# Patient Record
Sex: MALE | Race: WHITE | NOT HISPANIC OR LATINO | Employment: OTHER | URBAN - METROPOLITAN AREA
[De-identification: names, ages, dates, MRNs, and addresses within clinical notes are randomized per-mention and may not be internally consistent; named-entity substitution may affect disease eponyms.]

---

## 2017-12-21 ENCOUNTER — OFFICE VISIT (OUTPATIENT)
Dept: CARDIOLOGY | Facility: CLINIC | Age: 67
End: 2017-12-21
Payer: MEDICARE

## 2017-12-21 VITALS
DIASTOLIC BLOOD PRESSURE: 60 MMHG | HEART RATE: 105 BPM | SYSTOLIC BLOOD PRESSURE: 100 MMHG | WEIGHT: 243 LBS | BODY MASS INDEX: 35.99 KG/M2 | HEIGHT: 69 IN

## 2017-12-21 DIAGNOSIS — R06.02 SHORTNESS OF BREATH: ICD-10-CM

## 2017-12-21 DIAGNOSIS — E78.5 HYPERLIPIDEMIA, UNSPECIFIED HYPERLIPIDEMIA TYPE: ICD-10-CM

## 2017-12-21 DIAGNOSIS — I67.9 CVD (CEREBROVASCULAR DISEASE): ICD-10-CM

## 2017-12-21 DIAGNOSIS — E11.9 TYPE 2 DIABETES MELLITUS WITHOUT COMPLICATION, WITHOUT LONG-TERM CURRENT USE OF INSULIN: ICD-10-CM

## 2017-12-21 DIAGNOSIS — I10 ESSENTIAL HYPERTENSION: Primary | ICD-10-CM

## 2017-12-21 DIAGNOSIS — I63.00 CEREBROVASCULAR ACCIDENT (CVA) DUE TO THROMBOSIS OF PRECEREBRAL ARTERY: ICD-10-CM

## 2017-12-21 PROCEDURE — 99499 UNLISTED E&M SERVICE: CPT | Mod: S$GLB,,, | Performed by: INTERNAL MEDICINE

## 2017-12-21 PROCEDURE — 99999 PR PBB SHADOW E&M-NEW PATIENT-LVL III: CPT | Mod: PBBFAC,,, | Performed by: INTERNAL MEDICINE

## 2017-12-21 PROCEDURE — 93000 ELECTROCARDIOGRAM COMPLETE: CPT | Mod: S$GLB,,, | Performed by: INTERNAL MEDICINE

## 2017-12-21 PROCEDURE — 99213 OFFICE O/P EST LOW 20 MIN: CPT | Mod: S$GLB,,, | Performed by: INTERNAL MEDICINE

## 2017-12-21 RX ORDER — OMEGA-3 FATTY ACIDS 1000 MG
CAPSULE ORAL
COMMUNITY

## 2017-12-21 NOTE — PROGRESS NOTES
Patient, Christiano Frost (MRN #[id number]), presented with a recorded BMI of 35.88 kg/m^2 and a documented comorbidity(s):  - Diabetes Mellitus Type 2  - Hypertension  - Hyperlipidemia  to which the severe obesity is a contributing factor. This is consistent with the definition of severe obesity (BMI 35.0-35.9) with comorbidity (ICD-10 E66.01, Z68.35). The patient's severe obesity was monitored, evaluated, addressed and/or treated. This addendum to the medical record is made on 12/21/2017.

## 2017-12-21 NOTE — PROGRESS NOTES
Subjective:   Patient ID:  Christiano Frost is a 67 y.o. male     Chief Complaint   Patient presents with    Follow-up       HPI: Pt is scheduled for elective back fusion possibly in February or March at [facility] by Dr Edenilson Santiago.  Pt's main problem is chronic low back pain. Pt requests cardiac clearance.    Review of Systems   Cardiovascular: Positive for chest pain (Pt had hours of right lateral chest pain worse in certain positions which has since abated.) and dyspnea on exertion (Chronic shortness of breath after walking a block---pt attributes the shortness of breath to obesity). Negative for claudication, irregular heartbeat, leg swelling, near-syncope, orthopnea, palpitations and syncope.         Last stress test was several years ago.   Objective:   Physical Exam   Constitutional: He is oriented to person, place, and time. He appears well-developed and well-nourished. No distress.   HENT:   Head: Normocephalic.   Eyes: No scleral icterus.   Neck: No JVD present.   Cardiovascular: Normal rate, regular rhythm and normal heart sounds.  Exam reveals no gallop and no friction rub.    No murmur heard.  Pulmonary/Chest: Effort normal and breath sounds normal. No stridor.   Musculoskeletal: He exhibits no edema.   Neurological: He is alert and oriented to person, place, and time.   Skin: Skin is warm and dry. He is not diaphoretic.   Psychiatric: He has a normal mood and affect. His behavior is normal. Judgment and thought content normal.   Vitals reviewed.  Pt is overwight    ECG: sinus tachycardia at 105 bpm, otherwise WNL  Assessment:     1. Essential hypertension    2. CVD (cerebrovascular disease)    3. Cerebrovascular accident (CVA) due to thrombosis of precerebral artery    4. Hyperlipidemia, unspecified hyperlipidemia type    5. Type 2 diabetes mellitus without complication, without long-term current use of insulin    6. Shortness of breath        Plan:   Pt is clinically stable for back surgery  I  explained to pt that there is a risk of stroke if the ASA is held  Will get pre-op Lexiscan Cardiolite stress test  Same meds  Wt reducing diet  Walk more  F/u with Dr Sanjana sanchez orders labs  RTC 6 months

## 2018-01-02 ENCOUNTER — HOSPITAL ENCOUNTER (OUTPATIENT)
Dept: RADIOLOGY | Facility: HOSPITAL | Age: 68
Discharge: HOME | End: 2018-01-02
Attending: INTERNAL MEDICINE
Payer: MEDICARE

## 2018-01-02 ENCOUNTER — HOSPITAL ENCOUNTER (OUTPATIENT)
Dept: CARDIOLOGY | Facility: HOSPITAL | Age: 68
Discharge: HOME | End: 2018-01-02
Attending: INTERNAL MEDICINE
Payer: MEDICARE

## 2018-01-02 ENCOUNTER — TELEPHONE (OUTPATIENT)
Dept: CARDIOLOGY | Facility: CLINIC | Age: 68
End: 2018-01-02

## 2018-01-02 DIAGNOSIS — R06.02 SHORTNESS OF BREATH: ICD-10-CM

## 2018-01-02 LAB — DIASTOLIC DYSFUNCTION: NO

## 2018-01-02 PROCEDURE — 78452 HT MUSCLE IMAGE SPECT MULT: CPT | Mod: 26,,, | Performed by: RADIOLOGY

## 2018-01-02 PROCEDURE — 93018 CV STRESS TEST I&R ONLY: CPT | Mod: ,,, | Performed by: INTERNAL MEDICINE

## 2018-01-02 PROCEDURE — A9502 TC99M TETROFOSMIN: HCPCS

## 2018-01-02 PROCEDURE — 93016 CV STRESS TEST SUPVJ ONLY: CPT | Mod: ,,, | Performed by: INTERNAL MEDICINE

## 2018-07-03 ENCOUNTER — OFFICE VISIT (OUTPATIENT)
Dept: CARDIOLOGY | Facility: CLINIC | Age: 68
End: 2018-07-03
Payer: MEDICARE

## 2018-07-03 VITALS
SYSTOLIC BLOOD PRESSURE: 117 MMHG | WEIGHT: 232.69 LBS | DIASTOLIC BLOOD PRESSURE: 80 MMHG | BODY MASS INDEX: 34.47 KG/M2 | HEIGHT: 69 IN | HEART RATE: 86 BPM

## 2018-07-03 DIAGNOSIS — E78.5 HYPERLIPIDEMIA, UNSPECIFIED HYPERLIPIDEMIA TYPE: ICD-10-CM

## 2018-07-03 DIAGNOSIS — E11.9 TYPE 2 DIABETES MELLITUS WITHOUT COMPLICATION, WITHOUT LONG-TERM CURRENT USE OF INSULIN: ICD-10-CM

## 2018-07-03 DIAGNOSIS — I10 ESSENTIAL HYPERTENSION: Primary | ICD-10-CM

## 2018-07-03 PROCEDURE — 99213 OFFICE O/P EST LOW 20 MIN: CPT | Mod: S$GLB,,, | Performed by: INTERNAL MEDICINE

## 2018-07-03 PROCEDURE — 93000 ELECTROCARDIOGRAM COMPLETE: CPT | Mod: S$GLB,,, | Performed by: INTERNAL MEDICINE

## 2018-07-03 PROCEDURE — 3079F DIAST BP 80-89 MM HG: CPT | Mod: CPTII,S$GLB,, | Performed by: INTERNAL MEDICINE

## 2018-07-03 PROCEDURE — 99999 PR PBB SHADOW E&M-EST. PATIENT-LVL II: CPT | Mod: PBBFAC,,, | Performed by: INTERNAL MEDICINE

## 2018-07-03 PROCEDURE — 3074F SYST BP LT 130 MM HG: CPT | Mod: CPTII,S$GLB,, | Performed by: INTERNAL MEDICINE

## 2019-01-08 ENCOUNTER — OFFICE VISIT (OUTPATIENT)
Dept: CARDIOLOGY | Facility: CLINIC | Age: 69
End: 2019-01-08
Payer: MEDICARE

## 2019-01-08 VITALS
DIASTOLIC BLOOD PRESSURE: 75 MMHG | WEIGHT: 240.88 LBS | SYSTOLIC BLOOD PRESSURE: 114 MMHG | BODY MASS INDEX: 35.68 KG/M2 | HEIGHT: 69 IN | HEART RATE: 100 BPM

## 2019-01-08 DIAGNOSIS — I67.9 CVD (CEREBROVASCULAR DISEASE): ICD-10-CM

## 2019-01-08 DIAGNOSIS — I63.00 CEREBROVASCULAR ACCIDENT (CVA) DUE TO THROMBOSIS OF PRECEREBRAL ARTERY: Primary | ICD-10-CM

## 2019-01-08 DIAGNOSIS — E11.9 TYPE 2 DIABETES MELLITUS WITHOUT COMPLICATION, WITHOUT LONG-TERM CURRENT USE OF INSULIN: ICD-10-CM

## 2019-01-08 DIAGNOSIS — E78.00 PURE HYPERCHOLESTEROLEMIA: ICD-10-CM

## 2019-01-08 DIAGNOSIS — I10 ESSENTIAL HYPERTENSION: ICD-10-CM

## 2019-01-08 PROCEDURE — 1101F PR PT FALLS ASSESS DOC 0-1 FALLS W/OUT INJ PAST YR: ICD-10-PCS | Mod: CPTII,S$GLB,, | Performed by: INTERNAL MEDICINE

## 2019-01-08 PROCEDURE — 99999 PR PBB SHADOW E&M-EST. PATIENT-LVL II: CPT | Mod: PBBFAC,,, | Performed by: INTERNAL MEDICINE

## 2019-01-08 PROCEDURE — 3074F PR MOST RECENT SYSTOLIC BLOOD PRESSURE < 130 MM HG: ICD-10-PCS | Mod: CPTII,S$GLB,, | Performed by: INTERNAL MEDICINE

## 2019-01-08 PROCEDURE — 99999 PR PBB SHADOW E&M-EST. PATIENT-LVL II: ICD-10-PCS | Mod: PBBFAC,,, | Performed by: INTERNAL MEDICINE

## 2019-01-08 PROCEDURE — 99499 RISK ADDL DX/OHS AUDIT: ICD-10-PCS | Mod: S$GLB,,, | Performed by: INTERNAL MEDICINE

## 2019-01-08 PROCEDURE — 93000 EKG 12-LEAD: ICD-10-PCS | Mod: S$GLB,,, | Performed by: INTERNAL MEDICINE

## 2019-01-08 PROCEDURE — 3078F DIAST BP <80 MM HG: CPT | Mod: CPTII,S$GLB,, | Performed by: INTERNAL MEDICINE

## 2019-01-08 PROCEDURE — 99213 PR OFFICE/OUTPT VISIT, EST, LEVL III, 20-29 MIN: ICD-10-PCS | Mod: 25,S$GLB,, | Performed by: INTERNAL MEDICINE

## 2019-01-08 PROCEDURE — 99499 UNLISTED E&M SERVICE: CPT | Mod: S$GLB,,, | Performed by: INTERNAL MEDICINE

## 2019-01-08 PROCEDURE — 3078F PR MOST RECENT DIASTOLIC BLOOD PRESSURE < 80 MM HG: ICD-10-PCS | Mod: CPTII,S$GLB,, | Performed by: INTERNAL MEDICINE

## 2019-01-08 PROCEDURE — 93000 ELECTROCARDIOGRAM COMPLETE: CPT | Mod: S$GLB,,, | Performed by: INTERNAL MEDICINE

## 2019-01-08 PROCEDURE — 3074F SYST BP LT 130 MM HG: CPT | Mod: CPTII,S$GLB,, | Performed by: INTERNAL MEDICINE

## 2019-01-08 PROCEDURE — 1101F PT FALLS ASSESS-DOCD LE1/YR: CPT | Mod: CPTII,S$GLB,, | Performed by: INTERNAL MEDICINE

## 2019-01-08 PROCEDURE — 99213 OFFICE O/P EST LOW 20 MIN: CPT | Mod: 25,S$GLB,, | Performed by: INTERNAL MEDICINE

## 2019-01-08 NOTE — PROGRESS NOTES
Subjective:      Patient ID: Christiano Frost is a 68 y.o. male.    Chief Complaint: Follow-up (Hypertension - -BP up after working out.)    HPI:  Getting over a cold. Pt has a cough with scanty clear sputum.  Seeing urologist today.    Review of Systems   Cardiovascular: Positive for dyspnea on exertion (chronic, with significant exertion). Negative for chest pain, claudication, irregular heartbeat, leg swelling, near-syncope, orthopnea, palpitations and syncope.      Has nocturia q 3 hours.    Past Medical History:   Diagnosis Date    BPH (benign prostatic hyperplasia)     Depression     Diabetes mellitus     GERD (gastroesophageal reflux disease)     Hyperlipidemia     Hypertension     Obesity     TIA (transient ischemic attack) 0322/2016        Past Surgical History:   Procedure Laterality Date    SPINE SURGERY  2044 2005 2008       No family history on file.    Social History     Socioeconomic History    Marital status:      Spouse name: None    Number of children: None    Years of education: None    Highest education level: None   Social Needs    Financial resource strain: None    Food insecurity - worry: None    Food insecurity - inability: None    Transportation needs - medical: None    Transportation needs - non-medical: None   Occupational History    None   Tobacco Use    Smoking status: Never Smoker    Smokeless tobacco: Never Used   Substance and Sexual Activity    Alcohol use: None    Drug use: None    Sexual activity: None   Other Topics Concern    None   Social History Narrative    None       Current Outpatient Medications on File Prior to Visit   Medication Sig Dispense Refill    aspirin (ECOTRIN) 81 MG EC tablet Take 81 mg by mouth once daily.      atorvastatin (LIPITOR) 10 MG tablet Take 10 mg by mouth once daily.       dutasteride (AVODART) 0.5 mg capsule Take 0.5 mg by mouth once daily.      enalapril (VASOTEC) 5 MG tablet TK 1 T PO  QD  0    escitalopram  "oxalate (LEXAPRO) 10 MG tablet Take 10 mg by mouth once daily.       metformin (GLUCOPHAGE) 500 MG tablet 500 mg daily with breakfast.       omega-3 fatty acids (FISH OIL CONCENTRATE) 1,000 mg Cap Take by mouth.      tamsulosin (FLOMAX) 0.4 mg Cp24 0.4 mg once daily.        No current facility-administered medications on file prior to visit.        Review of patient's allergies indicates:  No Known Allergies  Objective:     Vitals:    01/08/19 1111   BP: 114/75   BP Location: Left arm   Patient Position: Sitting   BP Method: Large (Automatic)   Pulse: 100   Weight: 109.3 kg (240 lb 14.4 oz)   Height: 5' 9" (1.753 m)        Physical Exam   Constitutional: He is oriented to person, place, and time. He appears well-developed and well-nourished. No distress.   Eyes: No scleral icterus.   Neck: No JVD present. Carotid bruit is not present.   Cardiovascular: Regular rhythm and normal heart sounds. Exam reveals no gallop and no friction rub.   No murmur heard.  Pulmonary/Chest: Effort normal and breath sounds normal. No respiratory distress.   Musculoskeletal: He exhibits no edema.   Neurological: He is alert and oriented to person, place, and time.   Skin: Skin is warm and dry. He is not diaphoretic.   Psychiatric: He has a normal mood and affect. His behavior is normal. Judgment and thought content normal.   Vitals reviewed.     Wt up 8 lbs    ECG--NSR, WNL  Assessment:     1. Cerebrovascular accident (CVA) due to thrombosis of precerebral artery    2. CVD (cerebrovascular disease)    3. Essential hypertension    4. Pure hypercholesterolemia    5. Type 2 diabetes mellitus without complication, without long-term current use of insulin      Plan:   Christiano was seen today for follow-up.    Diagnoses and all orders for this visit:    Cerebrovascular accident (CVA) due to thrombosis of precerebral artery    CVD (cerebrovascular disease)    Essential hypertension  -     IN OFFICE EKG 12-LEAD (to [place])    Pure " hypercholesterolemia    Type 2 diabetes mellitus without complication, without long-term current use of insulin     f/u with urologist--Dr Webster    RTC 6 months    F/u with Dr Allan    Discussed diet    Follow-up in about 6 months (around 7/8/2019).

## 2019-01-08 NOTE — PROGRESS NOTES
Patient, Christiano Frost (MRN #[id number]), presented with a recorded BMI of 35.57 kg/m^2 and a documented comorbidity(s):  - Diabetes Mellitus Type 2  - Hypertension  - Hyperlipidemia  to which the severe obesity is a contributing factor. This is consistent with the definition of severe obesity (BMI 35.0-39.9) with comorbidity (ICD-10 E66.01, Z68.35). The patient's severe obesity was monitored, evaluated, addressed and/or treated. This addendum to the medical record is made on 01/08/2019.

## 2019-07-09 ENCOUNTER — OFFICE VISIT (OUTPATIENT)
Dept: CARDIOLOGY | Facility: CLINIC | Age: 69
End: 2019-07-09
Payer: MEDICARE

## 2019-07-09 VITALS
BODY MASS INDEX: 36.17 KG/M2 | HEIGHT: 69 IN | WEIGHT: 244.19 LBS | SYSTOLIC BLOOD PRESSURE: 134 MMHG | DIASTOLIC BLOOD PRESSURE: 85 MMHG | HEART RATE: 97 BPM | OXYGEN SATURATION: 90 %

## 2019-07-09 DIAGNOSIS — I67.9 CVD (CEREBROVASCULAR DISEASE): ICD-10-CM

## 2019-07-09 DIAGNOSIS — I63.00 CEREBROVASCULAR ACCIDENT (CVA) DUE TO THROMBOSIS OF PRECEREBRAL ARTERY: ICD-10-CM

## 2019-07-09 DIAGNOSIS — I10 ESSENTIAL HYPERTENSION: Primary | ICD-10-CM

## 2019-07-09 DIAGNOSIS — E78.00 PURE HYPERCHOLESTEROLEMIA: ICD-10-CM

## 2019-07-09 DIAGNOSIS — E11.9 TYPE 2 DIABETES MELLITUS WITHOUT COMPLICATION, WITHOUT LONG-TERM CURRENT USE OF INSULIN: ICD-10-CM

## 2019-07-09 PROCEDURE — 3079F DIAST BP 80-89 MM HG: CPT | Mod: CPTII,S$GLB,, | Performed by: INTERNAL MEDICINE

## 2019-07-09 PROCEDURE — 3075F SYST BP GE 130 - 139MM HG: CPT | Mod: CPTII,S$GLB,, | Performed by: INTERNAL MEDICINE

## 2019-07-09 PROCEDURE — 1101F PR PT FALLS ASSESS DOC 0-1 FALLS W/OUT INJ PAST YR: ICD-10-PCS | Mod: CPTII,S$GLB,, | Performed by: INTERNAL MEDICINE

## 2019-07-09 PROCEDURE — 93000 ELECTROCARDIOGRAM COMPLETE: CPT | Mod: S$GLB,,, | Performed by: INTERNAL MEDICINE

## 2019-07-09 PROCEDURE — 1101F PT FALLS ASSESS-DOCD LE1/YR: CPT | Mod: CPTII,S$GLB,, | Performed by: INTERNAL MEDICINE

## 2019-07-09 PROCEDURE — 3079F PR MOST RECENT DIASTOLIC BLOOD PRESSURE 80-89 MM HG: ICD-10-PCS | Mod: CPTII,S$GLB,, | Performed by: INTERNAL MEDICINE

## 2019-07-09 PROCEDURE — 99999 PR PBB SHADOW E&M-EST. PATIENT-LVL III: ICD-10-PCS | Mod: PBBFAC,,, | Performed by: INTERNAL MEDICINE

## 2019-07-09 PROCEDURE — 99214 OFFICE O/P EST MOD 30 MIN: CPT | Mod: S$GLB,,, | Performed by: INTERNAL MEDICINE

## 2019-07-09 PROCEDURE — 99999 PR PBB SHADOW E&M-EST. PATIENT-LVL III: CPT | Mod: PBBFAC,,, | Performed by: INTERNAL MEDICINE

## 2019-07-09 PROCEDURE — 3075F PR MOST RECENT SYSTOLIC BLOOD PRESS GE 130-139MM HG: ICD-10-PCS | Mod: CPTII,S$GLB,, | Performed by: INTERNAL MEDICINE

## 2019-07-09 PROCEDURE — 93000 EKG 12-LEAD: ICD-10-PCS | Mod: S$GLB,,, | Performed by: INTERNAL MEDICINE

## 2019-07-09 PROCEDURE — 99214 PR OFFICE/OUTPT VISIT, EST, LEVL IV, 30-39 MIN: ICD-10-PCS | Mod: S$GLB,,, | Performed by: INTERNAL MEDICINE

## 2019-07-09 RX ORDER — DUREZOL 0.5 MG/ML
EMULSION OPHTHALMIC
Refills: 5 | COMMUNITY
Start: 2019-05-10 | End: 2022-05-03

## 2019-07-09 RX ORDER — BROMFENAC 0.76 MG/ML
SOLUTION/ DROPS OPHTHALMIC
Refills: 2 | COMMUNITY
Start: 2019-05-30 | End: 2022-05-03

## 2019-07-09 RX ORDER — ESOMEPRAZOLE MAGNESIUM 40 MG/1
CAPSULE, DELAYED RELEASE ORAL
COMMUNITY
Start: 2019-03-08

## 2019-07-09 NOTE — PROGRESS NOTES
Subjective:      Patient ID: Christiano Frost is a 68 y.o. male.    Chief Complaint: Follow-up (Hypertension)    HPI:  Feels OK.    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    Back feels better since surgery with Dr Santiago    Walks a little    Rides stationary bike.    Lifts weights.    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    Review of Systems   Cardiovascular: Negative for chest pain, claudication, dyspnea on exertion, irregular heartbeat, leg swelling, near-syncope, orthopnea, palpitations and syncope.      Travelling to [facility]  Past Medical History:   Diagnosis Date    BPH (benign prostatic hyperplasia)     Depression     Diabetes mellitus     GERD (gastroesophageal reflux disease)     Hyperlipidemia     Hypertension     Obesity     TIA (transient ischemic attack) 0322/2016        Past Surgical History:   Procedure Laterality Date    SPINE SURGERY  2044 2005 2008       No family history on file.    Social History     Socioeconomic History    Marital status:      Spouse name: Not on file    Number of children: Not on file    Years of education: Not on file    Highest education level: Not on file   Occupational History    Not on file   Social Needs    Financial resource strain: Not on file    Food insecurity:     Worry: Not on file     Inability: Not on file    Transportation needs:     Medical: Not on file     Non-medical: Not on file   Tobacco Use    Smoking status: Never Smoker    Smokeless tobacco: Never Used   Substance and Sexual Activity    Alcohol use: Not on file    Drug use: Not on file    Sexual activity: Not on file   Lifestyle    Physical activity:     Days per week: Not on file     Minutes per session: Not on file    Stress: Not on file   Relationships    Social connections:     Talks on phone: Not on file     Gets together: Not on file     Attends Christianity service: Not on file     Active member of club or organization: Not on file  "    Attends meetings of clubs or organizations: Not on file     Relationship status: Not on file   Other Topics Concern    Not on file   Social History Narrative    Not on file       Current Outpatient Medications on File Prior to Visit   Medication Sig Dispense Refill    aspirin (ECOTRIN) 81 MG EC tablet Take 81 mg by mouth once daily.      atorvastatin (LIPITOR) 10 MG tablet Take 10 mg by mouth once daily.       BROMSITE 0.075 % Drop INSTILL 1 DROP INTO RIGHT EYE ONCE A DAY  2    DUREZOL 0.05 % Drop ophthalmic solution INSTILL 1 DROP INTO AFFECTED EYE ONCE A DAY AS DIRECTED  5    dutasteride (AVODART) 0.5 mg capsule Take 0.5 mg by mouth once daily.      enalapril (VASOTEC) 5 MG tablet TK 1 T PO  QD  0    escitalopram oxalate (LEXAPRO) 10 MG tablet Take 10 mg by mouth once daily.       esomeprazole (NEXIUM) 40 MG capsule Take by mouth.      metformin (GLUCOPHAGE) 500 MG tablet 500 mg daily with breakfast.       omega-3 fatty acids (FISH OIL CONCENTRATE) 1,000 mg Cap Take by mouth.      tamsulosin (FLOMAX) 0.4 mg Cp24 0.4 mg once daily.        No current facility-administered medications on file prior to visit.        Review of patient's allergies indicates:  No Known Allergies  Objective:     Vitals:    07/09/19 1135   BP: 134/85   BP Location: Left arm   Patient Position: Sitting   BP Method: Large (Automatic)   Pulse: 97   SpO2: (!) 90%   Weight: 110.7 kg (244 lb 2.6 oz)   Height: 5' 9" (1.753 m)        Physical Exam   Constitutional: He is oriented to person, place, and time. He appears well-developed and well-nourished. No distress.   Eyes: No scleral icterus.   Neck: No JVD present. Carotid bruit is not present.   Cardiovascular: Regular rhythm and normal heart sounds. Exam reveals no gallop and no friction rub.   No murmur heard.  Pulmonary/Chest: Effort normal and breath sounds normal. No respiratory distress.   Musculoskeletal: He exhibits no edema.   Neurological: He is alert and oriented to " person, place, and time.   Skin: Skin is warm and dry. He is not diaphoretic.   Psychiatric: He has a normal mood and affect. His behavior is normal. Judgment and thought content normal.   Vitals reviewed.     Wt up 3 lbs    ECG: NSR, first degree AV block.    Note Cardiolite stress test in 2017 was normal    Assessment:     1. Essential hypertension    2. Pure hypercholesterolemia    3. Cerebrovascular accident (CVA) due to thrombosis of precerebral artery    4. CVD (cerebrovascular disease)    5. Type 2 diabetes mellitus without complication, without long-term current use of insulin      Plan:   Christiano was seen today for follow-up.    Diagnoses and all orders for this visit:    Essential hypertension    Pure hypercholesterolemia    Cerebrovascular accident (CVA) due to thrombosis of precerebral artery    CVD (cerebrovascular disease)    Type 2 diabetes mellitus without complication, without long-term current use of insulin    Other orders  -     IN OFFICE EKG 12-LEAD (to [place])     Pt has appt with Dr Allan in August with lab    Same meds    If dizziness recurs would consider decreasing the enalapril to 2.5 mg daily since the dizziness may be due to orthostatic hypotension    Follow up in about 6 months (around 1/9/2020).

## 2020-01-21 ENCOUNTER — OFFICE VISIT (OUTPATIENT)
Dept: CARDIOLOGY | Facility: CLINIC | Age: 70
End: 2020-01-21
Payer: MEDICARE

## 2020-01-21 VITALS
WEIGHT: 241.88 LBS | HEART RATE: 104 BPM | SYSTOLIC BLOOD PRESSURE: 132 MMHG | HEIGHT: 69 IN | DIASTOLIC BLOOD PRESSURE: 86 MMHG | BODY MASS INDEX: 35.82 KG/M2 | OXYGEN SATURATION: 93 %

## 2020-01-21 DIAGNOSIS — I69.398: ICD-10-CM

## 2020-01-21 DIAGNOSIS — F07.0: ICD-10-CM

## 2020-01-21 DIAGNOSIS — I67.9 CVD (CEREBROVASCULAR DISEASE): ICD-10-CM

## 2020-01-21 DIAGNOSIS — E78.00 PURE HYPERCHOLESTEROLEMIA: ICD-10-CM

## 2020-01-21 DIAGNOSIS — R06.02 SHORTNESS OF BREATH: ICD-10-CM

## 2020-01-21 DIAGNOSIS — I10 ESSENTIAL HYPERTENSION: Primary | ICD-10-CM

## 2020-01-21 DIAGNOSIS — E11.9 TYPE 2 DIABETES MELLITUS WITHOUT COMPLICATION, WITHOUT LONG-TERM CURRENT USE OF INSULIN: ICD-10-CM

## 2020-01-21 PROCEDURE — 3079F PR MOST RECENT DIASTOLIC BLOOD PRESSURE 80-89 MM HG: ICD-10-PCS | Mod: CPTII,S$GLB,, | Performed by: INTERNAL MEDICINE

## 2020-01-21 PROCEDURE — 3075F SYST BP GE 130 - 139MM HG: CPT | Mod: CPTII,S$GLB,, | Performed by: INTERNAL MEDICINE

## 2020-01-21 PROCEDURE — 93000 ELECTROCARDIOGRAM COMPLETE: CPT | Mod: S$GLB,,, | Performed by: INTERNAL MEDICINE

## 2020-01-21 PROCEDURE — 1159F MED LIST DOCD IN RCRD: CPT | Mod: S$GLB,,, | Performed by: INTERNAL MEDICINE

## 2020-01-21 PROCEDURE — 99213 PR OFFICE/OUTPT VISIT, EST, LEVL III, 20-29 MIN: ICD-10-PCS | Mod: 25,S$GLB,, | Performed by: INTERNAL MEDICINE

## 2020-01-21 PROCEDURE — 1101F PT FALLS ASSESS-DOCD LE1/YR: CPT | Mod: CPTII,S$GLB,, | Performed by: INTERNAL MEDICINE

## 2020-01-21 PROCEDURE — 1101F PR PT FALLS ASSESS DOC 0-1 FALLS W/OUT INJ PAST YR: ICD-10-PCS | Mod: CPTII,S$GLB,, | Performed by: INTERNAL MEDICINE

## 2020-01-21 PROCEDURE — 99999 PR PBB SHADOW E&M-EST. PATIENT-LVL III: ICD-10-PCS | Mod: PBBFAC,,, | Performed by: INTERNAL MEDICINE

## 2020-01-21 PROCEDURE — 1159F PR MEDICATION LIST DOCUMENTED IN MEDICAL RECORD: ICD-10-PCS | Mod: S$GLB,,, | Performed by: INTERNAL MEDICINE

## 2020-01-21 PROCEDURE — 93000 EKG 12-LEAD: ICD-10-PCS | Mod: S$GLB,,, | Performed by: INTERNAL MEDICINE

## 2020-01-21 PROCEDURE — 99999 PR PBB SHADOW E&M-EST. PATIENT-LVL III: CPT | Mod: PBBFAC,,, | Performed by: INTERNAL MEDICINE

## 2020-01-21 PROCEDURE — 99213 OFFICE O/P EST LOW 20 MIN: CPT | Mod: 25,S$GLB,, | Performed by: INTERNAL MEDICINE

## 2020-01-21 PROCEDURE — 3079F DIAST BP 80-89 MM HG: CPT | Mod: CPTII,S$GLB,, | Performed by: INTERNAL MEDICINE

## 2020-01-21 PROCEDURE — 3075F PR MOST RECENT SYSTOLIC BLOOD PRESS GE 130-139MM HG: ICD-10-PCS | Mod: CPTII,S$GLB,, | Performed by: INTERNAL MEDICINE

## 2020-01-21 NOTE — PROGRESS NOTES
Subjective:      Patient ID: Christiano Frost is a 69 y.o. male.    Chief Complaint: Follow-up (Hypetension)    HPI:  Sees an orthopedic for right knee pain.    Rides a stationary bike.    Pt c/o mild shortness of breath when taking the trash out    Review of Systems   Cardiovascular: Positive for dyspnea on exertion. Negative for chest pain, claudication, irregular heartbeat, leg swelling, near-syncope, orthopnea, palpitations and syncope.      Wife has told pt that he has had a personality change since his TIA/stroke    Past Medical History:   Diagnosis Date    BPH (benign prostatic hyperplasia)     Depression     Diabetes mellitus     GERD (gastroesophageal reflux disease)     Hyperlipidemia     Hypertension     Obesity     TIA (transient ischemic attack) 0322/2016        Past Surgical History:   Procedure Laterality Date    SPINE SURGERY  2044 2005 2008       No family history on file.    Social History     Socioeconomic History    Marital status:      Spouse name: Not on file    Number of children: Not on file    Years of education: Not on file    Highest education level: Not on file   Occupational History    Not on file   Social Needs    Financial resource strain: Not on file    Food insecurity:     Worry: Not on file     Inability: Not on file    Transportation needs:     Medical: Not on file     Non-medical: Not on file   Tobacco Use    Smoking status: Never Smoker    Smokeless tobacco: Never Used   Substance and Sexual Activity    Alcohol use: Not on file    Drug use: Not on file    Sexual activity: Not on file   Lifestyle    Physical activity:     Days per week: Not on file     Minutes per session: Not on file    Stress: Not on file   Relationships    Social connections:     Talks on phone: Not on file     Gets together: Not on file     Attends Cheondoism service: Not on file     Active member of club or organization: Not on file     Attends meetings of clubs or  "organizations: Not on file     Relationship status: Not on file   Other Topics Concern    Not on file   Social History Narrative    Not on file       Current Outpatient Medications on File Prior to Visit   Medication Sig Dispense Refill    aspirin (ECOTRIN) 81 MG EC tablet Take 81 mg by mouth once daily.      atorvastatin (LIPITOR) 10 MG tablet Take 10 mg by mouth once daily.       BROMSITE 0.075 % Drop INSTILL 1 DROP INTO RIGHT EYE ONCE A DAY  2    DUREZOL 0.05 % Drop ophthalmic solution INSTILL 1 DROP INTO AFFECTED EYE ONCE A DAY AS DIRECTED  5    dutasteride (AVODART) 0.5 mg capsule Take 0.5 mg by mouth once daily.      enalapril (VASOTEC) 5 MG tablet TK 1 T PO  QD  0    esomeprazole (NEXIUM) 40 MG capsule Take by mouth.      metformin (GLUCOPHAGE) 500 MG tablet 500 mg daily with breakfast.       omega-3 fatty acids (FISH OIL CONCENTRATE) 1,000 mg Cap Take by mouth.      tamsulosin (FLOMAX) 0.4 mg Cp24 0.4 mg once daily.       escitalopram oxalate (LEXAPRO) 10 MG tablet Take 10 mg by mouth once daily.        No current facility-administered medications on file prior to visit.        Review of patient's allergies indicates:  No Known Allergies  Objective:     Vitals:    01/21/20 1120   BP: 132/86   BP Location: Left arm   Patient Position: Sitting   BP Method: Large (Automatic)   Pulse: 104   SpO2: (!) 93%   Weight: 109.7 kg (241 lb 13.5 oz)   Height: 5' 9" (1.753 m)        Physical Exam   Constitutional: He is oriented to person, place, and time. He appears well-developed and well-nourished. No distress.   Eyes: No scleral icterus.   Neck: No JVD present. Carotid bruit is not present.   Cardiovascular: Regular rhythm and normal heart sounds. Exam reveals no gallop and no friction rub.   No murmur heard.  Pulmonary/Chest: Effort normal and breath sounds normal. No respiratory distress.   Musculoskeletal: He exhibits no edema.   Neurological: He is alert and oriented to person, place, and time.   Skin: " Skin is warm and dry. He is not diaphoretic.   Psychiatric: He has a normal mood and affect. His behavior is normal. Judgment and thought content normal.   Vitals reviewed.     ECG: NSR, WNL    Wt down 3 lbs  Assessment:     1. Essential hypertension    2. Pure hypercholesterolemia    3. Type 2 diabetes mellitus without complication, without long-term current use of insulin    4. Shortness of breath    5. CVD (cerebrovascular disease)    6. 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

## 2020-01-21 NOTE — PROGRESS NOTES
Patient, Christiano Frost (MRN #[id number]), presented with a recorded BMI of 35.71 kg/m^2 and a documented comorbidity(s):  - Diabetes Mellitus Type 2  - Hypertension  - Hyperlipidemia  to which the severe obesity is a contributing factor. This is consistent with the definition of severe obesity (BMI 35.0-39.9) with comorbidity (ICD-10 E66.01, Z68.35). The patient's severe obesity was monitored, evaluated, addressed and/or treated. This addendum to the medical record is made on 01/21/2020.

## 2020-07-13 ENCOUNTER — TELEPHONE (OUTPATIENT)
Dept: CARDIOLOGY | Facility: CLINIC | Age: 70
End: 2020-07-13

## 2020-07-13 NOTE — TELEPHONE ENCOUNTER
Reached out to patient rescheduled her appointment for August with Dr. Meyers.      ----- Message from Jazmin Marshall sent at 7/13/2020  1:23 PM CDT -----  Contact: pt  Pt returning a call to the clinic       Please contact pt: [phone] (cell) or [phone] (home)

## 2020-07-13 NOTE — TELEPHONE ENCOUNTER
August appointment with Dr Meyers needs to be rescheduled.  Reached out to patient.  No answer, left voice mail message.

## 2020-10-27 ENCOUNTER — OFFICE VISIT (OUTPATIENT)
Dept: CARDIOLOGY | Facility: CLINIC | Age: 70
End: 2020-10-27
Payer: MEDICARE

## 2020-10-27 VITALS
OXYGEN SATURATION: 94 % | SYSTOLIC BLOOD PRESSURE: 136 MMHG | WEIGHT: 241.75 LBS | HEIGHT: 69 IN | HEART RATE: 93 BPM | DIASTOLIC BLOOD PRESSURE: 79 MMHG | BODY MASS INDEX: 35.81 KG/M2

## 2020-10-27 DIAGNOSIS — E11.9 TYPE 2 DIABETES MELLITUS WITHOUT COMPLICATION, WITHOUT LONG-TERM CURRENT USE OF INSULIN: ICD-10-CM

## 2020-10-27 DIAGNOSIS — F07.0: ICD-10-CM

## 2020-10-27 DIAGNOSIS — I10 ESSENTIAL HYPERTENSION: Primary | ICD-10-CM

## 2020-10-27 DIAGNOSIS — R06.02 SHORTNESS OF BREATH: ICD-10-CM

## 2020-10-27 DIAGNOSIS — I67.9 CVD (CEREBROVASCULAR DISEASE): ICD-10-CM

## 2020-10-27 DIAGNOSIS — E78.5 HYPERLIPIDEMIA, UNSPECIFIED HYPERLIPIDEMIA TYPE: ICD-10-CM

## 2020-10-27 DIAGNOSIS — I69.398: ICD-10-CM

## 2020-10-27 PROCEDURE — 3075F SYST BP GE 130 - 139MM HG: CPT | Mod: CPTII,S$GLB,, | Performed by: INTERNAL MEDICINE

## 2020-10-27 PROCEDURE — 1159F MED LIST DOCD IN RCRD: CPT | Mod: S$GLB,,, | Performed by: INTERNAL MEDICINE

## 2020-10-27 PROCEDURE — 3075F PR MOST RECENT SYSTOLIC BLOOD PRESS GE 130-139MM HG: ICD-10-PCS | Mod: CPTII,S$GLB,, | Performed by: INTERNAL MEDICINE

## 2020-10-27 PROCEDURE — 3008F BODY MASS INDEX DOCD: CPT | Mod: CPTII,S$GLB,, | Performed by: INTERNAL MEDICINE

## 2020-10-27 PROCEDURE — 1101F PR PT FALLS ASSESS DOC 0-1 FALLS W/OUT INJ PAST YR: ICD-10-PCS | Mod: CPTII,S$GLB,, | Performed by: INTERNAL MEDICINE

## 2020-10-27 PROCEDURE — 3008F PR BODY MASS INDEX (BMI) DOCUMENTED: ICD-10-PCS | Mod: CPTII,S$GLB,, | Performed by: INTERNAL MEDICINE

## 2020-10-27 PROCEDURE — 93000 EKG 12-LEAD: ICD-10-PCS | Mod: S$GLB,,, | Performed by: INTERNAL MEDICINE

## 2020-10-27 PROCEDURE — 1126F PR PAIN SEVERITY QUANTIFIED, NO PAIN PRESENT: ICD-10-PCS | Mod: S$GLB,,, | Performed by: INTERNAL MEDICINE

## 2020-10-27 PROCEDURE — 99214 PR OFFICE/OUTPT VISIT, EST, LEVL IV, 30-39 MIN: ICD-10-PCS | Mod: 25,S$GLB,, | Performed by: INTERNAL MEDICINE

## 2020-10-27 PROCEDURE — 1159F PR MEDICATION LIST DOCUMENTED IN MEDICAL RECORD: ICD-10-PCS | Mod: S$GLB,,, | Performed by: INTERNAL MEDICINE

## 2020-10-27 PROCEDURE — 93000 ELECTROCARDIOGRAM COMPLETE: CPT | Mod: S$GLB,,, | Performed by: INTERNAL MEDICINE

## 2020-10-27 PROCEDURE — 99999 PR PBB SHADOW E&M-EST. PATIENT-LVL IV: ICD-10-PCS | Mod: PBBFAC,,, | Performed by: INTERNAL MEDICINE

## 2020-10-27 PROCEDURE — 3078F PR MOST RECENT DIASTOLIC BLOOD PRESSURE < 80 MM HG: ICD-10-PCS | Mod: CPTII,S$GLB,, | Performed by: INTERNAL MEDICINE

## 2020-10-27 PROCEDURE — 1101F PT FALLS ASSESS-DOCD LE1/YR: CPT | Mod: CPTII,S$GLB,, | Performed by: INTERNAL MEDICINE

## 2020-10-27 PROCEDURE — 99214 OFFICE O/P EST MOD 30 MIN: CPT | Mod: 25,S$GLB,, | Performed by: INTERNAL MEDICINE

## 2020-10-27 PROCEDURE — 99999 PR PBB SHADOW E&M-EST. PATIENT-LVL IV: CPT | Mod: PBBFAC,,, | Performed by: INTERNAL MEDICINE

## 2020-10-27 PROCEDURE — 1126F AMNT PAIN NOTED NONE PRSNT: CPT | Mod: S$GLB,,, | Performed by: INTERNAL MEDICINE

## 2020-10-27 PROCEDURE — 3078F DIAST BP <80 MM HG: CPT | Mod: CPTII,S$GLB,, | Performed by: INTERNAL MEDICINE

## 2020-10-27 RX ORDER — IBUPROFEN 400 MG/1
TABLET ORAL
Status: ON HOLD | COMMUNITY
Start: 2020-10-15 | End: 2022-08-03 | Stop reason: HOSPADM

## 2020-10-27 RX ORDER — HYDROCODONE BITARTRATE AND ACETAMINOPHEN 10; 325 MG/1; MG/1
TABLET ORAL
Status: ON HOLD | COMMUNITY
Start: 2020-10-08 | End: 2022-08-03 | Stop reason: HOSPADM

## 2020-10-27 NOTE — PROGRESS NOTES
Subjective:      Patient ID: Christiano Frost is a 69 y.o. male.    Chief Complaint: Follow-up (Hypertension)    HPI:  Rides bike.    Pt has been limited by back pain.    Pt got constipated on pain meds.    Right knee hurt for a week.    The chronic shortness of breath with exertion has improved with riding bike.    Pt sees Dr Santiago for chronic low back pain.    Review of Systems   Cardiovascular: Positive for dyspnea on exertion (improved overall) and leg swelling (  mild chronic, intermittent). Negative for chest pain, claudication, irregular heartbeat, near-syncope, orthopnea, palpitations and syncope.        Past Medical History:   Diagnosis Date    BPH (benign prostatic hyperplasia)     Depression     Diabetes mellitus     GERD (gastroesophageal reflux disease)     Hyperlipidemia     Hypertension     Obesity     TIA (transient ischemic attack) 0322/2016        Past Surgical History:   Procedure Laterality Date    SPINE SURGERY  2044 2005 2008       History reviewed. No pertinent family history.    Social History     Socioeconomic History    Marital status:      Spouse name: Not on file    Number of children: Not on file    Years of education: Not on file    Highest education level: Not on file   Occupational History    Not on file   Social Needs    Financial resource strain: Not on file    Food insecurity     Worry: Not on file     Inability: Not on file    Transportation needs     Medical: Not on file     Non-medical: Not on file   Tobacco Use    Smoking status: Never Smoker    Smokeless tobacco: Never Used   Substance and Sexual Activity    Alcohol use: Not on file    Drug use: Not on file    Sexual activity: Not on file   Lifestyle    Physical activity     Days per week: Not on file     Minutes per session: Not on file    Stress: Not on file   Relationships    Social connections     Talks on phone: Not on file     Gets together: Not on file     Attends [employer] service: Not  "on file     Active member of club or organization: Not on file     Attends meetings of clubs or organizations: Not on file     Relationship status: Not on file   Other Topics Concern    Not on file   Social History Narrative    Not on file       Current Outpatient Medications on File Prior to Visit   Medication Sig Dispense Refill    aspirin (ECOTRIN) 81 MG EC tablet Take 81 mg by mouth once daily.      atorvastatin (LIPITOR) 10 MG tablet Take 10 mg by mouth once daily.       BROMSITE 0.075 % Drop INSTILL 1 DROP INTO RIGHT EYE ONCE A DAY  2    DUREZOL 0.05 % Drop ophthalmic solution INSTILL 1 DROP INTO AFFECTED EYE ONCE A DAY AS DIRECTED  5    dutasteride (AVODART) 0.5 mg capsule Take 0.5 mg by mouth once daily.      enalapril (VASOTEC) 5 MG tablet TK 1 T PO  QD  0    escitalopram oxalate (LEXAPRO) 10 MG tablet Take 10 mg by mouth once daily.       esomeprazole (NEXIUM) 40 MG capsule Take by mouth.      HYDROcodone-acetaminophen (NORCO)  mg per tablet TK 1 T PO TID FOR 7 DAYS      ibuprofen (ADVIL,MOTRIN) 400 MG tablet       metformin (GLUCOPHAGE) 500 MG tablet 500 mg daily with breakfast.       omega-3 fatty acids (FISH OIL CONCENTRATE) 1,000 mg Cap Take by mouth.      tamsulosin (FLOMAX) 0.4 mg Cp24 0.4 mg once daily.        No current facility-administered medications on file prior to visit.        Review of patient's allergies indicates:  No Known Allergies  Objective:     Vitals:    10/27/20 1504 10/27/20 1526   BP: (!) 151/84 136/79   BP Location: Left arm Left arm   Patient Position: Sitting Sitting   BP Method: Large (Automatic)    Pulse: 93    SpO2: (!) 94%    Weight: 109.6 kg (241 lb 11.8 oz)    Height: 5' 9" (1.753 m)         Physical Exam   Constitutional: He is oriented to person, place, and time. He appears well-developed and well-nourished. No distress.   Eyes: No scleral icterus.   Neck: No JVD present. Carotid bruit is not present.   Cardiovascular: Regular rhythm and normal " heart sounds. Exam reveals no gallop and no friction rub.   No murmur heard.  Pulmonary/Chest: Effort normal and breath sounds normal. No respiratory distress.   Musculoskeletal:         General: Edema (trace pitting edema bilaterally) present.   Neurological: He is alert and oriented to person, place, and time.   Skin: Skin is warm and dry. He is not diaphoretic.   Psychiatric: He has a normal mood and affect. His behavior is normal. Judgment and thought content normal.   Vitals reviewed.     ECG: NSR, WNL      Assessment:     1. Essential hypertension    2. BMI 35.0-35.9,adult    3. Hyperlipidemia, unspecified hyperlipidemia type    4. Type 2 diabetes mellitus without complication, without long-term current use of insulin    5. Shortness of breath    6. 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 CVD (cerebrovascular disease)      Plan:   Christiano was seen today for follow-up.    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

## 2021-05-10 ENCOUNTER — TELEPHONE (OUTPATIENT)
Dept: CARDIOLOGY | Facility: CLINIC | Age: 71
End: 2021-05-10

## 2021-10-05 ENCOUNTER — OFFICE VISIT (OUTPATIENT)
Dept: CARDIOLOGY | Facility: CLINIC | Age: 71
End: 2021-10-05
Payer: MEDICARE

## 2021-10-05 VITALS
BODY MASS INDEX: 35.23 KG/M2 | HEART RATE: 90 BPM | HEIGHT: 69 IN | DIASTOLIC BLOOD PRESSURE: 70 MMHG | WEIGHT: 237.88 LBS | SYSTOLIC BLOOD PRESSURE: 118 MMHG

## 2021-10-05 DIAGNOSIS — R06.02 SHORTNESS OF BREATH: ICD-10-CM

## 2021-10-05 DIAGNOSIS — I10 ESSENTIAL HYPERTENSION: Primary | ICD-10-CM

## 2021-10-05 DIAGNOSIS — I67.9 CVD (CEREBROVASCULAR DISEASE): ICD-10-CM

## 2021-10-05 DIAGNOSIS — F07.0: ICD-10-CM

## 2021-10-05 DIAGNOSIS — E11.9 TYPE 2 DIABETES MELLITUS WITHOUT COMPLICATION, WITHOUT LONG-TERM CURRENT USE OF INSULIN: ICD-10-CM

## 2021-10-05 DIAGNOSIS — I69.398: ICD-10-CM

## 2021-10-05 DIAGNOSIS — E78.5 HYPERLIPIDEMIA, UNSPECIFIED HYPERLIPIDEMIA TYPE: ICD-10-CM

## 2021-10-05 PROCEDURE — 99999 PR PBB SHADOW E&M-EST. PATIENT-LVL III: CPT | Mod: PBBFAC,,, | Performed by: INTERNAL MEDICINE

## 2021-10-05 PROCEDURE — 99999 PR PBB SHADOW E&M-EST. PATIENT-LVL III: ICD-10-PCS | Mod: PBBFAC,,, | Performed by: INTERNAL MEDICINE

## 2021-10-05 PROCEDURE — 3078F PR MOST RECENT DIASTOLIC BLOOD PRESSURE < 80 MM HG: ICD-10-PCS | Mod: CPTII,S$GLB,, | Performed by: INTERNAL MEDICINE

## 2021-10-05 PROCEDURE — 3078F DIAST BP <80 MM HG: CPT | Mod: CPTII,S$GLB,, | Performed by: INTERNAL MEDICINE

## 2021-10-05 PROCEDURE — 1159F MED LIST DOCD IN RCRD: CPT | Mod: CPTII,S$GLB,, | Performed by: INTERNAL MEDICINE

## 2021-10-05 PROCEDURE — 3008F PR BODY MASS INDEX (BMI) DOCUMENTED: ICD-10-PCS | Mod: CPTII,S$GLB,, | Performed by: INTERNAL MEDICINE

## 2021-10-05 PROCEDURE — 4010F ACE/ARB THERAPY RXD/TAKEN: CPT | Mod: CPTII,S$GLB,, | Performed by: INTERNAL MEDICINE

## 2021-10-05 PROCEDURE — 99213 PR OFFICE/OUTPT VISIT, EST, LEVL III, 20-29 MIN: ICD-10-PCS | Mod: 25,S$GLB,, | Performed by: INTERNAL MEDICINE

## 2021-10-05 PROCEDURE — 93000 EKG 12-LEAD: ICD-10-PCS | Mod: S$GLB,,, | Performed by: INTERNAL MEDICINE

## 2021-10-05 PROCEDURE — 1101F PT FALLS ASSESS-DOCD LE1/YR: CPT | Mod: CPTII,S$GLB,, | Performed by: INTERNAL MEDICINE

## 2021-10-05 PROCEDURE — 3008F BODY MASS INDEX DOCD: CPT | Mod: CPTII,S$GLB,, | Performed by: INTERNAL MEDICINE

## 2021-10-05 PROCEDURE — 99213 OFFICE O/P EST LOW 20 MIN: CPT | Mod: 25,S$GLB,, | Performed by: INTERNAL MEDICINE

## 2021-10-05 PROCEDURE — 1126F AMNT PAIN NOTED NONE PRSNT: CPT | Mod: CPTII,S$GLB,, | Performed by: INTERNAL MEDICINE

## 2021-10-05 PROCEDURE — 1101F PR PT FALLS ASSESS DOC 0-1 FALLS W/OUT INJ PAST YR: ICD-10-PCS | Mod: CPTII,S$GLB,, | Performed by: INTERNAL MEDICINE

## 2021-10-05 PROCEDURE — 1160F PR REVIEW ALL MEDS BY PRESCRIBER/CLIN PHARMACIST DOCUMENTED: ICD-10-PCS | Mod: CPTII,S$GLB,, | Performed by: INTERNAL MEDICINE

## 2021-10-05 PROCEDURE — 4010F PR ACE/ARB THEARPY RXD/TAKEN: ICD-10-PCS | Mod: CPTII,S$GLB,, | Performed by: INTERNAL MEDICINE

## 2021-10-05 PROCEDURE — 3288F FALL RISK ASSESSMENT DOCD: CPT | Mod: CPTII,S$GLB,, | Performed by: INTERNAL MEDICINE

## 2021-10-05 PROCEDURE — 1126F PR PAIN SEVERITY QUANTIFIED, NO PAIN PRESENT: ICD-10-PCS | Mod: CPTII,S$GLB,, | Performed by: INTERNAL MEDICINE

## 2021-10-05 PROCEDURE — 93000 ELECTROCARDIOGRAM COMPLETE: CPT | Mod: S$GLB,,, | Performed by: INTERNAL MEDICINE

## 2021-10-05 PROCEDURE — 1160F RVW MEDS BY RX/DR IN RCRD: CPT | Mod: CPTII,S$GLB,, | Performed by: INTERNAL MEDICINE

## 2021-10-05 PROCEDURE — 1159F PR MEDICATION LIST DOCUMENTED IN MEDICAL RECORD: ICD-10-PCS | Mod: CPTII,S$GLB,, | Performed by: INTERNAL MEDICINE

## 2021-10-05 PROCEDURE — 3074F PR MOST RECENT SYSTOLIC BLOOD PRESSURE < 130 MM HG: ICD-10-PCS | Mod: CPTII,S$GLB,, | Performed by: INTERNAL MEDICINE

## 2021-10-05 PROCEDURE — 3288F PR FALLS RISK ASSESSMENT DOCUMENTED: ICD-10-PCS | Mod: CPTII,S$GLB,, | Performed by: INTERNAL MEDICINE

## 2021-10-05 PROCEDURE — 3074F SYST BP LT 130 MM HG: CPT | Mod: CPTII,S$GLB,, | Performed by: INTERNAL MEDICINE

## 2022-05-03 ENCOUNTER — OFFICE VISIT (OUTPATIENT)
Dept: CARDIOLOGY | Facility: CLINIC | Age: 72
End: 2022-05-03
Payer: MEDICARE

## 2022-05-03 VITALS
HEART RATE: 94 BPM | DIASTOLIC BLOOD PRESSURE: 72 MMHG | SYSTOLIC BLOOD PRESSURE: 114 MMHG | HEIGHT: 69 IN | WEIGHT: 236.88 LBS | OXYGEN SATURATION: 94 % | BODY MASS INDEX: 35.09 KG/M2

## 2022-05-03 DIAGNOSIS — I10 ESSENTIAL HYPERTENSION: Primary | ICD-10-CM

## 2022-05-03 DIAGNOSIS — I69.398: ICD-10-CM

## 2022-05-03 DIAGNOSIS — F07.0: ICD-10-CM

## 2022-05-03 DIAGNOSIS — E78.5 HYPERLIPIDEMIA, UNSPECIFIED HYPERLIPIDEMIA TYPE: ICD-10-CM

## 2022-05-03 DIAGNOSIS — E11.9 TYPE 2 DIABETES MELLITUS WITHOUT COMPLICATION, WITHOUT LONG-TERM CURRENT USE OF INSULIN: ICD-10-CM

## 2022-05-03 PROCEDURE — 3288F PR FALLS RISK ASSESSMENT DOCUMENTED: ICD-10-PCS | Mod: CPTII,S$GLB,, | Performed by: INTERNAL MEDICINE

## 2022-05-03 PROCEDURE — 3074F PR MOST RECENT SYSTOLIC BLOOD PRESSURE < 130 MM HG: ICD-10-PCS | Mod: CPTII,S$GLB,, | Performed by: INTERNAL MEDICINE

## 2022-05-03 PROCEDURE — 93000 EKG 12-LEAD: ICD-10-PCS | Mod: S$GLB,,, | Performed by: INTERNAL MEDICINE

## 2022-05-03 PROCEDURE — 3078F PR MOST RECENT DIASTOLIC BLOOD PRESSURE < 80 MM HG: ICD-10-PCS | Mod: CPTII,S$GLB,, | Performed by: INTERNAL MEDICINE

## 2022-05-03 PROCEDURE — 1101F PT FALLS ASSESS-DOCD LE1/YR: CPT | Mod: CPTII,S$GLB,, | Performed by: INTERNAL MEDICINE

## 2022-05-03 PROCEDURE — 99213 PR OFFICE/OUTPT VISIT, EST, LEVL III, 20-29 MIN: ICD-10-PCS | Mod: 25,S$GLB,, | Performed by: INTERNAL MEDICINE

## 2022-05-03 PROCEDURE — 3008F PR BODY MASS INDEX (BMI) DOCUMENTED: ICD-10-PCS | Mod: CPTII,S$GLB,, | Performed by: INTERNAL MEDICINE

## 2022-05-03 PROCEDURE — 3078F DIAST BP <80 MM HG: CPT | Mod: CPTII,S$GLB,, | Performed by: INTERNAL MEDICINE

## 2022-05-03 PROCEDURE — 99213 OFFICE O/P EST LOW 20 MIN: CPT | Mod: 25,S$GLB,, | Performed by: INTERNAL MEDICINE

## 2022-05-03 PROCEDURE — 1159F MED LIST DOCD IN RCRD: CPT | Mod: CPTII,S$GLB,, | Performed by: INTERNAL MEDICINE

## 2022-05-03 PROCEDURE — 93000 ELECTROCARDIOGRAM COMPLETE: CPT | Mod: S$GLB,,, | Performed by: INTERNAL MEDICINE

## 2022-05-03 PROCEDURE — 1160F RVW MEDS BY RX/DR IN RCRD: CPT | Mod: CPTII,S$GLB,, | Performed by: INTERNAL MEDICINE

## 2022-05-03 PROCEDURE — 1126F PR PAIN SEVERITY QUANTIFIED, NO PAIN PRESENT: ICD-10-PCS | Mod: CPTII,S$GLB,, | Performed by: INTERNAL MEDICINE

## 2022-05-03 PROCEDURE — 1101F PR PT FALLS ASSESS DOC 0-1 FALLS W/OUT INJ PAST YR: ICD-10-PCS | Mod: CPTII,S$GLB,, | Performed by: INTERNAL MEDICINE

## 2022-05-03 PROCEDURE — 3074F SYST BP LT 130 MM HG: CPT | Mod: CPTII,S$GLB,, | Performed by: INTERNAL MEDICINE

## 2022-05-03 PROCEDURE — 3288F FALL RISK ASSESSMENT DOCD: CPT | Mod: CPTII,S$GLB,, | Performed by: INTERNAL MEDICINE

## 2022-05-03 PROCEDURE — 99999 PR PBB SHADOW E&M-EST. PATIENT-LVL III: CPT | Mod: PBBFAC,,, | Performed by: INTERNAL MEDICINE

## 2022-05-03 PROCEDURE — 1159F PR MEDICATION LIST DOCUMENTED IN MEDICAL RECORD: ICD-10-PCS | Mod: CPTII,S$GLB,, | Performed by: INTERNAL MEDICINE

## 2022-05-03 PROCEDURE — 1160F PR REVIEW ALL MEDS BY PRESCRIBER/CLIN PHARMACIST DOCUMENTED: ICD-10-PCS | Mod: CPTII,S$GLB,, | Performed by: INTERNAL MEDICINE

## 2022-05-03 PROCEDURE — 3008F BODY MASS INDEX DOCD: CPT | Mod: CPTII,S$GLB,, | Performed by: INTERNAL MEDICINE

## 2022-05-03 PROCEDURE — 99999 PR PBB SHADOW E&M-EST. PATIENT-LVL III: ICD-10-PCS | Mod: PBBFAC,,, | Performed by: INTERNAL MEDICINE

## 2022-05-03 PROCEDURE — 1126F AMNT PAIN NOTED NONE PRSNT: CPT | Mod: CPTII,S$GLB,, | Performed by: INTERNAL MEDICINE

## 2022-05-03 NOTE — PROGRESS NOTES
Subjective:      Patient ID: Christiano Frost is a 71 y.o. male.    Chief Complaint: Follow-up and Hypertension    HPI:Walking a mile and a quarter daily    Does 50 push ups a day    Dropped a stone on right second finger and required surgery.    Stressed due to mother's illness.    Review of Systems   Cardiovascular: Negative for chest pain, claudication, dyspnea on exertion, irregular heartbeat, leg swelling, near-syncope, orthopnea, palpitations and syncope.        Past Medical History:   Diagnosis Date    BPH (benign prostatic hyperplasia)     Depression     Diabetes mellitus     GERD (gastroesophageal reflux disease)     Hyperlipidemia     Hypertension     Obesity     TIA (transient ischemic attack) 0322/2016        Past Surgical History:   Procedure Laterality Date    SPINE SURGERY  2044 2005 2008       No family history on file.    Social History     Socioeconomic History    Marital status:    Tobacco Use    Smoking status: Never Smoker    Smokeless tobacco: Never Used       Current Outpatient Medications on File Prior to Visit   Medication Sig Dispense Refill    aspirin (ECOTRIN) 81 MG EC tablet Take 81 mg by mouth once daily.      atorvastatin (LIPITOR) 10 MG tablet Take 10 mg by mouth once daily.       dutasteride (AVODART) 0.5 mg capsule Take 0.5 mg by mouth once daily.      enalapril (VASOTEC) 5 MG tablet TK 1 T PO  QD  0    escitalopram oxalate (LEXAPRO) 10 MG tablet Take 10 mg by mouth once daily.       esomeprazole (NEXIUM) 40 MG capsule Take by mouth.      HYDROcodone-acetaminophen (NORCO)  mg per tablet TK 1 T PO TID FOR 7 DAYS      ibuprofen (ADVIL,MOTRIN) 400 MG tablet       metformin (GLUCOPHAGE) 500 MG tablet 500 mg daily with breakfast.       omega-3 fatty acids 1,000 mg Cap Take by mouth.      tamsulosin (FLOMAX) 0.4 mg Cp24 0.4 mg once daily.       [DISCONTINUED] BROMSITE 0.075 % Drop INSTILL 1 DROP INTO RIGHT EYE ONCE A DAY  2    [DISCONTINUED] DUREZOL  "0.05 % Drop ophthalmic solution INSTILL 1 DROP INTO AFFECTED EYE ONCE A DAY AS DIRECTED  5     No current facility-administered medications on file prior to visit.       Review of patient's allergies indicates:  No Known Allergies  Objective:     Vitals:    05/03/22 1009   BP: 114/72   BP Location: Right arm   Patient Position: Sitting   BP Method: Large (Automatic)   Pulse: 94   SpO2: (!) 94%   Weight: 107.5 kg (236 lb 14.2 oz)   Height: 5' 9" (1.753 m)        Physical Exam  Vitals reviewed.   Constitutional:       General: He is not in acute distress.     Appearance: He is well-developed. He is not diaphoretic.   Eyes:      General: No scleral icterus.  Neck:      Vascular: No carotid bruit or JVD.   Cardiovascular:      Rate and Rhythm: Regular rhythm.      Heart sounds: Normal heart sounds. No murmur heard.    No friction rub. No gallop.   Pulmonary:      Effort: Pulmonary effort is normal. No respiratory distress.      Breath sounds: Normal breath sounds.   Musculoskeletal:      Right lower leg: Edema (trace pitting edema bilaterally) present.      Left lower leg: Edema present.   Skin:     General: Skin is warm and dry.   Neurological:      Mental Status: He is alert and oriented to person, place, and time.   Psychiatric:         Behavior: Behavior normal.         Thought Content: Thought content normal.         Judgment: Judgment normal.        Wt down 7 lbs over the past 3 years      ECG: NSR with first degree AV block, otherwise normal, no significant change      No visits with results within 6 Month(s) from this visit.   Latest known visit with results is:   Hospital Outpatient Visit on 01/02/2018   Component Date Value Ref Range Status    Diastolic Dysfunction 01/02/2018 No   Final   (    Assessment:     1. Essential hypertension    2. Hyperlipidemia, unspecified hyperlipidemia type    3. Type 2 diabetes mellitus without complication, without long-term current use of insulin    4. Personality change due " to old stroke      Plan:   Christiano was seen today for follow-up and hypertension.    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

## 2022-07-06 ENCOUNTER — OFFICE VISIT (OUTPATIENT)
Dept: CARDIOLOGY | Facility: CLINIC | Age: 72
End: 2022-07-06
Payer: MEDICARE

## 2022-07-06 VITALS
OXYGEN SATURATION: 96 % | SYSTOLIC BLOOD PRESSURE: 143 MMHG | DIASTOLIC BLOOD PRESSURE: 85 MMHG | WEIGHT: 236.31 LBS | HEIGHT: 69 IN | BODY MASS INDEX: 35 KG/M2 | HEART RATE: 78 BPM

## 2022-07-06 DIAGNOSIS — R06.09 DYSPNEA ON EXERTION: ICD-10-CM

## 2022-07-06 DIAGNOSIS — E11.9 TYPE 2 DIABETES MELLITUS WITHOUT COMPLICATION, WITHOUT LONG-TERM CURRENT USE OF INSULIN: ICD-10-CM

## 2022-07-06 DIAGNOSIS — I20.89 ANGINAL EQUIVALENT: ICD-10-CM

## 2022-07-06 DIAGNOSIS — E78.5 HYPERLIPIDEMIA, UNSPECIFIED HYPERLIPIDEMIA TYPE: ICD-10-CM

## 2022-07-06 DIAGNOSIS — I10 ESSENTIAL HYPERTENSION: ICD-10-CM

## 2022-07-06 DIAGNOSIS — I63.00 CEREBROVASCULAR ACCIDENT (CVA) DUE TO THROMBOSIS OF PRECEREBRAL ARTERY: Primary | ICD-10-CM

## 2022-07-06 PROCEDURE — 1159F MED LIST DOCD IN RCRD: CPT | Mod: CPTII,S$GLB,, | Performed by: INTERNAL MEDICINE

## 2022-07-06 PROCEDURE — 1126F PR PAIN SEVERITY QUANTIFIED, NO PAIN PRESENT: ICD-10-PCS | Mod: CPTII,S$GLB,, | Performed by: INTERNAL MEDICINE

## 2022-07-06 PROCEDURE — 1126F AMNT PAIN NOTED NONE PRSNT: CPT | Mod: CPTII,S$GLB,, | Performed by: INTERNAL MEDICINE

## 2022-07-06 PROCEDURE — 99214 OFFICE O/P EST MOD 30 MIN: CPT | Mod: S$GLB,,, | Performed by: INTERNAL MEDICINE

## 2022-07-06 PROCEDURE — 99999 PR PBB SHADOW E&M-EST. PATIENT-LVL III: CPT | Mod: PBBFAC,,, | Performed by: INTERNAL MEDICINE

## 2022-07-06 PROCEDURE — 3008F PR BODY MASS INDEX (BMI) DOCUMENTED: ICD-10-PCS | Mod: CPTII,S$GLB,, | Performed by: INTERNAL MEDICINE

## 2022-07-06 PROCEDURE — 1159F PR MEDICATION LIST DOCUMENTED IN MEDICAL RECORD: ICD-10-PCS | Mod: CPTII,S$GLB,, | Performed by: INTERNAL MEDICINE

## 2022-07-06 PROCEDURE — 3008F BODY MASS INDEX DOCD: CPT | Mod: CPTII,S$GLB,, | Performed by: INTERNAL MEDICINE

## 2022-07-06 PROCEDURE — 3077F PR MOST RECENT SYSTOLIC BLOOD PRESSURE >= 140 MM HG: ICD-10-PCS | Mod: CPTII,S$GLB,, | Performed by: INTERNAL MEDICINE

## 2022-07-06 PROCEDURE — 3077F SYST BP >= 140 MM HG: CPT | Mod: CPTII,S$GLB,, | Performed by: INTERNAL MEDICINE

## 2022-07-06 PROCEDURE — 99214 PR OFFICE/OUTPT VISIT, EST, LEVL IV, 30-39 MIN: ICD-10-PCS | Mod: S$GLB,,, | Performed by: INTERNAL MEDICINE

## 2022-07-06 PROCEDURE — 3079F DIAST BP 80-89 MM HG: CPT | Mod: CPTII,S$GLB,, | Performed by: INTERNAL MEDICINE

## 2022-07-06 PROCEDURE — 3079F PR MOST RECENT DIASTOLIC BLOOD PRESSURE 80-89 MM HG: ICD-10-PCS | Mod: CPTII,S$GLB,, | Performed by: INTERNAL MEDICINE

## 2022-07-06 PROCEDURE — 99999 PR PBB SHADOW E&M-EST. PATIENT-LVL III: ICD-10-PCS | Mod: PBBFAC,,, | Performed by: INTERNAL MEDICINE

## 2022-07-06 NOTE — PROGRESS NOTES
Subjective:   @Patient ID:  Christiano Frost is a 71 y.o. male who presents for evaluation of ELKINS         HPI:   Patient of Dr. Meyers.   Here for earlier appt  Started having significant ELKINS 2 days ago. Usually he is very active and works out multiple times a week. Last Monday he couldn't complete one lab and he had to rest. No significant LE edema. Has chronic orthopnea from JEEVAN. No chest pain   He saw PCP today and was instructed to get evaluated by cardiology   No prior cardia hx.   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   HENT: Negative for hearing loss and nosebleeds.    Eyes: Negative for blurred vision.   Cardiovascular: Positive for dyspnea on exertion. Negative for chest pain, leg swelling and palpitations.        As in HPI   Respiratory: Negative for hemoptysis.    Hematologic/Lymphatic: Negative for bleeding problem.   Skin: Negative for itching.   Musculoskeletal: Negative for falls.   Gastrointestinal: Negative for abdominal pain and hematochezia.   Genitourinary: Negative for hematuria.   Neurological: Negative for dizziness and loss of  balance.   Psychiatric/Behavioral: Negative for altered mental status and depression.       Objective:   Physical Exam  Constitutional:       Appearance: He is well-developed. He is obese.   HENT:      Head: Normocephalic and atraumatic.   Eyes:      Conjunctiva/sclera: Conjunctivae normal.   Neck:      Vascular: No carotid bruit or JVD.   Cardiovascular:      Rate and Rhythm: Normal rate and regular rhythm.      Pulses:           Carotid pulses are 2+ on the right side and 2+ on the left side.       Radial pulses are 2+ on the right side and 2+ on the left side.      Heart sounds: Normal heart sounds. No murmur heard.    No friction rub. No gallop.   Pulmonary:      Effort: Pulmonary effort is normal. No respiratory distress.      Breath sounds: Normal breath sounds. No stridor. No wheezing.   Musculoskeletal:      Cervical back: Neck supple.   Skin:     General: Skin is warm and dry.   Neurological:      Mental Status: He is alert and oriented to person, place, and time.   Psychiatric:         Behavior: Behavior normal.         Assessment:     1. Cerebrovascular accident (CVA) due to thrombosis of precerebral artery    2. Essential hypertension    3. Hyperlipidemia, unspecified hyperlipidemia type    4. Type 2 diabetes mellitus without complication, without long-term current use of insulin    5. Dyspnea on exertion    6. Anginal equivalent        Plan:   Symptoms are concerning for angina equivalent. He had EKG done today at PCP office. Will obtain a copy   Will proceed with PET stress for ischemic evaluation   Check Echo for EF and diastolic function   With any worsening symptoms instructed to go to the ER     F/U with Dr. Meyers after the testes completed       Pertinent cardiac images and EKG reviewed independently.    Continue with current medical plan and lifestyle changes.  Return sooner for concerns or questions. If symptoms persist go to the ED  I have reviewed all pertinent data including patient's  medical history in detail and updated the computerized patient record.     Orders Placed This Encounter   Procedures    Cardiac PET Scan Stress     Standing Status:   Future     Standing Expiration Date:   7/6/2023     Order Specific Question:   Which medicaton for the stress procedure?     Answer:   Dipyridamole     Order Specific Question:   Release to patient     Answer:   Immediate    Echo     Standing Status:   Future     Standing Expiration Date:   7/6/2023     Order Specific Question:   Release to patient     Answer:   Immediate       Follow up as scheduled.     He expressed verbal understanding and agreed with the plan    Patient's Medications   New Prescriptions    No medications on file   Previous Medications    ASPIRIN (ECOTRIN) 81 MG EC TABLET    Take 81 mg by mouth once daily.    ATORVASTATIN (LIPITOR) 10 MG TABLET    Take 10 mg by mouth once daily.     DUTASTERIDE (AVODART) 0.5 MG CAPSULE    Take 0.5 mg by mouth once daily.    ENALAPRIL (VASOTEC) 5 MG TABLET    TK 1 T PO  QD    ESCITALOPRAM OXALATE (LEXAPRO) 10 MG TABLET    Take 10 mg by mouth once daily.     ESOMEPRAZOLE (NEXIUM) 40 MG CAPSULE    Take by mouth.    HYDROCODONE-ACETAMINOPHEN (NORCO)  MG PER TABLET    TK 1 T PO TID FOR 7 DAYS    IBUPROFEN (ADVIL,MOTRIN) 400 MG TABLET        METFORMIN (GLUCOPHAGE) 500 MG TABLET    500 mg daily with breakfast.     OMEGA-3 FATTY ACIDS 1,000 MG CAP    Take by mouth.    TAMSULOSIN (FLOMAX) 0.4 MG CP24    0.4 mg once daily.    Modified Medications    No medications on file   Discontinued Medications    No medications on file

## 2022-07-12 ENCOUNTER — TELEPHONE (OUTPATIENT)
Dept: CARDIOLOGY | Facility: HOSPITAL | Age: 72
End: 2022-07-12
Payer: MEDICARE

## 2022-07-14 ENCOUNTER — HOSPITAL ENCOUNTER (OUTPATIENT)
Dept: CARDIOLOGY | Facility: HOSPITAL | Age: 72
Discharge: HOME | End: 2022-07-14
Attending: INTERNAL MEDICINE
Payer: MEDICARE

## 2022-07-14 VITALS
WEIGHT: 236 LBS | DIASTOLIC BLOOD PRESSURE: 82 MMHG | BODY MASS INDEX: 34.96 KG/M2 | HEIGHT: 69 IN | SYSTOLIC BLOOD PRESSURE: 157 MMHG | HEART RATE: 75 BPM

## 2022-07-14 DIAGNOSIS — I20.89 ANGINAL EQUIVALENT: ICD-10-CM

## 2022-07-14 DIAGNOSIS — E78.5 HYPERLIPIDEMIA, UNSPECIFIED HYPERLIPIDEMIA TYPE: ICD-10-CM

## 2022-07-14 DIAGNOSIS — R06.09 DYSPNEA ON EXERTION: ICD-10-CM

## 2022-07-14 DIAGNOSIS — R94.39 ABNORMAL CARDIOVASCULAR STRESS TEST: Primary | ICD-10-CM

## 2022-07-14 DIAGNOSIS — I10 ESSENTIAL HYPERTENSION: ICD-10-CM

## 2022-07-14 LAB
CFR FLOW - ANTERIOR: 1.15
CFR FLOW - INFERIOR: 0.95
CFR FLOW - LATERAL: 1.55
CFR FLOW - MAX: 1.95
CFR FLOW - MIN: 0.52
CFR FLOW - SEPTAL: 1.17
CFR FLOW - WHOLE HEART: 1.21
CV PHARM DOSE: 0.4 MG
CV STRESS BASE HR: 75 BPM
DIASTOLIC BLOOD PRESSURE: 82 MMHG
EJECTION FRACTION- HIGH: 65 %
END DIASTOLIC INDEX-HIGH: 153 ML/M2
END DIASTOLIC INDEX-LOW: 93 ML/M2
END SYSTOLIC INDEX-HIGH: 71 ML/M2
END SYSTOLIC INDEX-LOW: 31 ML/M2
NUC REST DIASTOLIC VOLUME INDEX: 136
NUC REST EJECTION FRACTION: 49
NUC REST SYSTOLIC VOLUME INDEX: 69
NUC STRESS DIASTOLIC VOLUME INDEX: 137
NUC STRESS EJECTION FRACTION: 50 %
NUC STRESS SYSTOLIC VOLUME INDEX: 68
OHS CV CPX 85 PERCENT MAX PREDICTED HEART RATE MALE: 125
OHS CV CPX MAX PREDICTED HEART RATE: 147
OHS CV CPX PATIENT IS FEMALE: 0
OHS CV CPX PATIENT IS MALE: 1
OHS CV CPX PEAK DIASTOLIC BLOOD PRESSURE: 60 MMHG
OHS CV CPX PEAK HEAR RATE: 69 BPM
OHS CV CPX PEAK RATE PRESSURE PRODUCT: 9591
OHS CV CPX PEAK SYSTOLIC BLOOD PRESSURE: 139 MMHG
OHS CV CPX PERCENT MAX PREDICTED HEART RATE ACHIEVED: 47
OHS CV CPX RATE PRESSURE PRODUCT PRESENTING: NORMAL
PERFUSION DEFECT 1 SIZE IN %: 4 %
PERFUSION DEFECT 2 SIZE IN %: 5 %
PERFUSION DEFECT SIZE WORSENS % 1: 34 %
REST FLOW - ANTERIOR: 0.55 CC/MIN/G
REST FLOW - INFERIOR: 0.56 CC/MIN/G
REST FLOW - LATERAL: 0.61 CC/MIN/G
REST FLOW - MAX: 0.76 CC/MIN/G
REST FLOW - MIN: 0.3 CC/MIN/G
REST FLOW - SEPTAL: 0.41 CC/MIN/G
REST FLOW - WHOLE HEART: 0.53 CC/MIN/G
RETIRED EF AND QEF - SEE NOTES: 53 %
STRESS FLOW - ANTERIOR: 0.63 CC/MIN/G
STRESS FLOW - INFERIOR: 0.55 CC/MIN/G
STRESS FLOW - LATERAL: 0.94 CC/MIN/G
STRESS FLOW - MAX: 1.16 CC/MIN/G
STRESS FLOW - MIN: 0.21 CC/MIN/G
STRESS FLOW - SEPTAL: 0.5 CC/MIN/G
STRESS FLOW - WHOLE HEART: 0.66 CC/MIN/G
SYSTOLIC BLOOD PRESSURE: 157 MMHG

## 2022-07-14 PROCEDURE — 78434 CARDIAC PET SCAN STRESS (CUPID ONLY): ICD-10-PCS | Mod: 26,,, | Performed by: INTERNAL MEDICINE

## 2022-07-14 PROCEDURE — 63600175 PHARM REV CODE 636 W HCPCS: Performed by: INTERNAL MEDICINE

## 2022-07-14 PROCEDURE — 93018 CV STRESS TEST I&R ONLY: CPT | Mod: ,,, | Performed by: INTERNAL MEDICINE

## 2022-07-14 PROCEDURE — 78431 MYOCRD IMG PET RST&STRS CT: CPT

## 2022-07-14 PROCEDURE — 93016 CV STRESS TEST SUPVJ ONLY: CPT | Mod: ,,, | Performed by: INTERNAL MEDICINE

## 2022-07-14 PROCEDURE — 93018 CARDIAC PET SCAN STRESS (CUPID ONLY): ICD-10-PCS | Mod: ,,, | Performed by: INTERNAL MEDICINE

## 2022-07-14 PROCEDURE — 78431 MYOCRD IMG PET RST&STRS CT: CPT | Mod: 26,,, | Performed by: INTERNAL MEDICINE

## 2022-07-14 PROCEDURE — 93016 CARDIAC PET SCAN STRESS (CUPID ONLY): ICD-10-PCS | Mod: ,,, | Performed by: INTERNAL MEDICINE

## 2022-07-14 PROCEDURE — 78434 AQMBF PET REST & RX STRESS: CPT

## 2022-07-14 PROCEDURE — 78431 CARDIAC PET SCAN STRESS (CUPID ONLY): ICD-10-PCS | Mod: 26,,, | Performed by: INTERNAL MEDICINE

## 2022-07-14 PROCEDURE — 78434 AQMBF PET REST & RX STRESS: CPT | Mod: 26,,, | Performed by: INTERNAL MEDICINE

## 2022-07-14 RX ORDER — SODIUM CHLORIDE 9 MG/ML
125 INJECTION, SOLUTION INTRAVENOUS CONTINUOUS
Status: CANCELLED | OUTPATIENT
Start: 2022-07-14 | End: 2022-07-14

## 2022-07-14 RX ORDER — AMINOPHYLLINE 25 MG/ML
75 INJECTION, SOLUTION INTRAVENOUS ONCE
Status: COMPLETED | OUTPATIENT
Start: 2022-07-14 | End: 2022-07-14

## 2022-07-14 RX ORDER — DIPHENHYDRAMINE HCL 50 MG
50 CAPSULE ORAL ONCE
Status: CANCELLED | OUTPATIENT
Start: 2022-07-14 | End: 2022-07-14

## 2022-07-14 RX ORDER — REGADENOSON 0.08 MG/ML
0.4 INJECTION, SOLUTION INTRAVENOUS ONCE
Status: COMPLETED | OUTPATIENT
Start: 2022-07-14 | End: 2022-07-14

## 2022-07-14 RX ORDER — CLOPIDOGREL BISULFATE 75 MG/1
75 TABLET ORAL DAILY
Qty: 30 TABLET | Refills: 3 | Status: SHIPPED | OUTPATIENT
Start: 2022-07-14 | End: 2022-08-03

## 2022-07-14 RX ADMIN — AMINOPHYLLINE 75 MG: 25 INJECTION, SOLUTION INTRAVENOUS at 01:07

## 2022-07-14 RX ADMIN — REGADENOSON 0.4 MG: 0.08 INJECTION, SOLUTION INTRAVENOUS at 01:07

## 2022-07-14 NOTE — PROGRESS NOTES
Discussed the PET stress test results. Significant ischemia in LAD and RCA. Options discussed angiogram. After shared decision will proceed with angiogram.

## 2022-07-18 ENCOUNTER — LAB VISIT (OUTPATIENT)
Dept: LAB | Facility: HOSPITAL | Age: 72
End: 2022-07-18
Attending: INTERNAL MEDICINE
Payer: MEDICARE

## 2022-07-18 DIAGNOSIS — Z01.810 PREOP CARDIOVASCULAR EXAM: ICD-10-CM

## 2022-07-18 LAB
ANION GAP SERPL CALC-SCNC: 8 MMOL/L (ref 8–16)
BASOPHILS # BLD AUTO: 0.11 K/UL (ref 0–0.2)
BASOPHILS NFR BLD: 1.3 % (ref 0–1.9)
BUN SERPL-MCNC: 15 MG/DL (ref 8–23)
CALCIUM SERPL-MCNC: 9.7 MG/DL (ref 8.7–10.5)
CHLORIDE SERPL-SCNC: 101 MMOL/L (ref 95–110)
CO2 SERPL-SCNC: 27 MMOL/L (ref 23–29)
CREAT SERPL-MCNC: 1 MG/DL (ref 0.5–1.4)
DIFFERENTIAL METHOD: ABNORMAL
EOSINOPHIL # BLD AUTO: 0.3 K/UL (ref 0–0.5)
EOSINOPHIL NFR BLD: 3.4 % (ref 0–8)
ERYTHROCYTE [DISTWIDTH] IN BLOOD BY AUTOMATED COUNT: 14.5 % (ref 11.5–14.5)
EST. GFR  (AFRICAN AMERICAN): >60 ML/MIN/1.73 M^2
EST. GFR  (NON AFRICAN AMERICAN): >60 ML/MIN/1.73 M^2
GLUCOSE SERPL-MCNC: 97 MG/DL (ref 70–110)
HCT VFR BLD AUTO: 44.2 % (ref 40–54)
HGB BLD-MCNC: 13.5 G/DL (ref 14–18)
IMM GRANULOCYTES # BLD AUTO: 0.02 K/UL (ref 0–0.04)
IMM GRANULOCYTES NFR BLD AUTO: 0.2 % (ref 0–0.5)
LYMPHOCYTES # BLD AUTO: 2.9 K/UL (ref 1–4.8)
LYMPHOCYTES NFR BLD: 35.3 % (ref 18–48)
MCH RBC QN AUTO: 27.3 PG (ref 27–31)
MCHC RBC AUTO-ENTMCNC: 30.5 G/DL (ref 32–36)
MCV RBC AUTO: 90 FL (ref 82–98)
MONOCYTES # BLD AUTO: 1 K/UL (ref 0.3–1)
MONOCYTES NFR BLD: 11.7 % (ref 4–15)
NEUTROPHILS # BLD AUTO: 4 K/UL (ref 1.8–7.7)
NEUTROPHILS NFR BLD: 48.1 % (ref 38–73)
NRBC BLD-RTO: 0 /100 WBC
PLATELET # BLD AUTO: 229 K/UL (ref 150–450)
PMV BLD AUTO: 10.2 FL (ref 9.2–12.9)
POTASSIUM SERPL-SCNC: 4.2 MMOL/L (ref 3.5–5.1)
RBC # BLD AUTO: 4.94 M/UL (ref 4.6–6.2)
SODIUM SERPL-SCNC: 136 MMOL/L (ref 136–145)
WBC # BLD AUTO: 8.31 K/UL (ref 3.9–12.7)

## 2022-07-18 PROCEDURE — 36415 COLL VENOUS BLD VENIPUNCTURE: CPT | Mod: PO | Performed by: INTERNAL MEDICINE

## 2022-07-18 PROCEDURE — 80048 BASIC METABOLIC PNL TOTAL CA: CPT | Performed by: INTERNAL MEDICINE

## 2022-07-18 PROCEDURE — 85025 COMPLETE CBC W/AUTO DIFF WBC: CPT | Performed by: INTERNAL MEDICINE

## 2022-07-19 ENCOUNTER — DOCUMENTATION ONLY (OUTPATIENT)
Dept: CARDIOTHORACIC SURGERY | Facility: CLINIC | Age: 72
End: 2022-07-19
Payer: MEDICARE

## 2022-07-19 ENCOUNTER — HOSPITAL ENCOUNTER (OUTPATIENT)
Facility: HOSPITAL | Age: 72
Discharge: HOME | End: 2022-07-19
Attending: INTERNAL MEDICINE | Admitting: INTERNAL MEDICINE
Payer: MEDICARE

## 2022-07-19 VITALS
OXYGEN SATURATION: 98 % | HEART RATE: 56 BPM | BODY MASS INDEX: 34.96 KG/M2 | WEIGHT: 236 LBS | DIASTOLIC BLOOD PRESSURE: 83 MMHG | SYSTOLIC BLOOD PRESSURE: 162 MMHG | TEMPERATURE: 98 F | HEIGHT: 69 IN | RESPIRATION RATE: 16 BRPM

## 2022-07-19 DIAGNOSIS — Z01.810 PREOP CARDIOVASCULAR EXAM: ICD-10-CM

## 2022-07-19 DIAGNOSIS — I25.10 CORONARY ARTERY DISEASE: ICD-10-CM

## 2022-07-19 DIAGNOSIS — R94.39 ABNORMAL CARDIOVASCULAR STRESS TEST: ICD-10-CM

## 2022-07-19 DIAGNOSIS — I25.10 CORONARY ARTERY DISEASE INVOLVING NATIVE CORONARY ARTERY OF NATIVE HEART WITHOUT ANGINA PECTORIS: Primary | ICD-10-CM

## 2022-07-19 DIAGNOSIS — I20.89 ANGINAL EQUIVALENT: ICD-10-CM

## 2022-07-19 DIAGNOSIS — I25.10 CAD (CORONARY ARTERY DISEASE): Primary | ICD-10-CM

## 2022-07-19 LAB
AORTIC ROOT ANNULUS: 3.93 CM
ASCENDING AORTA: 3.24 CM
AV INDEX (PROSTH): 0.71
AV MEAN GRADIENT: 3 MMHG
AV PEAK GRADIENT: 5 MMHG
AV VALVE AREA: 3.15 CM2
AV VELOCITY RATIO: 0.82
BSA FOR ECHO PROCEDURE: 2.28 M2
CV ECHO LV RWT: 0.41 CM
DOP CALC AO PEAK VEL: 1.14 M/S
DOP CALC AO VTI: 31.41 CM
DOP CALC LVOT AREA: 4.4 CM2
DOP CALC LVOT DIAMETER: 2.38 CM
DOP CALC LVOT PEAK VEL: 0.93 M/S
DOP CALC LVOT STROKE VOLUME: 98.94 CM3
DOP CALC MV VTI: 27.1 CM
DOP CALCLVOT PEAK VEL VTI: 22.25 CM
E WAVE DECELERATION TIME: 272.83 MSEC
E/A RATIO: 0.75
E/E' RATIO: 15.33 M/S
ECHO LV POSTERIOR WALL: 1.04 CM (ref 0.6–1.1)
EJECTION FRACTION: 60 %
ESTIMATED AVG GLUCOSE: 140 MG/DL (ref 68–131)
FRACTIONAL SHORTENING: 29 % (ref 28–44)
HBA1C MFR BLD: 6.5 % (ref 4–5.6)
INTERVENTRICULAR SEPTUM: 1.51 CM (ref 0.6–1.1)
LA MAJOR: 5.78 CM
LA MINOR: 4.77 CM
LA WIDTH: 4.18 CM
LEFT ATRIUM SIZE: 4.38 CM
LEFT ATRIUM VOLUME INDEX MOD: 24.5 ML/M2
LEFT ATRIUM VOLUME INDEX: 36.6 ML/M2
LEFT ATRIUM VOLUME MOD: 54.49 CM3
LEFT ATRIUM VOLUME: 81.34 CM3
LEFT INTERNAL DIMENSION IN SYSTOLE: 3.56 CM (ref 2.1–4)
LEFT VENTRICLE DIASTOLIC VOLUME INDEX: 53.89 ML/M2
LEFT VENTRICLE DIASTOLIC VOLUME: 119.64 ML
LEFT VENTRICLE MASS INDEX: 116 G/M2
LEFT VENTRICLE SYSTOLIC VOLUME INDEX: 23.9 ML/M2
LEFT VENTRICLE SYSTOLIC VOLUME: 52.97 ML
LEFT VENTRICULAR INTERNAL DIMENSION IN DIASTOLE: 5.03 CM (ref 3.5–6)
LEFT VENTRICULAR MASS: 257.07 G
LV LATERAL E/E' RATIO: 13.8 M/S
LV SEPTAL E/E' RATIO: 17.25 M/S
MV A" WAVE DURATION": 10.66 MSEC
MV MEAN GRADIENT: 1 MMHG
MV PEAK A VEL: 0.92 M/S
MV PEAK E VEL: 0.69 M/S
MV PEAK GRADIENT: 3 MMHG
MV STENOSIS PRESSURE HALF TIME: 79.12 MS
MV VALVE AREA BY CONTINUITY EQUATION: 3.65 CM2
MV VALVE AREA P 1/2 METHOD: 2.78 CM2
PISA TR MAX VEL: 1.45 M/S
POCT GLUCOSE: 96 MG/DL (ref 70–110)
PULM VEIN S/D RATIO: 1.74
PV PEAK D VEL: 0.43 M/S
PV PEAK S VEL: 0.75 M/S
PV PEAK VELOCITY: 0.84 CM/S
RA MAJOR: 4.53 CM
RA PRESSURE: 8 MMHG
RA WIDTH: 2.45 CM
RIGHT VENTRICULAR END-DIASTOLIC DIMENSION: 3.62 CM
STJ: 3.3 CM
TDI LATERAL: 0.05 M/S
TDI SEPTAL: 0.04 M/S
TDI: 0.05 M/S
TR MAX PG: 8 MMHG
TRICUSPID ANNULAR PLANE SYSTOLIC EXCURSION: 1.75 CM
TV REST PULMONARY ARTERY PRESSURE: 16 MMHG

## 2022-07-19 PROCEDURE — 99152 MOD SED SAME PHYS/QHP 5/>YRS: CPT | Mod: ,,, | Performed by: INTERNAL MEDICINE

## 2022-07-19 PROCEDURE — 93010 EKG 12-LEAD: ICD-10-PCS | Mod: ,,, | Performed by: INTERNAL MEDICINE

## 2022-07-19 PROCEDURE — 93010 ELECTROCARDIOGRAM REPORT: CPT | Mod: ,,, | Performed by: INTERNAL MEDICINE

## 2022-07-19 PROCEDURE — C1887 CATHETER, GUIDING: HCPCS | Performed by: INTERNAL MEDICINE

## 2022-07-19 PROCEDURE — 63600175 PHARM REV CODE 636 W HCPCS: Performed by: INTERNAL MEDICINE

## 2022-07-19 PROCEDURE — 25000003 PHARM REV CODE 250

## 2022-07-19 PROCEDURE — C1769 GUIDE WIRE: HCPCS | Performed by: INTERNAL MEDICINE

## 2022-07-19 PROCEDURE — 99153 MOD SED SAME PHYS/QHP EA: CPT | Performed by: INTERNAL MEDICINE

## 2022-07-19 PROCEDURE — 36415 COLL VENOUS BLD VENIPUNCTURE: CPT | Performed by: INTERNAL MEDICINE

## 2022-07-19 PROCEDURE — 93458 L HRT ARTERY/VENTRICLE ANGIO: CPT | Mod: 26,,, | Performed by: INTERNAL MEDICINE

## 2022-07-19 PROCEDURE — C1894 INTRO/SHEATH, NON-LASER: HCPCS | Performed by: INTERNAL MEDICINE

## 2022-07-19 PROCEDURE — 93005 ELECTROCARDIOGRAM TRACING: CPT

## 2022-07-19 PROCEDURE — 25000003 PHARM REV CODE 250: Performed by: INTERNAL MEDICINE

## 2022-07-19 PROCEDURE — 83036 HEMOGLOBIN GLYCOSYLATED A1C: CPT | Performed by: INTERNAL MEDICINE

## 2022-07-19 PROCEDURE — 93458 L HRT ARTERY/VENTRICLE ANGIO: CPT | Performed by: INTERNAL MEDICINE

## 2022-07-19 PROCEDURE — 99152 MOD SED SAME PHYS/QHP 5/>YRS: CPT | Performed by: INTERNAL MEDICINE

## 2022-07-19 PROCEDURE — 93458 PR CATH PLACE/CORON ANGIO, IMG SUPER/INTERP,W LEFT HEART VENTRICULOGRAPHY: ICD-10-PCS | Mod: 26,,, | Performed by: INTERNAL MEDICINE

## 2022-07-19 PROCEDURE — 99152 PR MOD CONSCIOUS SEDATION, SAME PHYS, 5+ YRS, FIRST 15 MIN: ICD-10-PCS | Mod: ,,, | Performed by: INTERNAL MEDICINE

## 2022-07-19 RX ORDER — ONDANSETRON 4 MG/1
8 TABLET, ORALLY DISINTEGRATING ORAL EVERY 8 HOURS PRN
Status: DISCONTINUED | OUTPATIENT
Start: 2022-07-19 | End: 2022-07-19 | Stop reason: HOSPADM

## 2022-07-19 RX ORDER — LIDOCAINE HYDROCHLORIDE 10 MG/ML
INJECTION, SOLUTION EPIDURAL; INFILTRATION; INTRACAUDAL; PERINEURAL
Status: DISCONTINUED | OUTPATIENT
Start: 2022-07-19 | End: 2022-07-19 | Stop reason: HOSPADM

## 2022-07-19 RX ORDER — HEPARIN SODIUM 1000 [USP'U]/ML
INJECTION, SOLUTION INTRAVENOUS; SUBCUTANEOUS
Status: DISCONTINUED | OUTPATIENT
Start: 2022-07-19 | End: 2022-07-19 | Stop reason: HOSPADM

## 2022-07-19 RX ORDER — FENTANYL CITRATE 50 UG/ML
INJECTION, SOLUTION INTRAMUSCULAR; INTRAVENOUS
Status: DISCONTINUED | OUTPATIENT
Start: 2022-07-19 | End: 2022-07-19 | Stop reason: HOSPADM

## 2022-07-19 RX ORDER — METFORMIN HYDROCHLORIDE 500 MG/1
500 TABLET ORAL
Start: 2022-07-22 | End: 2022-08-03

## 2022-07-19 RX ORDER — MIDAZOLAM HYDROCHLORIDE 1 MG/ML
INJECTION, SOLUTION INTRAMUSCULAR; INTRAVENOUS
Status: DISCONTINUED | OUTPATIENT
Start: 2022-07-19 | End: 2022-07-19 | Stop reason: HOSPADM

## 2022-07-19 RX ORDER — ACETAMINOPHEN 325 MG/1
650 TABLET ORAL EVERY 4 HOURS PRN
Status: DISCONTINUED | OUTPATIENT
Start: 2022-07-19 | End: 2022-07-19 | Stop reason: HOSPADM

## 2022-07-19 RX ORDER — SODIUM CHLORIDE 9 MG/ML
INJECTION, SOLUTION INTRAVENOUS CONTINUOUS
Status: DISCONTINUED | OUTPATIENT
Start: 2022-07-19 | End: 2022-07-19 | Stop reason: HOSPADM

## 2022-07-19 RX ORDER — HEPARIN SODIUM 200 [USP'U]/100ML
INJECTION, SOLUTION INTRAVENOUS
Status: DISCONTINUED | OUTPATIENT
Start: 2022-07-19 | End: 2022-07-19 | Stop reason: HOSPADM

## 2022-07-19 RX ORDER — VERAPAMIL HYDROCHLORIDE 2.5 MG/ML
INJECTION, SOLUTION INTRAVENOUS
Status: DISCONTINUED | OUTPATIENT
Start: 2022-07-19 | End: 2022-07-19 | Stop reason: HOSPADM

## 2022-07-19 RX ADMIN — SODIUM CHLORIDE: 0.9 INJECTION, SOLUTION INTRAVENOUS at 10:07

## 2022-07-19 NOTE — DISCHARGE INSTRUCTIONS
Understanding Transradial Cardiac Catheterization    Cardiac catheterization (cardiac cath) is a common, non-surgical procedure. During the procedure, your doctor will insert a long, thin tube (catheter) into an artery and move it up into your heart. Transradial means the catheter is inserted into an artery in the wrist (the radial artery). This procedure can be used to diagnose and treat certain heart problems.  Why do I need a transradial cardiac cath?  You may need a cardiac cath if signs indicate a problem with your heart. These may include:  Symptoms of chest pain, tightness, or heaviness (known as angina). This is a common symptom of blocked heart arteries, known as coronary artery disease.  Symptoms of weakness, dizziness, trouble breathing, or swollen legs or feet. These may be symptoms of a problem with a heart valve or the heart muscle.  Other test results show heart problems. Tests may include stress tests, heart scans, and echocardiography.  During a cardiac cath, your doctor can see the condition of the coronary arteries and heart valves. He or she can also check how well the heart pumps and the flow of blood through the heart. Your doctor can also measure pressures and take blood samples. And, if needed, he or she can open blocked arteries. This can help reduce symptoms of angina.  Cardiac cath is often done using a catheter inserted into an artery in the groin. During transradial cardiac cath, the catheter is inserted into an artery in the wrist. This can mean less bleeding and a faster recovery. Some people may have blockages in the groin arteries as well as in the heart arteries, making it difficult to reach the heart. The transradial approach can be used to get around this problem.   What happens during a transradial cardiac cath?  The procedure is done in the hospital or a surgery center. First, an IV line is put in your arm or hand to deliver fluids and medicines. You will likely be given  medicine to relax you and make you drowsy. When the procedure begins:  You lie on an X-ray table.  The skin over the insertion site in your wrist is numbed.  The doctor makes a tiny puncture or incision into the artery in the wrist. He or she then inserts a catheter and threads it through the blood vessel into your heart.    The doctor may inject a contrast fluid through the catheter into the arteries. This fluid makes the arteries show up better on X-rays.  Tests may be done to check the condition of your heart and arteries. If needed, the doctor can clear blockages in the arteries or do other repairs.  When the doctor is finished, he or she will remove the catheter and put direct pressure on the site to prevent bleeding.  You will stay for a time to recover, and then go home.  What are the risks of transradial cardiac cath?  These include:  Bleeding, bruising, infection, or blood clots  Damage to the radial artery that may cause injury to the hand  Allergic reaction to the contrast fluid  Abnormal heartbeat (arrhythmia)  Damage to blood vessels or tissues  Kidney damage or failure  The need for emergency heart surgery  Heart attack, stroke, or death  Date Last Reviewed: 5/1/2016 © 2000-2017 [employer]. [address] [place] [id number]. All rights reserved. This information is not intended as a substitute for professional medical care. Always follow your healthcare professional's instructions.

## 2022-07-19 NOTE — Clinical Note
The catheter was repositioned into the ostium   right coronary artery. An angiography was performed of the right coronary arteries. Multiple views were taken. The angiography was performed via hand injection with 10 mL of contrast.

## 2022-07-19 NOTE — PLAN OF CARE
Patient discharged to home as ordered after 2 hour recovery per .   All discharge instructions, printed materials given.    Patient instructed on follow-up appointment as ordered.  Patient verbalized understanding and agreement with all discharge instructions given.   Pt is AAOx3, VSS, denies any pain.    Right radial gauze and tegaderm dressing c.d.i. No bleeding or hematoma noted.  Skin normal in color and warm to touch. Palpated bilateral radial, post tib and pedal pulses.  Pt ate 100% of his ordered lunch. No n/v. Pt got dressed and moving around without difficulty and no distress noted.  Pt in w/c.  Left hand 20 gauge iv dc'd as ordered with tip intact. arianne and koban dressing applied c.d.i.  Pt discharged home via wheelchair to private vehicle by pt's spouse.

## 2022-07-19 NOTE — PLAN OF CARE
2 mL of air removed from radial vasc band.  No hematoma or bleeding noted.  +2 wesley radial pulses palpated. Skin normal in color, warm to touch, < 3 sec cap refill.   Will continue to monitor pt.

## 2022-07-19 NOTE — PLAN OF CARE
Patient transferred to recovery cath lab slot 2 via stretcher with side rails up x2 .  Pt AAO X3 and able to follow commands. Pt is stable when connecting to cardiac monitors.  VSS. Right radial vasc band in place with 12ml of air in band c.d.i. no bleeding or hematoma noted. +2 wesley radial pulses palpated. +2 wesley pedal and post tib pulses.  Skin normal in color and warm to touch, <3 sec cap refill. Fall risk precautions given and patient acknowledges.  AIDET completed to pt.  Will continue to monitor patient.

## 2022-07-19 NOTE — DISCHARGE SUMMARY
Discharge Summary  Interventional Cardiology      Admit Date: 7/19/2022    Discharge Date:  7/19/2022    Attending Physician: Ben Ortega MD    Discharge Physician: Angelina Morel MD    Principal Diagnoses: <principal problem not specified>  Indication for Admission: Left heart cath (Left), Placement of Closure Device    Discharged Condition: Good    Hospital Course:   Patient presented for outpatient [facility] with evidence of significant mid LAD and OM1 disease with RCA . See full cath report in Epic for details. Hemostasis of patient's rt radial access site was achieved with vascband. Patient was monitored per post-cath protocol, and his  R radial access site was c/d/i with no hematoma. Patient was able to ambulate without difficulty. He was feeling well and anticipating discharge home today.     Outpatient Plan:  - Medication changes/ additions include: hold metformin for 48 hours.  CTS referral.     Diet: Cardiac diet    Activity: Ad alfonso, wound care instructions provided    Disposition: Home or Self Care    Discharge Medications:      Medication List      CHANGE how you take these medications    metFORMIN 500 MG tablet  Commonly known as: GLUCOPHAGE  Take 1 tablet (500 mg total) by mouth daily with breakfast.  Start taking on: July 22, 2022  What changed:   · how to take this  · These instructions start on July 22, 2022. If you are unsure what to do until then, ask your doctor or other care provider.        CONTINUE taking these medications    aspirin 81 MG EC tablet  Commonly known as: ECOTRIN     atorvastatin 10 MG tablet  Commonly known as: LIPITOR     clopidogreL 75 mg tablet  Commonly known as: PLAVIX  Take 1 tablet (75 mg total) by mouth once daily.     dutasteride 0.5 mg capsule  Commonly known as: AVODART     enalapril 5 MG tablet  Commonly known as: VASOTEC     EScitalopram oxalate 10 MG tablet  Commonly known as: LEXAPRO     esomeprazole 40 MG capsule  Commonly known as: NEXIUM      HYDROcodone-acetaminophen  mg per tablet  Commonly known as: NORCO     ibuprofen 400 MG tablet  Commonly known as: ADVIL,MOTRIN     omega-3 fatty acids 1,000 mg Cap     tamsulosin 0.4 mg Cap  Commonly known as: FLOMAX           Where to Get Your Medications      Information about where to get these medications is not yet available    Ask your nurse or doctor about these medications  · metFORMIN 500 MG tablet       Follow Up: Cardiology

## 2022-07-19 NOTE — Clinical Note
The catheter was repositioned into the ostium   left main. An angiography was performed of the left coronary arteries. Multiple views were taken. The angiography was performed via hand injection with 30 mL of contrast.

## 2022-07-19 NOTE — BRIEF OP NOTE
Brief Operative Note:    : Ben Ortega MD     Referring Physician: Ellis Allan     All Operators: Surgeon(s):  Ben Ortega MD     Preoperative Diagnosis: Abnormal cardiovascular stress test [R94.39]Anginal equivalent [I20.8]     Postop Diagnosis: Abnormal cardiovascular stress test [R94.39]Anginal equivalent [I20.8]    Treatments/Procedures: Procedure(s) (LRB):  Left heart cath (Left)  Placement of Closure Device    Access: Rt radial    Findings: Severe CAD- Significant lesion of mid LAD and OM1, RCA .    See catheterization report for full details.    Intervention: None    See catheterization report for full details.    Closure device: Vascband      Plan:  - Post cath protocol   - IVF @ 125 x 3 hours  - Continue aspirin 81/ plavix  - CTS referral as OP  - Continue high intensity statin therapy (LDL goal < 70)  - Risk factor reduction (BP <130/80 mmHg, glycemic control, etc)  - Cardiac rehab referral  - Follow up with Dr. Ortega/ CTS    Estimated Blood loss: 20 cc    Specimens removed: Eulalia Morel MD

## 2022-07-19 NOTE — PROGRESS NOTES
Contacted patient regarding clinic visit with Dr. Martell and HgbA1c lab, carotid ultrasound, and CT noncontrast. Patient stated he is aware of location and confirmed will be here for early am lab and visits.

## 2022-07-19 NOTE — INTERVAL H&P NOTE
The patient has been examined and the H&P has been reviewed:    I concur with the findings and changes have been noted since the H&P was written: see below    Anesthesia/Surgery risks, benefits and alternative options discussed and understood by patient/family.    Since last seen in the clinic had PET stress test. Plan for [facility] +/- intervention.      7/14: There are two significant perfusion abnormalities.    Perfusion Abnormality #1 - There is a large sized, moderate intensity mid to apical anterior and anteroseptal resting perfusion abnormality involving 4% of LV myocardium in the distribution of the LAD. After pharmacologic stress, this perfusion abnormality is larger and more severe involving 34% of the LV myocardium, indicative of ischemia with underlying scar.  Absolute flow data is consistent with  in the LAD territory with inadequate collateralization.    Perfusion Abnormality #2 - There is a small sized, moderate intensity inferobasilar reversible perfusion abnormality involving 5% of LV myocardium in the distribution of the RCA territory indicative of focal coronary stenosis.  Absolute flow data is consistent with  in the RCA territory with inadequate collateralization.    There are no other significant perfusion abnormalities.    The whole heart absolute myocardial perfusion values averaged 0.53 cc/min/g at rest, which is reduced; 0.66 cc/min/g at stress, which is severely reduced; and CFR is 1.21 , which is severely reduced.    CT attenuation images demonstrate severe diffuse coronary calcifications in the LAD, LCX and RCA territory and mild diffuse aortic calcifications in the descending aorta.    The gated perfusion images showed an ejection fraction of 49% at rest and 50% during stress. A normal ejection fraction is greater than 53%.    The wall motion is normal at rest.    There is apical wall hypokinesis during stress.    The LV cavity size is normal at rest and stress.    The EKG portion of  this study is negative for ischemia.    There were no arrhythmias during stress.    The patient reported no chest pain during the stress test.    There are no prior studies for comparison.      There are no hospital problems to display for this patient.

## 2022-07-19 NOTE — PLAN OF CARE
Remaining air removed from right radial vasc band. Gauze and tegaderm dressing applied c.d.i.   No drainage or shadowing noted. No bleeding or hematoma noted around site. +2 wesley radial pulses palpated. Skin normal in color, warm to touch, < 3 sec cap refill.   Pt tolerated well.  Will continue to monitor pt.

## 2022-07-20 ENCOUNTER — DOCUMENTATION ONLY (OUTPATIENT)
Dept: CARDIOTHORACIC SURGERY | Facility: CLINIC | Age: 72
End: 2022-07-20

## 2022-07-20 ENCOUNTER — OFFICE VISIT (OUTPATIENT)
Dept: CARDIOTHORACIC SURGERY | Facility: CLINIC | Age: 72
End: 2022-07-20
Payer: MEDICARE

## 2022-07-20 ENCOUNTER — HOSPITAL ENCOUNTER (OUTPATIENT)
Dept: VASCULAR SURGERY | Facility: CLINIC | Age: 72
Discharge: HOME | End: 2022-07-20
Attending: THORACIC SURGERY (CARDIOTHORACIC VASCULAR SURGERY)
Payer: MEDICARE

## 2022-07-20 ENCOUNTER — HOSPITAL ENCOUNTER (OUTPATIENT)
Dept: RADIOLOGY | Facility: HOSPITAL | Age: 72
Discharge: HOME | End: 2022-07-20
Attending: THORACIC SURGERY (CARDIOTHORACIC VASCULAR SURGERY)
Payer: MEDICARE

## 2022-07-20 VITALS
TEMPERATURE: 99 F | WEIGHT: 231 LBS | BODY MASS INDEX: 34.21 KG/M2 | DIASTOLIC BLOOD PRESSURE: 65 MMHG | HEART RATE: 72 BPM | HEIGHT: 69 IN | SYSTOLIC BLOOD PRESSURE: 128 MMHG | OXYGEN SATURATION: 94 %

## 2022-07-20 DIAGNOSIS — I25.10 CORONARY ARTERY DISEASE INVOLVING NATIVE CORONARY ARTERY OF NATIVE HEART WITHOUT ANGINA PECTORIS: Primary | ICD-10-CM

## 2022-07-20 DIAGNOSIS — I25.10 CORONARY ARTERY DISEASE INVOLVING NATIVE CORONARY ARTERY OF NATIVE HEART WITHOUT ANGINA PECTORIS: ICD-10-CM

## 2022-07-20 DIAGNOSIS — I65.23 BILATERAL CAROTID ARTERY STENOSIS: ICD-10-CM

## 2022-07-20 DIAGNOSIS — Z01.818 PRE-OP EXAM: ICD-10-CM

## 2022-07-20 DIAGNOSIS — I25.10 CAD (CORONARY ARTERY DISEASE): ICD-10-CM

## 2022-07-20 PROCEDURE — 71250 CT THORAX DX C-: CPT | Mod: TC

## 2022-07-20 PROCEDURE — 4010F ACE/ARB THERAPY RXD/TAKEN: CPT | Mod: CPTII,S$GLB,, | Performed by: THORACIC SURGERY (CARDIOTHORACIC VASCULAR SURGERY)

## 2022-07-20 PROCEDURE — 71250 CT CHEST WITHOUT CONTRAST: ICD-10-PCS | Mod: 26,,, | Performed by: RADIOLOGY

## 2022-07-20 PROCEDURE — 3074F SYST BP LT 130 MM HG: CPT | Mod: CPTII,S$GLB,, | Performed by: THORACIC SURGERY (CARDIOTHORACIC VASCULAR SURGERY)

## 2022-07-20 PROCEDURE — 3288F FALL RISK ASSESSMENT DOCD: CPT | Mod: CPTII,S$GLB,, | Performed by: THORACIC SURGERY (CARDIOTHORACIC VASCULAR SURGERY)

## 2022-07-20 PROCEDURE — 99205 PR OFFICE/OUTPT VISIT, NEW, LEVL V, 60-74 MIN: ICD-10-PCS | Mod: S$GLB,,, | Performed by: THORACIC SURGERY (CARDIOTHORACIC VASCULAR SURGERY)

## 2022-07-20 PROCEDURE — 3044F PR MOST RECENT HEMOGLOBIN A1C LEVEL <7.0%: ICD-10-PCS | Mod: CPTII,S$GLB,, | Performed by: THORACIC SURGERY (CARDIOTHORACIC VASCULAR SURGERY)

## 2022-07-20 PROCEDURE — 99999 PR PBB SHADOW E&M-EST. PATIENT-LVL III: ICD-10-PCS | Mod: PBBFAC,,, | Performed by: THORACIC SURGERY (CARDIOTHORACIC VASCULAR SURGERY)

## 2022-07-20 PROCEDURE — 1159F MED LIST DOCD IN RCRD: CPT | Mod: CPTII,S$GLB,, | Performed by: THORACIC SURGERY (CARDIOTHORACIC VASCULAR SURGERY)

## 2022-07-20 PROCEDURE — 71250 CT THORAX DX C-: CPT | Mod: 26,,, | Performed by: RADIOLOGY

## 2022-07-20 PROCEDURE — 3008F BODY MASS INDEX DOCD: CPT | Mod: CPTII,S$GLB,, | Performed by: THORACIC SURGERY (CARDIOTHORACIC VASCULAR SURGERY)

## 2022-07-20 PROCEDURE — 1159F PR MEDICATION LIST DOCUMENTED IN MEDICAL RECORD: ICD-10-PCS | Mod: CPTII,S$GLB,, | Performed by: THORACIC SURGERY (CARDIOTHORACIC VASCULAR SURGERY)

## 2022-07-20 PROCEDURE — 3044F HG A1C LEVEL LT 7.0%: CPT | Mod: CPTII,S$GLB,, | Performed by: THORACIC SURGERY (CARDIOTHORACIC VASCULAR SURGERY)

## 2022-07-20 PROCEDURE — 93880 PR DUPLEX SCAN EXTRACRANIAL,BILAT: ICD-10-PCS | Mod: S$GLB,,, | Performed by: SURGERY

## 2022-07-20 PROCEDURE — 3078F DIAST BP <80 MM HG: CPT | Mod: CPTII,S$GLB,, | Performed by: THORACIC SURGERY (CARDIOTHORACIC VASCULAR SURGERY)

## 2022-07-20 PROCEDURE — 4010F PR ACE/ARB THEARPY RXD/TAKEN: ICD-10-PCS | Mod: CPTII,S$GLB,, | Performed by: THORACIC SURGERY (CARDIOTHORACIC VASCULAR SURGERY)

## 2022-07-20 PROCEDURE — 1101F PR PT FALLS ASSESS DOC 0-1 FALLS W/OUT INJ PAST YR: ICD-10-PCS | Mod: CPTII,S$GLB,, | Performed by: THORACIC SURGERY (CARDIOTHORACIC VASCULAR SURGERY)

## 2022-07-20 PROCEDURE — 1126F AMNT PAIN NOTED NONE PRSNT: CPT | Mod: CPTII,S$GLB,, | Performed by: THORACIC SURGERY (CARDIOTHORACIC VASCULAR SURGERY)

## 2022-07-20 PROCEDURE — 1126F PR PAIN SEVERITY QUANTIFIED, NO PAIN PRESENT: ICD-10-PCS | Mod: CPTII,S$GLB,, | Performed by: THORACIC SURGERY (CARDIOTHORACIC VASCULAR SURGERY)

## 2022-07-20 PROCEDURE — 99999 PR PBB SHADOW E&M-EST. PATIENT-LVL III: CPT | Mod: PBBFAC,,, | Performed by: THORACIC SURGERY (CARDIOTHORACIC VASCULAR SURGERY)

## 2022-07-20 PROCEDURE — 1101F PT FALLS ASSESS-DOCD LE1/YR: CPT | Mod: CPTII,S$GLB,, | Performed by: THORACIC SURGERY (CARDIOTHORACIC VASCULAR SURGERY)

## 2022-07-20 PROCEDURE — 3078F PR MOST RECENT DIASTOLIC BLOOD PRESSURE < 80 MM HG: ICD-10-PCS | Mod: CPTII,S$GLB,, | Performed by: THORACIC SURGERY (CARDIOTHORACIC VASCULAR SURGERY)

## 2022-07-20 PROCEDURE — 3008F PR BODY MASS INDEX (BMI) DOCUMENTED: ICD-10-PCS | Mod: CPTII,S$GLB,, | Performed by: THORACIC SURGERY (CARDIOTHORACIC VASCULAR SURGERY)

## 2022-07-20 PROCEDURE — 3074F PR MOST RECENT SYSTOLIC BLOOD PRESSURE < 130 MM HG: ICD-10-PCS | Mod: CPTII,S$GLB,, | Performed by: THORACIC SURGERY (CARDIOTHORACIC VASCULAR SURGERY)

## 2022-07-20 PROCEDURE — 99205 OFFICE O/P NEW HI 60 MIN: CPT | Mod: S$GLB,,, | Performed by: THORACIC SURGERY (CARDIOTHORACIC VASCULAR SURGERY)

## 2022-07-20 PROCEDURE — 93880 EXTRACRANIAL BILAT STUDY: CPT | Mod: S$GLB,,, | Performed by: SURGERY

## 2022-07-20 PROCEDURE — 3288F PR FALLS RISK ASSESSMENT DOCUMENTED: ICD-10-PCS | Mod: CPTII,S$GLB,, | Performed by: THORACIC SURGERY (CARDIOTHORACIC VASCULAR SURGERY)

## 2022-07-20 RX ORDER — MAGNESIUM SULFATE HEPTAHYDRATE 40 MG/ML
4 INJECTION, SOLUTION INTRAVENOUS
Status: CANCELLED | OUTPATIENT
Start: 2022-07-20

## 2022-07-20 RX ORDER — SODIUM CHLORIDE 9 MG/ML
INJECTION, SOLUTION INTRAVENOUS CONTINUOUS
Status: CANCELLED | OUTPATIENT
Start: 2022-07-20

## 2022-07-20 RX ORDER — MUPIROCIN 20 MG/G
1 OINTMENT TOPICAL
Status: CANCELLED | OUTPATIENT
Start: 2022-07-20

## 2022-07-20 RX ORDER — IPRATROPIUM BROMIDE AND ALBUTEROL SULFATE 2.5; .5 MG/3ML; MG/3ML
3 SOLUTION RESPIRATORY (INHALATION) EVERY 4 HOURS PRN
Status: CANCELLED | OUTPATIENT
Start: 2022-07-20 | End: 2022-07-21

## 2022-07-20 RX ORDER — MUPIROCIN 20 MG/G
1 OINTMENT TOPICAL 2 TIMES DAILY
Status: CANCELLED | OUTPATIENT
Start: 2022-07-20 | End: 2022-07-25

## 2022-07-20 RX ORDER — CEFAZOLIN SODIUM/D5W 2 G/100 ML
2 PLASTIC BAG, INJECTION (ML) INTRAVENOUS
Status: CANCELLED | OUTPATIENT
Start: 2022-07-20 | End: 2022-07-22

## 2022-07-20 RX ORDER — ASPIRIN 300 MG/1
300 SUPPOSITORY RECTAL ONCE AS NEEDED
Status: CANCELLED | OUTPATIENT
Start: 2022-07-20 | End: 2033-12-16

## 2022-07-20 RX ORDER — ASPIRIN 325 MG
325 TABLET, DELAYED RELEASE (ENTERIC COATED) ORAL DAILY
Status: CANCELLED | OUTPATIENT
Start: 2022-07-20

## 2022-07-20 RX ORDER — ONDANSETRON 2 MG/ML
4 INJECTION INTRAMUSCULAR; INTRAVENOUS EVERY 12 HOURS PRN
Status: CANCELLED | OUTPATIENT
Start: 2022-07-20

## 2022-07-20 RX ORDER — POTASSIUM CHLORIDE 20 MEQ/1
20 TABLET, EXTENDED RELEASE ORAL EVERY 12 HOURS
Status: CANCELLED | OUTPATIENT
Start: 2022-07-20

## 2022-07-20 RX ORDER — OXYCODONE HYDROCHLORIDE 5 MG/1
5 TABLET ORAL EVERY 4 HOURS PRN
Status: CANCELLED | OUTPATIENT
Start: 2022-07-20

## 2022-07-20 RX ORDER — POTASSIUM CHLORIDE 14.9 MG/ML
60 INJECTION INTRAVENOUS
Status: CANCELLED | OUTPATIENT
Start: 2022-07-20

## 2022-07-20 RX ORDER — BISACODYL 10 MG
10 SUPPOSITORY, RECTAL RECTAL DAILY PRN
Status: CANCELLED | OUTPATIENT
Start: 2022-07-20

## 2022-07-20 RX ORDER — IPRATROPIUM BROMIDE AND ALBUTEROL SULFATE 2.5; .5 MG/3ML; MG/3ML
3 SOLUTION RESPIRATORY (INHALATION) EVERY 4 HOURS
Status: CANCELLED | OUTPATIENT
Start: 2022-07-20 | End: 2022-07-21

## 2022-07-20 RX ORDER — METOPROLOL TARTRATE 25 MG/1
25 TABLET, FILM COATED ORAL
Status: CANCELLED | OUTPATIENT
Start: 2022-07-20

## 2022-07-20 RX ORDER — FENTANYL CITRATE 50 UG/ML
25 INJECTION, SOLUTION INTRAMUSCULAR; INTRAVENOUS
Status: CANCELLED | OUTPATIENT
Start: 2022-07-20

## 2022-07-20 RX ORDER — DEXTROSE MONOHYDRATE, SODIUM CHLORIDE, AND POTASSIUM CHLORIDE 50; 1.49; 4.5 G/1000ML; G/1000ML; G/1000ML
INJECTION, SOLUTION INTRAVENOUS CONTINUOUS
Status: CANCELLED | OUTPATIENT
Start: 2022-07-20

## 2022-07-20 RX ORDER — POTASSIUM CHLORIDE 14.9 MG/ML
20 INJECTION INTRAVENOUS
Status: CANCELLED | OUTPATIENT
Start: 2022-07-20

## 2022-07-20 RX ORDER — METOCLOPRAMIDE HYDROCHLORIDE 5 MG/ML
5 INJECTION INTRAMUSCULAR; INTRAVENOUS EVERY 6 HOURS PRN
Status: CANCELLED | OUTPATIENT
Start: 2022-07-20

## 2022-07-20 RX ORDER — CEFAZOLIN SODIUM/D5W 2 G/100 ML
2 PLASTIC BAG, INJECTION (ML) INTRAVENOUS
Status: CANCELLED | OUTPATIENT
Start: 2022-07-20

## 2022-07-20 RX ORDER — ATORVASTATIN CALCIUM 40 MG/1
40 TABLET, FILM COATED ORAL NIGHTLY
Status: CANCELLED | OUTPATIENT
Start: 2022-07-20

## 2022-07-20 RX ORDER — SODIUM CHLORIDE 0.9 % (FLUSH) 0.9 %
10 SYRINGE (ML) INJECTION
Status: CANCELLED | OUTPATIENT
Start: 2022-07-20

## 2022-07-20 RX ORDER — DOCUSATE SODIUM 100 MG/1
100 CAPSULE, LIQUID FILLED ORAL 2 TIMES DAILY
Status: CANCELLED | OUTPATIENT
Start: 2022-07-20

## 2022-07-20 RX ORDER — POTASSIUM CHLORIDE 29.8 MG/ML
40 INJECTION INTRAVENOUS
Status: CANCELLED | OUTPATIENT
Start: 2022-07-20

## 2022-07-20 RX ORDER — ALBUMIN HUMAN 50 G/1000ML
25 SOLUTION INTRAVENOUS ONCE AS NEEDED
Status: CANCELLED | OUTPATIENT
Start: 2022-07-20 | End: 2033-12-16

## 2022-07-20 RX ORDER — POLYETHYLENE GLYCOL 3350 17 G/17G
17 POWDER, FOR SOLUTION ORAL DAILY
Status: CANCELLED | OUTPATIENT
Start: 2022-07-20

## 2022-07-20 RX ORDER — LIDOCAINE HYDROCHLORIDE 10 MG/ML
1 INJECTION, SOLUTION EPIDURAL; INFILTRATION; INTRACAUDAL; PERINEURAL
Status: CANCELLED | OUTPATIENT
Start: 2022-07-20

## 2022-07-20 RX ORDER — PROPOFOL 10 MG/ML
0-50 INJECTION, EMULSION INTRAVENOUS CONTINUOUS
Status: CANCELLED | OUTPATIENT
Start: 2022-07-20

## 2022-07-20 RX ORDER — FENTANYL CITRATE 50 UG/ML
25 INJECTION, SOLUTION INTRAMUSCULAR; INTRAVENOUS
Status: CANCELLED | OUTPATIENT
Start: 2022-07-20 | End: 2022-07-21

## 2022-07-20 RX ORDER — FAMOTIDINE 10 MG/ML
20 INJECTION INTRAVENOUS 2 TIMES DAILY
Status: CANCELLED | OUTPATIENT
Start: 2022-07-20

## 2022-07-20 RX ORDER — FAMOTIDINE 20 MG/1
20 TABLET, FILM COATED ORAL 2 TIMES DAILY
Status: CANCELLED | OUTPATIENT
Start: 2022-07-20

## 2022-07-20 RX ORDER — MAGNESIUM SULFATE HEPTAHYDRATE 40 MG/ML
2 INJECTION, SOLUTION INTRAVENOUS
Status: CANCELLED | OUTPATIENT
Start: 2022-07-20

## 2022-07-20 RX ORDER — ASPIRIN 325 MG
325 TABLET ORAL DAILY
Status: CANCELLED | OUTPATIENT
Start: 2022-07-20

## 2022-07-20 RX ORDER — ACETAMINOPHEN 325 MG/1
650 TABLET ORAL EVERY 4 HOURS PRN
Status: CANCELLED | OUTPATIENT
Start: 2022-07-20

## 2022-07-20 RX ORDER — FENTANYL CITRATE 50 UG/ML
50 INJECTION, SOLUTION INTRAMUSCULAR; INTRAVENOUS
Status: CANCELLED | OUTPATIENT
Start: 2022-07-22

## 2022-07-20 RX ORDER — ASPIRIN 325 MG
325 TABLET ORAL ONCE
Status: CANCELLED | OUTPATIENT
Start: 2022-07-20 | End: 2022-07-20

## 2022-07-20 RX ORDER — OXYCODONE HYDROCHLORIDE 10 MG/1
10 TABLET ORAL EVERY 4 HOURS PRN
Status: CANCELLED | OUTPATIENT
Start: 2022-07-20

## 2022-07-20 NOTE — LETTER
[facility] - Cardiovasc Surg 2nd Fl  [address]  [facility] [address]  Phone: [phone]               July 20, 2022      Patient: Christiano Frost   MR Number: [id number]   YOB: 1950   Date of Visit: 7/20/2022     John Meyers MD  [address]  [address]  [facility] [address]    Dear Dr. Meyers,     It has been a privilege for us to see your patient, Mr. Frost, at our [facility].  We had a chance to meet with him and went over the history, echocardiographic, as well as angiographic findings in detail.  As you know, he is a 71 year-old gentleman with a history of prior stroke/TIA, hypertension, diabetes (HbA1C 6.5), and BMI of 35.  Recently, he has been symptomatic with dyspnea on exertion symptoms interfering with his quality of life.  His most recent echocardiogram showed 60% ejection fraction with mild mitral regurgitation.  Angiogram showed 50% left main disease, totally occluded early mid LAD with a small to moderate sized mid and distal LAD, small to moderate sized second diagonal artery (small first diagonal artery), 80% proximal lesion in a small/small-moderate OM1, large OM2, totally occluded ostial RCA with a small/small-moderate PDA (fills via left to right collaterals).    We had an extensive discussion with him regarding the ACC/AHA guidelines and we agreed that he has class I indications for surgery involving coronary artery bypass surgery x 3 or 4 (LIMA-LAD, SVG-OM2, possible SVG-D2, possible SVG-OM1, possible SVG-PDA).  We went through the risks and benefits as well as the perioperative expectations and we agreed he is an appropriate surgical candidate.  We have tentatively scheduled his surgery for Friday, July 29, 2022, and I will be delighted to contact you around the time of his surgery.    We will also obtain a noncontrast CT chest and carotid ultrasound prior to surgery.     Thank you for referring Mr. Frost and for your trust and  confidence in our [facility].    Sincerely,     Magen Martell MD  Cardiothoracic Surgery  [facility]

## 2022-07-20 NOTE — H&P (VIEW-ONLY)
Subjective:      Patient ID: Christiano Frost is a 71 y.o. male.    Chief Complaint: No chief complaint on file.      HPI:  Christiano Frost is a 71 y.o. male who presents as an initial consult for multivessel coronary artery disease.  He has a medical history significant for depression, BPH, diabetes mellitus (HA1C 6.5), hyperlipidemia, hypertension, and previous TIA.  He is referred to our service from Dr. Shannon clements.  He reports that he originally saw his PCP after a few weeks of dyspnea on exertion.  He reports the is normally very active and was having trouble completing his normal tasks without having to stop and rest. His PCP referred him to Dr. Shannon clements who completed a cardiac evaluation which revealed multivessel coronary artery disease including 50% mid left main, 99% mid LAD,  with collaterals to the ostial RCA, 70% 1st diagonal,and 80% 1st marginal.   His ECHO revealed an ejection fraction of 60% with mild mitral regurgitation.  He denies chest pain or ankle swelling, and reports his main symptom is severe shortness of breath which he reports is life limiting. He states that he has had one TIA in the past and his only deficit is related to his verbal control.     Family and social history reviewed    Review of patient's allergies indicates:  No Known Allergies  Past Medical History:   Diagnosis Date    BPH (benign prostatic hyperplasia)     Depression     Diabetes mellitus     GERD (gastroesophageal reflux disease)     Hyperlipidemia     Hypertension     Obesity     TIA (transient ischemic attack) 0322/2016     Past Surgical History:   Procedure Laterality Date    SPINE SURGERY  2044 2005 2008     Family History    None       Social History     Socioeconomic History    Marital status:    Tobacco Use    Smoking status: Never Smoker    Smokeless tobacco: Never Used       Current Outpatient Medications:     aspirin (ECOTRIN) 81 MG EC tablet, Take 81 mg by mouth once daily.,  Disp: , Rfl:     atorvastatin (LIPITOR) 10 MG tablet, Take 10 mg by mouth once daily. , Disp: , Rfl:     clopidogreL (PLAVIX) 75 mg tablet, Take 1 tablet (75 mg total) by mouth once daily., Disp: 30 tablet, Rfl: 3    dutasteride (AVODART) 0.5 mg capsule, Take 0.5 mg by mouth once daily., Disp: , Rfl:     enalapril (VASOTEC) 5 MG tablet, TK 1 T PO  QD, Disp: , Rfl: 0    escitalopram oxalate (LEXAPRO) 10 MG tablet, Take 10 mg by mouth once daily. , Disp: , Rfl:     esomeprazole (NEXIUM) 40 MG capsule, Take by mouth., Disp: , Rfl:     HYDROcodone-acetaminophen (NORCO)  mg per tablet, TK 1 T PO TID FOR 7 DAYS, Disp: , Rfl:     ibuprofen (ADVIL,MOTRIN) 400 MG tablet, , Disp: , Rfl:     [START ON 7/22/2022] metFORMIN (GLUCOPHAGE) 500 MG tablet, Take 1 tablet (500 mg total) by mouth daily with breakfast., Disp: , Rfl:     omega-3 fatty acids 1,000 mg Cap, Take by mouth., Disp: , Rfl:     tamsulosin (FLOMAX) 0.4 mg Cp24, 0.4 mg once daily. , Disp: , Rfl:   No current facility-administered medications for this visit.  Current medications Reviewed    Review of Systems   Constitutional: Negative for activity change, appetite change, fatigue and fever.   HENT: Negative for nosebleeds.    Respiratory: Negative for cough and shortness of breath.    Cardiovascular: Negative for chest pain, palpitations and leg swelling.   Gastrointestinal: Negative for abdominal distention, abdominal pain and nausea.   Genitourinary: Negative for frequency.   Musculoskeletal: Negative for arthralgias and myalgias.   Skin: Negative for rash.   Neurological: Negative for dizziness and numbness.   Hematological: Does not bruise/bleed easily.     Objective:   Physical Exam  HENT:      Head: Normocephalic and atraumatic.   Eyes:      Extraocular Movements: Extraocular movements intact.   Cardiovascular:      Rate and Rhythm: Normal rate and regular rhythm.   Pulmonary:      Effort: Pulmonary effort is normal.   Abdominal:      General:  Abdomen is flat.      Palpations: Abdomen is soft.   Musculoskeletal:         General: Normal range of motion.      Cervical back: Normal range of motion.   Skin:     General: Skin is warm and dry.      Capillary Refill: Capillary refill takes less than 2 seconds.   Neurological:      General: No focal deficit present.       Diagnostic Results: Reviewed   ECHO: 7/19/2022  · The left ventricle is normal in size with eccentric hypertrophy and normal systolic function.  · The estimated ejection fraction is 60%.  · Grade I left ventricular diastolic dysfunction.  · Mild left atrial enlargement.  · Normal right ventricular size with normal right ventricular systolic function.  · Intermediate central venous pressure (8 mmHg).  · The estimated PA systolic pressure is 16 mmHg.  · Mild mitral regurgitation.    LHC: 7/19/2022  · There was three vessel coronary artery disease. There was left main disease.  · The Mid LM lesion was 50% stenosed. Calcified ecentirc plaque  · The Mid LAD lesion was 99% stenosed.  · The Ost RCA to Prox RCA lesion was 100% stenosed.  with LT-RT collateral  · The 1st Diag lesion was 70% stenosed.  · The 1st Mrg lesion was 80% stenosed.  · The estimated blood loss was none.  Assessment:   1. Multivessel Coronary Artery Disease  Plan:     I have seen the patient and reviewed the nurse practitioner's note above. I have personally interviewed and examined the patient at bedside and agree with the findings.     Mr. Frost is a 71 year-old gentleman with a history of prior stroke/TIA, hypertension, diabetes (HbA1C 6.5), and BMI of 35.  Recently, he has been symptomatic with dyspnea on exertion symptoms interfering with his quality of life.  His most recent echocardiogram showed 60% ejection fraction with mild mitral regurgitation.  Angiogram showed 50% left main disease, totally occluded early mid LAD with a small to moderate sized mid and distal LAD, small to moderate sized second diagonal artery  (small first diagonal artery), 80% proximal lesion in a small/small-moderate OM1, large OM2, totally occluded ostial RCA with a small/small-moderate PDA (fills via left to right collaterals).    We had an extensive discussion with him regarding the ACC/AHA guidelines and we agreed that he has class I indications for surgery involving coronary artery bypass surgery x 3 or 4 (LIMA-LAD, SVG-OM2, possible SVG-D2, possible SVG-OM1, possible SVG-PDA).  We went through the risks and benefits as well as the perioperative expectations and we agreed he is an appropriate surgical candidate.  We have tentatively scheduled his surgery for Friday, July 29, 2022.    We will also obtain a noncontrast CT chest and carotid ultrasound prior to surgery.       Magen Martell MD  Cardiothoracic Surgery  [facility]

## 2022-07-20 NOTE — LETTER
[facility] - Cardiovasc Surg 2nd Fl  [address]  [facility] [address]  Phone: [phone]               July 20, 2022      Patient: Christiano Frost   MR Number: [id number]   YOB: 1950   Date of Visit: 7/20/2022     Ben Ortega MD  [address] [id number]    Dear Dr. Ortega,     It has been a privilege for us to see your patient, Mr. Frost, at our [facility].  We had a chance to meet with him and went over the history, echocardiographic, as well as angiographic findings in detail.  As you know, he is a 71 year-old gentleman with a history of prior stroke/TIA, hypertension, diabetes (HbA1C 6.5), and BMI of 35.  Recently, he has been symptomatic with dyspnea on exertion symptoms interfering with his quality of life.  His most recent echocardiogram showed 60% ejection fraction with mild mitral regurgitation.  Angiogram showed 50% left main disease, totally occluded early mid LAD with a small to moderate sized mid and distal LAD, small to moderate sized second diagonal artery (small first diagonal artery), 80% proximal lesion in a small/small-moderate OM1, large OM2, totally occluded ostial RCA with a small/small-moderate PDA (fills via left to right collaterals).    We had an extensive discussion with him regarding the ACC/AHA guidelines and we agreed that he has class I indications for surgery involving coronary artery bypass surgery x 3 or 4 (LIMA-LAD, SVG-OM2, possible SVG-D2, possible SVG-OM1, possible SVG-PDA).  We went through the risks and benefits as well as the perioperative expectations and we agreed he is an appropriate surgical candidate.  We have tentatively scheduled his surgery for Friday, July 29, 2022, and I will be delighted to contact you around the time of his surgery.    We will also obtain a noncontrast CT chest and carotid ultrasound prior to surgery.     Thank you for referring Mr. Frost and for your trust and confidence in  our [facility].    Sincerely,     Magen Martell MD  Cardiothoracic Surgery  [facility]

## 2022-07-20 NOTE — PROGRESS NOTES
"PREPARING FOR SURGERY  Your surgery has been scheduled for:   Day: Friday 7/19/22  Arrival Time: 5A  Start Time: 7A  You should report to the second floor surgery center, located on the [facility] side of the second floor of the [facility]. The phone number is [phone].     PLEASE NOTE  · If you are allergic to any medications, please inform your doctor or the nurse responsible for your care.  · Tell the doctor if you take aspirin, products containing aspirin, herbal medications or blood thinners, such as Coumadin, Pradaxa, or Plavix.  · Notify your doctor if you are diabetic and provide information about the medications you take.  · Arrange for someone to drive you home following surgery. You will not be allowed to leave the surgical facility alone or drive yourself home following sedation and anesthesia.  · If you have not already done so, please bring a list of your medications with you the day of your surgery.     BEFORE SURGERY  Stop taking all herbal medications 14 days prior to surgery  Stop taking blood thinners PLAVIX 5 days before surgery  Refrain from drinking alcohol beverages for 24 hours before and after surgery  Stop or limit smoking 0 days before surgery  Other: OMEGA 3 FATTY ACIDS, VASOTEC, MVI, IBUPROFEN ________________________________________________________     THE NIGHT BEFORE SURGERY  Eat a light supper on the night before your surgery, no greasy or fatty foods.  DO NOT EAT OR DRINK ANYTHING AFTER MIDNIGHT, INCLUDING GUM, HARD CANDY, MINTS, OR CHEWING TOBACCO  Take a complete shower. Wash your body from the neck down with dial soap. Dial soap may be purchased over the counter at the pharmacy. Keep the soap away from your eyes, ears, and mouth. After washing with Hibiclens, rinse thoroughly. You may also use any soap labeled "antibacterial". Shampoo your hair with your regular shampoo.     THE DAY OF SURGERY  Take another shower with Hibiclens or any antibacterial soap, to " reduce the change of infection.  Medications to take the morning of surgery: Coreg with a small sip of water. Do not take diuretics or fluid pills.  Diabetic medication instructions will be given by the preop center. They will call you before your surgery.  You may brush your teeth and rinse your mouth, but do not shallow any water.  Do not apply perfume, powder, body lotions or deodorant on the day of surgery.  Do not wear any makeup, including mascara and false eyelashes.  Nail polish should be removed.  Wear comfortable clothes, such as button front shirt and loose-fitting pants.  Leave all jewelry, including body piercings and valuables at home.  Hairpins and clasps must be removed before you enter the operating room.  You may wear glasses, dentures and hearing aids before and after surgery. They may need to be removed before going into the operating room. Contact lenses worn before surgery must be removed before entering the operating room. Please bring a case for your hearing aids, glasses and/or contacts.  Bring any devices you will need after surgery such as crutches or canes.  If you have sleep apnea, please bring your CPAP machine.  If you have an implantable device, such as a pacemaker or AICD, please bring the device information card, if you have one.     If you have any questions or concerns, please don't hesitate to call.

## 2022-07-28 ENCOUNTER — ANESTHESIA EVENT (OUTPATIENT)
Dept: SURGERY | Facility: HOSPITAL | Age: 72
DRG: 236 | End: 2022-07-28
Payer: MEDICARE

## 2022-07-28 ENCOUNTER — DOCUMENTATION ONLY (OUTPATIENT)
Dept: CARDIOTHORACIC SURGERY | Facility: CLINIC | Age: 72
End: 2022-07-28
Payer: MEDICARE

## 2022-07-28 RX ORDER — ACETAMINOPHEN 500 MG
1000 TABLET ORAL
Status: CANCELLED | OUTPATIENT
Start: 2022-07-29 | End: 2022-07-29

## 2022-07-28 NOTE — PROGRESS NOTES
Called pt and informed him of arrival time for surgery.  Pt instructed to report to [facility] at 5:00 tomorrow morning.  Pt reminded to perform Hibiclens shower tonight and tomorrow morning, and to become NPO at midnight.  Pt verbalized understanding.

## 2022-07-29 ENCOUNTER — HOSPITAL ENCOUNTER (INPATIENT)
Facility: HOSPITAL | Age: 72
LOS: 5 days | Discharge: HOME | DRG: 236 | End: 2022-08-03
Attending: THORACIC SURGERY (CARDIOTHORACIC VASCULAR SURGERY) | Admitting: THORACIC SURGERY (CARDIOTHORACIC VASCULAR SURGERY)
Payer: MEDICARE

## 2022-07-29 ENCOUNTER — ANESTHESIA (OUTPATIENT)
Dept: SURGERY | Facility: HOSPITAL | Age: 72
DRG: 236 | End: 2022-07-29
Payer: MEDICARE

## 2022-07-29 DIAGNOSIS — E11.9 TYPE 2 DIABETES MELLITUS WITHOUT COMPLICATION, WITHOUT LONG-TERM CURRENT USE OF INSULIN: ICD-10-CM

## 2022-07-29 DIAGNOSIS — Z95.1 S/P CABG (CORONARY ARTERY BYPASS GRAFT): Primary | ICD-10-CM

## 2022-07-29 DIAGNOSIS — I49.9 ARRHYTHMIA: ICD-10-CM

## 2022-07-29 DIAGNOSIS — Z95.1 S/P CABG X 3: Primary | ICD-10-CM

## 2022-07-29 DIAGNOSIS — T14.8XXA SURGICAL WOUND PRESENT: ICD-10-CM

## 2022-07-29 DIAGNOSIS — I25.10 CORONARY ARTERY DISEASE INVOLVING NATIVE CORONARY ARTERY OF NATIVE HEART WITHOUT ANGINA PECTORIS: ICD-10-CM

## 2022-07-29 DIAGNOSIS — Z98.890 HISTORY OF HEART SURGERY: ICD-10-CM

## 2022-07-29 LAB
ABO + RH BLD: NORMAL
ALBUMIN SERPL BCP-MCNC: 2.8 G/DL (ref 3.5–5.2)
ALLENS TEST: ABNORMAL
ALP SERPL-CCNC: 28 U/L (ref 55–135)
ALT SERPL W/O P-5'-P-CCNC: 12 U/L (ref 10–44)
ANION GAP SERPL CALC-SCNC: 12 MMOL/L (ref 8–16)
APTT BLDCRRT: 30.6 SEC (ref 21–32)
AST SERPL-CCNC: 27 U/L (ref 10–40)
BASOPHILS # BLD AUTO: 0.14 K/UL (ref 0–0.2)
BASOPHILS NFR BLD: 0.6 % (ref 0–1.9)
BILIRUB SERPL-MCNC: 0.5 MG/DL (ref 0.1–1)
BLD GP AB SCN CELLS X3 SERPL QL: NORMAL
BUN SERPL-MCNC: 10 MG/DL (ref 8–23)
CALCIUM SERPL-MCNC: 8.5 MG/DL (ref 8.7–10.5)
CHLORIDE SERPL-SCNC: 107 MMOL/L (ref 95–110)
CO2 SERPL-SCNC: 19 MMOL/L (ref 23–29)
CREAT SERPL-MCNC: 0.9 MG/DL (ref 0.5–1.4)
DELSYS: ABNORMAL
DIFFERENTIAL METHOD: ABNORMAL
EOSINOPHIL # BLD AUTO: 0.1 K/UL (ref 0–0.5)
EOSINOPHIL NFR BLD: 0.3 % (ref 0–8)
ERYTHROCYTE [DISTWIDTH] IN BLOOD BY AUTOMATED COUNT: 14.4 % (ref 11.5–14.5)
ERYTHROCYTE [SEDIMENTATION RATE] IN BLOOD BY WESTERGREN METHOD: 18 MM/H
ERYTHROCYTE [SEDIMENTATION RATE] IN BLOOD BY WESTERGREN METHOD: 18 MM/H
ERYTHROCYTE [SEDIMENTATION RATE] IN BLOOD BY WESTERGREN METHOD: 26 MM/H
ERYTHROCYTE [SEDIMENTATION RATE] IN BLOOD BY WESTERGREN METHOD: 26 MM/H
ERYTHROCYTE [SEDIMENTATION RATE] IN BLOOD BY WESTERGREN METHOD: 30 MM/H
EST. GFR  (AFRICAN AMERICAN): >60 ML/MIN/1.73 M^2
EST. GFR  (NON AFRICAN AMERICAN): >60 ML/MIN/1.73 M^2
FIO2: 0.55
FIO2: 100
FIO2: 100
FIO2: 30
FIO2: 30
FIO2: 50
FIO2: 50
FIO2: 60
FIO2: 80
FIO2: 80
GLUCOSE SERPL-MCNC: 149 MG/DL (ref 70–110)
GLUCOSE SERPL-MCNC: 186 MG/DL (ref 70–110)
GLUCOSE SERPL-MCNC: 187 MG/DL (ref 70–110)
GLUCOSE SERPL-MCNC: 188 MG/DL (ref 70–110)
GLUCOSE SERPL-MCNC: 192 MG/DL (ref 70–110)
GLUCOSE SERPL-MCNC: 198 MG/DL (ref 70–110)
HCO3 UR-SCNC: 14.8 MMOL/L (ref 24–28)
HCO3 UR-SCNC: 16.5 MMOL/L (ref 24–28)
HCO3 UR-SCNC: 16.7 MMOL/L (ref 24–28)
HCO3 UR-SCNC: 17.4 MMOL/L (ref 24–28)
HCO3 UR-SCNC: 18.2 MMOL/L (ref 24–28)
HCO3 UR-SCNC: 20.8 MMOL/L (ref 24–28)
HCO3 UR-SCNC: 23.3 MMOL/L (ref 24–28)
HCO3 UR-SCNC: 23.4 MMOL/L (ref 24–28)
HCO3 UR-SCNC: 23.6 MMOL/L (ref 24–28)
HCO3 UR-SCNC: 24.8 MMOL/L (ref 24–28)
HCO3 UR-SCNC: 26 MMOL/L (ref 24–28)
HCO3 UR-SCNC: 26.4 MMOL/L (ref 24–28)
HCO3 UR-SCNC: 27.3 MMOL/L (ref 24–28)
HCT VFR BLD AUTO: 37 % (ref 40–54)
HCT VFR BLD CALC: 28 %PCV (ref 36–54)
HCT VFR BLD CALC: 29 %PCV (ref 36–54)
HCT VFR BLD CALC: 30 %PCV (ref 36–54)
HCT VFR BLD CALC: 31 %PCV (ref 36–54)
HCT VFR BLD CALC: 32 %PCV (ref 36–54)
HCT VFR BLD CALC: 32 %PCV (ref 36–54)
HCT VFR BLD CALC: 35 %PCV (ref 36–54)
HCT VFR BLD CALC: 37 %PCV (ref 36–54)
HGB BLD-MCNC: 11.7 G/DL (ref 14–18)
IMM GRANULOCYTES # BLD AUTO: 0.18 K/UL (ref 0–0.04)
IMM GRANULOCYTES NFR BLD AUTO: 0.8 % (ref 0–0.5)
INR PPP: 1.2 (ref 0.8–1.2)
LDH SERPL L TO P-CCNC: 1.55 MMOL/L (ref 0.36–1.25)
LDH SERPL L TO P-CCNC: 1.66 MMOL/L (ref 0.36–1.25)
LDH SERPL L TO P-CCNC: 1.96 MMOL/L (ref 0.36–1.25)
LDH SERPL L TO P-CCNC: 2.84 MMOL/L (ref 0.36–1.25)
LDH SERPL L TO P-CCNC: 4.74 MMOL/L (ref 0.36–1.25)
LDH SERPL L TO P-CCNC: 5.71 MMOL/L (ref 0.36–1.25)
LDH SERPL L TO P-CCNC: 6.19 MMOL/L (ref 0.36–1.25)
LDH SERPL L TO P-CCNC: 6.6 MMOL/L (ref 0.36–1.25)
LDH SERPL L TO P-CCNC: 6.71 MMOL/L (ref 0.36–1.25)
LDH SERPL L TO P-CCNC: 7.44 MMOL/L (ref 0.36–1.25)
LYMPHOCYTES # BLD AUTO: 3.1 K/UL (ref 1–4.8)
LYMPHOCYTES NFR BLD: 13 % (ref 18–48)
MAGNESIUM SERPL-MCNC: 2.4 MG/DL (ref 1.6–2.6)
MCH RBC QN AUTO: 27.4 PG (ref 27–31)
MCHC RBC AUTO-ENTMCNC: 31.6 G/DL (ref 32–36)
MCV RBC AUTO: 87 FL (ref 82–98)
MIN VOL: 13.4
MIN VOL: 13.4
MIN VOL: 15
MIN VOL: 15
MIN VOL: 15.2
MIN VOL: 15.3
MIN VOL: 15.3
MIN VOL: 9.75
MIN VOL: 9.75
MODE: ABNORMAL
MONOCYTES # BLD AUTO: 1.9 K/UL (ref 0.3–1)
MONOCYTES NFR BLD: 8.1 % (ref 4–15)
NEUTROPHILS # BLD AUTO: 18.3 K/UL (ref 1.8–7.7)
NEUTROPHILS NFR BLD: 77.2 % (ref 38–73)
NRBC BLD-RTO: 0 /100 WBC
PCO2 BLDA: 26.9 MMHG (ref 35–45)
PCO2 BLDA: 27 MMHG (ref 35–45)
PCO2 BLDA: 28.2 MMHG (ref 35–45)
PCO2 BLDA: 30.3 MMHG (ref 35–45)
PCO2 BLDA: 35.8 MMHG (ref 35–45)
PCO2 BLDA: 38.9 MMHG (ref 35–45)
PCO2 BLDA: 39.9 MMHG (ref 35–45)
PCO2 BLDA: 40.8 MMHG (ref 35–45)
PCO2 BLDA: 43.1 MMHG (ref 35–45)
PCO2 BLDA: 44.1 MMHG (ref 35–45)
PCO2 BLDA: 45.9 MMHG (ref 35–45)
PCO2 BLDA: 47.2 MMHG (ref 35–45)
PCO2 BLDA: 49.7 MMHG (ref 35–45)
PEEP: 5
PEEP: 5
PEEP: 7
PEEP: 8.5
PH SMN: 7.25 [PH] (ref 7.35–7.45)
PH SMN: 7.27 [PH] (ref 7.35–7.45)
PH SMN: 7.33 [PH] (ref 7.35–7.45)
PH SMN: 7.34 [PH] (ref 7.35–7.45)
PH SMN: 7.34 [PH] (ref 7.35–7.45)
PH SMN: 7.35 [PH] (ref 7.35–7.45)
PH SMN: 7.37 [PH] (ref 7.35–7.45)
PH SMN: 7.37 [PH] (ref 7.35–7.45)
PH SMN: 7.38 [PH] (ref 7.35–7.45)
PH SMN: 7.4 [PH] (ref 7.35–7.45)
PH SMN: 7.41 [PH] (ref 7.35–7.45)
PH SMN: 7.41 [PH] (ref 7.35–7.45)
PH SMN: 7.44 [PH] (ref 7.35–7.45)
PHOSPHATE SERPL-MCNC: 2.8 MG/DL (ref 2.7–4.5)
PIP: 24
PIP: 24
PIP: 25
PIP: 25
PIP: 26
PLATELET # BLD AUTO: 223 K/UL (ref 150–450)
PMV BLD AUTO: 9.7 FL (ref 9.2–12.9)
PO2 BLDA: 105 MMHG (ref 80–100)
PO2 BLDA: 127 MMHG (ref 80–100)
PO2 BLDA: 141 MMHG (ref 80–100)
PO2 BLDA: 183 MMHG (ref 80–100)
PO2 BLDA: 188 MMHG (ref 80–100)
PO2 BLDA: 232 MMHG (ref 80–100)
PO2 BLDA: 234 MMHG (ref 80–100)
PO2 BLDA: 256 MMHG (ref 80–100)
PO2 BLDA: 72 MMHG (ref 80–100)
PO2 BLDA: 85 MMHG (ref 80–100)
PO2 BLDA: 86 MMHG (ref 80–100)
PO2 BLDA: 88 MMHG (ref 80–100)
PO2 BLDA: 95 MMHG (ref 80–100)
POC BE: -10 MMOL/L
POC BE: -11 MMOL/L
POC BE: -2 MMOL/L
POC BE: -6 MMOL/L
POC BE: -6 MMOL/L
POC BE: -8 MMOL/L
POC BE: -8 MMOL/L
POC BE: 0 MMOL/L
POC BE: 1 MMOL/L
POC BE: 1 MMOL/L
POC BE: 2 MMOL/L
POC IONIZED CALCIUM: 1.03 MMOL/L (ref 1.06–1.42)
POC IONIZED CALCIUM: 1.07 MMOL/L (ref 1.06–1.42)
POC IONIZED CALCIUM: 1.07 MMOL/L (ref 1.06–1.42)
POC IONIZED CALCIUM: 1.09 MMOL/L (ref 1.06–1.42)
POC IONIZED CALCIUM: 1.1 MMOL/L (ref 1.06–1.42)
POC IONIZED CALCIUM: 1.12 MMOL/L (ref 1.06–1.42)
POC IONIZED CALCIUM: 1.13 MMOL/L (ref 1.06–1.42)
POC IONIZED CALCIUM: 1.14 MMOL/L (ref 1.06–1.42)
POC IONIZED CALCIUM: 1.16 MMOL/L (ref 1.06–1.42)
POC IONIZED CALCIUM: 1.18 MMOL/L (ref 1.06–1.42)
POC IONIZED CALCIUM: 1.2 MMOL/L (ref 1.06–1.42)
POC IONIZED CALCIUM: 1.21 MMOL/L (ref 1.06–1.42)
POC IONIZED CALCIUM: 1.27 MMOL/L (ref 1.06–1.42)
POC SATURATED O2: 100 % (ref 95–100)
POC SATURATED O2: 93 % (ref 95–100)
POC SATURATED O2: 96 % (ref 95–100)
POC SATURATED O2: 96 % (ref 95–100)
POC SATURATED O2: 97 % (ref 95–100)
POC SATURATED O2: 98 % (ref 95–100)
POC SATURATED O2: 99 % (ref 95–100)
POC TCO2: 16 MMOL/L (ref 23–27)
POC TCO2: 18 MMOL/L (ref 23–27)
POC TCO2: 19 MMOL/L (ref 23–27)
POC TCO2: 22 MMOL/L (ref 23–27)
POC TCO2: 25 MMOL/L (ref 23–27)
POC TCO2: 26 MMOL/L (ref 23–27)
POC TCO2: 27 MMOL/L (ref 23–27)
POC TCO2: 28 MMOL/L (ref 23–27)
POC TCO2: 29 MMOL/L (ref 23–27)
POCT GLUCOSE: 110 MG/DL (ref 70–110)
POCT GLUCOSE: 113 MG/DL (ref 70–110)
POCT GLUCOSE: 121 MG/DL (ref 70–110)
POCT GLUCOSE: 132 MG/DL (ref 70–110)
POCT GLUCOSE: 139 MG/DL (ref 70–110)
POCT GLUCOSE: 167 MG/DL (ref 70–110)
POCT GLUCOSE: 169 MG/DL (ref 70–110)
POCT GLUCOSE: 170 MG/DL (ref 70–110)
POCT GLUCOSE: 175 MG/DL (ref 70–110)
POCT GLUCOSE: 176 MG/DL (ref 70–110)
POTASSIUM BLD-SCNC: 2.8 MMOL/L (ref 3.5–5.1)
POTASSIUM BLD-SCNC: 2.9 MMOL/L (ref 3.5–5.1)
POTASSIUM BLD-SCNC: 3.1 MMOL/L (ref 3.5–5.1)
POTASSIUM BLD-SCNC: 3.2 MMOL/L (ref 3.5–5.1)
POTASSIUM BLD-SCNC: 3.4 MMOL/L (ref 3.5–5.1)
POTASSIUM BLD-SCNC: 3.6 MMOL/L (ref 3.5–5.1)
POTASSIUM BLD-SCNC: 4.1 MMOL/L (ref 3.5–5.1)
POTASSIUM BLD-SCNC: 4.5 MMOL/L (ref 3.5–5.1)
POTASSIUM BLD-SCNC: 4.7 MMOL/L (ref 3.5–5.1)
POTASSIUM BLD-SCNC: 5.2 MMOL/L (ref 3.5–5.1)
POTASSIUM BLD-SCNC: 5.6 MMOL/L (ref 3.5–5.1)
POTASSIUM BLD-SCNC: 5.6 MMOL/L (ref 3.5–5.1)
POTASSIUM BLD-SCNC: 6.3 MMOL/L (ref 3.5–5.1)
POTASSIUM SERPL-SCNC: 3.4 MMOL/L (ref 3.5–5.1)
POTASSIUM SERPL-SCNC: 3.8 MMOL/L (ref 3.5–5.1)
PROT SERPL-MCNC: 5.2 G/DL (ref 6–8.4)
PROTHROMBIN TIME: 12.1 SEC (ref 9–12.5)
PS: 5
PS: 5
RBC # BLD AUTO: 4.27 M/UL (ref 4.6–6.2)
SAMPLE: ABNORMAL
SITE: ABNORMAL
SODIUM BLD-SCNC: 135 MMOL/L (ref 136–145)
SODIUM BLD-SCNC: 136 MMOL/L (ref 136–145)
SODIUM BLD-SCNC: 136 MMOL/L (ref 136–145)
SODIUM BLD-SCNC: 138 MMOL/L (ref 136–145)
SODIUM BLD-SCNC: 138 MMOL/L (ref 136–145)
SODIUM BLD-SCNC: 139 MMOL/L (ref 136–145)
SODIUM BLD-SCNC: 139 MMOL/L (ref 136–145)
SODIUM BLD-SCNC: 140 MMOL/L (ref 136–145)
SODIUM BLD-SCNC: 142 MMOL/L (ref 136–145)
SODIUM BLD-SCNC: 142 MMOL/L (ref 136–145)
SODIUM BLD-SCNC: 143 MMOL/L (ref 136–145)
SODIUM BLD-SCNC: 143 MMOL/L (ref 136–145)
SODIUM BLD-SCNC: 145 MMOL/L (ref 136–145)
SODIUM SERPL-SCNC: 138 MMOL/L (ref 136–145)
SP02: 100
SP02: 99
SP02: 99
VT: 480
VT: 490
VT: 490
WBC # BLD AUTO: 23.7 K/UL (ref 3.9–12.7)

## 2022-07-29 PROCEDURE — 27000221 HC OXYGEN, UP TO 24 HOURS

## 2022-07-29 PROCEDURE — 82803 BLOOD GASES ANY COMBINATION: CPT

## 2022-07-29 PROCEDURE — 85610 PROTHROMBIN TIME: CPT | Performed by: NURSE PRACTITIONER

## 2022-07-29 PROCEDURE — 86901 BLOOD TYPING SEROLOGIC RH(D): CPT | Performed by: NURSE PRACTITIONER

## 2022-07-29 PROCEDURE — 36620 PR INSERT CATH,ART,PERCUT,SHORTTERM: ICD-10-PCS | Mod: 59,,, | Performed by: ANESTHESIOLOGY

## 2022-07-29 PROCEDURE — 27200966 HC CLOSED SUCTION SYSTEM

## 2022-07-29 PROCEDURE — 25000242 PHARM REV CODE 250 ALT 637 W/ HCPCS: Performed by: NURSE PRACTITIONER

## 2022-07-29 PROCEDURE — 83605 ASSAY OF LACTIC ACID: CPT

## 2022-07-29 PROCEDURE — 25000003 PHARM REV CODE 250: Performed by: THORACIC SURGERY (CARDIOTHORACIC VASCULAR SURGERY)

## 2022-07-29 PROCEDURE — D9220A PRA ANESTHESIA: Mod: ,,, | Performed by: ANESTHESIOLOGY

## 2022-07-29 PROCEDURE — 94761 N-INVAS EAR/PLS OXIMETRY MLT: CPT

## 2022-07-29 PROCEDURE — C1729 CATH, DRAINAGE: HCPCS | Performed by: THORACIC SURGERY (CARDIOTHORACIC VASCULAR SURGERY)

## 2022-07-29 PROCEDURE — 93320 PR DOPPLER ECHO HEART,COMPLETE: ICD-10-PCS | Mod: 26,,, | Performed by: ANESTHESIOLOGY

## 2022-07-29 PROCEDURE — 33508 PR ENDOSCOPY W/VIDEO-ASST VEIN HARVEST,CABG: ICD-10-PCS | Mod: 59,,, | Performed by: THORACIC SURGERY (CARDIOTHORACIC VASCULAR SURGERY)

## 2022-07-29 PROCEDURE — 84132 ASSAY OF SERUM POTASSIUM: CPT | Performed by: THORACIC SURGERY (CARDIOTHORACIC VASCULAR SURGERY)

## 2022-07-29 PROCEDURE — 93005 ELECTROCARDIOGRAM TRACING: CPT

## 2022-07-29 PROCEDURE — 82800 BLOOD PH: CPT

## 2022-07-29 PROCEDURE — 25000003 PHARM REV CODE 250: Performed by: STUDENT IN AN ORGANIZED HEALTH CARE EDUCATION/TRAINING PROGRAM

## 2022-07-29 PROCEDURE — 64999 PR BLOCK, ERECTOR SPINAE PLANE, SINGLE SHOT: ICD-10-PCS | Mod: ,,, | Performed by: ANESTHESIOLOGY

## 2022-07-29 PROCEDURE — 99499 UNLISTED E&M SERVICE: CPT | Mod: ,,, | Performed by: ANESTHESIOLOGY

## 2022-07-29 PROCEDURE — P9045 ALBUMIN (HUMAN), 5%, 250 ML: HCPCS | Mod: JG | Performed by: NURSE PRACTITIONER

## 2022-07-29 PROCEDURE — 84100 ASSAY OF PHOSPHORUS: CPT | Performed by: NURSE PRACTITIONER

## 2022-07-29 PROCEDURE — 63600175 PHARM REV CODE 636 W HCPCS: Performed by: STUDENT IN AN ORGANIZED HEALTH CARE EDUCATION/TRAINING PROGRAM

## 2022-07-29 PROCEDURE — 36000713 HC OR TIME LEV V EA ADD 15 MIN: Performed by: THORACIC SURGERY (CARDIOTHORACIC VASCULAR SURGERY)

## 2022-07-29 PROCEDURE — 93320 DOPPLER ECHO COMPLETE: CPT | Mod: 26,,, | Performed by: ANESTHESIOLOGY

## 2022-07-29 PROCEDURE — 33518 CABG ARTERY-VEIN TWO: CPT | Mod: ,,, | Performed by: THORACIC SURGERY (CARDIOTHORACIC VASCULAR SURGERY)

## 2022-07-29 PROCEDURE — P9045 ALBUMIN (HUMAN), 5%, 250 ML: HCPCS | Mod: JG

## 2022-07-29 PROCEDURE — 83735 ASSAY OF MAGNESIUM: CPT | Performed by: NURSE PRACTITIONER

## 2022-07-29 PROCEDURE — 25000003 PHARM REV CODE 250: Performed by: NURSE PRACTITIONER

## 2022-07-29 PROCEDURE — 76942 PR U/S GUIDANCE FOR NEEDLE GUIDANCE: ICD-10-PCS | Mod: 26,,, | Performed by: ANESTHESIOLOGY

## 2022-07-29 PROCEDURE — 99499 NO LOS: ICD-10-PCS | Mod: ,,, | Performed by: ANESTHESIOLOGY

## 2022-07-29 PROCEDURE — 36000712 HC OR TIME LEV V 1ST 15 MIN: Performed by: THORACIC SURGERY (CARDIOTHORACIC VASCULAR SURGERY)

## 2022-07-29 PROCEDURE — 99900026 HC AIRWAY MAINTENANCE (STAT)

## 2022-07-29 PROCEDURE — 33508 ENDOSCOPIC VEIN HARVEST: CPT | Mod: 59,,, | Performed by: THORACIC SURGERY (CARDIOTHORACIC VASCULAR SURGERY)

## 2022-07-29 PROCEDURE — 94010 BREATHING CAPACITY TEST: CPT

## 2022-07-29 PROCEDURE — 27000191 HC C-V MONITORING

## 2022-07-29 PROCEDURE — 99223 PR INITIAL HOSPITAL CARE,LEVL III: ICD-10-PCS | Mod: ,,, | Performed by: NURSE PRACTITIONER

## 2022-07-29 PROCEDURE — 63600175 PHARM REV CODE 636 W HCPCS: Mod: JG | Performed by: NURSE PRACTITIONER

## 2022-07-29 PROCEDURE — 36415 COLL VENOUS BLD VENIPUNCTURE: CPT | Performed by: THORACIC SURGERY (CARDIOTHORACIC VASCULAR SURGERY)

## 2022-07-29 PROCEDURE — 27100026 HC SHUNT SENSOR, TERUMO

## 2022-07-29 PROCEDURE — 33518 PR CABG, ARTERY-VEIN, TWO: ICD-10-PCS | Mod: ,,, | Performed by: THORACIC SURGERY (CARDIOTHORACIC VASCULAR SURGERY)

## 2022-07-29 PROCEDURE — 37799 UNLISTED PX VASCULAR SURGERY: CPT

## 2022-07-29 PROCEDURE — 76942 ECHO GUIDE FOR BIOPSY: CPT | Mod: 26,,, | Performed by: ANESTHESIOLOGY

## 2022-07-29 PROCEDURE — 36556 INSERT NON-TUNNEL CV CATH: CPT | Mod: 59,,, | Performed by: ANESTHESIOLOGY

## 2022-07-29 PROCEDURE — 37000008 HC ANESTHESIA 1ST 15 MINUTES: Performed by: THORACIC SURGERY (CARDIOTHORACIC VASCULAR SURGERY)

## 2022-07-29 PROCEDURE — 94640 AIRWAY INHALATION TREATMENT: CPT

## 2022-07-29 PROCEDURE — 27100088 HC CELL SAVER

## 2022-07-29 PROCEDURE — 63600175 PHARM REV CODE 636 W HCPCS: Mod: JG

## 2022-07-29 PROCEDURE — 36620 INSERTION CATHETER ARTERY: CPT | Mod: 59,,, | Performed by: ANESTHESIOLOGY

## 2022-07-29 PROCEDURE — 85014 HEMATOCRIT: CPT

## 2022-07-29 PROCEDURE — 82962 GLUCOSE BLOOD TEST: CPT | Performed by: THORACIC SURGERY (CARDIOTHORACIC VASCULAR SURGERY)

## 2022-07-29 PROCEDURE — 80053 COMPREHEN METABOLIC PANEL: CPT | Performed by: THORACIC SURGERY (CARDIOTHORACIC VASCULAR SURGERY)

## 2022-07-29 PROCEDURE — 27200953 HC CARDIOPLEGIA SYSTEM

## 2022-07-29 PROCEDURE — 20000000 HC ICU ROOM

## 2022-07-29 PROCEDURE — 27201423 OPTIME MED/SURG SUP & DEVICES STERILE SUPPLY: Performed by: THORACIC SURGERY (CARDIOTHORACIC VASCULAR SURGERY)

## 2022-07-29 PROCEDURE — 85025 COMPLETE CBC W/AUTO DIFF WBC: CPT | Performed by: NURSE PRACTITIONER

## 2022-07-29 PROCEDURE — 93010 EKG 12-LEAD: ICD-10-PCS | Mod: ,,, | Performed by: INTERNAL MEDICINE

## 2022-07-29 PROCEDURE — 63600175 PHARM REV CODE 636 W HCPCS: Performed by: ANESTHESIOLOGY

## 2022-07-29 PROCEDURE — 37000009 HC ANESTHESIA EA ADD 15 MINS: Performed by: THORACIC SURGERY (CARDIOTHORACIC VASCULAR SURGERY)

## 2022-07-29 PROCEDURE — 93312 PR ECHO HEART,TRANSESOPHAGEAL: ICD-10-PCS | Mod: 26,59,, | Performed by: ANESTHESIOLOGY

## 2022-07-29 PROCEDURE — 33533 PR CABG, ARTERIAL, SINGLE: ICD-10-PCS | Mod: ,,, | Performed by: THORACIC SURGERY (CARDIOTHORACIC VASCULAR SURGERY)

## 2022-07-29 PROCEDURE — 93010 ELECTROCARDIOGRAM REPORT: CPT | Mod: ,,, | Performed by: INTERNAL MEDICINE

## 2022-07-29 PROCEDURE — 33533 CABG ARTERIAL SINGLE: CPT | Mod: ,,, | Performed by: THORACIC SURGERY (CARDIOTHORACIC VASCULAR SURGERY)

## 2022-07-29 PROCEDURE — 85730 THROMBOPLASTIN TIME PARTIAL: CPT | Performed by: NURSE PRACTITIONER

## 2022-07-29 PROCEDURE — 84295 ASSAY OF SERUM SODIUM: CPT

## 2022-07-29 PROCEDURE — 27000175 HC ADULT BYPASS PUMP

## 2022-07-29 PROCEDURE — 36556 PR INSERT NON-TUNNEL CV CATH 5+ YRS OLD: ICD-10-PCS | Mod: 59,,, | Performed by: ANESTHESIOLOGY

## 2022-07-29 PROCEDURE — 63600175 PHARM REV CODE 636 W HCPCS: Performed by: THORACIC SURGERY (CARDIOTHORACIC VASCULAR SURGERY)

## 2022-07-29 PROCEDURE — 93312 ECHO TRANSESOPHAGEAL: CPT | Mod: 26,59,, | Performed by: ANESTHESIOLOGY

## 2022-07-29 PROCEDURE — 82330 ASSAY OF CALCIUM: CPT

## 2022-07-29 PROCEDURE — C9248 INJ, CLEVIDIPINE BUTYRATE: HCPCS | Mod: JG

## 2022-07-29 PROCEDURE — 82565 ASSAY OF CREATININE: CPT

## 2022-07-29 PROCEDURE — 25000003 PHARM REV CODE 250

## 2022-07-29 PROCEDURE — 27202608 HC CANNULA, MISC

## 2022-07-29 PROCEDURE — 84132 ASSAY OF SERUM POTASSIUM: CPT

## 2022-07-29 PROCEDURE — 99900035 HC TECH TIME PER 15 MIN (STAT)

## 2022-07-29 PROCEDURE — 25000003 PHARM REV CODE 250: Performed by: ANESTHESIOLOGY

## 2022-07-29 PROCEDURE — 64999 UNLISTED PX NERVOUS SYSTEM: CPT | Mod: ,,, | Performed by: ANESTHESIOLOGY

## 2022-07-29 PROCEDURE — D9220A PRA ANESTHESIA: ICD-10-PCS | Mod: ,,, | Performed by: ANESTHESIOLOGY

## 2022-07-29 PROCEDURE — 93325 DOPPLER ECHO COLOR FLOW MAPG: CPT | Mod: 26,,, | Performed by: ANESTHESIOLOGY

## 2022-07-29 PROCEDURE — 93325 PR DOPPLER COLOR FLOW VELOCITY MAP: ICD-10-PCS | Mod: 26,,, | Performed by: ANESTHESIOLOGY

## 2022-07-29 PROCEDURE — 85520 HEPARIN ASSAY: CPT

## 2022-07-29 PROCEDURE — 27000190 HC CPAP FULL FACE MASK W/VALVE

## 2022-07-29 PROCEDURE — 99223 1ST HOSP IP/OBS HIGH 75: CPT | Mod: ,,, | Performed by: NURSE PRACTITIONER

## 2022-07-29 PROCEDURE — 94002 VENT MGMT INPAT INIT DAY: CPT

## 2022-07-29 PROCEDURE — 27000445 HC TEMPORARY PACEMAKER LEADS

## 2022-07-29 RX ORDER — FAMOTIDINE 10 MG/ML
20 INJECTION INTRAVENOUS 2 TIMES DAILY
Status: DISCONTINUED | OUTPATIENT
Start: 2022-07-29 | End: 2022-07-30

## 2022-07-29 RX ORDER — LIDOCAINE HYDROCHLORIDE 10 MG/ML
INJECTION, SOLUTION INTRAVENOUS
Status: DISCONTINUED | OUTPATIENT
Start: 2022-07-29 | End: 2022-07-29

## 2022-07-29 RX ORDER — ESMOLOL HYDROCHLORIDE 10 MG/ML
INJECTION INTRAVENOUS
Status: DISCONTINUED | OUTPATIENT
Start: 2022-07-29 | End: 2022-07-29

## 2022-07-29 RX ORDER — CEFAZOLIN SODIUM 2 G/50ML
2 SOLUTION INTRAVENOUS
Status: DISCONTINUED | OUTPATIENT
Start: 2022-07-29 | End: 2022-07-30

## 2022-07-29 RX ORDER — FENTANYL CITRATE 50 UG/ML
25 INJECTION, SOLUTION INTRAMUSCULAR; INTRAVENOUS
Status: DISCONTINUED | OUTPATIENT
Start: 2022-07-29 | End: 2022-07-29

## 2022-07-29 RX ORDER — BUPIVACAINE HYDROCHLORIDE 7.5 MG/ML
INJECTION, SOLUTION EPIDURAL; RETROBULBAR
Status: COMPLETED | OUTPATIENT
Start: 2022-07-29 | End: 2022-07-29

## 2022-07-29 RX ORDER — PROPOFOL 10 MG/ML
0-50 INJECTION, EMULSION INTRAVENOUS CONTINUOUS
Status: DISCONTINUED | OUTPATIENT
Start: 2022-07-29 | End: 2022-07-30

## 2022-07-29 RX ORDER — MAGNESIUM SULFATE HEPTAHYDRATE 40 MG/ML
4 INJECTION, SOLUTION INTRAVENOUS
Status: DISCONTINUED | OUTPATIENT
Start: 2022-07-29 | End: 2022-08-02

## 2022-07-29 RX ORDER — PROTAMINE SULFATE 10 MG/ML
INJECTION, SOLUTION INTRAVENOUS
Status: DISCONTINUED | OUTPATIENT
Start: 2022-07-29 | End: 2022-07-29

## 2022-07-29 RX ORDER — POTASSIUM CHLORIDE 14.9 MG/ML
20 INJECTION INTRAVENOUS
Status: DISCONTINUED | OUTPATIENT
Start: 2022-07-29 | End: 2022-08-01

## 2022-07-29 RX ORDER — POLYETHYLENE GLYCOL 3350 17 G/17G
17 POWDER, FOR SOLUTION ORAL DAILY
Status: DISCONTINUED | OUTPATIENT
Start: 2022-07-29 | End: 2022-08-03 | Stop reason: HOSPADM

## 2022-07-29 RX ORDER — ASPIRIN 300 MG/1
300 SUPPOSITORY RECTAL ONCE AS NEEDED
Status: DISCONTINUED | OUTPATIENT
Start: 2022-07-29 | End: 2022-07-30

## 2022-07-29 RX ORDER — ACETAMINOPHEN 500 MG
1000 TABLET ORAL EVERY 8 HOURS
Status: DISCONTINUED | OUTPATIENT
Start: 2022-07-29 | End: 2022-08-03 | Stop reason: HOSPADM

## 2022-07-29 RX ORDER — SODIUM CHLORIDE 9 MG/ML
INJECTION, SOLUTION INTRAVENOUS CONTINUOUS
Status: DISCONTINUED | OUTPATIENT
Start: 2022-07-29 | End: 2022-08-02

## 2022-07-29 RX ORDER — ASPIRIN 325 MG
325 TABLET ORAL DAILY
Status: DISCONTINUED | OUTPATIENT
Start: 2022-07-29 | End: 2022-08-03 | Stop reason: HOSPADM

## 2022-07-29 RX ORDER — ASPIRIN 325 MG
325 TABLET ORAL ONCE
Status: DISCONTINUED | OUTPATIENT
Start: 2022-07-29 | End: 2022-07-29

## 2022-07-29 RX ORDER — POTASSIUM CHLORIDE 14.9 MG/ML
60 INJECTION INTRAVENOUS
Status: DISCONTINUED | OUTPATIENT
Start: 2022-07-29 | End: 2022-08-01

## 2022-07-29 RX ORDER — MUPIROCIN 20 MG/G
1 OINTMENT TOPICAL
Status: COMPLETED | OUTPATIENT
Start: 2022-07-29 | End: 2022-07-29

## 2022-07-29 RX ORDER — HEPARIN SODIUM 1000 [USP'U]/ML
INJECTION, SOLUTION INTRAVENOUS; SUBCUTANEOUS
Status: DISCONTINUED | OUTPATIENT
Start: 2022-07-29 | End: 2022-07-29

## 2022-07-29 RX ORDER — ALBUMIN HUMAN 50 G/1000ML
25 SOLUTION INTRAVENOUS ONCE
Status: COMPLETED | OUTPATIENT
Start: 2022-07-29 | End: 2022-07-29

## 2022-07-29 RX ORDER — BISACODYL 10 MG
10 SUPPOSITORY, RECTAL RECTAL DAILY PRN
Status: DISCONTINUED | OUTPATIENT
Start: 2022-07-29 | End: 2022-08-03 | Stop reason: HOSPADM

## 2022-07-29 RX ORDER — OXYCODONE HYDROCHLORIDE 5 MG/1
5 TABLET ORAL EVERY 4 HOURS PRN
Status: DISCONTINUED | OUTPATIENT
Start: 2022-07-29 | End: 2022-08-03 | Stop reason: HOSPADM

## 2022-07-29 RX ORDER — MAGNESIUM SULFATE HEPTAHYDRATE 40 MG/ML
2 INJECTION, SOLUTION INTRAVENOUS
Status: DISCONTINUED | OUTPATIENT
Start: 2022-07-29 | End: 2022-08-02

## 2022-07-29 RX ORDER — ALBUMIN HUMAN 50 G/1000ML
SOLUTION INTRAVENOUS
Status: COMPLETED
Start: 2022-07-29 | End: 2022-07-29

## 2022-07-29 RX ORDER — IPRATROPIUM BROMIDE AND ALBUTEROL SULFATE 2.5; .5 MG/3ML; MG/3ML
3 SOLUTION RESPIRATORY (INHALATION) EVERY 4 HOURS PRN
Status: ACTIVE | OUTPATIENT
Start: 2022-07-29 | End: 2022-07-30

## 2022-07-29 RX ORDER — HYDROMORPHONE HYDROCHLORIDE 1 MG/ML
1 INJECTION, SOLUTION INTRAMUSCULAR; INTRAVENOUS; SUBCUTANEOUS
Status: DISCONTINUED | OUTPATIENT
Start: 2022-07-29 | End: 2022-07-30

## 2022-07-29 RX ORDER — DEXTROSE MONOHYDRATE, SODIUM CHLORIDE, AND POTASSIUM CHLORIDE 50; 1.49; 4.5 G/1000ML; G/1000ML; G/1000ML
INJECTION, SOLUTION INTRAVENOUS CONTINUOUS
Status: DISCONTINUED | OUTPATIENT
Start: 2022-07-29 | End: 2022-07-29

## 2022-07-29 RX ORDER — ATORVASTATIN CALCIUM 40 MG/1
40 TABLET, FILM COATED ORAL DAILY
Status: DISCONTINUED | OUTPATIENT
Start: 2022-07-30 | End: 2022-08-03 | Stop reason: HOSPADM

## 2022-07-29 RX ORDER — POTASSIUM CHLORIDE 29.8 MG/ML
40 INJECTION INTRAVENOUS
Status: DISCONTINUED | OUTPATIENT
Start: 2022-07-29 | End: 2022-08-01

## 2022-07-29 RX ORDER — TAMSULOSIN HYDROCHLORIDE 0.4 MG/1
0.4 CAPSULE ORAL DAILY
Status: DISCONTINUED | OUTPATIENT
Start: 2022-07-30 | End: 2022-08-03 | Stop reason: HOSPADM

## 2022-07-29 RX ORDER — LIDOCAINE HYDROCHLORIDE 10 MG/ML
1 INJECTION, SOLUTION EPIDURAL; INFILTRATION; INTRACAUDAL; PERINEURAL
Status: DISCONTINUED | OUTPATIENT
Start: 2022-07-29 | End: 2022-07-29 | Stop reason: HOSPADM

## 2022-07-29 RX ORDER — ALBUMIN HUMAN 50 G/1000ML
25 SOLUTION INTRAVENOUS ONCE AS NEEDED
Status: COMPLETED | OUTPATIENT
Start: 2022-07-29 | End: 2022-07-29

## 2022-07-29 RX ORDER — NITROGLYCERIN 5 MG/ML
INJECTION, SOLUTION INTRAVENOUS
Status: DISCONTINUED | OUTPATIENT
Start: 2022-07-29 | End: 2022-07-29

## 2022-07-29 RX ORDER — NOREPINEPHRINE BITARTRATE/D5W 4MG/250ML
0-3 PLASTIC BAG, INJECTION (ML) INTRAVENOUS CONTINUOUS
Status: DISCONTINUED | OUTPATIENT
Start: 2022-07-29 | End: 2022-07-30

## 2022-07-29 RX ORDER — SODIUM CHLORIDE 0.9 % (FLUSH) 0.9 %
10 SYRINGE (ML) INJECTION
Status: DISCONTINUED | OUTPATIENT
Start: 2022-07-29 | End: 2022-08-03 | Stop reason: HOSPADM

## 2022-07-29 RX ORDER — TRANEXAMIC ACID 100 MG/ML
INJECTION, SOLUTION INTRAVENOUS
Status: DISCONTINUED | OUTPATIENT
Start: 2022-07-29 | End: 2022-07-29

## 2022-07-29 RX ORDER — VASOPRESSIN 20 [USP'U]/ML
INJECTION, SOLUTION INTRAMUSCULAR; SUBCUTANEOUS
Status: DISCONTINUED | OUTPATIENT
Start: 2022-07-29 | End: 2022-07-29

## 2022-07-29 RX ORDER — NOREPINEPHRINE BITARTRATE 1 MG/ML
INJECTION, SOLUTION INTRAVENOUS
Status: DISCONTINUED | OUTPATIENT
Start: 2022-07-29 | End: 2022-07-29

## 2022-07-29 RX ORDER — TRANEXAMIC ACID 100 MG/ML
INJECTION, SOLUTION INTRAVENOUS CONTINUOUS PRN
Status: DISCONTINUED | OUTPATIENT
Start: 2022-07-29 | End: 2022-07-29

## 2022-07-29 RX ORDER — ONDANSETRON 2 MG/ML
INJECTION INTRAMUSCULAR; INTRAVENOUS
Status: DISCONTINUED | OUTPATIENT
Start: 2022-07-29 | End: 2022-07-29

## 2022-07-29 RX ORDER — METOCLOPRAMIDE HYDROCHLORIDE 5 MG/ML
5 INJECTION INTRAMUSCULAR; INTRAVENOUS EVERY 6 HOURS PRN
Status: DISCONTINUED | OUTPATIENT
Start: 2022-07-29 | End: 2022-08-01

## 2022-07-29 RX ORDER — ONDANSETRON 2 MG/ML
4 INJECTION INTRAMUSCULAR; INTRAVENOUS EVERY 12 HOURS PRN
Status: DISCONTINUED | OUTPATIENT
Start: 2022-07-29 | End: 2022-08-03 | Stop reason: HOSPADM

## 2022-07-29 RX ORDER — ALBUMIN HUMAN 50 G/1000ML
25 SOLUTION INTRAVENOUS ONCE
Status: COMPLETED | OUTPATIENT
Start: 2022-07-30 | End: 2022-07-30

## 2022-07-29 RX ORDER — DEXMEDETOMIDINE HYDROCHLORIDE 4 UG/ML
0-1.4 INJECTION, SOLUTION INTRAVENOUS CONTINUOUS
Status: DISCONTINUED | OUTPATIENT
Start: 2022-07-29 | End: 2022-07-30

## 2022-07-29 RX ORDER — PAPAVERINE HYDROCHLORIDE 30 MG/ML
INJECTION INTRAMUSCULAR; INTRAVENOUS
Status: DISCONTINUED | OUTPATIENT
Start: 2022-07-29 | End: 2022-07-29 | Stop reason: HOSPADM

## 2022-07-29 RX ORDER — DOCUSATE SODIUM 100 MG/1
100 CAPSULE, LIQUID FILLED ORAL 2 TIMES DAILY
Status: DISCONTINUED | OUTPATIENT
Start: 2022-07-29 | End: 2022-08-03 | Stop reason: HOSPADM

## 2022-07-29 RX ORDER — INDOMETHACIN 25 MG/1
100 CAPSULE ORAL ONCE
Status: COMPLETED | OUTPATIENT
Start: 2022-07-29 | End: 2022-07-29

## 2022-07-29 RX ORDER — FENTANYL CITRATE 50 UG/ML
INJECTION, SOLUTION INTRAMUSCULAR; INTRAVENOUS
Status: DISCONTINUED | OUTPATIENT
Start: 2022-07-29 | End: 2022-07-29

## 2022-07-29 RX ORDER — CEFAZOLIN SODIUM/WATER 2 G/20 ML
2 SYRINGE (ML) INTRAVENOUS
Status: DISCONTINUED | OUTPATIENT
Start: 2022-07-29 | End: 2022-07-29 | Stop reason: HOSPADM

## 2022-07-29 RX ORDER — METOPROLOL TARTRATE 25 MG/1
25 TABLET, FILM COATED ORAL
Status: COMPLETED | OUTPATIENT
Start: 2022-07-29 | End: 2022-07-29

## 2022-07-29 RX ORDER — ESCITALOPRAM OXALATE 10 MG/1
10 TABLET ORAL DAILY
Status: DISCONTINUED | OUTPATIENT
Start: 2022-07-30 | End: 2022-08-03 | Stop reason: HOSPADM

## 2022-07-29 RX ORDER — HEPARIN SOD,PORCINE/0.9 % NACL 1000/500ML
INTRAVENOUS SOLUTION INTRAVENOUS
Status: DISCONTINUED | OUTPATIENT
Start: 2022-07-29 | End: 2022-07-29 | Stop reason: HOSPADM

## 2022-07-29 RX ORDER — PROPOFOL 10 MG/ML
VIAL (ML) INTRAVENOUS
Status: DISCONTINUED | OUTPATIENT
Start: 2022-07-29 | End: 2022-07-29

## 2022-07-29 RX ORDER — HYDROMORPHONE HYDROCHLORIDE 2 MG/ML
2 INJECTION, SOLUTION INTRAMUSCULAR; INTRAVENOUS; SUBCUTANEOUS EVERY 6 HOURS PRN
Status: DISCONTINUED | OUTPATIENT
Start: 2022-07-29 | End: 2022-07-30

## 2022-07-29 RX ORDER — ACETAMINOPHEN 325 MG/1
650 TABLET ORAL EVERY 4 HOURS PRN
Status: DISCONTINUED | OUTPATIENT
Start: 2022-07-29 | End: 2022-07-29

## 2022-07-29 RX ORDER — OXYCODONE HYDROCHLORIDE 10 MG/1
10 TABLET ORAL EVERY 4 HOURS PRN
Status: DISCONTINUED | OUTPATIENT
Start: 2022-07-29 | End: 2022-08-03 | Stop reason: HOSPADM

## 2022-07-29 RX ORDER — ROCURONIUM BROMIDE 10 MG/ML
INJECTION, SOLUTION INTRAVENOUS
Status: DISCONTINUED | OUTPATIENT
Start: 2022-07-29 | End: 2022-07-29

## 2022-07-29 RX ORDER — IPRATROPIUM BROMIDE AND ALBUTEROL SULFATE 2.5; .5 MG/3ML; MG/3ML
3 SOLUTION RESPIRATORY (INHALATION) EVERY 4 HOURS
Status: COMPLETED | OUTPATIENT
Start: 2022-07-29 | End: 2022-07-30

## 2022-07-29 RX ORDER — CEFAZOLIN SODIUM 1 G/3ML
INJECTION, POWDER, FOR SOLUTION INTRAMUSCULAR; INTRAVENOUS
Status: DISCONTINUED | OUTPATIENT
Start: 2022-07-29 | End: 2022-07-29

## 2022-07-29 RX ORDER — MUPIROCIN 20 MG/G
1 OINTMENT TOPICAL 2 TIMES DAILY
Status: DISCONTINUED | OUTPATIENT
Start: 2022-07-29 | End: 2022-08-03 | Stop reason: HOSPADM

## 2022-07-29 RX ORDER — FENTANYL CITRATE 50 UG/ML
50 INJECTION, SOLUTION INTRAMUSCULAR; INTRAVENOUS
Status: DISCONTINUED | OUTPATIENT
Start: 2022-07-31 | End: 2022-07-29

## 2022-07-29 RX ADMIN — ROCURONIUM BROMIDE 100 MG: 10 INJECTION, SOLUTION INTRAVENOUS at 07:07

## 2022-07-29 RX ADMIN — NITROGLYCERIN 12.5 MCG: 5 INJECTION, SOLUTION INTRAVENOUS at 09:07

## 2022-07-29 RX ADMIN — CEFAZOLIN 2 G: 330 INJECTION, POWDER, FOR SOLUTION INTRAMUSCULAR; INTRAVENOUS at 08:07

## 2022-07-29 RX ADMIN — SODIUM BICARBONATE 100 MEQ: 84 INJECTION, SOLUTION INTRAVENOUS at 03:07

## 2022-07-29 RX ADMIN — OXYCODONE 5 MG: 5 TABLET ORAL at 10:07

## 2022-07-29 RX ADMIN — PROTAMINE SULFATE 275 MG: 10 INJECTION, SOLUTION INTRAVENOUS at 11:07

## 2022-07-29 RX ADMIN — PROPOFOL 30 MCG/KG/MIN: 10 INJECTION, EMULSION INTRAVENOUS at 03:07

## 2022-07-29 RX ADMIN — TRANEXAMIC ACID 105 MG: 100 INJECTION, SOLUTION INTRAVENOUS at 08:07

## 2022-07-29 RX ADMIN — PROPOFOL 20 MG: 10 INJECTION, EMULSION INTRAVENOUS at 07:07

## 2022-07-29 RX ADMIN — PROPOFOL 30 MG: 10 INJECTION, EMULSION INTRAVENOUS at 09:07

## 2022-07-29 RX ADMIN — ALBUMIN (HUMAN) 25 G: 12.5 SOLUTION INTRAVENOUS at 01:07

## 2022-07-29 RX ADMIN — CALCIUM CHLORIDE 500 MG: 100 INJECTION, SOLUTION INTRAVENOUS at 11:07

## 2022-07-29 RX ADMIN — CLEVIPIDINE 1 MG/HR: 0.5 EMULSION INTRAVENOUS at 11:07

## 2022-07-29 RX ADMIN — NOREPINEPHRINE BITARTRATE 16 MCG: 1 INJECTION, SOLUTION, CONCENTRATE INTRAVENOUS at 09:07

## 2022-07-29 RX ADMIN — POTASSIUM CHLORIDE 20 MEQ: 200 INJECTION, SOLUTION INTRAVENOUS at 06:07

## 2022-07-29 RX ADMIN — NOREPINEPHRINE BITARTRATE 24 MCG: 1 INJECTION, SOLUTION, CONCENTRATE INTRAVENOUS at 08:07

## 2022-07-29 RX ADMIN — NOREPINEPHRINE BITARTRATE 8 MCG: 1 INJECTION, SOLUTION, CONCENTRATE INTRAVENOUS at 09:07

## 2022-07-29 RX ADMIN — NOREPINEPHRINE BITARTRATE 16 MCG: 1 INJECTION, SOLUTION, CONCENTRATE INTRAVENOUS at 07:07

## 2022-07-29 RX ADMIN — ESMOLOL HYDROCHLORIDE 35 MG: 10 INJECTION INTRAVENOUS at 07:07

## 2022-07-29 RX ADMIN — VASOPRESSIN 1 UNITS: 20 INJECTION, SOLUTION INTRAMUSCULAR; SUBCUTANEOUS at 12:07

## 2022-07-29 RX ADMIN — BUPIVACAINE HYDROCHLORIDE 30 ML: 7.5 INJECTION, SOLUTION EPIDURAL; RETROBULBAR at 07:07

## 2022-07-29 RX ADMIN — INSULIN HUMAN 5 UNITS/HR: 1 INJECTION, SOLUTION INTRAVENOUS at 11:07

## 2022-07-29 RX ADMIN — SUGAMMADEX 400 MG: 100 INJECTION, SOLUTION INTRAVENOUS at 01:07

## 2022-07-29 RX ADMIN — SODIUM CHLORIDE, SODIUM GLUCONATE, SODIUM ACETATE, POTASSIUM CHLORIDE, MAGNESIUM CHLORIDE, SODIUM PHOSPHATE, DIBASIC, AND POTASSIUM PHOSPHATE: .53; .5; .37; .037; .03; .012; .00082 INJECTION, SOLUTION INTRAVENOUS at 08:07

## 2022-07-29 RX ADMIN — FAMOTIDINE 20 MG: 10 INJECTION, SOLUTION INTRAVENOUS at 08:07

## 2022-07-29 RX ADMIN — PROPOFOL 10 MG: 10 INJECTION, EMULSION INTRAVENOUS at 07:07

## 2022-07-29 RX ADMIN — ACETAMINOPHEN 1000 MG: 500 TABLET ORAL at 03:07

## 2022-07-29 RX ADMIN — NOREPINEPHRINE BITARTRATE 4 MCG: 1 INJECTION, SOLUTION, CONCENTRATE INTRAVENOUS at 09:07

## 2022-07-29 RX ADMIN — SODIUM CHLORIDE, SODIUM GLUCONATE, SODIUM ACETATE, POTASSIUM CHLORIDE, MAGNESIUM CHLORIDE, SODIUM PHOSPHATE, DIBASIC, AND POTASSIUM PHOSPHATE: .53; .5; .37; .037; .03; .012; .00082 INJECTION, SOLUTION INTRAVENOUS at 01:07

## 2022-07-29 RX ADMIN — MUPIROCIN 1 G: 20 OINTMENT TOPICAL at 08:07

## 2022-07-29 RX ADMIN — NOREPINEPHRINE BITARTRATE 4 MCG: 1 INJECTION, SOLUTION, CONCENTRATE INTRAVENOUS at 10:07

## 2022-07-29 RX ADMIN — LIDOCAINE HYDROCHLORIDE 100 MG: 10 INJECTION, SOLUTION INTRAVENOUS at 11:07

## 2022-07-29 RX ADMIN — HYDROMORPHONE HYDROCHLORIDE 0.2 MG: 1 INJECTION, SOLUTION INTRAMUSCULAR; INTRAVENOUS; SUBCUTANEOUS at 09:07

## 2022-07-29 RX ADMIN — ACETAMINOPHEN 1000 MG: 500 TABLET ORAL at 10:07

## 2022-07-29 RX ADMIN — ROCURONIUM BROMIDE 25 MG: 10 INJECTION, SOLUTION INTRAVENOUS at 11:07

## 2022-07-29 RX ADMIN — ROCURONIUM BROMIDE 25 MG: 10 INJECTION, SOLUTION INTRAVENOUS at 12:07

## 2022-07-29 RX ADMIN — NOREPINEPHRINE BITARTRATE 0.06 MCG/KG/MIN: 1 INJECTION, SOLUTION, CONCENTRATE INTRAVENOUS at 08:07

## 2022-07-29 RX ADMIN — POLYETHYLENE GLYCOL 3350 17 G: 17 POWDER, FOR SOLUTION ORAL at 03:07

## 2022-07-29 RX ADMIN — ALBUMIN HUMAN 25 G: 50 SOLUTION INTRAVENOUS at 01:07

## 2022-07-29 RX ADMIN — DEXMEDETOMIDINE HYDROCHLORIDE 0.4 MCG/KG/HR: 4 INJECTION, SOLUTION INTRAVENOUS at 05:07

## 2022-07-29 RX ADMIN — FENTANYL CITRATE 50 MCG: 50 INJECTION, SOLUTION INTRAMUSCULAR; INTRAVENOUS at 12:07

## 2022-07-29 RX ADMIN — CEFAZOLIN SODIUM 2 G: 2 SOLUTION INTRAVENOUS at 08:07

## 2022-07-29 RX ADMIN — ALBUMIN (HUMAN) 25 G: 12.5 SOLUTION INTRAVENOUS at 07:07

## 2022-07-29 RX ADMIN — NOREPINEPHRINE BITARTRATE 16 MCG: 1 INJECTION, SOLUTION, CONCENTRATE INTRAVENOUS at 08:07

## 2022-07-29 RX ADMIN — NOREPINEPHRINE BITARTRATE 12 MCG: 1 INJECTION, SOLUTION, CONCENTRATE INTRAVENOUS at 07:07

## 2022-07-29 RX ADMIN — IPRATROPIUM BROMIDE AND ALBUTEROL SULFATE 3 ML: .5; 3 SOLUTION RESPIRATORY (INHALATION) at 03:07

## 2022-07-29 RX ADMIN — NOREPINEPHRINE BITARTRATE 8 MCG: 1 INJECTION, SOLUTION, CONCENTRATE INTRAVENOUS at 08:07

## 2022-07-29 RX ADMIN — EPINEPHRINE 0.05 MCG/KG/MIN: 1 INJECTION INTRAMUSCULAR; INTRAVENOUS; SUBCUTANEOUS at 11:07

## 2022-07-29 RX ADMIN — SODIUM CHLORIDE: 0.9 INJECTION, SOLUTION INTRAVENOUS at 05:07

## 2022-07-29 RX ADMIN — LIDOCAINE HYDROCHLORIDE 80 MG: 10 INJECTION, SOLUTION INTRAVENOUS at 07:07

## 2022-07-29 RX ADMIN — VASOPRESSIN 1 UNITS: 20 INJECTION, SOLUTION INTRAMUSCULAR; SUBCUTANEOUS at 01:07

## 2022-07-29 RX ADMIN — ROCURONIUM BROMIDE 50 MG: 10 INJECTION, SOLUTION INTRAVENOUS at 09:07

## 2022-07-29 RX ADMIN — IPRATROPIUM BROMIDE AND ALBUTEROL SULFATE 3 ML: .5; 3 SOLUTION RESPIRATORY (INHALATION) at 07:07

## 2022-07-29 RX ADMIN — FENTANYL CITRATE 50 MCG: 50 INJECTION, SOLUTION INTRAMUSCULAR; INTRAVENOUS at 06:07

## 2022-07-29 RX ADMIN — CEFAZOLIN 2 G: 330 INJECTION, POWDER, FOR SOLUTION INTRAMUSCULAR; INTRAVENOUS at 12:07

## 2022-07-29 RX ADMIN — POTASSIUM CHLORIDE 40 MEQ: 29.8 INJECTION, SOLUTION INTRAVENOUS at 02:07

## 2022-07-29 RX ADMIN — METOPROLOL TARTRATE 25 MG: 25 TABLET, FILM COATED ORAL at 05:07

## 2022-07-29 RX ADMIN — MUPIROCIN 1 G: 20 OINTMENT TOPICAL at 05:07

## 2022-07-29 RX ADMIN — NOREPINEPHRINE BITARTRATE 4 MCG: 1 INJECTION, SOLUTION, CONCENTRATE INTRAVENOUS at 07:07

## 2022-07-29 RX ADMIN — PROTAMINE SULFATE 70 MG: 10 INJECTION, SOLUTION INTRAVENOUS at 12:07

## 2022-07-29 RX ADMIN — ASPIRIN 325 MG ORAL TABLET 325 MG: 325 PILL ORAL at 04:07

## 2022-07-29 RX ADMIN — FENTANYL CITRATE 100 MCG: 50 INJECTION, SOLUTION INTRAMUSCULAR; INTRAVENOUS at 07:07

## 2022-07-29 RX ADMIN — HEPARIN SODIUM 29000 UNITS: 1000 INJECTION, SOLUTION INTRAVENOUS; SUBCUTANEOUS at 09:07

## 2022-07-29 RX ADMIN — HYDROMORPHONE HYDROCHLORIDE 0.2 MG: 1 INJECTION, SOLUTION INTRAMUSCULAR; INTRAVENOUS; SUBCUTANEOUS at 08:07

## 2022-07-29 RX ADMIN — SODIUM CHLORIDE 1 MG/KG/HR: 9 INJECTION, SOLUTION INTRAVENOUS at 08:07

## 2022-07-29 NOTE — SUBJECTIVE & OBJECTIVE
Follow-up For: Procedure(s) (LRB):  CORONARY ARTERY BYPASS GRAFT (CABG) (Left)  HARVEST-VEIN-ENDOVASCULAR (Left)    Post-Operative Day: Day of Surgery     Past Medical History:   Diagnosis Date    BPH (benign prostatic hyperplasia)     Depression     Diabetes mellitus     GERD (gastroesophageal reflux disease)     Hyperlipidemia     Hypertension     Obesity     TIA (transient ischemic attack) 0322/2016       Past Surgical History:   Procedure Laterality Date    LEFT HEART CATHETERIZATION Left 7/19/2022    Procedure: Left heart cath;  Surgeon: Ben Ortega MD;  Location: [facility] CATH LAB/EP;  Service: Cardiology;  Laterality: Left;    SPINE SURGERY  2044 2005 2008       Review of patient's allergies indicates:  No Known Allergies    Family History    None       Tobacco Use    Smoking status: Never Smoker    Smokeless tobacco: Never Used   Substance and Sexual Activity    Alcohol use: Not on file    Drug use: Not on file    Sexual activity: Not on file      Review of Systems   Unable to perform ROS: Intubated   Objective:     Vital Signs (Most Recent):  Temp: 97.8 °F (36.6 °C) (07/29/22 1315)  Pulse: 75 (07/29/22 1335)  Resp: (!) 5 (07/29/22 1335)  BP: (!) 158/80 (07/29/22 0536)  SpO2: 99 % (07/29/22 1335)   Vital Signs (24h Range):  Temp:  [97.8 °F (36.6 °C)] 97.8 °F (36.6 °C)  Pulse:  [66-80] 75  Resp:  [0-26] 5  SpO2:  [95 %-100 %] 99 %  BP: (158)/(80) 158/80  Arterial Line BP: (114-122)/(52-57) 122/52     Weight: 104.8 kg (231 lb 0.7 oz)  Body mass index is 34.12 kg/m².      Intake/Output Summary (Last 24 hours) at 7/29/2022 1452  Last data filed at 7/29/2022 1332  Gross per 24 hour   Intake 2980 ml   Output 535 ml   Net 2445 ml       Physical Exam  Constitutional:       Comments: Sedated and intubated   HENT:      Head: Normocephalic and atraumatic.   Eyes:      Pupils: Pupils are equal, round, and reactive to light.   Neck:      Comments: L subclavian CVC    Cardiovascular:      Rate and Rhythm: Normal rate  and regular rhythm.      Comments: Midline sternotomy c/d with dressing in place    2 mediastinal and 1 pleural chest tube to suction    V wires in place  Abdominal:      General: There is no distension.      Palpations: Abdomen is soft.   Genitourinary:     Comments: Hernandez in place  Musculoskeletal:      Comments: L radial art line       Skin:     General: Skin is warm and dry.      Capillary Refill: Capillary refill takes less than 2 seconds.   Neurological:      Comments: Sedated       Vents:  Vent Mode: A/C (07/29/22 1315)  Ventilator Initiated: Yes (07/29/22 1315)  Set Rate: 14 BPM (07/29/22 1315)  Vt Set: 490 mL (07/29/22 1315)  PEEP/CPAP: 7 cmH20 (07/29/22 1315)  Oxygen Concentration (%): 80 (07/29/22 1335)  Peak Airway Pressure: 24 cmH2O (07/29/22 1315)  Plateau Pressure: 0 cmH20 (07/29/22 1315)  Total Ve: 9.4 mL (07/29/22 1315)  Negative Inspiratory Force (cm H2O): 0 (07/29/22 1315)  F/VT Ratio<105 (RSBI): (!) 46.43 (07/29/22 1315)    Lines/Drains/Airways       Central Venous Catheter Line  Duration                  Percutaneous Central Line Insertion/Assessment - Quad lumen  07/29/22 0853 left subclavian <1 day    Percutaneous Central Line Insertion/Assessment - Quad Lumen  07/29/22 0853 left subclavian <1 day              Drain  Duration                  Chest Tube 07/29/22 1200 1 Anterior Mediastinal 19 Fr. <1 day         Chest Tube 07/29/22 1200 2 Anterior Mediastinal 19 Fr. <1 day         Chest Tube 07/29/22 1201 3 Left Pleural 19 Fr. <1 day         Urethral Catheter 07/29/22 0730 Non-latex;Straight-tip;Temperature probe 14 Fr. <1 day              Airway  Duration                  Airway - Non-Surgical 07/29/22 <1 day              Arterial Line  Duration             Arterial Line 07/29/22 0853 Left Radial <1 day              Line  Duration                  Pacer Wires 07/29/22 1114 <1 day              Peripheral Intravenous Line  Duration                  Peripheral IV - Single Lumen 07/29/22 0540 18 G  Left;Posterior Hand <1 day                    Significant Labs:    CBC/Anemia Profile:  Recent Labs   Lab 07/29/22  1310 07/29/22  1315 07/29/22  1419   WBC 23.70*  --   --    HGB 11.7*  --   --    HCT 37.0* 35* 31*     --   --    MCV 87  --   --    RDW 14.4  --   --         Chemistries:  Recent Labs   Lab 07/29/22  1310      K 3.4*      CO2 19*   BUN 10   CREATININE 0.9   CALCIUM 8.5*   ALBUMIN 2.8*   PROT 5.2*   BILITOT 0.5   ALKPHOS 28*   ALT 12   AST 27   MG 2.4   PHOS 2.8       ABGs:   Recent Labs   Lab 07/29/22  1419   PH 7.272*   PCO2 35.8   HCO3 16.5*   POCSATURATED 98   BE -10     Coagulation:   Recent Labs   Lab 07/29/22  1310   INR 1.2     All pertinent labs within the past 24 hours have been reviewed.    Significant Imaging: I have reviewed all pertinent imaging results/findings within the past 24 hours.

## 2022-07-29 NOTE — NURSING
Pt transferred from OR to SICU [address] by anesthesia.  Pt is intubated and on pressors.  Pt connected to wall monitor upon arrival.  Charge nurse, RRT, and MD notified of pt arrival.  Pt situated in room and family (spouse) notified once pt was settled.  [facility] pt status

## 2022-07-29 NOTE — TRANSFER OF CARE
"Anesthesia Transfer of Care Note    Patient: Christiano Frost    Procedure(s) Performed: Procedure(s) (LRB):  CORONARY ARTERY BYPASS GRAFT (CABG) (Left)  HARVEST-VEIN-ENDOVASCULAR (Left)    Patient location: ICU    Anesthesia Type: general    Transport from OR: Continuous ECG monitoring in transport. Continuous SpO2 monitoring in transport. Continuos invasive BP monitoring in transport. Transported from OR intubated on 100% O2 by AMBU with adequate controlled ventilation    Post pain: adequate analgesia    Post assessment: no apparent anesthetic complications    Post vital signs: stable    Level of consciousness: sedated    Nausea/Vomiting: no nausea/vomiting    Complications: none    Transfer of care protocol was followed      Last vitals:   Visit Vitals  BP (!) 158/80   Pulse 75   Temp 36.6 °C (97.8 °F) (Oral)   Resp (!) 5   Ht 5' 9" (1.753 m)   Wt 104.8 kg (231 lb 0.7 oz)   SpO2 99%   BMI 34.12 kg/m²     "

## 2022-07-29 NOTE — ANESTHESIA PROCEDURE NOTES
Bilat NETO Block    Patient location during procedure: OR   Block not for primary anesthetic.  Reason for block: at surgeon's request and post-op pain management   Post-op Pain Location: Chest pain      Staffing  Authorizing Provider: Demetri Day MD  Performing Provider: Bran Bradford MD    Preanesthetic Checklist  Completed: patient identified, IV checked, site marked, risks and benefits discussed, surgical consent, monitors and equipment checked, pre-op evaluation and timeout performed  Peripheral Block  Patient position: sitting  Prep: ChloraPrep  Patient monitoring: heart rate, cardiac monitor, continuous pulse ox, continuous capnometry and frequent blood pressure checks  Block type: erector spinae plane  Laterality: bilateral  Injection technique: single shot  Interspace: T3-4    Needle  Needle type: Tuohy   Needle gauge: 17 G  Needle length: 3.5 in  Needle localization: anatomical landmarks and ultrasound guidance   -ultrasound image captured on disc.  Assessment  Injection assessment: negative aspiration, negative parasthesia and local visualized surrounding nerve  Paresthesia pain: none  Heart rate change: no  Slow fractionated injection: yes    Medications:    Medications: bupivacaine (pf) (MARCAINE) injection 0.75% - Perineural   30 mL - 7/29/2022 7:10:00 AM    Additional Notes  Patient tolerated well.  See [facility] RN record for vitals. 30cc 0.375% Bupi w 1:200K epi administered to each side

## 2022-07-29 NOTE — INTERVAL H&P NOTE
The patient has been examined and the H&P has been reviewed:    I concur with the findings and no changes have occurred since H&P was written.    Surgery risks, benefits and alternative options discussed and understood by patient/family.          There are no hospital problems to display for this patient.    Jayda Madera MD, PGY-7  Cardiothoracic Surgery   [phone]

## 2022-07-29 NOTE — ANESTHESIA PROCEDURE NOTES
FLAVIA    Diagnosis: Coronary artery disease  Patient location during procedure: OR  Exam type: Baseline    Staffing  Performed: fellow     Anesthesiologist: Demetri Day MD        Anesthesiologist Present  Yes      Setup & Induction  Patient preparation: bite block inserted  Probe Insertion: easy  Exam: completeDoppler Echo: 2D, color flow mapping, pulse wave Doppler and continuous wave Doppler.  Exam     Left Heart  Left Atruim: normal  Left appendage velocity:36.4    Left Ventricle: 4.2 cm  LV Wall Thickness (posterior wall):1.7 cm  Modified Simpsons: 63 %    LVAD:no  Estimated Ejection Fraction: > 55% normal  Regional Wall Abnormalities: no RWMA        Left Ventricle Diastolic Function  E Velocity: 83.7 cm/s  A Velocity: 70 cm/s  Deceleration time: 182 ms  E/A Ratio: 1.2    Right Heart  Right Ventricle: normal  Right Ventricle Function: normal  Right Atria:  normal    Intra Atrial Septum  PFO: no shunt by color flow doppler  no IAS aneurysm  lipomatous hypertrophy  no Atrial Septal Defect (Asd)    Right Ventricle  Size: normal, Free Wall Thickness: normal    Aortic Valve:  Stenosis: none.  Morphology: trileaflet    LVOT: 1.9 cm  Annulus Diameter: 2.4 cm  STJ Diameter: 3.04 cm   LVOT TVI: 24.2 cm  AV VTI: 29.3 cm  AV Vmax: 123 cm/s2  AV Mean Gradient: 4 mmHg  AV Peak Gradient: 6 mmHg  Regurgitation: no aortic valve regurgitation      Mitral Valve:   Morphology:normal  Prolapse: none  Flail: no flail  Jet Description: mildStenosis: no significant stenosis.    Tricuspid Valve:  Morphology: normal  Regurgitation: none    Pulmonic Valve:  Morphology:normal  Regurgitation(color flow): none    Great Vessels  Ascending Aorta Atherosclerosis: 2=mild dz (<3mm)  Aortic Arch Atherosclerosis: 2=mild dz (<3mm)  IABP: no  Descending Aorta Atherosclerosis: 3=sessile dz (3-5mm)  Aorta    Descending aorta IABP: no    Effusions pericardial    SummaryFindings discussed with surgeon.    Other Findings   Baseline  - Normal  biventricular systolic function.  LVEF 60-65%. Concentric LV hypertrophy (inferolateral wall thickness 1.7 cm)  - No RWMAs.   - Aortic valve: trileaflet, no evidence of AS or AI  - Mitral valve: appears grossly normal. Mild MR, no evidence of MS  - Tricuspid valve: no evidence of TR  - Pulmonic valve: no evidence of PI  - Aorta: grade III atherosclerosis in descending thoracic aorta. Grade II atherosclerosis in ascending aorta and aortic arch. No evidence of aortic dissection.   - Effusions: questionable pericardial effusion vs pericardial fat surrounding RV. No evidence of pleural effusions.   - Other: no evidence of ASD or PFO. Lipomatous hypertrophy present on interatrial septum.

## 2022-07-29 NOTE — PLAN OF CARE
"      SICU PLAN OF CARE NOTE    Dx: Coronary artery disease involving native coronary artery of native heart without angina pectoris    Shift Events: TIP.  Admitted to SICU.  Pressors off.  Propofol weaned, precedex started.  Possible extubation tonight    Goals of Care: MAP > 65  SBP < 140    Neuro: Sedated, Arouses to Voice, Follows Commands, and Moves All Extremities    Vital Signs: /61 (BP Location: Right arm, Patient Position: Lying)   Pulse 71   Temp 97.6 °F (36.4 °C) (Oral)   Resp (!) 30   Ht 5' 9" (1.753 m)   Wt 104.8 kg (231 lb 0.7 oz)   SpO2 100%   BMI 34.12 kg/m²     Cardiac: NSR    Respiratory: Ventilator    Diet: NPO    Gtts: Precedex and Insulin    Urine Output: Urinary Catheter 40-60 cc/hour    Drains: Chest Tube, total output 110 cc /  shift    Restraints (soft L & R wrist restraints)    Labs/Accuchecks: Daily Labs, Q1 accuchecks    Skin: Pt skin is dry and intact.  Pulses palpable in upper and lower extremities.  No skin breakdown evident upon arrival to SICU.  Foam padding in place on sacrum.  Pt turned q2 hours per protocol.  Island/border dressing in place on midsternal incision.    View flowsheet for full assessment details.    "

## 2022-07-29 NOTE — OP NOTE
[facility]  Cardiothoracic Surgery Operative Report    Patient Name:  Christiano Frost; [id number]    Preoperative Diagnosis: Coronary artery disease    Postoperative Diagnosis:  Same    Date of Operation:  07/29/2022     Operation:  Triple vessel coronary artery bypass grafting  · Left internal mammary artery to the distal left anterior descending artery  · Saphenous vein graft to the second obtuse marginal artery  · Saphenous vein graft to the posterior descending artery  Endoscopic saphenous vein harvest from the left lower extremity    Surgeon:  Magen Martell MD    Assistant Surgeon:  none    Fellow:  none    Anesthesiologist:  Leeroy Day MD    ---------------------------------------------------------------------------------------------------------------------      Indications for surgery: Mr. Frost is a 71 year-old gentleman with a history of prior stroke/TIA, hypertension, diabetes (HbA1C 6.5), and BMI of 35.  Recently, he has been symptomatic with dyspnea on exertion symptoms interfering with his quality of life.  His most recent echocardiogram showed 60% ejection fraction with mild mitral regurgitation.  Angiogram showed 50% left main disease, totally occluded early mid LAD with a small to moderate sized mid and distal LAD, small to moderate sized second diagonal artery (small first diagonal artery), 80% proximal lesion in a small/small-moderate OM1, large OM2, totally occluded ostial RCA with a small/small-moderate PDA (fills via left to right collaterals).     We had an extensive discussion with him regarding the ACC/AHA guidelines and we agreed that he has class I indications for surgery involving coronary artery bypass surgery x 3 or 4 (LIMA-LAD, SVG-OM2, possible SVG-D2, possible SVG-OM1, possible SVG-PDA).  The risks and benefits were explained and informed consent was obtained.     Gross findings: No pericardial adhesions.  Small to moderate LAD target, small D2 target (nonbypassable),  small OM1 target (nonbypassable), moderate-large sized OM2 target, small to moderate sized PDA target.  Good biventricular function pre and post bypass.      Procedure:  The patient was brought to the holding area and the indications for surgery were reviewed.  Afterwards, the patient was placed supine on the operating room table and prepped and draped in the usual sterile fashion. A surgical time out was performed.     A midline incision was followed with division of the sternum. The left internal mammary artery was harvested. Simultaneously, two pieces of saphenous vein were harvested endoscopically from the leg. ACT guided heparinization was administered.  The ascending aorta and right atrium were cannulated.  Cardiopulmonary bypass was commenced.  The ascending aorta was cannulated for administration of cardioplegia.  A cross-clamp was applied and 1500cc of antegrade cold blood cardioplegia was administered. Subsequent doses of 500cc of antegrade cardioplegia were administered every 20 minutes.    Attention was turned to the posterior descending artery. The heart was positioned and the vessel was exposed. The artery was identified and arteriotomy performed with an 11 blade scalpel and elongated with scissors.  A segment of vein was prepared for use.  An end to side anastomosis was constructed with continuous 7-0 prolene. The vein was infused with 80cc of cardioplegia to confirm patency and hemostasis.  Afterwards, an aortotomy was made and the proximal anastomosis was constructed with continuous 6-0 prolene suture.    Attention was turned to the second obtuse marginal artery. The heart was repositioned and elevated from the pericardial well.   The vessel was identified and opened with an 11 blade scalpel.  A second segment of vein was prepared for use.  An end to side anastomosis was constructed with continuous 7-0 prolene.  80cc of cardioplegia was infused to check for patency and hemostasis.  Afterwards, a  "second aortotomy was made and the proximal anastomosis was again constructed with continuous 6-0 prolene suture.    Attention was next turned to the distal left anterior descending artery. The heart was repositioned and the LAD was identified. The vessel was opened and the arteriotomy elongated with scissors.  The left internal mammary artery was brought to the field and prepared for use.  The left internal mammary artery was sewn end to side to the LAD with continuous 7-0 prolene. The vessel was briefly unclamped to assess for hemostasis.     A dose of warm "hot shot" cardioplegia was given antegrade.  Air was evacuated and the cross-clamp was removed. All anastomoses were checked for hemostasis and the patient was weaned from bypass on a low amount of inotropic support.  The total cardiopulmonary bypass time was 80 minutes and cross clamp time was 59 minutes.  The cannulae were removed and heparin was reversed.  Temporary ventricular pacing wires were placed on the heart.  Drainage tubes were placed behind the sternum and in the pleural space. The chest was closed with steel wires and the soft tissue was closed with absorbable suture.  A sterile dressing was applied and the patient was brought to the ICU in stable condition.      I was present in the operating room for the entire operation and immediately available thereafter.    "

## 2022-07-29 NOTE — HPI
Reason for Consult: Management of type 2DM,  Hyperglycemia     Surgical Procedure and Date: CABG 7/29/22    Diabetes diagnosis year: ~8 years ago     Home Diabetes Medications:  metformin 500 mg daily and januvia   Lab Results   Component Value Date    HGBA1C 5.9 (H) 07/20/2022         How often checking glucose at home? 1-3 x day   BG readings on regimen: NATIVIDAD  Hypoglycemia on the regimen? NATIVIDAD  Missed doses on regimen?  NATIVIDAD    Diabetes Complications include:     NATIVIDAD    Complicating diabetes co morbidities:   History of CVA      HPI:   Patient is a 71 y.o. male with a diagnosis of well controlled type 2 DM. Also with depression, BPH, CAD, hyperlipidemia, hypertension, and previous TIA. Patient admitted for CABG. Endocrinology consulted for BG/ DM management.

## 2022-07-29 NOTE — ANESTHESIA PREPROCEDURE EVALUATION
[facility]  Anesthesia Pre-Operative Evaluation         Patient Name: Christiano Frost  YOB: 1950  MRN: [id number]    SUBJECTIVE:     Pre-operative evaluation for Procedure(s) (LRB):  CORONARY ARTERY BYPASS GRAFT (CABG) (Left)  HARVEST-VEIN-ENDOVASCULAR (Left)     07/28/2022    Christiano Frost is a 71 y.o. male w/ a significant PMHx of obesity, htn, dm, gerd, tia, cad.    Patient now presents for the above procedure(s).    TTE 7/19/22:  · The left ventricle is normal in size with eccentric hypertrophy and normal systolic function.  · The estimated ejection fraction is 60%.  · Grade I left ventricular diastolic dysfunction.  · Mild left atrial enlargement.  · Normal right ventricular size with normal right ventricular systolic function.  · Intermediate central venous pressure (8 mmHg).  · The estimated PA systolic pressure is 16 mmHg.  · Mild mitral regurgitation.    C 7/19/22:  · There was three vessel coronary artery disease. There was left main disease.  · The Mid LM lesion was 50% stenosed. Calcified ecentirc plaque  · The Mid LAD lesion was 99% stenosed.  · The Ost RCA to Prox RCA lesion was 100% stenosed.  with LT-RT collateral  · The 1st Diag lesion was 70% stenosed.  · The 1st Mrg lesion was 80% stenosed.  · The estimated blood loss was none.        LDA: None documented.       Prev airway: None documented.    Drips: None documented.      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     Current Outpatient Medications on File Prior to Encounter   Medication Sig Dispense Refill    aspirin (ECOTRIN) 81 MG EC tablet Take 81 mg by mouth once daily.      atorvastatin (LIPITOR) 10 MG tablet Take 10 mg by mouth once daily.       clopidogreL (PLAVIX) 75 mg tablet Take 1 tablet (75 mg total) by mouth once daily. 30 tablet 3    dutasteride (AVODART) 0.5 mg capsule Take 0.5 mg by mouth once daily.      enalapril (VASOTEC) 5 MG tablet TK 1 T PO  QD  0    escitalopram oxalate (LEXAPRO) 10 MG tablet Take 10 mg by mouth once daily.       esomeprazole (NEXIUM) 40 MG capsule Take by mouth.      HYDROcodone-acetaminophen (NORCO)  mg per tablet TK 1 T PO TID FOR 7 DAYS      ibuprofen (ADVIL,MOTRIN) 400 MG tablet       metFORMIN (GLUCOPHAGE) 500 MG tablet Take 1 tablet (500 mg total) by mouth daily with breakfast. (Patient not taking: Reported on 7/20/2022)      omega-3 fatty acids 1,000 mg Cap Take by mouth.      tamsulosin (FLOMAX) 0.4 mg Cp24 0.4 mg once daily.          Past Surgical History:   Procedure Laterality Date    LEFT HEART CATHETERIZATION Left 7/19/2022    Procedure: Left heart cath;  Surgeon: Ben Ortega MD;  [facility] CATH LAB[facility]EP;  Service: Cardiology;  Laterality: Left;    SPINE SURGERY  2044 2005 2008       Social History     Socioeconomic History    Marital status:    Tobacco Use    Smoking status: Never Smoker    Smokeless tobacco: Never Used       OBJECTIVE:     Vital Signs Range (Last 24H):         Significant Labs:  Lab Results   Component Value Date    WBC 8.31 07/18/2022    HGB 13.5 (L) 07/18/2022    HCT 44.2 07/18/2022     07/18/2022     07/18/2022    K 4.2 07/18/2022     07/18/2022    CREATININE 1.0 07/18/2022    BUN 15 07/18/2022    CO2 27 07/18/2022    HGBA1C 5.9 (H) 07/20/2022       Diagnostic Studies: No relevant studies.    EKG:   Results for orders placed or performed during the  "hospital encounter of 07/19/22   EKG 12-LEAD on arrival to floor    Collection Time: 07/19/22 12:44 PM    Narrative    Test Reason : I25.10,    Vent. Rate : 068 BPM     Atrial Rate : 068 BPM     P-R Int : 252 ms          QRS Dur : 090 ms      QT Int : 386 ms       P-R-T Axes : 056 012 032 degrees     QTc Int : 410 ms    Sinus rhythm with 1st degree A-V block  Nonspecific ST abnormality  Abnormal ECG  Confirmed by Ben Ortega MD ([id number]) on 7/19/2022 6:31:18 PM    Referred By: SASKIA MUSA           Confirmed By:Ben Ortega MD       2D ECHO:  TTE:  Results for orders placed or performed during the hospital encounter of 07/19/22   Echo   Result Value Ref Range    BSA 2.28 m2    TDI SEPTAL 0.04 m/s    LV LATERAL E/E' RATIO 13.80 m/s    LV SEPTAL E/E' RATIO 17.25 m/s    LA WIDTH 4.18 cm    TDI LATERAL 0.05 m/s    PV PEAK VELOCITY 0.84 cm/s    LVIDd 5.03 3.5 - 6.0 cm    IVS 1.51 (A) 0.6 - 1.1 cm    Posterior Wall 1.04 0.6 - 1.1 cm    Ao root annulus 3.93 cm    LVIDs 3.56 2.1 - 4.0 cm    FS 29 28 - 44 %    LA volume 81.34 cm3    STJ 3.30 cm    Ascending aorta 3.24 cm    LV mass 257.07 g    LA size 4.38 cm    RVDD 3.62 cm    TAPSE 1.75 cm    Left Ventricle Relative Wall Thickness 0.41 cm    AV mean gradient 3 mmHg    AV valve area 3.15 cm2    AV Velocity Ratio 0.82     AV index (prosthetic) 0.71     MV mean gradient 1 mmHg    MV valve area p 1/2 method 2.78 cm2    MV valve area by continuity eq 3.65 cm2    E/A ratio 0.75     Mean e' 0.05 m/s    E wave deceleration time 272.83 msec    MV "A" wave duration 10.66 msec    Pulm vein S/D ratio 1.74     LVOT diameter 2.38 cm    LVOT area 4.4 cm2    LVOT peak otto 0.93 m/s    LVOT peak VTI 22.25 cm    Ao peak otto 1.14 m/s    Ao VTI 31.41 cm    LVOT stroke volume 98.94 cm3    AV peak gradient 5 mmHg    MV peak gradient 3 mmHg    E/E' ratio 15.33 m/s    MV Peak E Otto 0.69 m/s    TR Max Otto 1.45 m/s    MV VTI 27.10 cm    MV stenosis pressure 1/2 time 79.12 ms    MV Peak A " Jesse 0.92 m/s    PV Peak S Jesse 0.75 m/s    PV Peak D Jesse 0.43 m/s    LV Systolic Volume 52.97 mL    LV Systolic Volume Index 23.9 mL/m2    LV Diastolic Volume 119.64 mL    LV Diastolic Volume Index 53.89 mL/m2    LA Volume Index 36.6 mL/m2    LV Mass Index 116 g/m2    RA Major Axis 4.53 cm    Left Atrium Minor Axis 4.77 cm    Left Atrium Major Axis 5.78 cm    Triscuspid Valve Regurgitation Peak Gradient 8 mmHg    LA Volume Index (Mod) 24.5 mL/m2    LA volume (mod) 54.49 cm3    RA Width 2.45 cm    Right Atrial Pressure (from IVC) 8 mmHg    EF 60 %    TV rest pulmonary artery pressure 16 mmHg    Narrative    · The left ventricle is normal in size with eccentric hypertrophy and   normal systolic function.  · The estimated ejection fraction is 60%.  · Grade I left ventricular diastolic dysfunction.  · Mild left atrial enlargement.  · Normal right ventricular size with normal right ventricular systolic   function.  · Intermediate central venous pressure (8 mmHg).  · The estimated PA systolic pressure is 16 mmHg.  · Mild mitral regurgitation.          FLAVIA:  No results found for this or any previous visit.    ASSESSMENT/PLAN:           Pre-op Assessment    I have reviewed the Patient Summary Reports.     I have reviewed the Nursing Notes.    I have reviewed the Medications.     Review of Systems  Anesthesia Hx:  No problems with previous Anesthesia Denies Hx of Anesthetic complications  History of prior surgery of interest to airway management or planning: Denies Family Hx of Anesthesia complications.   Denies Personal Hx of Anesthesia complications.   Hematology/Oncology:  Hematology Normal   Oncology Normal     EENT/Dental:EENT/Dental Normal   Cardiovascular:   Hypertension CAD   CHF hyperlipidemia    Pulmonary:   Denies COPD.  Denies Sleep Apnea.    Renal/:  Renal/ Normal     Hepatic/GI:   GERD    Musculoskeletal:   Denies Arthritis.     Neurological:   TIA,    Endocrine:   Diabetes  Obesity / BMI > 30  Psych:    depression          Physical Exam  General: Well nourished, Cooperative, Alert and Oriented    Airway:  Mallampati: III   Mouth Opening: Normal  TM Distance: Normal  Tongue: Normal  Neck ROM: Normal ROM    Dental:  Intact    Chest/Lungs:  Clear to auscultation, Normal Respiratory Rate    Heart:  Rate: Normal  Rhythm: Regular Rhythm        Anesthesia Plan  Type of Anesthesia, risks & benefits discussed:    Anesthesia Type: Gen ETT  Intra-op Monitoring Plan: Standard ASA Monitors, Art Line, Central Line and FLAVIA  Post Op Pain Control Plan: multimodal analgesia, IV/PO Opioids PRN and peripheral nerve block  Induction:  IV  Airway Plan: Direct and Video, Post-Induction  Informed Consent: Informed consent signed with the Patient and all parties understand the risks and agree with anesthesia plan.  All questions answered.   ASA Score: 4  Day of Surgery Review of History & Physical: H&P Update referred to the surgeon/provider.    Ready For Surgery From Anesthesia Perspective.     .

## 2022-07-29 NOTE — CONSULTS
[facility] - Surgical Intensive Care  Endocrinology  Diabetes Consult Note    Consult Requested by: Magen Martell MD   Reason for admit: Coronary artery disease involving native coronary artery of native heart without angina pectoris    HISTORY OF PRESENT ILLNESS:  Reason for Consult: Management of type 2DM,  Hyperglycemia     Surgical Procedure and Date: CABG 7/29/22    Diabetes diagnosis year: ~8 years ago     Home Diabetes Medications:  metformin 500 mg daily and januvia   Lab Results   Component Value Date    HGBA1C 5.9 (H) 07/20/2022         How often checking glucose at home? 1-3 x day   BG readings on regimen: NATIVIDAD  Hypoglycemia on the regimen? NATIVIDAD  Missed doses on regimen?  NATIVIDAD    Diabetes Complications include:     NATIVIDAD    Complicating diabetes co morbidities:   History of CVA      HPI:   Patient is a 71 y.o. male with a diagnosis of well controlled type 2 DM. Also with depression, BPH, CAD, hyperlipidemia, hypertension, and previous TIA. Patient admitted for CABG. Endocrinology consulted for BG/ DM management.             Interval HPI:   Overnight events: Received in ICU. Remains intubated. Family at bedside. BG at or above goal on IV insulin infusion rates 5-6 u/hr. Day of Surgery  Eating:  NPO  Nausea: No  Hypoglycemia and intervention: No  Fever: No  TPN and/or TF: No      PMH, PSH, FH, SH  reviewed       Review of Systems  Unable to obtain due to: Sedation,Intubation,Altered mental status,Critical illness,Reviewed ROS from note dated 7/29/22 per Dr. Madera     Current Medications and/or Treatments Impacting Glycemic Control  Immunotherapy:    Immunosuppressants       None          Steroids:   Hormones (From admission, onward)                None          Pressors:    Autonomic Drugs (From admission, onward)                Start     Stop Route Frequency Ordered    07/29/22 1430  NORepinephrine 4 mg in dextrose 5% 250 mL infusion (premix) (titrating)        Question Answer Comment   Begin at (in  mcg/kg/min): 0.02    Titrate by: (in mcg/kg/min) 0.02    Titrate interval: (in minutes) 5    Titrate to maintain: (MAP or SBP) MAP    Greater than: (in mmHg) 65    Maximum dose: (in mcg/kg/min) 0.2        -- IV Continuous 07/29/22 1322    07/29/22 1315  EPINEPHrine 5 mg in dextrose 5% 250 mL infusion (premix)        Question Answer Comment   Begin at (in mcg/kg/min): 0.02    Titrate by: (in mcg/kg/min) 0.02    Titrate interval: (in minutes) 5    Titrate to maintain: (SBP or MAP or Cardiac Index) MAP    Greater than: (in mmHg) 65    Cardiac index greater than: (in L/min) 2.2    Maximum dose: (in mcg/kg/min) 2        -- IV Continuous 07/29/22 1307          Hyperglycemia/Diabetes Medications:   Antihyperglycemics (From admission, onward)                Start     Stop Route Frequency Ordered    07/29/22 1315  insulin regular in 0.9 % NaCl 100 unit/100 mL (1 unit/mL) infusion        Question Answer Comment   Insulin rate changes (DO NOT MODIFY ANSWER) \\[email or web address] [email or web address] [id number]    Enter initial dose (Units/hr): 1        -- IV Continuous 07/29/22 1307             PHYSICAL EXAMINATION:  Vitals:    07/29/22 1624   BP:    Pulse: 78   Resp: (!) 30   Temp:      Body mass index is 34.12 kg/m².    Physical Exam  Constitutional: Well developed, well nourished, NAD.  ENT: External ears no masses with nose patent; normal hearing.  Neck: Supple; trachea midline  Cardiovascular: Normal heart sounds, no LE edema. DP +2 bilaterally.  Lungs: Intubated on ventilator; lungs anterior bilaterally clear to auscultation.  Abdomen: Soft, no masses, no hernias.  MS: No clubbing or cyanosis of nails noted; unable to assess gait.  Skin: No rashes, lesions, or ulcers; no nodules. Mid-sternal incision with dressing; chest tubes.   Psychiatric: NATIVIDAD  Neurological: NATIVIDAD  Foot: nails in good condition, no amputations noted.        Labs Reviewed and Include   Recent Labs   Lab 07/29/22  1310   *    CALCIUM 8.5*   ALBUMIN 2.8*   PROT 5.2*      K 3.4*   CO2 19*      BUN 10   CREATININE 0.9   ALKPHOS 28*   ALT 12   AST 27   BILITOT 0.5     Lab Results   Component Value Date    WBC 23.70 (H) 07/29/2022    HGB 11.7 (L) 07/29/2022    HCT 28 (L) 07/29/2022    MCV 87 07/29/2022     07/29/2022     No results for input(s): TSH, FREET4 in the last 168 hours.  Lab Results   Component Value Date    HGBA1C 5.9 (H) 07/20/2022       Nutritional status:   Body mass index is 34.12 kg/m².  Lab Results   Component Value Date    ALBUMIN 2.8 (L) 07/29/2022     No results found for: PREALBUMIN    Estimated Creatinine Clearance: 87.2 mL/min (based on SCr of 0.9 mg/dL).    Accu-Checks  Recent Labs     07/29/22  0541 07/29/22  1314 07/29/22  1419 07/29/22  1517 07/29/22  1611   POCTGLUCOSE 110 176* 170* 175* 169*        ASSESSMENT and PLAN    * Coronary artery disease involving native coronary artery of native heart without angina pectoris  Optimize glucose control and  avoid hypoglycemia          Type 2 diabetes mellitus without complication  CTS protocol   BG goal 110-140       Continue IV insulin infusion protocol  Requires intensive BG monitoring while on protocol         Surgical wound present  Optimize BG for surgical wound healing.             Plan discussed with  family and RN at bedside.     Sonia Hernandez NP  Endocrinology  [facility] - Surgical Intensive Care

## 2022-07-29 NOTE — HPI
71 y.o. male who presented as an initial consult for multivessel coronary artery disease.  He has a medical history significant for depression, BPH, diabetes mellitus (HA1C 6.5), hyperlipidemia, hypertension, and previous TIA.  He is referred to our service from Dr. Shannon clements.  He reports that he originally saw his PCP after a few weeks of dyspnea on exertion.  He reports the is normally very active and was having trouble completing his normal tasks without having to stop and rest. His PCP referred him to Dr. Shannon clements who completed a cardiac evaluation which revealed multivessel coronary artery disease including 50% mid left main, 99% mid LAD,  with collaterals to the ostial RCA, 70% 1st diagonal,and 80% 1st marginal.   His ECHO revealed an ejection fraction of 60% with mild mitral regurgitation.  He denies chest pain or ankle swelling, and reports his main symptom is severe shortness of breath which he reports is life limiting. He states that he has had one TIA in the past and his only deficit is related to his verbal control.     He is now s/p elective CABGx3 procedure on 7/29/22. The procedure was performed without apparent complication and he was transferred to the SICU for postoperative care and management. Intraoperatively he received 2mL of crystalloid and 480mL of Cell Saver back. His reported urine output during the case was 350mL. His postoperative echocardiogram revealed normal biventricular function with no notable mitral regurgitation. He arrives to the SICU intubated, sedated, and hemodynamically stable on 0.06 of epi, and 0.04 of levo.

## 2022-07-29 NOTE — PROGRESS NOTES
Autotransfusion/Rapid Infusion Record:      07/29/2022  Autotransfusionist:  Michael Elliott Jr    Surgeon(s) and Role:     * Magen Martell MD - Primary     * Jayda Madera MD - Fellow  Anesthesiologist:  Demetri Day MD    Past Medical History:   Diagnosis Date    BPH (benign prostatic hyperplasia)     Depression     Diabetes mellitus     GERD (gastroesophageal reflux disease)     Hyperlipidemia     Hypertension     Obesity     TIA (transient ischemic attack) 0322/2016       Procedure(s) (LRB):  CORONARY ARTERY BYPASS GRAFT (CABG) (Left)  HARVEST-VEIN-ENDOVASCULAR (Left)     12:08 PM    Equipment:    Cell Saver     R.I.S.  : CareParent Model: CATSmart or CATSplus : My Point...Exactly   Model: [id number]     Serial number: [id number]   Serial number:    Disposable lot #: [id number]   Disposable lot #:          Solutions:  Anticoagulant: ACD-A   Expiration date: 10/2023 Volume used: 450   Wash solution: 0.9% NaCl   Expiration date: 4/2025 Volume used: 3540     Cell saver checklist  Time completed: 0705          [x]   Circuit assembled correctly     [x]   Cell saver powered and operational     [x]   Vacuum connected, functional, adjust to max -150mmHg     [x]   Anticoagulant drip rate adjusted     [x]   Transfer bag properly labeled with patient name, expiration time, volume,       anticoagulant, OR number, and initials     [x]   Cell saver disinfected after use (completed at end of case)       Cell Saver volumes:    Total volume processed:     2953 mL     Total volume pRBCs recovered     450 mL     Volume pRBCs infused     450 mL         RIS checklist   Time completed:  []   RIS circuit assembled correctly     []   RIS power and operational     []   RIS disinfected after use (completed at end of case)       RIS volumes:    Total volume infused:    (see anesthesia record for blood       product information)    mL       Additional comments:   CPB pump volume processed  post-CPB

## 2022-07-29 NOTE — H&P
[facility] - Surgical Intensive [facility]  Critical Care - Surgery  History & Physical    Patient Name: Christiano Frost  MRN: [id number]  Admission Date: 7/29/2022  Code Status: Full Code  Attending Physician: Magen Martell MD   Primary Care Provider: Ellis Allan MD   Principal Problem: Coronary artery disease involving native coronary artery of native heart without angina pectoris    Subjective:     HPI:  71 y.o. male who presented as an initial consult for multivessel coronary artery disease.  He has a medical history significant for depression, BPH, diabetes mellitus (HA1C 6.5), hyperlipidemia, hypertension, and previous TIA.  He is referred to our service from Dr. Shannon clements.  He reports that he originally saw his PCP after a few weeks of dyspnea on exertion.  He reports the is normally very active and was having trouble completing his normal tasks without having to stop and rest. His PCP referred him to Dr. Shannon clements who completed a cardiac evaluation which revealed multivessel coronary artery disease including 50% mid left main, 99% mid LAD,  with collaterals to the ostial RCA, 70% 1st diagonal,and 80% 1st marginal.   His ECHO revealed an ejection fraction of 60% with mild mitral regurgitation.  He denies chest pain or ankle swelling, and reports his main symptom is severe shortness of breath which he reports is life limiting. He states that he has had one TIA in the past and his only deficit is related to his verbal control.     He is now s/p elective CABGx3 procedure on 7/29/22. The procedure was performed without apparent complication and he was transferred to the SICU for postoperative care and management. Intraoperatively he received 2mL of crystalloid and 480mL of Cell Saver back. His reported urine output during the case was 350mL. His postoperative echocardiogram revealed normal biventricular function with no notable mitral regurgitation. He arrives to the SICU intubated, sedated, and  hemodynamically stable on 0.06 of epi, and 0.04 of levo.        Hospital/ICU Course:  No notes on file    Follow-up For: Procedure(s) (LRB):  CORONARY ARTERY BYPASS GRAFT (CABG) (Left)  HARVEST-VEIN-ENDOVASCULAR (Left)    Post-Operative Day: Day of Surgery     Past Medical History:   Diagnosis Date    BPH (benign prostatic hyperplasia)     Depression     Diabetes mellitus     GERD (gastroesophageal reflux disease)     Hyperlipidemia     Hypertension     Obesity     TIA (transient ischemic attack) 0322/2016       Past Surgical History:   Procedure Laterality Date    LEFT HEART CATHETERIZATION Left 7/19/2022    Procedure: Left heart cath;  Surgeon: Ben Ortega MD;  [facility];  Service: Cardiology;  Laterality: Left;    SPINE SURGERY  2044 2005 2008       Review of patient's allergies indicates:  No Known Allergies    Family History    None       Tobacco Use    Smoking status: Never Smoker    Smokeless tobacco: Never Used   Substance and Sexual Activity    Alcohol use: Not on file    Drug use: Not on file    Sexual activity: Not on file      Review of Systems   Unable to perform ROS: Intubated   Objective:     Vital Signs (Most Recent):  Temp: 97.8 °F (36.6 °C) (07/29/22 1315)  Pulse: 75 (07/29/22 1335)  Resp: (!) 5 (07/29/22 1335)  BP: (!) 158/80 (07/29/22 0536)  SpO2: 99 % (07/29/22 1335)   Vital Signs (24h Range):  Temp:  [97.8 °F (36.6 °C)] 97.8 °F (36.6 °C)  Pulse:  [66-80] 75  Resp:  [0-26] 5  SpO2:  [95 %-100 %] 99 %  BP: (158)/(80) 158/80  Arterial Line BP: (114-122)/(52-57) 122/52     Weight: 104.8 kg (231 lb 0.7 oz)  Body mass index is 34.12 kg/m².      Intake/Output Summary (Last 24 hours) at 7/29/2022 1452  Last data filed at 7/29/2022 1332  Gross per 24 hour   Intake 2980 ml   Output 535 ml   Net 2445 ml       Physical Exam  Constitutional:       Comments: Sedated and intubated   HENT:      Head: Normocephalic and atraumatic.   Eyes:      Pupils: Pupils are equal,  round, and reactive to light.   Neck:      Comments: L subclavian CVC    Cardiovascular:      Rate and Rhythm: Normal rate and regular rhythm.      Comments: Midline sternotomy c/d with dressing in place    2 mediastinal and 1 pleural chest tube to suction    V wires in place  Abdominal:      General: There is no distension.      Palpations: Abdomen is soft.   Genitourinary:     Comments: Hernandez in place  Musculoskeletal:      Comments: L radial art line       Skin:     General: Skin is warm and dry.      Capillary Refill: Capillary refill takes less than 2 seconds.   Neurological:      Comments: Sedated       Vents:  Vent Mode: A/C (07/29/22 1315)  Ventilator Initiated: Yes (07/29/22 1315)  Set Rate: 14 BPM (07/29/22 1315)  Vt Set: 490 mL (07/29/22 1315)  PEEP/CPAP: 7 cmH20 (07/29/22 1315)  Oxygen Concentration (%): 80 (07/29/22 1335)  Peak Airway Pressure: 24 cmH2O (07/29/22 1315)  Plateau Pressure: 0 cmH20 (07/29/22 1315)  Total Ve: 9.4 mL (07/29/22 1315)  Negative Inspiratory Force (cm H2O): 0 (07/29/22 1315)  F/VT Ratio<105 (RSBI): (!) 46.43 (07/29/22 1315)    Lines/Drains/Airways       Central Venous Catheter Line  Duration                  Percutaneous Central Line Insertion/Assessment - Quad lumen  07/29/22 0853 left subclavian <1 day    Percutaneous Central Line Insertion/Assessment - Quad Lumen  07/29/22 0853 left subclavian <1 day              Drain  Duration                  Chest Tube 07/29/22 1200 1 Anterior Mediastinal 19 Fr. <1 day         Chest Tube 07/29/22 1200 2 Anterior Mediastinal 19 Fr. <1 day         Chest Tube 07/29/22 1201 3 Left Pleural 19 Fr. <1 day         Urethral Catheter 07/29/22 0730 Non-latex;Straight-tip;Temperature probe 14 Fr. <1 day              Airway  Duration                  Airway - Non-Surgical 07/29/22 <1 day              Arterial Line  Duration             Arterial Line 07/29/22 0853 Left Radial <1 day              Line  Duration                  Pacer Wires 07/29/22 1114  <1 day              Peripheral Intravenous Line  Duration                  Peripheral IV - Single Lumen 07/29/22 0540 18 G Left;Posterior Hand <1 day                    Significant Labs:    CBC/Anemia Profile:  Recent Labs   Lab 07/29/22  1310 07/29/22  1315 07/29/22  1419   WBC 23.70*  --   --    HGB 11.7*  --   --    HCT 37.0* 35* 31*     --   --    MCV 87  --   --    RDW 14.4  --   --         Chemistries:  Recent Labs   Lab 07/29/22  1310      K 3.4*      CO2 19*   BUN 10   CREATININE 0.9   CALCIUM 8.5*   ALBUMIN 2.8*   PROT 5.2*   BILITOT 0.5   ALKPHOS 28*   ALT 12   AST 27   MG 2.4   PHOS 2.8       ABGs:   Recent Labs   Lab 07/29/22  1419   PH 7.272*   PCO2 35.8   HCO3 16.5*   POCSATURATED 98   BE -10     Coagulation:   Recent Labs   Lab 07/29/22  1310   INR 1.2     All pertinent labs within the past 24 hours have been reviewed.    Significant Imaging: I have reviewed all pertinent imaging results/findings within the past 24 hours.    Assessment/Plan:     * Coronary artery disease involving native coronary artery of native heart without angina pectoris  71 year old male with CAD, depression, BPH, diabetes mellitus (HA1C 6.5), hyperlipidemia, hypertension, and previous TIA now s/p CABGx      Neuro/Psych:   -- Sedation: Propofol. Wean as tolerated with plans to extubate    -- Pain: PRN Oxy + Dilaudid  -- Scheduled Tylenol             Cards:   -- S/P CABG x 2 on 5/31/22  -- HDS on   -- Ventricular pacing wires. Paced at 80 BPM  -- MAP > 65, Syst < 140  -- Cleviprex PRN  -- Aspirin 325mg tonight pending postoperative coags and chest tube output      Pulm:   -- Goal O2 sat > 90%  -- ABG Q1hr x6 then Q3hrs  -- Wean vent as tolerated  -- Plan to extubate tonight if stable  -- Mediastinal chest tubes x2 and pleural chest tube x1 to suction      Renal:  -- Keep hopper for strict I/O  -- Trend BUN/Cr       FEN / GI:   -- Replace lytes as needed  -- Nutrition: NPO  -- GI ppx: famotidine  -- Bowel  reg: miralax  -- 1500mL 5% Albumin in first 12 hours post-op      ID:   -- Tm: afebrile; WBC stable  -- Maribeth-op ancef      Heme/Onc:   -- H/H stable   -- Daily CBC  -- 700mL Cell saver given back  -- Post-op coags pending  -- Aspirin 325mg QD      Endo:   -- BG goal 140-180  -- Insulin gtt      PPx:   Feeding: NPO  Analgesia/Sedation: Propofol / PRN Oxy + Dilaudid  Thromboembolic prevention: SCDs  HOB >30: Yes  Stress Ulcer ppx: famotidine  Glucose control: Critical care goal 140-180 g/dl, ISS     Lines/Drains/Airway: CVC L subclavian, Hernandez, Chest tubes x 3, ventricular pacing wires, L radial A-line      Dispo/Code Status/Palliative:   -- SICU / Full Code.            Emmy Jennings MD  Critical Care - Surgery  [facility] - Surgical Intensive Care

## 2022-07-29 NOTE — ANESTHESIA PROCEDURE NOTES
Arterial    Diagnosis: CAD    Patient location during procedure: done in OR    Staffing  Authorizing Provider: Demetri Day MD  Performing Provider: Bran Bradford MD    Anesthesiologist was present at the time of the procedure.    Preanesthetic Checklist  Completed: patient identified, IV checked, site marked, risks and benefits discussed, surgical consent, monitors and equipment checked, pre-op evaluation, timeout performed and anesthesia consent givenArterial  Skin Prep: chlorhexidine gluconate and isopropyl alcohol  Local Infiltration: none  Orientation: left  Location: radial    Catheter Size: 20 G  Catheter placement by Ultrasound guidance. Heme positive aspiration all ports.   Vessel Caliber: medium, patent, compressibility normal  Vascular Doppler:  not done  Needle advanced into vessel with real time Ultrasound guidance.Insertion Attempts: 1  Assessment  Dressing: secured with tape and tegaderm  Patient: Tolerated well

## 2022-07-29 NOTE — ASSESSMENT & PLAN NOTE
71 year old male with CAD, depression, BPH, diabetes mellitus (HA1C 6.5), hyperlipidemia, hypertension, and previous TIA now s/p CABGx      Neuro/Psych:   -- Sedation: Propofol. Wean as tolerated with plans to extubate    -- Pain: PRN Oxy + Dilaudid  -- Scheduled Tylenol             Cards:   -- S/P CABG x 2 on 5/31/22  -- HDS on   -- Ventricular pacing wires. Paced at 80 BPM  -- MAP > 65, Syst < 140  -- Cleviprex PRN  -- Aspirin 325mg tonight pending postoperative coags and chest tube output      Pulm:   -- Goal O2 sat > 90%  -- ABG Q1hr x6 then Q3hrs  -- Wean vent as tolerated  -- Plan to extubate tonight if stable  -- Mediastinal chest tubes x2 and pleural chest tube x1 to suction      Renal:  -- Keep hopper for strict I/O  -- Trend BUN/Cr       FEN / GI:   -- Replace lytes as needed  -- Nutrition: NPO  -- GI ppx: famotidine  -- Bowel reg: miralax  -- 1500mL 5% Albumin in first 12 hours post-op      ID:   -- Tm: afebrile; WBC stable  -- Maribeth-op ancef      Heme/Onc:   -- H/H stable   -- Daily CBC  -- 700mL Cell saver given back  -- Post-op coags pending  -- Aspirin 325mg QD      Endo:   -- BG goal 140-180  -- Insulin gtt      PPx:   Feeding: NPO  Analgesia/Sedation: Propofol / PRN Oxy + Dilaudid  Thromboembolic prevention: SCDs  HOB >30: Yes  Stress Ulcer ppx: famotidine  Glucose control: Critical care goal 140-180 g/dl, ISS     Lines/Drains/Airway: CVC L subclavian, Hopper, Chest tubes x 3, ventricular pacing wires, L radial A-line      Dispo/Code Status/Palliative:   -- SICU / Full Code.

## 2022-07-29 NOTE — ANESTHESIA PROCEDURE NOTES
Central Line    Diagnosis: CAD  Patient location during procedure: done in OR    Staffing  Authorizing Provider: Demetri Day MD  Performing Provider: Bran Bradford MD    Staffing  Performed: resident/CRNA   Anesthesiologist: Demetri Day MD  Resident/CRNA: Bran Bradford MD  Other anesthesia staff: Jp Sandoval MD  Anesthesiologist was present at the time of the procedure.  Preanesthetic Checklist  Completed: patient identified, IV checked, site marked, risks and benefits discussed, surgical consent, monitors and equipment checked, pre-op evaluation, timeout performed and anesthesia consent given  Indication   Indication: vascular access, med administration     Anesthesia   general anesthesia    Central Line   Skin Prep: skin prepped with ChloraPrep, skin prep agent completely dried prior to procedure  Sterile Barriers Followed: Yes    All five maximal barriers used- gloves, gown, cap, mask, and large sterile sheet    hand hygiene performed prior to central venous catheter insertion  Location: left subclavian.   Catheter type: quad lumen  Catheter Size: 8.5 Fr  Ultrasound: vascular probe with ultrasound   Vessel Caliber: medium, patent  Vascular Doppler:  not done, compressibility normal  Needle advanced into vessel with real time Ultrasound guidance.  Guidewire confirmed in vessel.  sterile gel and probe cover used in ultrasound-guided central venous catheter insertion   Manometry: Venous cannualation confirmed by visual estimation of blood vessel pressure using manometry.  Insertion Attempts: 1   Securement:line sutured, chlorhexidine patch, sterile dressing applied and blood return through all ports    Post-Procedure   X-Ray: successful placement   Adverse Events:none      Guidewire Guidewire removed intact. Guidewire removed intact, verified with nurse.

## 2022-07-29 NOTE — SUBJECTIVE & OBJECTIVE
Interval HPI:   Overnight events: Received in ICU. Remains intubated. Family at bedside. BG at or above goal on IV insulin infusion rates 5-6 u/hr. Day of Surgery  Eating:  NPO  Nausea: No  Hypoglycemia and intervention: No  Fever: No  TPN and/or TF: No      PMH, PSH, FH, SH  reviewed       Review of Systems  Unable to obtain due to: Sedation,Intubation,Altered mental status,Critical illness,Reviewed ROS from note dated 7/29/22 per Dr. Madera     Current Medications and/or Treatments Impacting Glycemic Control  Immunotherapy:    Immunosuppressants       None          Steroids:   Hormones (From admission, onward)                None          Pressors:    Autonomic Drugs (From admission, onward)                Start     Stop Route Frequency Ordered    07/29/22 1430  NORepinephrine 4 mg in dextrose 5% 250 mL infusion (premix) (titrating)        Question Answer Comment   Begin at (in mcg/kg/min): 0.02    Titrate by: (in mcg/kg/min) 0.02    Titrate interval: (in minutes) 5    Titrate to maintain: (MAP or SBP) MAP    Greater than: (in mmHg) 65    Maximum dose: (in mcg/kg/min) 0.2        -- IV Continuous 07/29/22 1322    07/29/22 1315  EPINEPHrine 5 mg in dextrose 5% 250 mL infusion (premix)        Question Answer Comment   Begin at (in mcg/kg/min): 0.02    Titrate by: (in mcg/kg/min) 0.02    Titrate interval: (in minutes) 5    Titrate to maintain: (SBP or MAP or Cardiac Index) MAP    Greater than: (in mmHg) 65    Cardiac index greater than: (in L/min) 2.2    Maximum dose: (in mcg/kg/min) 2        -- IV Continuous 07/29/22 1307          Hyperglycemia/Diabetes Medications:   Antihyperglycemics (From admission, onward)                Start     Stop Route Frequency Ordered    07/29/22 1315  insulin regular in 0.9 % NaCl 100 unit/100 mL (1 unit/mL) infusion        Question Answer Comment   Insulin rate changes (DO NOT MODIFY ANSWER) [email or web address] [id number]    Enter initial dose  (Units/hr): 1        -- IV Continuous 07/29/22 1307             PHYSICAL EXAMINATION:  Vitals:    07/29/22 1624   BP:    Pulse: 78   Resp: (!) 30   Temp:      Body mass index is 34.12 kg/m².    Physical Exam  Constitutional: Well developed, well nourished, NAD.  ENT: External ears no masses with nose patent; normal hearing.  Neck: Supple; trachea midline  Cardiovascular: Normal heart sounds, no LE edema. DP +2 bilaterally.  Lungs: Intubated on ventilator; lungs anterior bilaterally clear to auscultation.  Abdomen: Soft, no masses, no hernias.  MS: No clubbing or cyanosis of nails noted; unable to assess gait.  Skin: No rashes, lesions, or ulcers; no nodules. Mid-sternal incision with dressing; chest tubes.   Psychiatric: NATIVIDAD  Neurological: NATIVIDAD  Foot: nails in good condition, no amputations noted.

## 2022-07-30 LAB
ALBUMIN SERPL BCP-MCNC: 3.6 G/DL (ref 3.5–5.2)
ALLENS TEST: ABNORMAL
ALLENS TEST: ABNORMAL
ALLENS TEST: NORMAL
ALLENS TEST: NORMAL
ALP SERPL-CCNC: 24 U/L (ref 55–135)
ALT SERPL W/O P-5'-P-CCNC: 11 U/L (ref 10–44)
ANION GAP SERPL CALC-SCNC: 6 MMOL/L (ref 8–16)
APTT BLDCRRT: 26.4 SEC (ref 21–32)
AST SERPL-CCNC: 38 U/L (ref 10–40)
BASOPHILS # BLD AUTO: 0.01 K/UL (ref 0–0.2)
BASOPHILS NFR BLD: 0.1 % (ref 0–1.9)
BILIRUB SERPL-MCNC: 0.8 MG/DL (ref 0.1–1)
BUN SERPL-MCNC: 13 MG/DL (ref 8–23)
CALCIUM SERPL-MCNC: 8.5 MG/DL (ref 8.7–10.5)
CHLORIDE SERPL-SCNC: 104 MMOL/L (ref 95–110)
CO2 SERPL-SCNC: 23 MMOL/L (ref 23–29)
CREAT SERPL-MCNC: 0.8 MG/DL (ref 0.5–1.4)
DELSYS: ABNORMAL
DELSYS: ABNORMAL
DELSYS: NORMAL
DELSYS: NORMAL
DIFFERENTIAL METHOD: ABNORMAL
EOSINOPHIL # BLD AUTO: 0 K/UL (ref 0–0.5)
EOSINOPHIL NFR BLD: 0 % (ref 0–8)
ERYTHROCYTE [DISTWIDTH] IN BLOOD BY AUTOMATED COUNT: 14.6 % (ref 11.5–14.5)
EST. GFR  (AFRICAN AMERICAN): >60 ML/MIN/1.73 M^2
EST. GFR  (NON AFRICAN AMERICAN): >60 ML/MIN/1.73 M^2
FIO2: 50
FIO2: 50
GLUCOSE SERPL-MCNC: 158 MG/DL (ref 70–110)
HCO3 UR-SCNC: 24.2 MMOL/L (ref 24–28)
HCO3 UR-SCNC: 24.9 MMOL/L (ref 24–28)
HCT VFR BLD AUTO: 30.6 % (ref 40–54)
HCT VFR BLD CALC: 29 %PCV (ref 36–54)
HCT VFR BLD CALC: 30 %PCV (ref 36–54)
HGB BLD-MCNC: 10 G/DL (ref 14–18)
IMM GRANULOCYTES # BLD AUTO: 0.03 K/UL (ref 0–0.04)
IMM GRANULOCYTES NFR BLD AUTO: 0.2 % (ref 0–0.5)
INR PPP: 1.1 (ref 0.8–1.2)
LDH SERPL L TO P-CCNC: 1 MMOL/L (ref 0.36–1.25)
LDH SERPL L TO P-CCNC: 1 MMOL/L (ref 0.36–1.25)
LYMPHOCYTES # BLD AUTO: 0.8 K/UL (ref 1–4.8)
LYMPHOCYTES NFR BLD: 6.4 % (ref 18–48)
MAGNESIUM SERPL-MCNC: 2 MG/DL (ref 1.6–2.6)
MCH RBC QN AUTO: 27.5 PG (ref 27–31)
MCHC RBC AUTO-ENTMCNC: 32.7 G/DL (ref 32–36)
MCV RBC AUTO: 84 FL (ref 82–98)
MODE: ABNORMAL
MODE: NORMAL
MONOCYTES # BLD AUTO: 1.5 K/UL (ref 0.3–1)
MONOCYTES NFR BLD: 11.7 % (ref 4–15)
NEUTROPHILS # BLD AUTO: 10.3 K/UL (ref 1.8–7.7)
NEUTROPHILS NFR BLD: 81.6 % (ref 38–73)
NRBC BLD-RTO: 0 /100 WBC
PCO2 BLDA: 40.4 MMHG (ref 35–45)
PCO2 BLDA: 40.8 MMHG (ref 35–45)
PEEP: 10
PEEP: 10
PH SMN: 7.38 [PH] (ref 7.35–7.45)
PH SMN: 7.39 [PH] (ref 7.35–7.45)
PHOSPHATE SERPL-MCNC: 2.5 MG/DL (ref 2.7–4.5)
PLATELET # BLD AUTO: 157 K/UL (ref 150–450)
PMV BLD AUTO: 9.9 FL (ref 9.2–12.9)
PO2 BLDA: 56 MMHG (ref 80–100)
PO2 BLDA: 60 MMHG (ref 80–100)
POC BE: -1 MMOL/L
POC BE: 0 MMOL/L
POC IONIZED CALCIUM: 1.18 MMOL/L (ref 1.06–1.42)
POC IONIZED CALCIUM: 1.18 MMOL/L (ref 1.06–1.42)
POC SATURATED O2: 89 % (ref 95–100)
POC SATURATED O2: 90 % (ref 95–100)
POC TCO2: 25 MMOL/L (ref 23–27)
POC TCO2: 26 MMOL/L (ref 23–27)
POCT GLUCOSE: 132 MG/DL (ref 70–110)
POCT GLUCOSE: 137 MG/DL (ref 70–110)
POCT GLUCOSE: 138 MG/DL (ref 70–110)
POCT GLUCOSE: 139 MG/DL (ref 70–110)
POCT GLUCOSE: 139 MG/DL (ref 70–110)
POCT GLUCOSE: 144 MG/DL (ref 70–110)
POCT GLUCOSE: 146 MG/DL (ref 70–110)
POCT GLUCOSE: 155 MG/DL (ref 70–110)
POCT GLUCOSE: 158 MG/DL (ref 70–110)
POCT GLUCOSE: 161 MG/DL (ref 70–110)
POCT GLUCOSE: 178 MG/DL (ref 70–110)
POTASSIUM BLD-SCNC: 4.2 MMOL/L (ref 3.5–5.1)
POTASSIUM BLD-SCNC: 4.3 MMOL/L (ref 3.5–5.1)
POTASSIUM SERPL-SCNC: 4 MMOL/L (ref 3.5–5.1)
POTASSIUM SERPL-SCNC: 4.3 MMOL/L (ref 3.5–5.1)
POTASSIUM SERPL-SCNC: 4.3 MMOL/L (ref 3.5–5.1)
PROT SERPL-MCNC: 5.7 G/DL (ref 6–8.4)
PROTHROMBIN TIME: 11.2 SEC (ref 9–12.5)
RBC # BLD AUTO: 3.63 M/UL (ref 4.6–6.2)
SAMPLE: ABNORMAL
SAMPLE: ABNORMAL
SAMPLE: NORMAL
SAMPLE: NORMAL
SITE: ABNORMAL
SITE: ABNORMAL
SITE: NORMAL
SITE: NORMAL
SODIUM BLD-SCNC: 138 MMOL/L (ref 136–145)
SODIUM BLD-SCNC: 138 MMOL/L (ref 136–145)
SODIUM SERPL-SCNC: 133 MMOL/L (ref 136–145)
WBC # BLD AUTO: 12.57 K/UL (ref 3.9–12.7)

## 2022-07-30 PROCEDURE — 25000003 PHARM REV CODE 250: Performed by: STUDENT IN AN ORGANIZED HEALTH CARE EDUCATION/TRAINING PROGRAM

## 2022-07-30 PROCEDURE — 27100171 HC OXYGEN HIGH FLOW UP TO 24 HOURS

## 2022-07-30 PROCEDURE — 83605 ASSAY OF LACTIC ACID: CPT

## 2022-07-30 PROCEDURE — 63600175 PHARM REV CODE 636 W HCPCS: Performed by: NURSE PRACTITIONER

## 2022-07-30 PROCEDURE — 82330 ASSAY OF CALCIUM: CPT

## 2022-07-30 PROCEDURE — 97116 GAIT TRAINING THERAPY: CPT

## 2022-07-30 PROCEDURE — 99232 SBSQ HOSP IP/OBS MODERATE 35: CPT | Mod: ,,, | Performed by: NURSE PRACTITIONER

## 2022-07-30 PROCEDURE — 99233 PR SUBSEQUENT HOSPITAL CARE,LEVL III: ICD-10-PCS | Mod: ,,, | Performed by: ANESTHESIOLOGY

## 2022-07-30 PROCEDURE — 63600175 PHARM REV CODE 636 W HCPCS

## 2022-07-30 PROCEDURE — 84295 ASSAY OF SERUM SODIUM: CPT

## 2022-07-30 PROCEDURE — 63600175 PHARM REV CODE 636 W HCPCS: Performed by: THORACIC SURGERY (CARDIOTHORACIC VASCULAR SURGERY)

## 2022-07-30 PROCEDURE — 99233 SBSQ HOSP IP/OBS HIGH 50: CPT | Mod: ,,, | Performed by: ANESTHESIOLOGY

## 2022-07-30 PROCEDURE — 82803 BLOOD GASES ANY COMBINATION: CPT

## 2022-07-30 PROCEDURE — 27000221 HC OXYGEN, UP TO 24 HOURS

## 2022-07-30 PROCEDURE — 97530 THERAPEUTIC ACTIVITIES: CPT

## 2022-07-30 PROCEDURE — 85610 PROTHROMBIN TIME: CPT | Performed by: NURSE PRACTITIONER

## 2022-07-30 PROCEDURE — 25000003 PHARM REV CODE 250: Performed by: NURSE PRACTITIONER

## 2022-07-30 PROCEDURE — 83735 ASSAY OF MAGNESIUM: CPT | Performed by: NURSE PRACTITIONER

## 2022-07-30 PROCEDURE — 37799 UNLISTED PX VASCULAR SURGERY: CPT

## 2022-07-30 PROCEDURE — 97162 PT EVAL MOD COMPLEX 30 MIN: CPT

## 2022-07-30 PROCEDURE — 94640 AIRWAY INHALATION TREATMENT: CPT

## 2022-07-30 PROCEDURE — 84100 ASSAY OF PHOSPHORUS: CPT | Performed by: NURSE PRACTITIONER

## 2022-07-30 PROCEDURE — 25000003 PHARM REV CODE 250: Performed by: THORACIC SURGERY (CARDIOTHORACIC VASCULAR SURGERY)

## 2022-07-30 PROCEDURE — 97165 OT EVAL LOW COMPLEX 30 MIN: CPT

## 2022-07-30 PROCEDURE — 85025 COMPLETE CBC W/AUTO DIFF WBC: CPT | Performed by: NURSE PRACTITIONER

## 2022-07-30 PROCEDURE — 99900035 HC TECH TIME PER 15 MIN (STAT)

## 2022-07-30 PROCEDURE — 20000000 HC ICU ROOM

## 2022-07-30 PROCEDURE — 94799 UNLISTED PULMONARY SVC/PX: CPT

## 2022-07-30 PROCEDURE — 25000003 PHARM REV CODE 250

## 2022-07-30 PROCEDURE — 63600150 PHARM REV CODE 636: Performed by: STUDENT IN AN ORGANIZED HEALTH CARE EDUCATION/TRAINING PROGRAM

## 2022-07-30 PROCEDURE — 85730 THROMBOPLASTIN TIME PARTIAL: CPT | Performed by: NURSE PRACTITIONER

## 2022-07-30 PROCEDURE — 25000242 PHARM REV CODE 250 ALT 637 W/ HCPCS: Performed by: NURSE PRACTITIONER

## 2022-07-30 PROCEDURE — 94761 N-INVAS EAR/PLS OXIMETRY MLT: CPT

## 2022-07-30 PROCEDURE — 84132 ASSAY OF SERUM POTASSIUM: CPT | Performed by: NURSE PRACTITIONER

## 2022-07-30 PROCEDURE — 94660 CPAP INITIATION&MGMT: CPT

## 2022-07-30 PROCEDURE — C9248 INJ, CLEVIDIPINE BUTYRATE: HCPCS | Mod: JG

## 2022-07-30 PROCEDURE — 82565 ASSAY OF CREATININE: CPT

## 2022-07-30 PROCEDURE — 80053 COMPREHEN METABOLIC PANEL: CPT

## 2022-07-30 PROCEDURE — 99232 PR SUBSEQUENT HOSPITAL CARE,LEVL II: ICD-10-PCS | Mod: ,,, | Performed by: NURSE PRACTITIONER

## 2022-07-30 PROCEDURE — 84132 ASSAY OF SERUM POTASSIUM: CPT

## 2022-07-30 PROCEDURE — P9045 ALBUMIN (HUMAN), 5%, 250 ML: HCPCS | Mod: JG

## 2022-07-30 RX ORDER — INSULIN ASPART 100 [IU]/ML
1-10 INJECTION, SOLUTION INTRAVENOUS; SUBCUTANEOUS EVERY 4 HOURS PRN
Status: DISCONTINUED | OUTPATIENT
Start: 2022-07-30 | End: 2022-07-30

## 2022-07-30 RX ORDER — SODIUM,POTASSIUM PHOSPHATES 280-250MG
2 POWDER IN PACKET (EA) ORAL ONCE
Status: COMPLETED | OUTPATIENT
Start: 2022-07-30 | End: 2022-07-30

## 2022-07-30 RX ORDER — ENOXAPARIN SODIUM 100 MG/ML
40 INJECTION SUBCUTANEOUS EVERY 24 HOURS
Status: DISCONTINUED | OUTPATIENT
Start: 2022-07-30 | End: 2022-08-03 | Stop reason: HOSPADM

## 2022-07-30 RX ORDER — NIFEDIPINE 30 MG/1
60 TABLET, EXTENDED RELEASE ORAL DAILY
Status: DISCONTINUED | OUTPATIENT
Start: 2022-07-31 | End: 2022-07-31

## 2022-07-30 RX ORDER — GLUCAGON 1 MG
1 KIT INJECTION
Status: DISCONTINUED | OUTPATIENT
Start: 2022-07-30 | End: 2022-07-30

## 2022-07-30 RX ORDER — CEFAZOLIN SODIUM/D5W 2 G/100 ML
2 PLASTIC BAG, INJECTION (ML) INTRAVENOUS
Status: DISPENSED | OUTPATIENT
Start: 2022-07-30 | End: 2022-07-31

## 2022-07-30 RX ORDER — CEFAZOLIN SODIUM 2 G/50ML
2 SOLUTION INTRAVENOUS
Status: DISCONTINUED | OUTPATIENT
Start: 2022-07-30 | End: 2022-07-30

## 2022-07-30 RX ORDER — METOPROLOL SUCCINATE 50 MG/1
50 TABLET, EXTENDED RELEASE ORAL DAILY
Status: DISCONTINUED | OUTPATIENT
Start: 2022-07-30 | End: 2022-07-31

## 2022-07-30 RX ORDER — HYDROMORPHONE HYDROCHLORIDE 1 MG/ML
0.5 INJECTION, SOLUTION INTRAMUSCULAR; INTRAVENOUS; SUBCUTANEOUS
Status: DISCONTINUED | OUTPATIENT
Start: 2022-07-30 | End: 2022-08-01

## 2022-07-30 RX ORDER — GLUCAGON 1 MG
1 KIT INJECTION
Status: DISCONTINUED | OUTPATIENT
Start: 2022-07-30 | End: 2022-07-31

## 2022-07-30 RX ORDER — FAMOTIDINE 20 MG/1
20 TABLET, FILM COATED ORAL 2 TIMES DAILY
Status: DISCONTINUED | OUTPATIENT
Start: 2022-07-30 | End: 2022-08-03 | Stop reason: HOSPADM

## 2022-07-30 RX ORDER — INSULIN ASPART 100 [IU]/ML
1-10 INJECTION, SOLUTION INTRAVENOUS; SUBCUTANEOUS EVERY 4 HOURS PRN
Status: DISCONTINUED | OUTPATIENT
Start: 2022-07-30 | End: 2022-07-31

## 2022-07-30 RX ORDER — NIFEDIPINE 30 MG/1
30 TABLET, EXTENDED RELEASE ORAL ONCE
Status: COMPLETED | OUTPATIENT
Start: 2022-07-30 | End: 2022-07-30

## 2022-07-30 RX ORDER — FUROSEMIDE 10 MG/ML
20 INJECTION INTRAMUSCULAR; INTRAVENOUS 2 TIMES DAILY
Status: DISCONTINUED | OUTPATIENT
Start: 2022-07-30 | End: 2022-07-30

## 2022-07-30 RX ORDER — NIFEDIPINE 30 MG/1
30 TABLET, EXTENDED RELEASE ORAL DAILY
Status: DISCONTINUED | OUTPATIENT
Start: 2022-07-30 | End: 2022-07-30

## 2022-07-30 RX ADMIN — OXYCODONE HYDROCHLORIDE 10 MG: 10 TABLET ORAL at 03:07

## 2022-07-30 RX ADMIN — OXYCODONE 5 MG: 5 TABLET ORAL at 12:07

## 2022-07-30 RX ADMIN — IPRATROPIUM BROMIDE AND ALBUTEROL SULFATE 3 ML: .5; 3 SOLUTION RESPIRATORY (INHALATION) at 04:07

## 2022-07-30 RX ADMIN — DOCUSATE SODIUM 100 MG: 100 CAPSULE ORAL at 08:07

## 2022-07-30 RX ADMIN — ACETAMINOPHEN 1000 MG: 500 TABLET ORAL at 10:07

## 2022-07-30 RX ADMIN — CLEVIPIDINE 1 MG/HR: 0.5 EMULSION INTRAVENOUS at 07:07

## 2022-07-30 RX ADMIN — NIFEDIPINE 30 MG: 30 TABLET, FILM COATED, EXTENDED RELEASE ORAL at 11:07

## 2022-07-30 RX ADMIN — ATORVASTATIN CALCIUM 40 MG: 20 TABLET, FILM COATED ORAL at 08:07

## 2022-07-30 RX ADMIN — CEFAZOLIN SODIUM 2 G: 2 SOLUTION INTRAVENOUS at 03:07

## 2022-07-30 RX ADMIN — MUPIROCIN 1 G: 20 OINTMENT TOPICAL at 08:07

## 2022-07-30 RX ADMIN — OXYCODONE HYDROCHLORIDE 10 MG: 10 TABLET ORAL at 07:07

## 2022-07-30 RX ADMIN — IPRATROPIUM BROMIDE AND ALBUTEROL SULFATE 3 ML: .5; 3 SOLUTION RESPIRATORY (INHALATION) at 11:07

## 2022-07-30 RX ADMIN — ACETAMINOPHEN 1000 MG: 500 TABLET ORAL at 05:07

## 2022-07-30 RX ADMIN — ENOXAPARIN SODIUM 40 MG: 100 INJECTION SUBCUTANEOUS at 05:07

## 2022-07-30 RX ADMIN — TAMSULOSIN HYDROCHLORIDE 0.4 MG: 0.4 CAPSULE ORAL at 08:07

## 2022-07-30 RX ADMIN — ESCITALOPRAM OXALATE 10 MG: 5 TABLET, FILM COATED ORAL at 08:07

## 2022-07-30 RX ADMIN — METOPROLOL SUCCINATE 50 MG: 50 TABLET, EXTENDED RELEASE ORAL at 09:07

## 2022-07-30 RX ADMIN — INSULIN ASPART 2 UNITS: 100 INJECTION, SOLUTION INTRAVENOUS; SUBCUTANEOUS at 07:07

## 2022-07-30 RX ADMIN — FAMOTIDINE 20 MG: 10 INJECTION, SOLUTION INTRAVENOUS at 08:07

## 2022-07-30 RX ADMIN — FAMOTIDINE 20 MG: 20 TABLET ORAL at 08:07

## 2022-07-30 RX ADMIN — NIFEDIPINE 30 MG: 30 TABLET, FILM COATED, EXTENDED RELEASE ORAL at 08:07

## 2022-07-30 RX ADMIN — FUROSEMIDE 15 MG/HR: 10 INJECTION, SOLUTION INTRAMUSCULAR; INTRAVENOUS at 05:07

## 2022-07-30 RX ADMIN — CLEVIPIDINE 6 MG/HR: 0.5 EMULSION INTRAVENOUS at 05:07

## 2022-07-30 RX ADMIN — CEFAZOLIN SODIUM 2 G: 2 SOLUTION INTRAVENOUS at 11:07

## 2022-07-30 RX ADMIN — ALBUMIN (HUMAN) 25 G: 12.5 INJECTION, SOLUTION INTRAVENOUS at 03:07

## 2022-07-30 RX ADMIN — IPRATROPIUM BROMIDE AND ALBUTEROL SULFATE 3 ML: .5; 3 SOLUTION RESPIRATORY (INHALATION) at 07:07

## 2022-07-30 RX ADMIN — DEXTROSE 2 G: 50 INJECTION, SOLUTION INTRAVENOUS at 06:07

## 2022-07-30 RX ADMIN — OXYCODONE 5 MG: 5 TABLET ORAL at 07:07

## 2022-07-30 RX ADMIN — POTASSIUM & SODIUM PHOSPHATES POWDER PACK 280-160-250 MG 2 PACKET: 280-160-250 PACK at 05:07

## 2022-07-30 RX ADMIN — FUROSEMIDE 20 MG: 10 INJECTION, SOLUTION INTRAMUSCULAR; INTRAVENOUS at 08:07

## 2022-07-30 RX ADMIN — ASPIRIN 325 MG ORAL TABLET 325 MG: 325 PILL ORAL at 08:07

## 2022-07-30 RX ADMIN — ACETAMINOPHEN 1000 MG: 500 TABLET ORAL at 02:07

## 2022-07-30 RX ADMIN — IPRATROPIUM BROMIDE AND ALBUTEROL SULFATE 3 ML: .5; 3 SOLUTION RESPIRATORY (INHALATION) at 12:07

## 2022-07-30 RX ADMIN — POLYETHYLENE GLYCOL 3350 17 G: 17 POWDER, FOR SOLUTION ORAL at 08:07

## 2022-07-30 NOTE — PLAN OF CARE
Problem: Occupational Therapy  Goal: Occupational Therapy Goal  Description: Goals to be met by: 8/13/2022    Patient will increase functional independence with ADLs by performing:    UE Dressing with Modified [place].  LE Dressing with Modified [place].  Grooming while standing at sink with Modified [place].  Toileting from toilet with Supervision for hygiene and clothing management.   Supine to sit with Modified [place].  Sit to stand transfer with Supervision without AD.  Toilet transfer to toilet with Supervision without AD.    Outcome: Ongoing, Progressing     Pt evaluated and OT goals established.

## 2022-07-30 NOTE — NURSING
MD Dick notified unable to obtain CVP with a good waveform. CVPs obtained 17,20. U/O 30-50cc/hr. Orders to hold albumin at this time.

## 2022-07-30 NOTE — NURSING
MD Dick updated on pt ABG PO2 56, SpO2 89, 15L HFNC. Orders to leave pt on 15L HFNC at this time.

## 2022-07-30 NOTE — PLAN OF CARE
"      SICU PLAN OF CARE NOTE    SHIFT EVENTS:  VSS. Extubated. 1L albumin given. Cleviprex needed throughout shift. Replaced Phos. POC reviewed with patient and family; questions and concerns addressed. See flow sheets for full assessment details. Will continue to monitor patient closely.      Dx: Coronary artery disease involving native coronary artery of native heart without angina pectoris    Vital Signs: BP (!) 151/72   Pulse 88   Temp 98.4 °F (36.9 °C) (Oral)   Resp 18   Ht 5' 9" (1.753 m)   Wt 104.8 kg (231 lb 0.7 oz)   SpO2 96%   BMI 34.12 kg/m²     Neuro: AAO x4, Follows Commands and Moves All Extremities    Respiratory: Increase O2 demand. On comfort flow. CPAP overnight    Cardiac: NSR; HR 80s    Diet: NPO    Gtts: Insulin, Cleviprex    Urine Output: Urinary Catheter 530 cc/shift    Drains:     2 Med and 1 Pleural Chest Tube, total output 232 cc /  shift    Labs: Daily    Accuchecks: Q1    SKIN NOTE:  Skin: Midsternal incision, island dsg CDI; L graft site compression CDI, CT sites, dsg in place CDI. No new skin breakdown or skin tears noted at this time.    Skin precautions maintained including:  Sacrum and heels with foam dressing in place for pressure protection. Frequent weight shift assistance provided Q2 hr to prevent breakdown. Bed plugged in and mattress inflated; . Adhesive use limited. Heels elevated off bed. Pressure points protected and positioning supports utilized.  Skin-to-device areas padded. Skin-to-skin areas padded    "

## 2022-07-30 NOTE — PT/OT/SLP EVAL
Occupational Therapy   Evaluation and Treatment    Name: Christiano Frost  MRN: [id number]  Admitting Diagnosis:  Coronary artery disease involving native coronary artery of native heart without angina pectoris  Recent Surgery: Procedure(s) (LRB):  CORONARY ARTERY BYPASS GRAFT (CABG) (Left)  HARVEST-VEIN-ENDOVASCULAR (Left) 1 Day Post-Op     Co-eval with PT to have 2 skilled therapists present to safely assess pt's functional mobility.    Recommendations:     Discharge Recommendations: home health OT  Discharge Equipment Recommendations:  other (see comments) (TBD)  Barriers to discharge:  None    Assessment:     Christiano Frost is a 71 y.o. male with a medical diagnosis of Coronary artery disease involving native coronary artery of native heart without angina pectoris.  Pt tolerated session well and without incident, but he requires assistance to perform self-care tasks and mobility.  Due to his current level of function compared to his PLOF and his home environment, HHOT recommended at d/c to ensure pt's safety in his home environment.  He presents with the following. Performance deficits affecting function: weakness, impaired endurance, impaired self care skills, impaired functional mobility, gait instability, impaired balance, pain, impaired cardiopulmonary response to activity (sternal precautions).      Rehab Prognosis: Good; patient would benefit from acute skilled OT services to address these deficits and reach maximum level of function.       Plan:     Patient to be seen 3 x/week to address the above listed problems via self-care/home management, therapeutic activities, therapeutic exercises  · Plan of Care Expires: 08/28/22  · Plan of Care Reviewed with: patient    Subjective     Chief Complaint: chest pain  Patient/Family Comments/goals: to get stronger and return to PLOF    Occupational Profile:  Living Environment: Pt lives with his wife in a [facility] with a small threshold to enter.  He has a tub/shower combo  with grab bars and a raised toilet.  Previous level of function: Independent with ADLs and mobility.  Roles and Routines: Pt is very active and likes to walk  Equipment Used at Home:  grab bar, raised toilet Pt owns but doesn't use his RW or 3-pronged cane  Assistance upon Discharge: His wife, but pt said she isn't in the best health.  Pt said his neighbor could assist too if needed.     Pain/Comfort:  Pain Rating 1: other (see comments) (not rated)  Location - Orientation 1: midline  Location 1: chest  Pain Addressed 1: Distraction, Reposition  Pain Rating Post-Intervention 1: 8/10    Patients cultural, spiritual, Confucianist conflicts given the current situation: no    Objective:     Communicated with: nursing and PT prior to session.  Patient found up in chair with telemetry, pulse ox (continuous), blood pressure cuff, central line, arterial line, hopper catheter, oxygen, peripheral IV, chest tube, external pacer with nursing present upon OT entry to room.    General Precautions: Standard, fall, sternal   Orthopedic Precautions:N/A   Braces: N/A  Respiratory Status: Comfort flow, flow 40 L/min, concentration 80%    Occupational Performance:    Bed Mobility:    · Pt completed Sit to Supine with Min A    Functional Mobility/Transfers:  · Patient completed Sit <> Stand Transfer from bedside chair x 1 trial with contact guard assistance with no assistive device, requiring cueing to adhere to his sternal precautions   · Pt performed Chair <> Bed t/f via step t/f technique with CGA-Min A with HHA.   · Functional Mobility: Pt ambulated ~3 ft with CGA-Min A with no AD.      Activities of Daily Living:  · Lower Body Dressing: total assistance to adjust non-skid sock while supine   · Pt had already performed other ADLs.  He would be able to t/f to a bedside commode with Min A with HHA.    Cognitive/Visual Perceptual:  Cognitive/Psychosocial Skills:     -       Oriented to: Person, Place, Time and Situation   -       Follows  Commands/attention:Follows multistep  commands  -       Communication: clear/fluent    Physical Exam:  Upper Extremity Range of Motion:     -       Right Upper Extremity: WFL  -       Left Upper Extremity: WFL  Upper Extremity Strength:    -       Right Upper Extremity: WFL  -       Left Upper Extremity: WFL   Strength:    -       Right Upper Extremity: WFL  -       Left Upper Extremity: WFL    AMPAC 6 Click ADL:  AMPAC Total Score: 15    Treatment & Education:  Pt edu on role of OT, POC, his sternal precautions, safety when performing self care tasks, benefit of performing OOB activity, and safety when performing functional transfers and mobility.    - Self care tasks completed-- as noted above     Education:    Patient left HOB elevated with all lines intact, call button in reach, nursing notified and nursing present    GOALS:   Multidisciplinary Problems     Occupational Therapy Goals        Problem: Occupational Therapy    Goal Priority Disciplines Outcome Interventions   Occupational Therapy Goal     OT, PT/OT Ongoing, Progressing    Description: Goals to be met by: 8/13/2022    Patient will increase functional independence with ADLs by performing:    UE Dressing with Modified [place].  LE Dressing with Modified [place].  Grooming while standing at sink with Modified [place].  Toileting from toilet with Supervision for hygiene and clothing management.   Supine to sit with Modified [place].  Sit to stand transfer with Supervision without AD.  Toilet transfer to toilet with Supervision without AD.                     History:     Past Medical History:   Diagnosis Date    BPH (benign prostatic hyperplasia)     Depression     Diabetes mellitus     GERD (gastroesophageal reflux disease)     Hyperlipidemia     Hypertension     Obesity     TIA (transient ischemic attack) 0322/2016         Past Surgical History:   Procedure Laterality Date    LEFT HEART CATHETERIZATION Left 7/19/2022     Procedure: Left heart cath;  Surgeon: Ben Ortega MD;  [facility]: [facility] CATH LAB/EP;  Service: Cardiology;  Laterality: Left;    SPINE SURGERY  2044 2005 2008       Time Tracking:     OT Date of Treatment: 07/30/22  OT Start Time: 0838  OT Stop Time: 0903  OT Total Time (min): 25 min    Billable Minutes:Evaluation 10 min  Therapeutic Activity 15 min    7/30/2022

## 2022-07-30 NOTE — PROGRESS NOTES
[facility] - Surgical Intensive Care  Critical Care - Surgery  Progress Note    Patient Name: Christiano Frost  MRN: [id number]  Admission Date: 7/29/2022  Hospital Length of Stay: 1 days  Code Status: Full Code  Attending Provider: Magen Martell MD  Primary Care Provider: Ellis Allan MD   Principal Problem: Coronary artery disease involving native coronary artery of native heart without angina pectoris    Subjective:     Hospital/ICU Course:  No notes on file    Interval History/Significant Events: Extubated overnight without difficulty and placed on home CPAP. Started on cleviprex for HTN. Pain is well controlled this morning.    Follow-up For: Procedure(s) (LRB):  CORONARY ARTERY BYPASS GRAFT (CABG) (Left)  HARVEST-VEIN-ENDOVASCULAR (Left)    Post-Operative Day: 1 Day Post-Op    Objective:     Vital Signs (Most Recent):  Temp: 98.8 °F (37.1 °C) (07/30/22 0702)  Pulse: 84 (07/30/22 0930)  Resp: 18 (07/30/22 0759)  BP: (!) 135/55 (07/30/22 0930)  SpO2: 95 % (07/30/22 0759)   Vital Signs (24h Range):  Temp:  [97.6 °F (36.4 °C)-98.8 °F (37.1 °C)] 98.8 °F (37.1 °C)  Pulse:  [65-92] 84  Resp:  [0-38] 18  SpO2:  [87 %-100 %] 95 %  BP: (121-153)/(50-80) 135/55  Arterial Line BP: (105-148)/(43-69) 125/43     Weight: 111.8 kg (246 lb 7.6 oz)  Body mass index is 36.4 kg/m².      Intake/Output Summary (Last 24 hours) at 7/30/2022 0945  Last data filed at 7/30/2022 0600  Gross per 24 hour   Intake 5129.07 ml   Output 1641 ml   Net 3488.07 ml       Physical Exam  Constitutional:       General: He is not in acute distress.  HENT:      Head: Normocephalic and atraumatic.   Eyes:      Pupils: Pupils are equal, round, and reactive to light.   Neck:      Comments: L subclavian CVC    Cardiovascular:      Rate and Rhythm: Normal rate and regular rhythm.      Comments: Midline sternotomy c/d with dressing in place    2 mediastinal and 1 pleural chest tube to suction    V wires in place  Pulmonary:      Effort: Pulmonary effort  is normal. No respiratory distress.      Comments: On comfort flow  Abdominal:      General: There is no distension.      Palpations: Abdomen is soft.   Genitourinary:     Comments: Hernandez in place  Musculoskeletal:      Comments: L radial art line       Skin:     General: Skin is warm and dry.      Capillary Refill: Capillary refill takes less than 2 seconds.   Neurological:      General: No focal deficit present.      Mental Status: He is alert and oriented to person, place, and time.       Vents:  Vent Mode: Spont (07/29/22 1956)  Ventilator Initiated: Yes (07/29/22 1315)  Set Rate: 30 BPM (07/29/22 1813)  Vt Set: 480 mL (07/29/22 1813)  Pressure Support: 5 cmH20 (07/29/22 1956)  PEEP/CPAP: 8.5 cmH20 (07/29/22 1956)  Oxygen Concentration (%): 80 (07/30/22 0702)  Peak Airway Pressure: 14 cmH2O (07/29/22 1956)  Plateau Pressure: 0 cmH20 (07/29/22 1956)  Total Ve: 2.91 mL (07/29/22 1956)  Negative Inspiratory Force (cm H2O): -15 (07/29/22 1956)  F/VT Ratio<105 (RSBI): (!) 35.35 (07/29/22 1946)    Lines/Drains/Airways       Central Venous Catheter Line  Duration                  Percutaneous Central Line Insertion/Assessment - Quad lumen  07/29/22 0853 left subclavian 1 day    Percutaneous Central Line Insertion/Assessment - Quad Lumen  07/29/22 0853 left subclavian 1 day              Drain  Duration                  Urethral Catheter 07/29/22 0730 Non-latex;Straight-tip;Temperature probe 14 Fr. 1 day         Chest Tube 07/29/22 1200 1 Anterior Mediastinal 19 Fr. <1 day         Chest Tube 07/29/22 1200 2 Anterior Mediastinal 19 Fr. <1 day         Chest Tube 07/29/22 1201 3 Left Pleural 19 Fr. <1 day              Arterial Line  Duration             Arterial Line 07/29/22 0853 Left Radial 1 day              Line  Duration                  Pacer Wires 07/29/22 1114 <1 day              Peripheral Intravenous Line  Duration                  Peripheral IV - Single Lumen 07/29/22 0540 18 G Left;Posterior Hand 1 day          Peripheral IV - Single Lumen 07/29/22 1345 22 G Posterior;Right Hand <1 day         Peripheral IV - Single Lumen 07/29/22 1350 18 G Anterior;Proximal;Right Forearm <1 day                    Significant Labs:    CBC/Anemia Profile:  Recent Labs   Lab 07/29/22  1310 07/29/22  1315 07/30/22  0237 07/30/22  0302 07/30/22  0544   WBC 23.70*  --   --  12.57  --    HGB 11.7*  --   --  10.0*  --    HCT 37.0*   < > 30* 30.6* 29*     --   --  157  --    MCV 87  --   --  84  --    RDW 14.4  --   --  14.6*  --     < > = values in this interval not displayed.        Chemistries:  Recent Labs   Lab 07/29/22  1310 07/29/22  1717 07/30/22  0302 07/30/22  0850     --  133*  --    K 3.4* 3.8 4.3 4.3     --  104  --    CO2 19*  --  23  --    BUN 10  --  13  --    CREATININE 0.9  --  0.8  --    CALCIUM 8.5*  --  8.5*  --    ALBUMIN 2.8*  --  3.6  --    PROT 5.2*  --  5.7*  --    BILITOT 0.5  --  0.8  --    ALKPHOS 28*  --  24*  --    ALT 12  --  11  --    AST 27  --  38  --    MG 2.4  --  2.0  --    PHOS 2.8  --  2.5*  --        ABGs:   Recent Labs   Lab 07/30/22  0544   PH 7.393   PCO2 40.8   HCO3 24.9   POCSATURATED 89*   BE 0     Coagulation:   Recent Labs   Lab 07/30/22  0302   INR 1.1   APTT 26.4     All pertinent labs within the past 24 hours have been reviewed.    Significant Imaging:  I have reviewed all pertinent imaging results/findings within the past 24 hours.    Assessment/Plan:     * Coronary artery disease involving native coronary artery of native heart without angina pectoris  71 year old male with CAD, depression, BPH, diabetes mellitus (HA1C 6.5), hyperlipidemia, hypertension, and previous TIA now s/p CABGx3      Neuro/Psych:   -- Sedation: none    -- Pain: PRN Oxy + Dilaudid  -- Scheduled Tylenol             Cards:   -- S/P CABG x 3 on 7/2922  -- HDS off pressors  -- Ventricular pacing wires. Backup paced at 45  -- MAP > 65, Syst < 140  -- Cleviprex PRN, starting nifedipine today  -- Aspirin  325mg  -- Statin  -- Toprol      Pulm:   -- Goal O2 sat > 88%  -- ABG PRN  -- Wean comfort flow as tolerated  -- Mediastinal chest tubes x2 and pleural chest tube x1 to suction  -- CPAP at night      Renal:  -- Keep hopper for strict I/O  -- Trend BUN/Cr   -- Lasix BID      FEN / GI:   -- Replace lytes as needed  -- Nutrition: NPO, advance diet as tolerated  -- GI ppx: famotidine  -- Bowel reg: miralax  -- 1500mL 5% Albumin in first 12 hours post-op, complete      ID:   -- Tm: afebrile; WBC stable  -- Maribeth-op ancef complete      Heme/Onc:   -- H/H stable   -- Daily CBC  -- 700mL Cell saver given back  -- Post-op coags wnl  -- Aspirin 325mg QD      Endo:   -- BG goal 140-180  -- Insulin gtt      PPx:   Feeding: NPO  Analgesia/Sedation: none / PRN Oxy + Dilaudid  Thromboembolic prevention: SCDs  HOB >30: Yes  Stress Ulcer ppx: famotidine  Glucose control: Critical care goal 140-180 g/dl, ISS     Lines/Drains/Airway: CVC L subclavian, Hopper, Chest tubes x 3, ventricular pacing wires, L radial A-line      Dispo/Code Status/Palliative:   -- SICU / Full Code.          Emmy Jennings MD  Critical Care - Surgery  [facility] - [facility]

## 2022-07-30 NOTE — PLAN OF CARE
PT eval complete, plan of care established    2022    Problem: Physical Therapy  Goal: Physical Therapy Goal  Description: Goals to be met by: 2022     Patient will increase functional independence with mobility by performin. Supine to sit with independence  2. Sit to supine with independence  3. Sit to stand transfer with modified independence  4. Bed to chair transfer with modified independence using platform rolling walker as needed  5. Gait  x 200 feet with modified independence using platform rolling walker as needed  6. Lower extremity exercise program x10 reps per handout, with independence   Outcome: Ongoing, Progressing

## 2022-07-30 NOTE — PLAN OF CARE
"      SICU PLAN OF CARE NOTE    SHIFT EVENTS:  VSS / No acute events this shift. POC reviewed with patient and family; questions and concerns addressed. See flow sheets for full assessment details.       Dx: Coronary artery disease involving native coronary artery of native heart without angina pectoris    Vital Signs: BP (!) 123/58   Pulse 86   Temp 98.8 °F (37.1 °C) (Oral)   Resp (!) 22   Ht 5' 9" (1.753 m)   Wt 111.8 kg (246 lb 7.6 oz)   SpO2 (!) 90%   BMI 36.40 kg/m²     Neuro: AAO x4, Follows Commands and Moves All Extremities    Respiratory: Comfort Robert 40L/44%, maintaining an oxygen saturation above 92%.    Cardiac: NSR; HR 70s - 80s    Diet: Clear Liquid    Gtts: Lasix and Abx     Urine Output: Urinary Catheter  555cc/shift    Drains:   2 Med, 1 Pleural  Chest Tube, total output 175 cc /  shift    Accuchecks: Q4h    SKIN NOTE:  Skin: Incision - dressing - clean and intact   Skin precautions maintained including:  Sacrum and heels with foam dressing in place for pressure protection. Frequent weight shift encouraged / assistance provided Q2 hr to prevent further breakdown. Adhesive use limited. Positioned off wounds. Pressure points protected and positioning supports utilized.  Skin-to-device areas padded. Skin-to-skin areas padded    Remained in chair majority of shift; tolerated well.   "

## 2022-07-30 NOTE — SUBJECTIVE & OBJECTIVE
Interval History/Significant Events: Extubated overnight without difficulty and placed on home CPAP. Started on cleviprex for HTN. Pain is well controlled this morning.    Follow-up For: Procedure(s) (LRB):  CORONARY ARTERY BYPASS GRAFT (CABG) (Left)  HARVEST-VEIN-ENDOVASCULAR (Left)    Post-Operative Day: 1 Day Post-Op    Objective:     Vital Signs (Most Recent):  Temp: 98.8 °F (37.1 °C) (07/30/22 0702)  Pulse: 84 (07/30/22 0930)  Resp: 18 (07/30/22 0759)  BP: (!) 135/55 (07/30/22 0930)  SpO2: 95 % (07/30/22 0759)   Vital Signs (24h Range):  Temp:  [97.6 °F (36.4 °C)-98.8 °F (37.1 °C)] 98.8 °F (37.1 °C)  Pulse:  [65-92] 84  Resp:  [0-38] 18  SpO2:  [87 %-100 %] 95 %  BP: (121-153)/(50-80) 135/55  Arterial Line BP: (105-148)/(43-69) 125/43     Weight: 111.8 kg (246 lb 7.6 oz)  Body mass index is 36.4 kg/m².      Intake/Output Summary (Last 24 hours) at 7/30/2022 0945  Last data filed at 7/30/2022 0600  Gross per 24 hour   Intake 5129.07 ml   Output 1641 ml   Net 3488.07 ml       Physical Exam  Constitutional:       General: He is not in acute distress.  HENT:      Head: Normocephalic and atraumatic.   Eyes:      Pupils: Pupils are equal, round, and reactive to light.   Neck:      Comments: L subclavian CVC    Cardiovascular:      Rate and Rhythm: Normal rate and regular rhythm.      Comments: Midline sternotomy c/d with dressing in place    2 mediastinal and 1 pleural chest tube to suction    V wires in place  Pulmonary:      Effort: Pulmonary effort is normal. No respiratory distress.      Comments: On comfort flow  Abdominal:      General: There is no distension.      Palpations: Abdomen is soft.   Genitourinary:     Comments: Hernandez in place  Musculoskeletal:      Comments: L radial art line       Skin:     General: Skin is warm and dry.      Capillary Refill: Capillary refill takes less than 2 seconds.   Neurological:      General: No focal deficit present.      Mental Status: He is alert and oriented to person,  place, and time.       Vents:  Vent Mode: Spont (07/29/22 1956)  Ventilator Initiated: Yes (07/29/22 1315)  Set Rate: 30 BPM (07/29/22 1813)  Vt Set: 480 mL (07/29/22 1813)  Pressure Support: 5 cmH20 (07/29/22 1956)  PEEP/CPAP: 8.5 cmH20 (07/29/22 1956)  Oxygen Concentration (%): 80 (07/30/22 0702)  Peak Airway Pressure: 14 cmH2O (07/29/22 1956)  Plateau Pressure: 0 cmH20 (07/29/22 1956)  Total Ve: 2.91 mL (07/29/22 1956)  Negative Inspiratory Force (cm H2O): -15 (07/29/22 1956)  F/VT Ratio<105 (RSBI): (!) 35.35 (07/29/22 1946)    Lines/Drains/Airways       Central Venous Catheter Line  Duration                  Percutaneous Central Line Insertion/Assessment - Quad lumen  07/29/22 0853 left subclavian 1 day    Percutaneous Central Line Insertion/Assessment - Quad Lumen  07/29/22 0853 left subclavian 1 day              Drain  Duration                  Urethral Catheter 07/29/22 0730 Non-latex;Straight-tip;Temperature probe 14 Fr. 1 day         Chest Tube 07/29/22 1200 1 Anterior Mediastinal 19 Fr. <1 day         Chest Tube 07/29/22 1200 2 Anterior Mediastinal 19 Fr. <1 day         Chest Tube 07/29/22 1201 3 Left Pleural 19 Fr. <1 day              Arterial Line  Duration             Arterial Line 07/29/22 0853 Left Radial 1 day              Line  Duration                  Pacer Wires 07/29/22 1114 <1 day              Peripheral Intravenous Line  Duration                  Peripheral IV - Single Lumen 07/29/22 0540 18 G Left;Posterior Hand 1 day         Peripheral IV - Single Lumen 07/29/22 1345 22 G Posterior;Right Hand <1 day         Peripheral IV - Single Lumen 07/29/22 1350 18 G Anterior;Proximal;Right Forearm <1 day                    Significant Labs:    CBC/Anemia Profile:  Recent Labs   Lab 07/29/22  1310 07/29/22  1315 07/30/22  0237 07/30/22  0302 07/30/22  0544   WBC 23.70*  --   --  12.57  --    HGB 11.7*  --   --  10.0*  --    HCT 37.0*   < > 30* 30.6* 29*     --   --  157  --    MCV 87  --   --  84   --    RDW 14.4  --   --  14.6*  --     < > = values in this interval not displayed.        Chemistries:  Recent Labs   Lab 07/29/22  1310 07/29/22  1717 07/30/22  0302 07/30/22  0850     --  133*  --    K 3.4* 3.8 4.3 4.3     --  104  --    CO2 19*  --  23  --    BUN 10  --  13  --    CREATININE 0.9  --  0.8  --    CALCIUM 8.5*  --  8.5*  --    ALBUMIN 2.8*  --  3.6  --    PROT 5.2*  --  5.7*  --    BILITOT 0.5  --  0.8  --    ALKPHOS 28*  --  24*  --    ALT 12  --  11  --    AST 27  --  38  --    MG 2.4  --  2.0  --    PHOS 2.8  --  2.5*  --        ABGs:   Recent Labs   Lab 07/30/22  0544   PH 7.393   PCO2 40.8   HCO3 24.9   POCSATURATED 89*   BE 0     Coagulation:   Recent Labs   Lab 07/30/22  0302   INR 1.1   APTT 26.4     All pertinent labs within the past 24 hours have been reviewed.    Significant Imaging:  I have reviewed all pertinent imaging results/findings within the past 24 hours.

## 2022-07-30 NOTE — PT/OT/SLP EVAL
"Physical Therapy Co-Evaluation and Co-Treatment    Patient Name:  Christiano Frost   MRN:  [id number]    Recommendations:     Discharge Recommendations:  home health PT   Discharge Equipment Recommendations: none   Barriers to discharge: Decreased caregiver support    Assessment:     Christiano Frost is a 71 y.o. male admitted with a medical diagnosis of Coronary artery disease involving native coronary artery of native heart without angina pectoris.  He presents with the following impairments/functional limitations:  weakness, impaired endurance, impaired self care skills, impaired functional mobility, gait instability, impaired balance, pain, impaired cardiopulmonary response to activity. Patient tolerated session well however limited by fatigue.    Rehab Prognosis: Good; patient would benefit from acute skilled PT services 5 x/week to address these deficits and reach maximum level of function.  Recent Surgery: Procedure(s) (LRB):  CORONARY ARTERY BYPASS GRAFT (CABG) (Left)  HARVEST-VEIN-ENDOVASCULAR (Left) 1 Day Post-Op    Plan:     During this hospitalization, patient to be seen 5 x/week to address the identified rehab impairments via gait training, therapeutic activities, therapeutic exercises, neuromuscular re-education and progress toward the following goals:    · Plan of Care Expires:  08/30/22    Subjective     Chief Complaint: Chest pain  Patient/Family Comments/Goals: "I'm just tired"  Pain/Comfort:  · Pain Rating 1:  (not rated)  · Location - Orientation 1: midline  · Location 1: chest  · Pain Addressed 1: Distraction, Reposition  · Pain Rating Post-Intervention 1: 8/10    Patients cultural, spiritual, Mormonism conflicts given the current situation: no    Living Environment:  Patient lives with their spouse (limited assistance available) in a single story home, number of outside stairs: threshold, tub-shower combo.  Prior to admission, patient was independent with ADLs, driving, not working. Patient uses " DME as follows: grab bar, raised toilet. DME owned (not currently used): rolling walker, bedside commode and quad cane. Upon discharge, patient will have assistance from significant other (limited).    Objective:     Communicated with nursing prior to session.  Patient found up in chair with telemetry, blood pressure cuff, pulse ox (continuous), peripheral IV, central line, arterial line, chest tube, hopper catheter, oxygen, external pacer upon PT entry to room.    General Precautions: Standard, fall, sternal   Orthopedic Precautions:N/A   Braces: N/A   Oxygen Status: Comfort flow 40 L, 80%    Exams:  · Cognitive Exam:  Patient is oriented to Person, Place, Time, Situation, follows commands 100% of the time  · RLE ROM: WFL  · RLE Strength: WFL  · LLE ROM: WFL  · LLE Strength: WFL  · Sensation: Intact light touch to BLEs    Functional Mobility:  · Bed Mobility:     · Sit to Supine: minimum assistance  · Transfers:     · Sit to Stand: contact guard assistance with no AD  · Chair to Bed: contact guard - minimum assistance with no AD with cues for hand placement using Step Transfer  · Gait: Patient ambulated 3 ft with no AD and contact guard - minimum assistance. Patient demonstrates occasional unsteady gait, decreased step length, decreased weight shift, decreased foot clearance and decreased vera. Cued for increased step size. All lines remained intact throughout ambulation trial, nurse present for vital sign monitoring.  · Balance:   · Static Sitting: Good, able to maintain for 3 minute(s) with supervision  · Dynamic Sitting: Fair: Patient accepts minimal challenge, stand by assistance  · Static Standing: Good, able to maintain for 20 seconds with contact guard assistance  · Dynamic Standing: Fair: Patient accepts minimal challenge, minimum assistance    Therapeutic Activities and Exercises:  Patient educated on role of acute care PT and PT POC, safety while in hospital including calling nurse for mobility and call  light usage  Patient is clear to stand pivot transfer with RN/PCT, assist x1   Patient educated on Post-op sternal precautions, including no lifting > 5 lbs, pulling or pushing with BUEs. Cued for hand placement throughout session to maintain precautions     AM-PAC 6 CLICK MOBILITY  Total Score:14     Patient left HOB elevated with all lines intact, call button in reach and RN present.    GOALS:   Multidisciplinary Problems     Physical Therapy Goals        Problem: Physical Therapy    Goal Priority Disciplines Outcome Goal Variances Interventions   Physical Therapy Goal     PT, PT/OT Ongoing, Progressing     Description: Goals to be met by: 2022     Patient will increase functional independence with mobility by performin. Supine to sit with independence  2. Sit to supine with independence  3. Sit to stand transfer with modified independence  4. Bed to chair transfer with modified independence using platform rolling walker as needed  5. Gait  x 200 feet with modified independence using platform rolling walker as needed  6. Lower extremity exercise program x10 reps per handout, with independence                    History:     Past Medical History:   Diagnosis Date    BPH (benign prostatic hyperplasia)     Depression     Diabetes mellitus     GERD (gastroesophageal reflux disease)     Hyperlipidemia     Hypertension     Obesity     TIA (transient ischemic attack)        Past Surgical History:   Procedure Laterality Date    LEFT HEART CATHETERIZATION Left 2022    Procedure: Left heart cath;  Surgeon: Ben Ortega MD;  Location: [facility] CATH LAB/EP;  Service: Cardiology;  Laterality: Left;    SPINE SURGERY  2044       Time Tracking:     PT Received On: 22  PT Start Time: 838     PT Stop Time: 903  PT Total Time (min): 25 min     Billable Minutes: Evaluation 10 min Gait Training 8 min     2022    Co-evaluation and co-treatment performed for this visit due to  suspected patient need for two skilled therapists to ensure patient and staff safety and to accommodate for patient activity tolerance/pain management

## 2022-07-30 NOTE — PROGRESS NOTES
"[facility]  Endocrinology  Progress Note    Admit Date: 7/29/2022     Reason for Consult: Management of type 2DM,  Hyperglycemia     Surgical Procedure and Date: CABG 7/29/22    Diabetes diagnosis year: ~8 years ago     Home Diabetes Medications:  metformin 500 mg daily and januvia   Lab Results   Component Value Date    HGBA1C 5.9 (H) 07/20/2022         How often checking glucose at home? 1-3 x day   BG readings on regimen: NATIVIDAD  Hypoglycemia on the regimen? NATIVIDAD  Missed doses on regimen?  NATIVIDAD    Diabetes Complications include:     NATIVIDAD    Complicating diabetes co morbidities:   History of CVA      HPI:   Patient is a 71 y.o. male with a diagnosis of well controlled type 2 DM. Also with depression, BPH, CAD, hyperlipidemia, hypertension, and previous TIA. Patient admitted for CABG. Endocrinology consulted for BG/ DM management.             Interval HPI:   Overnight events: No acute events overnight. Patient on the SICU in room 69554/82747 A. Blood glucose stable. BG at and below goal on current insulin regimen (IIP) Minimal requirements 0.3-0.6 u/hr. Steroid use- None. 1 Day Post-Op  Renal function- Normal   Vasopressors-  Epinephrine  and Norepinephrine       Diet NPO Except for: Sips with Medication     Eating:   NPO  Nausea: No  Hypoglycemia and intervention: No  Fever: No  TPN and/or TF: No    BP (!) 135/55   Pulse 84   Temp 98.8 °F (37.1 °C) (Oral)   Resp 18   Ht 5' 9" (1.753 m)   Wt 111.8 kg (246 lb 7.6 oz)   SpO2 95%   BMI 36.40 kg/m²     Labs Reviewed and Include    Recent Labs   Lab 07/30/22  0302 07/30/22  0850   *  --    CALCIUM 8.5*  --    ALBUMIN 3.6  --    PROT 5.7*  --    *  --    K 4.3 4.3   CO2 23  --      --    BUN 13  --    CREATININE 0.8  --    ALKPHOS 24*  --    ALT 11  --    AST 38  --    BILITOT 0.8  --      Lab Results   Component Value Date    WBC 12.57 07/30/2022    HGB 10.0 (L) 07/30/2022    HCT 29 (L) 07/30/2022    MCV 84 07/30/2022    PLT " 157 07/30/2022     No results for input(s): TSH, FREET4 in the last 168 hours.  Lab Results   Component Value Date    HGBA1C 5.9 (H) 07/20/2022       Nutritional status:   Body mass index is 36.4 kg/m².  Lab Results   Component Value Date    ALBUMIN 3.6 07/30/2022    ALBUMIN 2.8 (L) 07/29/2022     No results found for: PREALBUMIN    Estimated Creatinine Clearance: 101.3 mL/min (based on SCr of 0.8 mg/dL).    Accu-Checks  Recent Labs     07/29/22  2311 07/29/22  2356 07/30/22  0059 07/30/22  0205 07/30/22  0300 07/30/22  0401 07/30/22  0503 07/30/22  0607 07/30/22  0740 07/30/22  0849   POCTGLUCOSE 139* 139* 161* 178* 158* 155* 132* 144* 139* 137*       Current Medications and/or Treatments Impacting Glycemic Control  Immunotherapy:    Immunosuppressants       None          Steroids:   Hormones (From admission, onward)                None          Pressors:    Autonomic Drugs (From admission, onward)                Start     Stop Route Frequency Ordered    07/29/22 1430  NORepinephrine 4 mg in dextrose 5% 250 mL infusion (premix) (titrating)        Question Answer Comment   Begin at (in mcg/kg/min): 0.02    Titrate by: (in mcg/kg/min) 0.02    Titrate interval: (in minutes) 5    Titrate to maintain: (MAP or SBP) MAP    Greater than: (in mmHg) 65    Maximum dose: (in mcg/kg/min) 0.2        -- IV Continuous 07/29/22 1322    07/29/22 1315  EPINEPHrine 5 mg in dextrose 5% 250 mL infusion (premix)        Question Answer Comment   Begin at (in mcg/kg/min): 0.02    Titrate by: (in mcg/kg/min) 0.02    Titrate interval: (in minutes) 5    Titrate to maintain: (SBP or MAP or Cardiac Index) MAP    Greater than: (in mmHg) 65    Cardiac index greater than: (in L/min) 2.2    Maximum dose: (in mcg/kg/min) 2        -- IV Continuous 07/29/22 1307          Hyperglycemia/Diabetes Medications:   Antihyperglycemics (From admission, onward)                Start     Stop Route Frequency Ordered    07/30/22 1034  insulin aspart U-100 pen 1-10  Units         -- SubQ Every 4 hours PRN 07/30/22 0905            ASSESSMENT and PLAN    * Coronary artery disease involving native coronary artery of native heart without angina pectoris  Optimize glucose control and  avoid hypoglycemia          Type 2 diabetes mellitus without complication  Endocrinology consulted for BG management.   BG goal 110-140 (CTS Protocol)    - D/C IIP due to minimal insulin requirements.   - Novolog (aspart) insulin MDC SSI (150/25) Units SQ prn for BG excursions.   - BG checks q4hr while NPO or on S/F clears.   - Hypoglycemia protocol in place    ** Please notify Endocrine for any change and/or advance in diet**  ** Please call Endocrine for any BG related issues **    Discharge Planning:   TBD. Please notify endocrinology prior to discharge.          Surgical wound present  Optimize BG for surgical wound healing.              Edenilson Cassidy, DNP, FNP  Endocrinology  [facility]   Statement Selected

## 2022-07-30 NOTE — PLAN OF CARE
Plan of Care Note  Cardiothoracic Surgery    1 Day Post-Op s/p CABG    Specific issues: ASA/statin, lovenox, BB, HTN control, wean cleviprex    Plan of care for patient was discussed with ICU staff including nurses, residents, and faculty and appropriate consulting services.    Will continue to monitor patient's hemodynamics, functionality, neuro status, fluid status and renal function, and labs and will adjust medications and fluids as necessary while monitoring appropriateness for de-escalation of support and monitoring and transport to stepdown unit.    Jayda Madera MD, PGY-7  Cardiothoracic Surgery   [phone]

## 2022-07-30 NOTE — SUBJECTIVE & OBJECTIVE
"Interval HPI:   Overnight events: No acute events overnight. Patient on the SICU in room 32766/26877 A. Blood glucose stable. BG at and below goal on current insulin regimen (IIP) Minimal requirements 0.3-0.6 u/hr. Steroid use- None. 1 Day Post-Op  Renal function- Normal   Vasopressors-  Epinephrine  and Norepinephrine       Diet NPO Except for: Sips with Medication     Eating:   NPO  Nausea: No  Hypoglycemia and intervention: No  Fever: No  TPN and/or TF: No    BP (!) 135/55   Pulse 84   Temp 98.8 °F (37.1 °C) (Oral)   Resp 18   Ht 5' 9" (1.753 m)   Wt 111.8 kg (246 lb 7.6 oz)   SpO2 95%   BMI 36.40 kg/m²     Labs Reviewed and Include    Recent Labs   Lab 07/30/22  0302 07/30/22  0850   *  --    CALCIUM 8.5*  --    ALBUMIN 3.6  --    PROT 5.7*  --    *  --    K 4.3 4.3   CO2 23  --      --    BUN 13  --    CREATININE 0.8  --    ALKPHOS 24*  --    ALT 11  --    AST 38  --    BILITOT 0.8  --      Lab Results   Component Value Date    WBC 12.57 07/30/2022    HGB 10.0 (L) 07/30/2022    HCT 29 (L) 07/30/2022    MCV 84 07/30/2022     07/30/2022     No results for input(s): TSH, FREET4 in the last 168 hours.  Lab Results   Component Value Date    HGBA1C 5.9 (H) 07/20/2022       Nutritional status:   Body mass index is 36.4 kg/m².  Lab Results   Component Value Date    ALBUMIN 3.6 07/30/2022    ALBUMIN 2.8 (L) 07/29/2022     No results found for: PREALBUMIN    Estimated Creatinine Clearance: 101.3 mL/min (based on SCr of 0.8 mg/dL).    Accu-Checks  Recent Labs     07/29/22  2311 07/29/22  2356 07/30/22  0059 07/30/22  0205 07/30/22  0300 07/30/22  0401 07/30/22  0503 07/30/22  0607 07/30/22  0740 07/30/22  0849   POCTGLUCOSE 139* 139* 161* 178* 158* 155* 132* 144* 139* 137*       Current Medications and/or Treatments Impacting Glycemic Control  Immunotherapy:    Immunosuppressants       None          Steroids:   Hormones (From admission, onward)                None          Pressors:  "   Autonomic Drugs (From admission, onward)                Start     Stop Route Frequency Ordered    07/29/22 1430  NORepinephrine 4 mg in dextrose 5% 250 mL infusion (premix) (titrating)        Question Answer Comment   Begin at (in mcg/kg/min): 0.02    Titrate by: (in mcg/kg/min) 0.02    Titrate interval: (in minutes) 5    Titrate to maintain: (MAP or SBP) MAP    Greater than: (in mmHg) 65    Maximum dose: (in mcg/kg/min) 0.2        -- IV Continuous 07/29/22 1322    07/29/22 1315  EPINEPHrine 5 mg in dextrose 5% 250 mL infusion (premix)        Question Answer Comment   Begin at (in mcg/kg/min): 0.02    Titrate by: (in mcg/kg/min) 0.02    Titrate interval: (in minutes) 5    Titrate to maintain: (SBP or MAP or Cardiac Index) MAP    Greater than: (in mmHg) 65    Cardiac index greater than: (in L/min) 2.2    Maximum dose: (in mcg/kg/min) 2        -- IV Continuous 07/29/22 1307          Hyperglycemia/Diabetes Medications:   Antihyperglycemics (From admission, onward)                Start     Stop Route Frequency Ordered    07/30/22 1034  insulin aspart U-100 pen 1-10 Units         -- SubQ Every 4 hours PRN 07/30/22 0979

## 2022-07-30 NOTE — ASSESSMENT & PLAN NOTE
Endocrinology consulted for BG management.   BG goal 110-140 (CTS Protocol)    - D/C IIP due to minimal insulin requirements.   - Novolog (aspart) insulin MDC SSI (150/25) Units SQ prn for BG excursions.   - BG checks q4hr while NPO or on S/F clears.   - Hypoglycemia protocol in place    ** Please notify Endocrine for any change and/or advance in diet**  ** Please call Endocrine for any BG related issues **    Discharge Planning:   TBD. Please notify endocrinology prior to discharge.

## 2022-07-30 NOTE — NURSING
MD Dick notified pt [id number], pain medication given, pt resting. Orders to start cleviprex gtt and give 500 cc 5% albumin. Read back for verification.

## 2022-07-30 NOTE — ASSESSMENT & PLAN NOTE
71 year old male with CAD, depression, BPH, diabetes mellitus (HA1C 6.5), hyperlipidemia, hypertension, and previous TIA now s/p CABGx3      Neuro/Psych:   -- Sedation: none    -- Pain: PRN Oxy + Dilaudid  -- Scheduled Tylenol             Cards:   -- S/P CABG x 3 on 7/2922  -- HDS off pressors  -- Ventricular pacing wires. Backup paced at 45  -- MAP > 65, Syst < 140  -- Cleviprex PRN, starting nifedipine today  -- Aspirin 325mg  -- Statin  -- Toprol      Pulm:   -- Goal O2 sat > 88%  -- ABG PRN  -- Wean comfort flow as tolerated  -- Mediastinal chest tubes x2 and pleural chest tube x1 to suction  -- CPAP at night      Renal:  -- Keep hopper for strict I/O  -- Trend BUN/Cr   -- Lasix BID      FEN / GI:   -- Replace lytes as needed  -- Nutrition: NPO, advance diet as tolerated  -- GI ppx: famotidine  -- Bowel reg: miralax  -- 1500mL 5% Albumin in first 12 hours post-op, complete      ID:   -- Tm: afebrile; WBC stable  -- Maribeth-op ancef complete      Heme/Onc:   -- H/H stable   -- Daily CBC  -- 700mL Cell saver given back  -- Post-op coags wnl  -- Aspirin 325mg QD      Endo:   -- BG goal 140-180  -- Insulin gtt      PPx:   Feeding: NPO  Analgesia/Sedation: none / PRN Oxy + Dilaudid  Thromboembolic prevention: SCDs  HOB >30: Yes  Stress Ulcer ppx: famotidine  Glucose control: Critical care goal 140-180 g/dl, ISS     Lines/Drains/Airway: CVC L subclavian, Hopper, Chest tubes x 3, ventricular pacing wires, L radial A-line      Dispo/Code Status/Palliative:   -- SICU / Full Code.

## 2022-07-31 LAB
ALBUMIN SERPL BCP-MCNC: 3.7 G/DL (ref 3.5–5.2)
ALP SERPL-CCNC: 29 U/L (ref 55–135)
ALT SERPL W/O P-5'-P-CCNC: 12 U/L (ref 10–44)
ANION GAP SERPL CALC-SCNC: 7 MMOL/L (ref 8–16)
APTT BLDCRRT: 29.8 SEC (ref 21–32)
AST SERPL-CCNC: 50 U/L (ref 10–40)
BASOPHILS # BLD AUTO: 0.05 K/UL (ref 0–0.2)
BASOPHILS NFR BLD: 0.4 % (ref 0–1.9)
BILIRUB SERPL-MCNC: 0.9 MG/DL (ref 0.1–1)
BUN SERPL-MCNC: 15 MG/DL (ref 8–23)
CALCIUM SERPL-MCNC: 8.1 MG/DL (ref 8.7–10.5)
CHLORIDE SERPL-SCNC: 101 MMOL/L (ref 95–110)
CO2 SERPL-SCNC: 26 MMOL/L (ref 23–29)
CREAT SERPL-MCNC: 0.9 MG/DL (ref 0.5–1.4)
DIFFERENTIAL METHOD: ABNORMAL
EOSINOPHIL # BLD AUTO: 0.1 K/UL (ref 0–0.5)
EOSINOPHIL NFR BLD: 0.5 % (ref 0–8)
ERYTHROCYTE [DISTWIDTH] IN BLOOD BY AUTOMATED COUNT: 14.7 % (ref 11.5–14.5)
EST. GFR  (AFRICAN AMERICAN): >60 ML/MIN/1.73 M^2
EST. GFR  (NON AFRICAN AMERICAN): >60 ML/MIN/1.73 M^2
GLUCOSE SERPL-MCNC: 136 MG/DL (ref 70–110)
HCT VFR BLD AUTO: 27.5 % (ref 40–54)
HGB BLD-MCNC: 8.9 G/DL (ref 14–18)
IMM GRANULOCYTES # BLD AUTO: 0.05 K/UL (ref 0–0.04)
IMM GRANULOCYTES NFR BLD AUTO: 0.4 % (ref 0–0.5)
INR PPP: 1 (ref 0.8–1.2)
LYMPHOCYTES # BLD AUTO: 1.6 K/UL (ref 1–4.8)
LYMPHOCYTES NFR BLD: 11.8 % (ref 18–48)
MAGNESIUM SERPL-MCNC: 1.9 MG/DL (ref 1.6–2.6)
MAGNESIUM SERPL-MCNC: 2 MG/DL (ref 1.6–2.6)
MAGNESIUM SERPL-MCNC: 2 MG/DL (ref 1.6–2.6)
MCH RBC QN AUTO: 27.5 PG (ref 27–31)
MCHC RBC AUTO-ENTMCNC: 32.4 G/DL (ref 32–36)
MCV RBC AUTO: 85 FL (ref 82–98)
MONOCYTES # BLD AUTO: 1.6 K/UL (ref 0.3–1)
MONOCYTES NFR BLD: 12.3 % (ref 4–15)
NEUTROPHILS # BLD AUTO: 9.8 K/UL (ref 1.8–7.7)
NEUTROPHILS NFR BLD: 74.6 % (ref 38–73)
NRBC BLD-RTO: 0 /100 WBC
PHOSPHATE SERPL-MCNC: 2.6 MG/DL (ref 2.7–4.5)
PLATELET # BLD AUTO: 141 K/UL (ref 150–450)
PMV BLD AUTO: 10 FL (ref 9.2–12.9)
POCT GLUCOSE: 118 MG/DL (ref 70–110)
POCT GLUCOSE: 119 MG/DL (ref 70–110)
POCT GLUCOSE: 126 MG/DL (ref 70–110)
POCT GLUCOSE: 140 MG/DL (ref 70–110)
POCT GLUCOSE: 148 MG/DL (ref 70–110)
POCT GLUCOSE: 157 MG/DL (ref 70–110)
POTASSIUM SERPL-SCNC: 3.3 MMOL/L (ref 3.5–5.1)
POTASSIUM SERPL-SCNC: 3.4 MMOL/L (ref 3.5–5.1)
POTASSIUM SERPL-SCNC: 3.8 MMOL/L (ref 3.5–5.1)
PROT SERPL-MCNC: 5.6 G/DL (ref 6–8.4)
PROTHROMBIN TIME: 10.7 SEC (ref 9–12.5)
RBC # BLD AUTO: 3.24 M/UL (ref 4.6–6.2)
SODIUM SERPL-SCNC: 134 MMOL/L (ref 136–145)
WBC # BLD AUTO: 13.11 K/UL (ref 3.9–12.7)

## 2022-07-31 PROCEDURE — 84132 ASSAY OF SERUM POTASSIUM: CPT | Mod: 91

## 2022-07-31 PROCEDURE — 84100 ASSAY OF PHOSPHORUS: CPT | Performed by: NURSE PRACTITIONER

## 2022-07-31 PROCEDURE — 84132 ASSAY OF SERUM POTASSIUM: CPT | Mod: 91 | Performed by: NURSE PRACTITIONER

## 2022-07-31 PROCEDURE — 27000221 HC OXYGEN, UP TO 24 HOURS

## 2022-07-31 PROCEDURE — 63600175 PHARM REV CODE 636 W HCPCS: Performed by: STUDENT IN AN ORGANIZED HEALTH CARE EDUCATION/TRAINING PROGRAM

## 2022-07-31 PROCEDURE — 94761 N-INVAS EAR/PLS OXIMETRY MLT: CPT

## 2022-07-31 PROCEDURE — 25000003 PHARM REV CODE 250: Performed by: NURSE PRACTITIONER

## 2022-07-31 PROCEDURE — 85730 THROMBOPLASTIN TIME PARTIAL: CPT | Performed by: NURSE PRACTITIONER

## 2022-07-31 PROCEDURE — 94799 UNLISTED PULMONARY SVC/PX: CPT

## 2022-07-31 PROCEDURE — 83735 ASSAY OF MAGNESIUM: CPT | Mod: 91

## 2022-07-31 PROCEDURE — 85025 COMPLETE CBC W/AUTO DIFF WBC: CPT | Performed by: NURSE PRACTITIONER

## 2022-07-31 PROCEDURE — C9248 INJ, CLEVIDIPINE BUTYRATE: HCPCS | Mod: JG

## 2022-07-31 PROCEDURE — 85610 PROTHROMBIN TIME: CPT | Performed by: NURSE PRACTITIONER

## 2022-07-31 PROCEDURE — 99900035 HC TECH TIME PER 15 MIN (STAT)

## 2022-07-31 PROCEDURE — 83735 ASSAY OF MAGNESIUM: CPT | Performed by: NURSE PRACTITIONER

## 2022-07-31 PROCEDURE — 80053 COMPREHEN METABOLIC PANEL: CPT

## 2022-07-31 PROCEDURE — 63600175 PHARM REV CODE 636 W HCPCS: Mod: JG

## 2022-07-31 PROCEDURE — 63600175 PHARM REV CODE 636 W HCPCS: Performed by: THORACIC SURGERY (CARDIOTHORACIC VASCULAR SURGERY)

## 2022-07-31 PROCEDURE — 63600175 PHARM REV CODE 636 W HCPCS: Performed by: NURSE PRACTITIONER

## 2022-07-31 PROCEDURE — 27100171 HC OXYGEN HIGH FLOW UP TO 24 HOURS

## 2022-07-31 PROCEDURE — 94660 CPAP INITIATION&MGMT: CPT

## 2022-07-31 PROCEDURE — 99232 SBSQ HOSP IP/OBS MODERATE 35: CPT | Mod: ,,, | Performed by: ANESTHESIOLOGY

## 2022-07-31 PROCEDURE — 20000000 HC ICU ROOM

## 2022-07-31 PROCEDURE — 25000003 PHARM REV CODE 250

## 2022-07-31 PROCEDURE — 25000003 PHARM REV CODE 250: Performed by: THORACIC SURGERY (CARDIOTHORACIC VASCULAR SURGERY)

## 2022-07-31 PROCEDURE — 99232 PR SUBSEQUENT HOSPITAL CARE,LEVL II: ICD-10-PCS | Mod: ,,, | Performed by: ANESTHESIOLOGY

## 2022-07-31 RX ORDER — METOPROLOL SUCCINATE 100 MG/1
100 TABLET, EXTENDED RELEASE ORAL DAILY
Status: DISCONTINUED | OUTPATIENT
Start: 2022-07-31 | End: 2022-08-03 | Stop reason: HOSPADM

## 2022-07-31 RX ORDER — POTASSIUM CHLORIDE 20 MEQ/1
20 TABLET, EXTENDED RELEASE ORAL DAILY
Status: DISCONTINUED | OUTPATIENT
Start: 2022-07-31 | End: 2022-08-01

## 2022-07-31 RX ORDER — GLUCAGON 1 MG
1 KIT INJECTION
Status: DISCONTINUED | OUTPATIENT
Start: 2022-07-31 | End: 2022-08-03 | Stop reason: HOSPADM

## 2022-07-31 RX ORDER — INSULIN ASPART 100 [IU]/ML
1-10 INJECTION, SOLUTION INTRAVENOUS; SUBCUTANEOUS
Status: DISCONTINUED | OUTPATIENT
Start: 2022-07-31 | End: 2022-08-03 | Stop reason: HOSPADM

## 2022-07-31 RX ORDER — SODIUM,POTASSIUM PHOSPHATES 280-250MG
2 POWDER IN PACKET (EA) ORAL ONCE
Status: COMPLETED | OUTPATIENT
Start: 2022-07-31 | End: 2022-07-31

## 2022-07-31 RX ORDER — IBUPROFEN 200 MG
24 TABLET ORAL
Status: DISCONTINUED | OUTPATIENT
Start: 2022-07-31 | End: 2022-08-03 | Stop reason: HOSPADM

## 2022-07-31 RX ORDER — NIFEDIPINE 30 MG/1
30 TABLET, EXTENDED RELEASE ORAL ONCE
Status: COMPLETED | OUTPATIENT
Start: 2022-07-31 | End: 2022-07-31

## 2022-07-31 RX ORDER — SODIUM,POTASSIUM PHOSPHATES 280-250MG
2 POWDER IN PACKET (EA) ORAL ONCE
Status: DISCONTINUED | OUTPATIENT
Start: 2022-07-31 | End: 2022-07-31

## 2022-07-31 RX ORDER — NIFEDIPINE 30 MG/1
60 TABLET, EXTENDED RELEASE ORAL DAILY
Status: DISCONTINUED | OUTPATIENT
Start: 2022-07-31 | End: 2022-08-01

## 2022-07-31 RX ORDER — IBUPROFEN 200 MG
16 TABLET ORAL
Status: DISCONTINUED | OUTPATIENT
Start: 2022-07-31 | End: 2022-08-03 | Stop reason: HOSPADM

## 2022-07-31 RX ADMIN — POLYETHYLENE GLYCOL 3350 17 G: 17 POWDER, FOR SOLUTION ORAL at 08:07

## 2022-07-31 RX ADMIN — POTASSIUM & SODIUM PHOSPHATES POWDER PACK 280-160-250 MG 2 PACKET: 280-160-250 PACK at 06:07

## 2022-07-31 RX ADMIN — FAMOTIDINE 20 MG: 20 TABLET ORAL at 08:07

## 2022-07-31 RX ADMIN — OXYCODONE 5 MG: 5 TABLET ORAL at 03:07

## 2022-07-31 RX ADMIN — HYDROMORPHONE HYDROCHLORIDE 0.5 MG: 1 INJECTION, SOLUTION INTRAMUSCULAR; INTRAVENOUS; SUBCUTANEOUS at 06:07

## 2022-07-31 RX ADMIN — DOCUSATE SODIUM 100 MG: 100 CAPSULE ORAL at 08:07

## 2022-07-31 RX ADMIN — CLEVIPIDINE 10 MG/HR: 0.5 EMULSION INTRAVENOUS at 03:07

## 2022-07-31 RX ADMIN — ENOXAPARIN SODIUM 40 MG: 100 INJECTION SUBCUTANEOUS at 06:07

## 2022-07-31 RX ADMIN — OXYCODONE 5 MG: 5 TABLET ORAL at 08:07

## 2022-07-31 RX ADMIN — ACETAMINOPHEN 1000 MG: 500 TABLET ORAL at 06:07

## 2022-07-31 RX ADMIN — MUPIROCIN 1 G: 20 OINTMENT TOPICAL at 08:07

## 2022-07-31 RX ADMIN — ACETAMINOPHEN 1000 MG: 500 TABLET ORAL at 10:07

## 2022-07-31 RX ADMIN — METOPROLOL SUCCINATE 100 MG: 100 TABLET, EXTENDED RELEASE ORAL at 08:07

## 2022-07-31 RX ADMIN — CLEVIPIDINE 10 MG/HR: 0.5 EMULSION INTRAVENOUS at 09:07

## 2022-07-31 RX ADMIN — ACETAMINOPHEN 1000 MG: 500 TABLET ORAL at 02:07

## 2022-07-31 RX ADMIN — NIFEDIPINE 30 MG: 30 TABLET, FILM COATED, EXTENDED RELEASE ORAL at 11:07

## 2022-07-31 RX ADMIN — POTASSIUM CHLORIDE 20 MEQ: 1500 TABLET, EXTENDED RELEASE ORAL at 08:07

## 2022-07-31 RX ADMIN — FUROSEMIDE 20 MG/HR: 10 INJECTION, SOLUTION INTRAMUSCULAR; INTRAVENOUS at 11:07

## 2022-07-31 RX ADMIN — ASPIRIN 325 MG ORAL TABLET 325 MG: 325 PILL ORAL at 08:07

## 2022-07-31 RX ADMIN — ESCITALOPRAM OXALATE 10 MG: 5 TABLET, FILM COATED ORAL at 08:07

## 2022-07-31 RX ADMIN — NIFEDIPINE 60 MG: 30 TABLET, FILM COATED, EXTENDED RELEASE ORAL at 08:07

## 2022-07-31 RX ADMIN — TAMSULOSIN HYDROCHLORIDE 0.4 MG: 0.4 CAPSULE ORAL at 08:07

## 2022-07-31 RX ADMIN — POTASSIUM CHLORIDE 20 MEQ: 200 INJECTION, SOLUTION INTRAVENOUS at 06:07

## 2022-07-31 RX ADMIN — ATORVASTATIN CALCIUM 40 MG: 20 TABLET, FILM COATED ORAL at 08:07

## 2022-07-31 RX ADMIN — POTASSIUM CHLORIDE 40 MEQ: 29.8 INJECTION, SOLUTION INTRAVENOUS at 08:07

## 2022-07-31 NOTE — ASSESSMENT & PLAN NOTE
71 year old male with CAD, depression, BPH, diabetes mellitus (HA1C 6.5), hyperlipidemia, hypertension, and previous TIA now s/p CABGx3      Neuro/Psych:   -- Sedation: none    -- Pain: PRN Oxy + Dilaudid  -- Scheduled Tylenol             Cards:   -- S/P CABG x 3 on 7/2922  -- HDS off pressors  -- Ventricular pacing wires. Plan to remove today  -- MAP > 65, Syst < 140  -- Cleviprex PRN, Nifedipine 60, will add another 30 if needed to help wean off of cleviprex  -- Aspirin 325mg  -- Statin  -- Toprol      Pulm:   -- Goal O2 sat > 88%  -- ABG PRN  -- Wean comfort flow as tolerated  -- Mediastinal chest tubes x2 and pleural chest tube x1 to suction, plan to remove today  -- CPAP at night. Planning on having his home CPAP machine at bedside tonight.      Renal:  -- Keep hopper for strict I/O  -- Trend BUN/Cr   -- Lasix drip. UOP goal of 150-200      FEN / GI:   -- Replace lytes as needed  -- Nutrition: NPO, advance diet as tolerated  -- GI ppx: famotidine  -- Bowel reg: miralax  -- 1500mL 5% Albumin in first 12 hours post-op, complete      ID:   -- Tm: afebrile; WBC stable  -- Maribeth-op ancef complete      Heme/Onc:   -- H/H stable   -- Daily CBC  -- 700mL Cell saver given back  -- Post-op coags wnl  -- Aspirin 325mg QD      Endo:   -- BG goal 140-180  -- Insulin SSI      PPx:   Feeding: NPO  Analgesia/Sedation: none / PRN Oxy + Dilaudid  Thromboembolic prevention: SCDs, lovenox  HOB >30: Yes  Stress Ulcer ppx: famotidine  Glucose control: Critical care goal 140-180 g/dl, ISS     Lines/Drains/Airway: CVC L subclavian, Hopper, Chest tubes x 3, ventricular pacing wires, L radial A-line      Dispo/Code Status/Palliative:   -- SICU / Full Code.

## 2022-07-31 NOTE — PLAN OF CARE
"      SICU PLAN OF CARE NOTE    SHIFT EVENTS:  VSS. Cleviprex needed throughout shift. Titrated lasix gtt for U/O 100-150 cc/hr. Managed pain. Replaced K and Phos. OOBTC. POC reviewed with patient and family; questions and concerns addressed. See flow sheets for full assessment details. Will continue to monitor patient closely.      Dx: Coronary artery disease involving native coronary artery of native heart without angina pectoris    Vital Signs: BP (!) 166/74   Pulse 77   Temp 99.8 °F (37.7 °C) (Oral)   Resp (!) 23   Ht 5' 9" (1.753 m)   Wt 111.8 kg (246 lb 7.6 oz)   SpO2 (!) 92%   BMI 36.40 kg/m²     Neuro: AAO x4, Follows Commands and Moves All Extremities    Respiratory: Comfort Robert 40L 70%; CPAP overnight    Cardiac: NSR; HR 70-80s    Diet: Clear Liquid    Gtts: Lasix and Cleviprex    Urine Output: Urinary Catheter 1925 cc/shift    Drains:     2 Meds and 1 Pleural Chest Tube, total output 110 cc /  shift    Labs:  Daily and Q 12 K    Accuchecks: Q4    SKIN NOTE:  Skin: Midsternal incision; L leg graft site. No new skin breakdown or skin tears noted at this time.     Skin precautions maintained including:  Sacrum and heels with foam dressing in place for pressure protection. Frequent weight shift assistance provided Q2 hr to prevent breakdown. Bed plugged in and mattress inflated; Adhesive use limited. Heels elevated off bed. Pressure points protected and positioning supports utilized.  Skin-to-device areas padded. Skin-to-skin areas padded.    "

## 2022-07-31 NOTE — PLAN OF CARE
Plan of Care Note  Cardiothoracic Surgery    2 Days Post-Op s/p CABG    Specific issues: ASA/statin, lovenox, BB, nifedipine, wean cleviprex, lasix drip    Plan of care for patient was discussed with ICU staff including nurses, residents, and faculty and appropriate consulting services.    Will continue to monitor patient's hemodynamics, functionality, neuro status, fluid status and renal function, and labs and will adjust medications and fluids as necessary while monitoring appropriateness for de-escalation of support and monitoring and transport to stepdown unit.    Jayda Madera MD, PGY-7  Cardiothoracic Surgery   [phone]

## 2022-07-31 NOTE — CARE UPDATE
BG goal 110-140    -A1C   Lab Results   Component Value Date    HGBA1C 5.9 (H) 07/20/2022         -HOME REGIMEN: metformin and januvia     -INPATIENT REGIMEN: correction scale     -GLUCOSE TREND FOR THE PAST 24HRS:118-157    -NO HYPOGYCEMIAS NOTED     -TOLERATING 50% OF PO DIET     -Diet: Diet clear liquid Fluid - 1500mL      Remains in ICU, NAEON. BG stable with prn correction scale. 2 Days Post-Op.       Plan:  BG monitoring ac/hs and moderate dose correction scale.     Discharge planning: home regimen.     Endocrine to continue to follow    ** Please call Endocrine for any BG related issues **

## 2022-07-31 NOTE — SUBJECTIVE & OBJECTIVE
Interval History/Significant Events: No acute events overnight. Started on a lasix drip with improvement in oxygen requirements. Hemodynamically stable on cleviprex. Pain is well controlled. Tolerating diet.    Follow-up For: Procedure(s) (LRB):  CORONARY ARTERY BYPASS GRAFT (CABG) (Left)  HARVEST-VEIN-ENDOVASCULAR (Left)    Post-Operative Day: 2 Days Post-Op    Objective:     Vital Signs (Most Recent):  Temp: 99.4 °F (37.4 °C) (07/31/22 0702)  Pulse: 75 (07/31/22 0800)  Resp: (!) 21 (07/31/22 0800)  BP: (!) 124/47 (07/31/22 0821)  SpO2: 95 % (07/31/22 0800)   Vital Signs (24h Range):  Temp:  [97.7 °F (36.5 °C)-99.8 °F (37.7 °C)] 99.4 °F (37.4 °C)  Pulse:  [] 75  Resp:  [13-44] 21  SpO2:  [82 %-100 %] 95 %  BP: (104-166)/(47-76) 124/47  Arterial Line BP: (104-177)/(39-70) 124/44     Weight: 116.8 kg (257 lb 8 oz)  Body mass index is 38.03 kg/m².      Intake/Output Summary (Last 24 hours) at 7/31/2022 0851  Last data filed at 7/31/2022 0805  Gross per 24 hour   Intake 1058.25 ml   Output 2795 ml   Net -1736.75 ml       Physical Exam  Constitutional:       General: He is not in acute distress.  HENT:      Head: Normocephalic and atraumatic.   Eyes:      Pupils: Pupils are equal, round, and reactive to light.   Neck:      Comments: L subclavian CVC    Cardiovascular:      Rate and Rhythm: Normal rate and regular rhythm.      Comments: Midline sternotomy c/d with dressing in place    2 mediastinal and 1 pleural chest tube to suction    V wires in place  Pulmonary:      Effort: Pulmonary effort is normal. No respiratory distress.      Comments: On comfort flow  Abdominal:      General: There is no distension.      Palpations: Abdomen is soft.   Genitourinary:     Comments: Hernandez in place  Musculoskeletal:      Comments: L radial art line       Skin:     General: Skin is warm and dry.      Capillary Refill: Capillary refill takes less than 2 seconds.   Neurological:      General: No focal deficit present.       Mental Status: He is alert and oriented to person, place, and time.       Vents:  Vent Mode: Spont (07/29/22 1956)  Ventilator Initiated: Yes (07/29/22 1315)  Set Rate: 30 BPM (07/29/22 1813)  Vt Set: 480 mL (07/29/22 1813)  Pressure Support: 5 cmH20 (07/29/22 1956)  PEEP/CPAP: 8.5 cmH20 (07/29/22 1956)  Oxygen Concentration (%): 60 (07/31/22 0738)  Peak Airway Pressure: 14 cmH2O (07/29/22 1956)  Plateau Pressure: 0 cmH20 (07/29/22 1956)  Total Ve: 2.91 mL (07/29/22 1956)  Negative Inspiratory Force (cm H2O): -15 (07/29/22 1956)  F/VT Ratio<105 (RSBI): (!) 35.35 (07/29/22 1946)    Lines/Drains/Airways       Central Venous Catheter Line  Duration                  Percutaneous Central Line Insertion/Assessment - Quad lumen  07/29/22 0853 left subclavian 1 day    Percutaneous Central Line Insertion/Assessment - Quad Lumen  07/29/22 0853 left subclavian 1 day              Drain  Duration                  Urethral Catheter 07/29/22 0730 Non-latex;Straight-tip;Temperature probe 14 Fr. 2 days         Chest Tube 07/29/22 1200 1 Anterior Mediastinal 19 Fr. 1 day         Chest Tube 07/29/22 1200 2 Anterior Mediastinal 19 Fr. 1 day         Chest Tube 07/29/22 1201 3 Left Pleural 19 Fr. 1 day              Arterial Line  Duration             Arterial Line 07/29/22 0853 Left Radial 1 day              Line  Duration                  Pacer Wires 07/29/22 1114 1 day              Peripheral Intravenous Line  Duration                  Peripheral IV - Single Lumen 07/29/22 0540 18 G Left;Posterior Hand 2 days         Peripheral IV - Single Lumen 07/29/22 1345 22 G Posterior;Right Hand 1 day         Peripheral IV - Single Lumen 07/29/22 1350 18 G Anterior;Proximal;Right Forearm 1 day                    Significant Labs:    CBC/Anemia Profile:  Recent Labs   Lab 07/29/22  1310 07/29/22  1315 07/30/22  0302 07/30/22  0544 07/31/22  0309   WBC 23.70*  --  12.57  --  13.11*   HGB 11.7*  --  10.0*  --  8.9*   HCT 37.0*   < > 30.6* 29* 27.5*      --  157  --  141*   MCV 87  --  84  --  85   RDW 14.4  --  14.6*  --  14.7*    < > = values in this interval not displayed.        Chemistries:  Recent Labs   Lab 07/29/22  1310 07/29/22  1717 07/30/22  0302 07/30/22  0850 07/30/22  1941 07/31/22  0309     --  133*  --   --  134*   K 3.4*   < > 4.3 4.3 4.0 3.8     --  104  --   --  101   CO2 19*  --  23  --   --  26   BUN 10  --  13  --   --  15   CREATININE 0.9  --  0.8  --   --  0.9   CALCIUM 8.5*  --  8.5*  --   --  8.1*   ALBUMIN 2.8*  --  3.6  --   --  3.7   PROT 5.2*  --  5.7*  --   --  5.6*   BILITOT 0.5  --  0.8  --   --  0.9   ALKPHOS 28*  --  24*  --   --  29*   ALT 12  --  11  --   --  12   AST 27  --  38  --   --  50*   MG 2.4  --  2.0  --   --  2.0   PHOS 2.8  --  2.5*  --   --  2.6*    < > = values in this interval not displayed.       Coagulation:   Recent Labs   Lab 07/31/22  0309   INR 1.0   APTT 29.8     All pertinent labs within the past 24 hours have been reviewed.    Significant Imaging:  I have reviewed all pertinent imaging results/findings within the past 24 hours.

## 2022-07-31 NOTE — PLAN OF CARE
Plan of care reviewed with patient. Verbalizes understanding.    Problem: Adult Inpatient Plan of Care  Goal: Plan of Care Review  Outcome: Ongoing, Progressing  Flowsheets (Taken 7/31/2022 1419)  Plan of Care Reviewed With: patient     Problem: Diabetes Comorbidity  Goal: Blood Glucose Level Within Targeted Range  Outcome: Ongoing, Progressing  Intervention: Monitor and Manage Glycemia  Flowsheets (Taken 7/31/2022 1419)  Glycemic Management:   blood glucose monitored   insulin dose matched to carbohydrate intake     Problem: Bleeding (Cardiovascular Surgery)  Goal: Bleeding (Cardiovascular Surgery)  Outcome: Ongoing, Progressing  Intervention: Monitor and Manage Bleeding  Flowsheets (Taken 7/31/2022 1419)  Bleeding Management: dressing monitored

## 2022-07-31 NOTE — PROGRESS NOTES
[facility]  Critical Care - Surgery  Progress Note    Patient Name: Christiano Frost  MRN: [id number]  Admission Date: 7/29/2022  Hospital Length of Stay: 2 days  Code Status: Full Code  Attending Provider: Magen Martell MD  Primary Care Provider: Ellis Allan MD   Principal Problem: Coronary artery disease involving native coronary artery of native heart without angina pectoris    Subjective:     Hospital/ICU Course:  No notes on file    Interval History/Significant Events: No acute events overnight. Started on a lasix drip with improvement in oxygen requirements. Hemodynamically stable on cleviprex. Pain is well controlled. Tolerating diet.    Follow-up For: Procedure(s) (LRB):  CORONARY ARTERY BYPASS GRAFT (CABG) (Left)  HARVEST-VEIN-ENDOVASCULAR (Left)    Post-Operative Day: 2 Days Post-Op    Objective:     Vital Signs (Most Recent):  Temp: 99.4 °F (37.4 °C) (07/31/22 0702)  Pulse: 75 (07/31/22 0800)  Resp: (!) 21 (07/31/22 0800)  BP: (!) 124/47 (07/31/22 0821)  SpO2: 95 % (07/31/22 0800)   Vital Signs (24h Range):  Temp:  [97.7 °F (36.5 °C)-99.8 °F (37.7 °C)] 99.4 °F (37.4 °C)  Pulse:  [] 75  Resp:  [13-44] 21  SpO2:  [82 %-100 %] 95 %  BP: (104-166)/(47-76) 124/47  Arterial Line BP: (104-177)/(39-70) 124/44     Weight: 116.8 kg (257 lb 8 oz)  Body mass index is 38.03 kg/m².      Intake/Output Summary (Last 24 hours) at 7/31/2022 0851  Last data filed at 7/31/2022 0805  Gross per 24 hour   Intake 1058.25 ml   Output 2795 ml   Net -1736.75 ml       Physical Exam  Constitutional:       General: He is not in acute distress.  HENT:      Head: Normocephalic and atraumatic.   Eyes:      Pupils: Pupils are equal, round, and reactive to light.   Neck:      Comments: L subclavian CVC    Cardiovascular:      Rate and Rhythm: Normal rate and regular rhythm.      Comments: Midline sternotomy c/d with dressing in place    2 mediastinal and 1 pleural chest tube to suction    V wires in  place  Pulmonary:      Effort: Pulmonary effort is normal. No respiratory distress.      Comments: On comfort flow  Abdominal:      General: There is no distension.      Palpations: Abdomen is soft.   Genitourinary:     Comments: Hernandez in place  Musculoskeletal:      Comments: L radial art line       Skin:     General: Skin is warm and dry.      Capillary Refill: Capillary refill takes less than 2 seconds.   Neurological:      General: No focal deficit present.      Mental Status: He is alert and oriented to person, place, and time.       Vents:  Vent Mode: Spont (07/29/22 1956)  Ventilator Initiated: Yes (07/29/22 1315)  Set Rate: 30 BPM (07/29/22 1813)  Vt Set: 480 mL (07/29/22 1813)  Pressure Support: 5 cmH20 (07/29/22 1956)  PEEP/CPAP: 8.5 cmH20 (07/29/22 1956)  Oxygen Concentration (%): 60 (07/31/22 0738)  Peak Airway Pressure: 14 cmH2O (07/29/22 1956)  Plateau Pressure: 0 cmH20 (07/29/22 1956)  Total Ve: 2.91 mL (07/29/22 1956)  Negative Inspiratory Force (cm H2O): -15 (07/29/22 1956)  F/VT Ratio<105 (RSBI): (!) 35.35 (07/29/22 1946)    Lines/Drains/Airways       Central Venous Catheter Line  Duration                  Percutaneous Central Line Insertion/Assessment - Quad lumen  07/29/22 0853 left subclavian 1 day    Percutaneous Central Line Insertion/Assessment - Quad Lumen  07/29/22 0853 left subclavian 1 day              Drain  Duration                  Urethral Catheter 07/29/22 0730 Non-latex;Straight-tip;Temperature probe 14 Fr. 2 days         Chest Tube 07/29/22 1200 1 Anterior Mediastinal 19 Fr. 1 day         Chest Tube 07/29/22 1200 2 Anterior Mediastinal 19 Fr. 1 day         Chest Tube 07/29/22 1201 3 Left Pleural 19 Fr. 1 day              Arterial Line  Duration             Arterial Line 07/29/22 0853 Left Radial 1 day              Line  Duration                  Pacer Wires 07/29/22 1114 1 day              Peripheral Intravenous Line  Duration                  Peripheral IV - Single Lumen 07/29/22  0540 18 G Left;Posterior Hand 2 days         Peripheral IV - Single Lumen 07/29/22 1345 22 G Posterior;Right Hand 1 day         Peripheral IV - Single Lumen 07/29/22 1350 18 G Anterior;Proximal;Right Forearm 1 day                    Significant Labs:    CBC/Anemia Profile:  Recent Labs   Lab 07/29/22  1310 07/29/22  1315 07/30/22  0302 07/30/22  0544 07/31/22  0309   WBC 23.70*  --  12.57  --  13.11*   HGB 11.7*  --  10.0*  --  8.9*   HCT 37.0*   < > 30.6* 29* 27.5*     --  157  --  141*   MCV 87  --  84  --  85   RDW 14.4  --  14.6*  --  14.7*    < > = values in this interval not displayed.        Chemistries:  Recent Labs   Lab 07/29/22  1310 07/29/22  1717 07/30/22  0302 07/30/22  0850 07/30/22  1941 07/31/22  0309     --  133*  --   --  134*   K 3.4*   < > 4.3 4.3 4.0 3.8     --  104  --   --  101   CO2 19*  --  23  --   --  26   BUN 10  --  13  --   --  15   CREATININE 0.9  --  0.8  --   --  0.9   CALCIUM 8.5*  --  8.5*  --   --  8.1*   ALBUMIN 2.8*  --  3.6  --   --  3.7   PROT 5.2*  --  5.7*  --   --  5.6*   BILITOT 0.5  --  0.8  --   --  0.9   ALKPHOS 28*  --  24*  --   --  29*   ALT 12  --  11  --   --  12   AST 27  --  38  --   --  50*   MG 2.4  --  2.0  --   --  2.0   PHOS 2.8  --  2.5*  --   --  2.6*    < > = values in this interval not displayed.       Coagulation:   Recent Labs   Lab 07/31/22  0309   INR 1.0   APTT 29.8     All pertinent labs within the past 24 hours have been reviewed.    Significant Imaging:  I have reviewed all pertinent imaging results/findings within the past 24 hours.    Assessment/Plan:     * Coronary artery disease involving native coronary artery of native heart without angina pectoris  71 year old male with CAD, depression, BPH, diabetes mellitus (HA1C 6.5), hyperlipidemia, hypertension, and previous TIA now s/p CABGx3      Neuro/Psych:   -- Sedation: none    -- Pain: PRN Oxy + Dilaudid  -- Scheduled Tylenol             Cards:   -- S/P CABG x 3 on  7/2922  -- HDS off pressors  -- Ventricular pacing wires. Plan to remove today  -- MAP > 65, Syst < 140  -- Cleviprex PRN, Nifedipine 60, will add another 30 if needed to help wean off of cleviprex  -- Aspirin 325mg  -- Statin  -- Toprol      Pulm:   -- Goal O2 sat > 88%  -- ABG PRN  -- Wean comfort flow as tolerated  -- Mediastinal chest tubes x2 and pleural chest tube x1 to suction, plan to remove today  -- CPAP at night. Planning on having his home CPAP machine at bedside tonight.      Renal:  -- Keep hopper for strict I/O  -- Trend BUN/Cr   -- Lasix drip. UOP goal of 150-200      FEN / GI:   -- Replace lytes as needed  -- Nutrition: NPO, advance diet as tolerated  -- GI ppx: famotidine  -- Bowel reg: miralax  -- 1500mL 5% Albumin in first 12 hours post-op, complete      ID:   -- Tm: afebrile; WBC stable  -- Maribeth-op ancef complete      Heme/Onc:   -- H/H stable   -- Daily CBC  -- 700mL Cell saver given back  -- Post-op coags wnl  -- Aspirin 325mg QD      Endo:   -- BG goal 140-180  -- Insulin SSI      PPx:   Feeding: NPO  Analgesia/Sedation: none / PRN Oxy + Dilaudid  Thromboembolic prevention: SCDs, lovenox  HOB >30: Yes  Stress Ulcer ppx: famotidine  Glucose control: Critical care goal 140-180 g/dl, ISS     Lines/Drains/Airway: CVC L subclavian, Hopper, Chest tubes x 3, ventricular pacing wires, L radial A-line      Dispo/Code Status/Palliative:   -- SICU / Full Code.          Emmy Jennings MD  Critical Care - Surgery  [facility]

## 2022-08-01 LAB
ALBUMIN SERPL BCP-MCNC: 3.3 G/DL (ref 3.5–5.2)
ALP SERPL-CCNC: 41 U/L (ref 55–135)
ALT SERPL W/O P-5'-P-CCNC: 20 U/L (ref 10–44)
ANION GAP SERPL CALC-SCNC: 7 MMOL/L (ref 8–16)
APTT BLDCRRT: 30.4 SEC (ref 21–32)
AST SERPL-CCNC: 56 U/L (ref 10–40)
BASOPHILS # BLD AUTO: 0.06 K/UL (ref 0–0.2)
BASOPHILS NFR BLD: 0.7 % (ref 0–1.9)
BILIRUB SERPL-MCNC: 1.1 MG/DL (ref 0.1–1)
BUN SERPL-MCNC: 13 MG/DL (ref 8–23)
CALCIUM SERPL-MCNC: 7.7 MG/DL (ref 8.7–10.5)
CHLORIDE SERPL-SCNC: 98 MMOL/L (ref 95–110)
CO2 SERPL-SCNC: 29 MMOL/L (ref 23–29)
CREAT SERPL-MCNC: 0.8 MG/DL (ref 0.5–1.4)
DIFFERENTIAL METHOD: ABNORMAL
EOSINOPHIL # BLD AUTO: 0.1 K/UL (ref 0–0.5)
EOSINOPHIL NFR BLD: 1.2 % (ref 0–8)
ERYTHROCYTE [DISTWIDTH] IN BLOOD BY AUTOMATED COUNT: 14.6 % (ref 11.5–14.5)
EST. GFR  (NO RACE VARIABLE): >60 ML/MIN/1.73 M^2
GLUCOSE SERPL-MCNC: 110 MG/DL (ref 70–110)
HCT VFR BLD AUTO: 25.9 % (ref 40–54)
HGB BLD-MCNC: 8.5 G/DL (ref 14–18)
IMM GRANULOCYTES # BLD AUTO: 0.04 K/UL (ref 0–0.04)
IMM GRANULOCYTES NFR BLD AUTO: 0.5 % (ref 0–0.5)
INR PPP: 1 (ref 0.8–1.2)
LYMPHOCYTES # BLD AUTO: 1 K/UL (ref 1–4.8)
LYMPHOCYTES NFR BLD: 12 % (ref 18–48)
MAGNESIUM SERPL-MCNC: 1.8 MG/DL (ref 1.6–2.6)
MAGNESIUM SERPL-MCNC: 2.2 MG/DL (ref 1.6–2.6)
MAGNESIUM SERPL-MCNC: 2.2 MG/DL (ref 1.6–2.6)
MCH RBC QN AUTO: 27.6 PG (ref 27–31)
MCHC RBC AUTO-ENTMCNC: 32.8 G/DL (ref 32–36)
MCV RBC AUTO: 84 FL (ref 82–98)
MONOCYTES # BLD AUTO: 1.1 K/UL (ref 0.3–1)
MONOCYTES NFR BLD: 12.7 % (ref 4–15)
NEUTROPHILS # BLD AUTO: 6.2 K/UL (ref 1.8–7.7)
NEUTROPHILS NFR BLD: 72.9 % (ref 38–73)
NRBC BLD-RTO: 0 /100 WBC
PHOSPHATE SERPL-MCNC: 1.9 MG/DL (ref 2.7–4.5)
PLATELET # BLD AUTO: 145 K/UL (ref 150–450)
PMV BLD AUTO: 10.6 FL (ref 9.2–12.9)
POCT GLUCOSE: 105 MG/DL (ref 70–110)
POCT GLUCOSE: 124 MG/DL (ref 70–110)
POCT GLUCOSE: 124 MG/DL (ref 70–110)
POTASSIUM SERPL-SCNC: 3.5 MMOL/L (ref 3.5–5.1)
POTASSIUM SERPL-SCNC: 3.6 MMOL/L (ref 3.5–5.1)
POTASSIUM SERPL-SCNC: 3.6 MMOL/L (ref 3.5–5.1)
POTASSIUM SERPL-SCNC: 3.7 MMOL/L (ref 3.5–5.1)
POTASSIUM SERPL-SCNC: 4 MMOL/L (ref 3.5–5.1)
PROT SERPL-MCNC: 5.5 G/DL (ref 6–8.4)
PROTHROMBIN TIME: 10.2 SEC (ref 9–12.5)
RBC # BLD AUTO: 3.08 M/UL (ref 4.6–6.2)
SODIUM SERPL-SCNC: 134 MMOL/L (ref 136–145)
WBC # BLD AUTO: 8.45 K/UL (ref 3.9–12.7)

## 2022-08-01 PROCEDURE — 97116 GAIT TRAINING THERAPY: CPT

## 2022-08-01 PROCEDURE — 80053 COMPREHEN METABOLIC PANEL: CPT

## 2022-08-01 PROCEDURE — 20000000 HC ICU ROOM

## 2022-08-01 PROCEDURE — 99900035 HC TECH TIME PER 15 MIN (STAT)

## 2022-08-01 PROCEDURE — 25000003 PHARM REV CODE 250: Performed by: NURSE PRACTITIONER

## 2022-08-01 PROCEDURE — 85730 THROMBOPLASTIN TIME PARTIAL: CPT | Performed by: NURSE PRACTITIONER

## 2022-08-01 PROCEDURE — 84132 ASSAY OF SERUM POTASSIUM: CPT | Performed by: THORACIC SURGERY (CARDIOTHORACIC VASCULAR SURGERY)

## 2022-08-01 PROCEDURE — 99291 PR CRITICAL CARE, E/M 30-74 MINUTES: ICD-10-PCS | Mod: ,,, | Performed by: ANESTHESIOLOGY

## 2022-08-01 PROCEDURE — A4216 STERILE WATER/SALINE, 10 ML: HCPCS | Performed by: NURSE PRACTITIONER

## 2022-08-01 PROCEDURE — 83735 ASSAY OF MAGNESIUM: CPT | Performed by: THORACIC SURGERY (CARDIOTHORACIC VASCULAR SURGERY)

## 2022-08-01 PROCEDURE — 27000221 HC OXYGEN, UP TO 24 HOURS

## 2022-08-01 PROCEDURE — 85025 COMPLETE CBC W/AUTO DIFF WBC: CPT | Performed by: NURSE PRACTITIONER

## 2022-08-01 PROCEDURE — 63600175 PHARM REV CODE 636 W HCPCS: Performed by: THORACIC SURGERY (CARDIOTHORACIC VASCULAR SURGERY)

## 2022-08-01 PROCEDURE — 63600175 PHARM REV CODE 636 W HCPCS: Performed by: NURSE PRACTITIONER

## 2022-08-01 PROCEDURE — 27200966 HC CLOSED SUCTION SYSTEM

## 2022-08-01 PROCEDURE — 25000003 PHARM REV CODE 250: Performed by: THORACIC SURGERY (CARDIOTHORACIC VASCULAR SURGERY)

## 2022-08-01 PROCEDURE — 25000003 PHARM REV CODE 250

## 2022-08-01 PROCEDURE — 94799 UNLISTED PULMONARY SVC/PX: CPT

## 2022-08-01 PROCEDURE — 83735 ASSAY OF MAGNESIUM: CPT | Mod: 91

## 2022-08-01 PROCEDURE — 94761 N-INVAS EAR/PLS OXIMETRY MLT: CPT

## 2022-08-01 PROCEDURE — 84132 ASSAY OF SERUM POTASSIUM: CPT | Mod: 91

## 2022-08-01 PROCEDURE — 97530 THERAPEUTIC ACTIVITIES: CPT

## 2022-08-01 PROCEDURE — C9248 INJ, CLEVIDIPINE BUTYRATE: HCPCS | Mod: JG

## 2022-08-01 PROCEDURE — 99291 CRITICAL CARE FIRST HOUR: CPT | Mod: ,,, | Performed by: ANESTHESIOLOGY

## 2022-08-01 PROCEDURE — 84132 ASSAY OF SERUM POTASSIUM: CPT | Mod: 91 | Performed by: NURSE PRACTITIONER

## 2022-08-01 PROCEDURE — 85610 PROTHROMBIN TIME: CPT | Performed by: NURSE PRACTITIONER

## 2022-08-01 PROCEDURE — 63600175 PHARM REV CODE 636 W HCPCS: Mod: JG

## 2022-08-01 PROCEDURE — 84100 ASSAY OF PHOSPHORUS: CPT | Performed by: NURSE PRACTITIONER

## 2022-08-01 RX ORDER — FUROSEMIDE 10 MG/ML
40 INJECTION INTRAMUSCULAR; INTRAVENOUS 2 TIMES DAILY
Status: DISCONTINUED | OUTPATIENT
Start: 2022-08-01 | End: 2022-08-02

## 2022-08-01 RX ORDER — SODIUM,POTASSIUM PHOSPHATES 280-250MG
2 POWDER IN PACKET (EA) ORAL
Status: DISCONTINUED | OUTPATIENT
Start: 2022-08-01 | End: 2022-08-02

## 2022-08-01 RX ORDER — NIFEDIPINE 30 MG/1
90 TABLET, EXTENDED RELEASE ORAL DAILY
Status: DISCONTINUED | OUTPATIENT
Start: 2022-08-01 | End: 2022-08-01

## 2022-08-01 RX ORDER — HYDROMORPHONE HYDROCHLORIDE 1 MG/ML
0.5 INJECTION, SOLUTION INTRAMUSCULAR; INTRAVENOUS; SUBCUTANEOUS EVERY 4 HOURS PRN
Status: DISCONTINUED | OUTPATIENT
Start: 2022-08-01 | End: 2022-08-03

## 2022-08-01 RX ORDER — POTASSIUM CHLORIDE 20 MEQ/1
20 TABLET, EXTENDED RELEASE ORAL 2 TIMES DAILY
Status: DISCONTINUED | OUTPATIENT
Start: 2022-08-01 | End: 2022-08-03 | Stop reason: HOSPADM

## 2022-08-01 RX ADMIN — CLEVIPIDINE 9 MG/HR: 0.5 EMULSION INTRAVENOUS at 04:08

## 2022-08-01 RX ADMIN — POTASSIUM BICARBONATE 50 MEQ: 978 TABLET, EFFERVESCENT ORAL at 11:08

## 2022-08-01 RX ADMIN — MUPIROCIN 1 G: 20 OINTMENT TOPICAL at 08:08

## 2022-08-01 RX ADMIN — ATORVASTATIN CALCIUM 40 MG: 20 TABLET, FILM COATED ORAL at 08:08

## 2022-08-01 RX ADMIN — SODIUM PHOSPHATE, MONOBASIC, MONOHYDRATE 30 MMOL: 276; 142 INJECTION, SOLUTION INTRAVENOUS at 07:08

## 2022-08-01 RX ADMIN — METOPROLOL SUCCINATE 100 MG: 100 TABLET, EXTENDED RELEASE ORAL at 08:08

## 2022-08-01 RX ADMIN — ESCITALOPRAM OXALATE 10 MG: 5 TABLET, FILM COATED ORAL at 08:08

## 2022-08-01 RX ADMIN — POTASSIUM CHLORIDE 20 MEQ: 200 INJECTION, SOLUTION INTRAVENOUS at 05:08

## 2022-08-01 RX ADMIN — NIFEDIPINE 90 MG: 60 TABLET, FILM COATED, EXTENDED RELEASE ORAL at 06:08

## 2022-08-01 RX ADMIN — POTASSIUM BICARBONATE 50 MEQ: 978 TABLET, EFFERVESCENT ORAL at 08:08

## 2022-08-01 RX ADMIN — FUROSEMIDE 40 MG: 10 INJECTION, SOLUTION INTRAMUSCULAR; INTRAVENOUS at 05:08

## 2022-08-01 RX ADMIN — ENOXAPARIN SODIUM 40 MG: 100 INJECTION SUBCUTANEOUS at 05:08

## 2022-08-01 RX ADMIN — POTASSIUM CHLORIDE 20 MEQ: 1500 TABLET, EXTENDED RELEASE ORAL at 08:08

## 2022-08-01 RX ADMIN — TAMSULOSIN HYDROCHLORIDE 0.4 MG: 0.4 CAPSULE ORAL at 08:08

## 2022-08-01 RX ADMIN — FAMOTIDINE 20 MG: 20 TABLET ORAL at 08:08

## 2022-08-01 RX ADMIN — ASPIRIN 325 MG ORAL TABLET 325 MG: 325 PILL ORAL at 08:08

## 2022-08-01 RX ADMIN — FUROSEMIDE 40 MG: 10 INJECTION, SOLUTION INTRAMUSCULAR; INTRAVENOUS at 08:08

## 2022-08-01 RX ADMIN — Medication 10 ML: at 08:08

## 2022-08-01 RX ADMIN — ACETAMINOPHEN 1000 MG: 500 TABLET ORAL at 06:08

## 2022-08-01 RX ADMIN — DOCUSATE SODIUM 100 MG: 100 CAPSULE ORAL at 08:08

## 2022-08-01 RX ADMIN — ACETAMINOPHEN 1000 MG: 500 TABLET ORAL at 08:08

## 2022-08-01 RX ADMIN — POLYETHYLENE GLYCOL 3350 17 G: 17 POWDER, FOR SOLUTION ORAL at 08:08

## 2022-08-01 RX ADMIN — MAGNESIUM SULFATE 2 G: 2 INJECTION INTRAVENOUS at 01:08

## 2022-08-01 NOTE — PLAN OF CARE
"      SICU PLAN OF CARE NOTE    SHIFT EVENTS:  VSS / No acute events this shift. Replaced K and Mg. Titrated O2. Managed pain. Titrated lasix to meet goal 150-200 cc / hr. Lasix gtt of this am. Cleviprex needed throughout shift. OOBTC. POC reviewed with patient and family; questions and concerns addressed. See flow sheets for full assessment details. Will continue to monitor patient closely.      Dx: Coronary artery disease involving native coronary artery of native heart without angina pectoris    Vital Signs: /61 (BP Location: Right arm, Patient Position: Lying)   Pulse 74   Temp 98.9 °F (37.2 °C) (Oral)   Resp 18   Ht 5' 9" (1.753 m)   Wt 116.8 kg (257 lb 8 oz)   SpO2 98%   BMI 38.03 kg/m²     Neuro: AAO x4, Follows Commands and Moves All Extremities    Respiratory: HFNC 5L; CPAP overnight    Cardiac: NSR; HR 70-80    Diet: Clear Liquid    Gtts: Cleviprex    Urine Output: Urinary Catheter 2400 cc/shift     Labs: Q8 Mg and K; daily    Accuchecks: ACHS    SKIN NOTE:  Skin: Midsternal incision HEENA, dermabond intact. L leg graft site dsg CDI. No new skin breakdown or skin tears noted at this time.    Skin precautions maintained including:  Sacrum and heels with foam dressing in place for pressure protection. Frequent weight shift assistance provided Q2 hr to prevent breakdown. Bed plugged in and mattress inflated; Adhesive use limited. Heels elevated off bed. Pressure points protected and positioning supports utilized.  Skin-to-device areas padded. Skin-to-skin areas padded,    "

## 2022-08-01 NOTE — SUBJECTIVE & OBJECTIVE
Interval History/Significant Events: No acute events overnight. Hemodynamically stable on cleviprex, which was weaned off this morning. Tolerating diet. Pain is well controlled.     Follow-up For: Procedure(s) (LRB):  CORONARY ARTERY BYPASS GRAFT (CABG) (Left)  HARVEST-VEIN-ENDOVASCULAR (Left)    Post-Operative Day: 3 Days Post-Op    Objective:     Vital Signs (Most Recent):  Temp: 97.5 °F (36.4 °C) (08/01/22 0315)  Pulse: 80 (08/01/22 0853)  Resp: (!) 32 (08/01/22 0725)  BP: (!) 143/57 (08/01/22 0853)  SpO2: 100 % (08/01/22 0725)   Vital Signs (24h Range):  Temp:  [97.5 °F (36.4 °C)-100.1 °F (37.8 °C)] 97.5 °F (36.4 °C)  Pulse:  [68-90] 80  Resp:  [13-40] 32  SpO2:  [87 %-100 %] 100 %  BP: (112-152)/() 143/57  Arterial Line BP: (100-149)/(37-57) 127/43     Weight: 112.3 kg (247 lb 9.2 oz)  Body mass index is 36.56 kg/m².      Intake/Output Summary (Last 24 hours) at 8/1/2022 0903  Last data filed at 8/1/2022 0612  Gross per 24 hour   Intake 801.7 ml   Output 4345 ml   Net -3543.3 ml       Physical Exam  Constitutional:       General: He is not in acute distress.  HENT:      Head: Normocephalic and atraumatic.   Eyes:      Pupils: Pupils are equal, round, and reactive to light.   Neck:      Comments: L subclavian CVC    Cardiovascular:      Rate and Rhythm: Normal rate and regular rhythm.      Comments: Midline sternotomy c/d with dressing in place    2 mediastinal and 1 pleural chest tube to suction    V wires in place  Pulmonary:      Effort: Pulmonary effort is normal. No respiratory distress.      Comments: On comfort flow  Abdominal:      General: There is no distension.      Palpations: Abdomen is soft.   Genitourinary:     Comments: Hernandez in place  Musculoskeletal:      Comments: L radial art line       Skin:     General: Skin is warm and dry.      Capillary Refill: Capillary refill takes less than 2 seconds.   Neurological:      General: No focal deficit present.      Mental Status: He is alert and  oriented to person, place, and time.       Vents:  Vent Mode: Spont (07/29/22 1956)  Ventilator Initiated: Yes (07/29/22 1315)  Set Rate: 30 BPM (07/29/22 1813)  Vt Set: 480 mL (07/29/22 1813)  Pressure Support: 5 cmH20 (07/29/22 1956)  PEEP/CPAP: 8.5 cmH20 (07/29/22 1956)  Oxygen Concentration (%): 35 (08/01/22 0315)  Peak Airway Pressure: 14 cmH2O (07/29/22 1956)  Plateau Pressure: 0 cmH20 (07/29/22 1956)  Total Ve: 2.91 mL (07/29/22 1956)  Negative Inspiratory Force (cm H2O): -15 (07/29/22 1956)  F/VT Ratio<105 (RSBI): (!) 35.35 (07/29/22 1946)    Lines/Drains/Airways       Central Venous Catheter Line  Duration                  Percutaneous Central Line Insertion/Assessment - Quad lumen  07/29/22 0853 left subclavian 3 days    Percutaneous Central Line Insertion/Assessment - Quad Lumen  07/29/22 0853 left subclavian 3 days              Drain  Duration                  Urethral Catheter 07/29/22 0730 Non-latex;Straight-tip;Temperature probe 14 Fr. 3 days              Arterial Line  Duration             Arterial Line 07/29/22 0853 Left Radial 3 days              Peripheral Intravenous Line  Duration                  Peripheral IV - Single Lumen 07/29/22 0540 18 G Left;Posterior Hand 3 days         Peripheral IV - Single Lumen 07/29/22 1345 22 G Posterior;Right Hand 2 days         Peripheral IV - Single Lumen 07/29/22 1350 18 G Anterior;Proximal;Right Forearm 2 days                    Significant Labs:    CBC/Anemia Profile:  Recent Labs   Lab 07/31/22  0309 08/01/22 0315   WBC 13.11* 8.45   HGB 8.9* 8.5*   HCT 27.5* 25.9*   * 145*   MCV 85 84   RDW 14.7* 14.6*        Chemistries:  Recent Labs   Lab 07/31/22  0309 07/31/22  0849 07/31/22  1730 07/31/22  1928 07/31/22  2336 08/01/22  0315   *  --   --   --   --  134*   K 3.8 3.8  3.8 3.3* 3.4* 3.6 3.6     --   --   --   --  98   CO2 26  --   --   --   --  29   BUN 15  --   --   --   --  13   CREATININE 0.9  --   --   --   --  0.8   CALCIUM 8.1*   --   --   --   --  7.7*   ALBUMIN 3.7  --   --   --   --  3.3*   PROT 5.6*  --   --   --   --  5.5*   BILITOT 0.9  --   --   --   --  1.1*   ALKPHOS 29*  --   --   --   --  41*   ALT 12  --   --   --   --  20   AST 50*  --   --   --   --  56*   MG 2.0 2.0 1.9  --  1.8  --    PHOS 2.6*  --   --   --   --  1.9*       Coagulation:   Recent Labs   Lab 08/01/22  0315   INR 1.0   APTT 30.4     All pertinent labs within the past 24 hours have been reviewed.    Significant Imaging:  I have reviewed all pertinent imaging results/findings within the past 24 hours.

## 2022-08-01 NOTE — PLAN OF CARE
CM met with the patient to discuss discharge  planninghe lives with his wife in a 1 story house  he is not on dialysis and does not take coumadin he has transportation home   PHARMACY [facility]  PCP W.ST CATALINA ALLAN  POA/SPOUSE TAYLOR   [facility]  Initial Discharge Assessment       Primary Care Provider: Ellis Allan MD    Admission Diagnosis: Coronary artery disease involving native coronary artery of native heart without angina pectoris [I25.10]    Admission Date: 7/29/2022  Expected Discharge Date: 8/8/2022    Discharge Barriers Identified: None    Payor: HU[employer] MANAGED MEDICARE / Plan: HU[employer] MEDICARE HMO / Product Type: Capitation /     Extended Emergency Contact Information  Primary Emergency Contact: FrostTaylor  Address: [address] [place] [address]  Home Phone: [phone]  Mobile Phone: [phone]  Relation: Spouse    Discharge Plan A: Home with family, Home  Discharge Plan B: Home with family, Home      [id number] [place] [address] AT [facility]  [address] [facility] [address]  Phone: [phone] Fax: [phone]    [facility] (Now [facility]) - [facility] [address] [facility]  [address] [facility] [address]  Phone: [phone] Fax: [phone]      Initial Assessment (most recent)     Adult Discharge Assessment - 08/01/22 1127        Discharge Assessment    Assessment Type Discharge Planning Assessment     Confirmed/corrected address, phone number and insurance Yes     Confirmed Demographics Correct on Facesheet     Source of Information patient     When was your last doctors appointment? 07/25/22     Communicated WENCESLAO with patient/caregiver Date not available/Unable to determine     Lives With spouse     Do you expect to return to your current living situation? Yes     Do you have help at home or  someone to help you manage your care at home? Yes     Prior to hospitilization cognitive status: Alert/Oriented     Current cognitive status: No Deficits     Walking or Climbing Stairs Difficulty none     Dressing/Bathing Difficulty none     Home Layout Able to live on 1st floor     Equipment Currently Used at Home none   PATIENT HAS A WALKER AND A CANE THAT HE IS NOT CURRENTLY USING    Readmission within 30 days? No     Patient currently being followed by outpatient case management? No     Do you currently have service(s) that help you manage your care at home? No     Do you take prescription medications? Yes     Do you have prescription coverage? Yes     Do you have any problems affording any of your prescribed medications? No     Is the patient taking medications as prescribed? yes     Who is going to help you get home at discharge? SPOUSE TAYLOR BAINS      How do you get to doctors appointments? car, drives self;family or friend will provide     Are you on dialysis? No     Do you take coumadin? No     Discharge Plan A Home with family;Home     Discharge Plan B Home with family;Home     DME Needed Upon Discharge  none     Discharge Plan discussed with: Patient     Discharge Barriers Identified None

## 2022-08-01 NOTE — CARE UPDATE
BG goal 110-140    -A1C   Lab Results   Component Value Date    HGBA1C 5.9 (H) 07/20/2022         -HOME REGIMEN: metformin and januvia     -INPATIENT REGIMEN: correction scale     -GLUCOSE TREND FOR THE PAST 24HRS:110- 140    -NO HYPOGYCEMIAS NOTED     -TOLERATING 50% OF PO DIET     -Diet: Diet Cardiac Fluid - 1500mL      Remains in ICU, NAEON. BG stable with prn correction scale. 3 Days Post-Op.       Plan:  BG monitoring ac/hs and moderate dose correction scale.     Discharge planning: home regimen.     Endocrine to continue to follow    ** Please call Endocrine for any BG related issues **

## 2022-08-01 NOTE — PT/OT/SLP PROGRESS
Occupational Therapy   Treatment    Name: Christiano Frost  MRN: [id number]  Admitting Diagnosis:  Coronary artery disease involving native coronary artery of native heart without angina pectoris  3 Days Post-Op    Recommendations:     Discharge Recommendations: home health OT  Discharge Equipment Recommendations:  other (see comments) (TBD)  Barriers to discharge:       Assessment:     Christiano Frost is a 71 y.o. male with a medical diagnosis of Coronary artery disease involving native coronary artery of native heart without angina pectoris. Pt walked 36' CGA with 1 small LOB while in the bathroom. Performance deficits affecting function are weakness, impaired endurance, impaired self care skills, impaired functional mobility, gait instability, impaired balance, decreased coordination, decreased safety awareness.     Rehab Prognosis:  Good; patient would benefit from acute skilled OT services to address these deficits and reach maximum level of function.       Plan:     Patient to be seen 5 x/week to address the above listed problems via self-care/home management, therapeutic activities, therapeutic exercises, neuromuscular re-education  · Plan of Care Expires: 08/28/22  · Plan of Care Reviewed with: patient    Subjective     Pain/Comfort:  · Pain Rating 1: 0/10    Objective:     Communicated with: rn prior to session.  Patient found up in chair with arterial line, hopper catheter, oxygen, pulse ox (continuous), telemetry upon OT entry to room.    General Precautions: Standard, fall, sternal   Orthopedic Precautions:N/A   Braces:    Respiratory Status: Nasal cannula, flow 1 L/min     Occupational Performance:     Bed Mobility:    · Up in chair.    Functional Mobility/Transfers:  · Patient completed Sit <> Stand Transfer with contact guard assistance  with  no assistive device   · Patient completed Toilet Transfer Step Transfer technique with contact guard assistance with  no AD  · Functional Mobility: Pt walked 36' CGA  / no AD.    Activities of Daily Living:  · Toileting: stand by assistance for pericare.    [facility] 6 Click ADL: 20    Treatment & Education:  Reviewed sternal precautions.  Discussed OT POC and progress.    Patient left up in chair with all lines intact and call button in reachEducation:      GOALS:   Multidisciplinary Problems     Occupational Therapy Goals        Problem: Occupational Therapy    Goal Priority Disciplines Outcome Interventions   Occupational Therapy Goal     OT, PT/OT Ongoing, Progressing    Description: Goals to be met by: 8/13/2022    Patient will increase functional independence with ADLs by performing:    UE Dressing with Modified [place].  LE Dressing with Modified [place].  Grooming while standing at sink with Modified [place].  Toileting from toilet with Supervision for hygiene and clothing management.   Supine to sit with Modified [place].  Sit to stand transfer with Supervision without AD.  Toilet transfer to toilet with Supervision without AD.                     Time Tracking:     OT Date of Treatment: 08/01/22  OT Start Time: 0919  OT Stop Time: 0936  OT Total Time (min): 17 min    Billable Minutes:Therapeutic Activity 17    OT/HEENA: OT          8/1/2022

## 2022-08-01 NOTE — ASSESSMENT & PLAN NOTE
71 year old male with CAD, depression, BPH, diabetes mellitus (HA1C 6.5), hyperlipidemia, hypertension, and previous TIA now s/p CABGx3      Neuro/Psych:   -- Sedation: none    -- Pain: PRN Oxy + Dilaudid  -- Scheduled Tylenol             Cards:   -- S/P CABG x 3 on 7/2922  -- HDS off pressors  -- Ventricular pacing wires. Plan to remove today  -- MAP > 65, Syst < 140  -- Cleviprex PRN, Nifedipine 90, weaned off cleviprex  -- Aspirin 325mg  -- Statin  -- Toprol      Pulm:   -- Goal O2 sat > 88%  -- ABG PRN  -- Wean comfort flow as tolerated  -- Mediastinal chest tubes x2 and pleural chest tube x1 to suction, plan to remove today  -- CPAP at night. Planning on having his home CPAP machine at bedside tonight.      Renal:  -- Remove hopper  -- Trend BUN/Cr   -- Lasix BID      FEN / GI:   -- Replace lytes as needed  -- Nutrition: cardiac diet  -- GI ppx: famotidine  -- Bowel reg: miralax  -- 1500mL 5% Albumin in first 12 hours post-op, complete      ID:   -- Tm: afebrile; WBC stable  -- Maribeth-op ancef complete      Heme/Onc:   -- H/H stable   -- Daily CBC  -- 700mL Cell saver given back  -- Post-op coags wnl  -- Aspirin 325mg QD      Endo:   -- BG goal 140-180  -- Insulin SSI      PPx:   Feeding: NPO  Analgesia/Sedation: none / PRN Oxy + Dilaudid  Thromboembolic prevention: SCDs, lovenox  HOB >30: Yes  Stress Ulcer ppx: famotidine  Glucose control: Critical care goal 140-180 g/dl, ISS     Lines/Drains/Airway: CVC L subclavian, Hopper, Chest tubes x 3, ventricular pacing wires, L radial A-line      Dispo/Code Status/Palliative:   -- SICU / Full Code.

## 2022-08-01 NOTE — PT/OT/SLP PROGRESS
Physical Therapy Co- Treatment with OT    Patient Name:  Christiano Frost   MRN:  [id number]    Recommendations:     Discharge Recommendations:  home health PT   Discharge Equipment Recommendations:  (will determine DME needs closer to discharge)   Barriers to discharge: None    Assessment:     Christiano Frost is a 71 y.o. male admitted with a medical diagnosis of Coronary artery disease involving native coronary artery of native heart without angina pectoris.  He presents with the following impairments/functional limitations:  impaired endurance, impaired functional mobility, gait instability, impaired balance, decreased safety awareness pt tolerated treatment better being able to gait train farther. Pt will benefit from cont skilled PT 5x/wk to progress physically. Pt will be able to discharge home with HHPT when medically stable. Pt is s/p CABG 7/29/22.    Rehab Prognosis: Good; patient would benefit from acute skilled PT services to address these deficits and reach maximum level of function.    Recent Surgery: Procedure(s) (LRB):  CORONARY ARTERY BYPASS GRAFT (CABG) (Left)  HARVEST-VEIN-ENDOVASCULAR (Left) 3 Days Post-Op    Plan:     During this hospitalization, patient to be seen 5 x/week to address the identified rehab impairments via therapeutic activities, therapeutic exercises and progress toward the following goals:    · Plan of Care Expires:  08/30/22    Subjective     Chief Complaint: pt had no complaints during treatment.   Patient/Family Comments/goals: to be able to discharge home   Pain/Comfort:  · Pain Rating 1: 0/10  · Pain Rating Post-Intervention 1: 0/10      Objective:     Communicated with nurse prior to session.  Patient found up in chair with telemetry, blood pressure cuff, pulse ox (continuous), oxygen, central line, hopper catheter (CVP) upon PT entry to room.     General Precautions: Standard, fall, sternal   Orthopedic Precautions:N/A   Braces:    Respiratory Status: Nasal cannula, flow 3  L/min     Functional Mobility:  · Transfers: pt needed verbal cues for functional mobility with sternal precautions.     · Sit to Stand:  contact guard assistance with hand-held assist. Pt stood x 2 reps.   ·   · Gait: pt received gait training ~ 14 ft then 22 ft  (36 ft total) with CGA. pt had sitting rest period to use toilet with gait training.    ·   · Balance: pt was CGA standing balance but had 1 episode of decreased balance and was CGA to recover    Due to pt complex medical condition, the skill of 2 licensed therapists is needed to maximize treatment session and progression towards goals    AM-PAC 6 CLICK MOBILITY  Turning over in bed (including adjusting bedclothes, sheets and blankets)?: 3  Sitting down on and standing up from a chair with arms (e.g., wheelchair, bedside commode, etc.): 3  Moving from lying on back to sitting on the side of the bed?: 3  Moving to and from a bed to a chair (including a wheelchair)?: 3  Need to walk in hospital room?: 3  Climbing 3-5 steps with a railing?: 1  Basic Mobility Total Score: 16       Therapeutic Activities and Exercises:   pt received verbal instructions in PT POC and verbally expressed understanding of such.     Patient left up in chair with all lines intact, call button in reach and RN notified..    GOALS:   Multidisciplinary Problems     Physical Therapy Goals        Problem: Physical Therapy    Goal Priority Disciplines Outcome Goal Variances Interventions   Physical Therapy Goal     PT, PT/OT Ongoing, Progressing     Description: Goals to be met by: 2022     Patient will increase functional independence with mobility by performin. Supine to sit with independence-not met  2. Sit to supine with independence -not met  3. Sit to stand transfer with modified independence -not met  4. Bed to chair transfer with modified independence using platform rolling walker as needed -not met  5. Gait  x 200 feet with modified independence using platform rolling  walker as needed -not met  6. Lower extremity exercise program x10 reps, with independence -not met                   Time Tracking:     PT Received On: 08/01/22  PT Start Time: 0918     PT Stop Time: 0935  PT Total Time (min): 17 min     Billable Minutes: Gait Training 17 min       PT/PTA: PT     PTA Visit Number: 0     08/01/2022

## 2022-08-01 NOTE — NURSING
Called CTS to clarify plan for BP control, said plan is to titrate Cleviprex to off and give Lasix and Metoprolol

## 2022-08-01 NOTE — PLAN OF CARE
Problem: Physical Therapy  Goal: Physical Therapy Goal  Description: Goals to be met by: 2022     Patient will increase functional independence with mobility by performin. Supine to sit with independence-not met  2. Sit to supine with independence -not met  3. Sit to stand transfer with modified independence -not met  4. Bed to chair transfer with modified independence using platform rolling walker as needed -not met  5. Gait  x 200 feet with modified independence using platform rolling walker as needed -not met  6. Lower extremity exercise program x10 reps, with independence -not met  Outcome: Ongoing, Progressing   Goals remain appropriate. 2022

## 2022-08-01 NOTE — NURSING
Informed CTS on call MD that SBP is staying in mid 140s-160s, just gave IVP 40 Lasix. Said to give time, but may have to restart Cleviprex if staying elevated.

## 2022-08-01 NOTE — PLAN OF CARE
Plan of Care Note  Cardiothoracic Surgery    3 Days Post-Op s/p CABG    Specific issues: ASA/statin, lovenox, BB, nifedipine, lasix    Plan of care for patient was discussed with ICU staff including nurses, residents, and faculty and appropriate consulting services.    Will continue to monitor patient's hemodynamics, functionality, neuro status, fluid status and renal function, and labs and will adjust medications and fluids as necessary while monitoring appropriateness for de-escalation of support and monitoring and transport to stepdown unit.    Jayda Madera MD, PGY-7  Cardiothoracic Surgery   [phone]

## 2022-08-01 NOTE — PROGRESS NOTES
[facility] - Surgical Intensive Care  Critical Care - Surgery  Progress Note    Patient Name: Christiano Frost  MRN: [id number]  Admission Date: 7/29/2022  Hospital Length of Stay: 3 days  Code Status: Full Code  Attending Provider: Magen Martell MD  Primary Care Provider: Ellis Allan MD   Principal Problem: Coronary artery disease involving native coronary artery of native heart without angina pectoris    Subjective:     Hospital/ICU Course:  No notes on file    Interval History/Significant Events: No acute events overnight. Hemodynamically stable on cleviprex, which was weaned off this morning. Tolerating diet. Pain is well controlled.     Follow-up For: Procedure(s) (LRB):  CORONARY ARTERY BYPASS GRAFT (CABG) (Left)  HARVEST-VEIN-ENDOVASCULAR (Left)    Post-Operative Day: 3 Days Post-Op    Objective:     Vital Signs (Most Recent):  Temp: 97.5 °F (36.4 °C) (08/01/22 0315)  Pulse: 80 (08/01/22 0853)  Resp: (!) 32 (08/01/22 0725)  BP: (!) 143/57 (08/01/22 0853)  SpO2: 100 % (08/01/22 0725)   Vital Signs (24h Range):  Temp:  [97.5 °F (36.4 °C)-100.1 °F (37.8 °C)] 97.5 °F (36.4 °C)  Pulse:  [68-90] 80  Resp:  [13-40] 32  SpO2:  [87 %-100 %] 100 %  BP: (112-152)/() 143/57  Arterial Line BP: (100-149)/(37-57) 127/43     Weight: 112.3 kg (247 lb 9.2 oz)  Body mass index is 36.56 kg/m².      Intake/Output Summary (Last 24 hours) at 8/1/2022 0903  Last data filed at 8/1/2022 0612  Gross per 24 hour   Intake 801.7 ml   Output 4345 ml   Net -3543.3 ml       Physical Exam  Constitutional:       General: He is not in acute distress.  HENT:      Head: Normocephalic and atraumatic.   Eyes:      Pupils: Pupils are equal, round, and reactive to light.   Neck:      Comments: L subclavian CVC    Cardiovascular:      Rate and Rhythm: Normal rate and regular rhythm.      Comments: Midline sternotomy c/d with dressing in place    2 mediastinal and 1 pleural chest tube to suction    V wires in place  Pulmonary:      Effort:  Pulmonary effort is normal. No respiratory distress.      Comments: On comfort flow  Abdominal:      General: There is no distension.      Palpations: Abdomen is soft.   Genitourinary:     Comments: Hernandez in place  Musculoskeletal:      Comments: L radial art line       Skin:     General: Skin is warm and dry.      Capillary Refill: Capillary refill takes less than 2 seconds.   Neurological:      General: No focal deficit present.      Mental Status: He is alert and oriented to person, place, and time.       Vents:  Vent Mode: Spont (07/29/22 1956)  Ventilator Initiated: Yes (07/29/22 1315)  Set Rate: 30 BPM (07/29/22 1813)  Vt Set: 480 mL (07/29/22 1813)  Pressure Support: 5 cmH20 (07/29/22 1956)  PEEP/CPAP: 8.5 cmH20 (07/29/22 1956)  Oxygen Concentration (%): 35 (08/01/22 0315)  Peak Airway Pressure: 14 cmH2O (07/29/22 1956)  Plateau Pressure: 0 cmH20 (07/29/22 1956)  Total Ve: 2.91 mL (07/29/22 1956)  Negative Inspiratory Force (cm H2O): -15 (07/29/22 1956)  F/VT Ratio<105 (RSBI): (!) 35.35 (07/29/22 1946)    Lines/Drains/Airways       Central Venous Catheter Line  Duration                  Percutaneous Central Line Insertion/Assessment - Quad lumen  07/29/22 0853 left subclavian 3 days    Percutaneous Central Line Insertion/Assessment - Quad Lumen  07/29/22 0853 left subclavian 3 days              Drain  Duration                  Urethral Catheter 07/29/22 0730 Non-latex;Straight-tip;Temperature probe 14 Fr. 3 days              Arterial Line  Duration             Arterial Line 07/29/22 0853 Left Radial 3 days              Peripheral Intravenous Line  Duration                  Peripheral IV - Single Lumen 07/29/22 0540 18 G Left;Posterior Hand 3 days         Peripheral IV - Single Lumen 07/29/22 1345 22 G Posterior;Right Hand 2 days         Peripheral IV - Single Lumen 07/29/22 1350 18 G Anterior;Proximal;Right Forearm 2 days                    Significant Labs:    CBC/Anemia Profile:  Recent Labs   Lab  07/31/22  0309 08/01/22  0315   WBC 13.11* 8.45   HGB 8.9* 8.5*   HCT 27.5* 25.9*   * 145*   MCV 85 84   RDW 14.7* 14.6*        Chemistries:  Recent Labs   Lab 07/31/22  0309 07/31/22  0849 07/31/22  1730 07/31/22  1928 07/31/22  2336 08/01/22 0315   *  --   --   --   --  134*   K 3.8 3.8  3.8 3.3* 3.4* 3.6 3.6     --   --   --   --  98   CO2 26  --   --   --   --  29   BUN 15  --   --   --   --  13   CREATININE 0.9  --   --   --   --  0.8   CALCIUM 8.1*  --   --   --   --  7.7*   ALBUMIN 3.7  --   --   --   --  3.3*   PROT 5.6*  --   --   --   --  5.5*   BILITOT 0.9  --   --   --   --  1.1*   ALKPHOS 29*  --   --   --   --  41*   ALT 12  --   --   --   --  20   AST 50*  --   --   --   --  56*   MG 2.0 2.0 1.9  --  1.8  --    PHOS 2.6*  --   --   --   --  1.9*       Coagulation:   Recent Labs   Lab 08/01/22 0315   INR 1.0   APTT 30.4     All pertinent labs within the past 24 hours have been reviewed.    Significant Imaging:  I have reviewed all pertinent imaging results/findings within the past 24 hours.    Assessment/Plan:     * Coronary artery disease involving native coronary artery of native heart without angina pectoris  71 year old male with CAD, depression, BPH, diabetes mellitus (HA1C 6.5), hyperlipidemia, hypertension, and previous TIA now s/p CABGx3      Neuro/Psych:   -- Sedation: none    -- Pain: PRN Oxy + Dilaudid  -- Scheduled Tylenol             Cards:   -- S/P CABG x 3 on 7/2922  -- HDS off pressors  -- Ventricular pacing wires. Plan to remove today  -- MAP > 65, Syst < 140  -- Cleviprex PRN, Nifedipine 90, weaned off cleviprex  -- Aspirin 325mg  -- Statin  -- Toprol      Pulm:   -- Goal O2 sat > 88%  -- ABG PRN  -- Wean comfort flow as tolerated  -- Mediastinal chest tubes x2 and pleural chest tube x1 to suction, plan to remove today  -- CPAP at night. Planning on having his home CPAP machine at bedside tonight.      Renal:  -- Remove hopper  -- Trend BUN/Cr   -- Lasix BID       FEN / GI:   -- Replace lytes as needed  -- Nutrition: cardiac diet  -- GI ppx: famotidine  -- Bowel reg: miralax  -- 1500mL 5% Albumin in first 12 hours post-op, complete      ID:   -- Tm: afebrile; WBC stable  -- Maribeth-op ancef complete      Heme/Onc:   -- H/H stable   -- Daily CBC  -- 700mL Cell saver given back  -- Post-op coags wnl  -- Aspirin 325mg QD      Endo:   -- BG goal 140-180  -- Insulin SSI      PPx:   Feeding: NPO  Analgesia/Sedation: none / PRN Oxy + Dilaudid  Thromboembolic prevention: SCDs, lovenox  HOB >30: Yes  Stress Ulcer ppx: famotidine  Glucose control: Critical care goal 140-180 g/dl, ISS     Lines/Drains/Airway: CVC L subclavian, Hernandez, Chest tubes x 3, ventricular pacing wires, L radial A-line      Dispo/Code Status/Palliative:   -- SICU / Full Code.          Emmy Jennings MD  Critical Care - Surgery  [facility] - Surgical Int[facility]

## 2022-08-02 LAB
ALBUMIN SERPL BCP-MCNC: 3.2 G/DL (ref 3.5–5.2)
ALP SERPL-CCNC: 46 U/L (ref 55–135)
ALT SERPL W/O P-5'-P-CCNC: 25 U/L (ref 10–44)
ANION GAP SERPL CALC-SCNC: 8 MMOL/L (ref 8–16)
APTT BLDCRRT: 31 SEC (ref 21–32)
AST SERPL-CCNC: 49 U/L (ref 10–40)
BASOPHILS # BLD AUTO: 0.06 K/UL (ref 0–0.2)
BASOPHILS NFR BLD: 0.9 % (ref 0–1.9)
BILIRUB SERPL-MCNC: 0.9 MG/DL (ref 0.1–1)
BUN SERPL-MCNC: 16 MG/DL (ref 8–23)
CALCIUM SERPL-MCNC: 8.6 MG/DL (ref 8.7–10.5)
CHLORIDE SERPL-SCNC: 98 MMOL/L (ref 95–110)
CO2 SERPL-SCNC: 28 MMOL/L (ref 23–29)
CREAT SERPL-MCNC: 0.8 MG/DL (ref 0.5–1.4)
DIFFERENTIAL METHOD: ABNORMAL
EOSINOPHIL # BLD AUTO: 0.1 K/UL (ref 0–0.5)
EOSINOPHIL NFR BLD: 1.3 % (ref 0–8)
ERYTHROCYTE [DISTWIDTH] IN BLOOD BY AUTOMATED COUNT: 14.7 % (ref 11.5–14.5)
EST. GFR  (NO RACE VARIABLE): >60 ML/MIN/1.73 M^2
GLUCOSE SERPL-MCNC: 108 MG/DL (ref 70–110)
HCT VFR BLD AUTO: 26.5 % (ref 40–54)
HGB BLD-MCNC: 9 G/DL (ref 14–18)
IMM GRANULOCYTES # BLD AUTO: 0.02 K/UL (ref 0–0.04)
IMM GRANULOCYTES NFR BLD AUTO: 0.3 % (ref 0–0.5)
INR PPP: 1 (ref 0.8–1.2)
LYMPHOCYTES # BLD AUTO: 1.4 K/UL (ref 1–4.8)
LYMPHOCYTES NFR BLD: 19.7 % (ref 18–48)
MAGNESIUM SERPL-MCNC: 2 MG/DL (ref 1.6–2.6)
MAGNESIUM SERPL-MCNC: 2 MG/DL (ref 1.6–2.6)
MCH RBC QN AUTO: 28.7 PG (ref 27–31)
MCHC RBC AUTO-ENTMCNC: 34 G/DL (ref 32–36)
MCV RBC AUTO: 84 FL (ref 82–98)
MONOCYTES # BLD AUTO: 1.3 K/UL (ref 0.3–1)
MONOCYTES NFR BLD: 18.8 % (ref 4–15)
NEUTROPHILS # BLD AUTO: 4.1 K/UL (ref 1.8–7.7)
NEUTROPHILS NFR BLD: 59 % (ref 38–73)
NRBC BLD-RTO: 0 /100 WBC
PHOSPHATE SERPL-MCNC: 2.3 MG/DL (ref 2.7–4.5)
PLATELET # BLD AUTO: 153 K/UL (ref 150–450)
PMV BLD AUTO: 10 FL (ref 9.2–12.9)
POCT GLUCOSE: 110 MG/DL (ref 70–110)
POCT GLUCOSE: 110 MG/DL (ref 70–110)
POCT GLUCOSE: 113 MG/DL (ref 70–110)
POCT GLUCOSE: 120 MG/DL (ref 70–110)
POCT GLUCOSE: 122 MG/DL (ref 70–110)
POCT GLUCOSE: 125 MG/DL (ref 70–110)
POCT GLUCOSE: 126 MG/DL (ref 70–110)
POCT GLUCOSE: 136 MG/DL (ref 70–110)
POTASSIUM SERPL-SCNC: 4 MMOL/L (ref 3.5–5.1)
POTASSIUM SERPL-SCNC: 4 MMOL/L (ref 3.5–5.1)
PROT SERPL-MCNC: 6.2 G/DL (ref 6–8.4)
PROTHROMBIN TIME: 10.3 SEC (ref 9–12.5)
RBC # BLD AUTO: 3.14 M/UL (ref 4.6–6.2)
SODIUM SERPL-SCNC: 134 MMOL/L (ref 136–145)
WBC # BLD AUTO: 6.87 K/UL (ref 3.9–12.7)

## 2022-08-02 PROCEDURE — 97530 THERAPEUTIC ACTIVITIES: CPT

## 2022-08-02 PROCEDURE — 63600175 PHARM REV CODE 636 W HCPCS: Performed by: THORACIC SURGERY (CARDIOTHORACIC VASCULAR SURGERY)

## 2022-08-02 PROCEDURE — 94660 CPAP INITIATION&MGMT: CPT

## 2022-08-02 PROCEDURE — 20600001 HC STEP DOWN PRIVATE ROOM

## 2022-08-02 PROCEDURE — 85025 COMPLETE CBC W/AUTO DIFF WBC: CPT | Performed by: NURSE PRACTITIONER

## 2022-08-02 PROCEDURE — 85610 PROTHROMBIN TIME: CPT | Performed by: NURSE PRACTITIONER

## 2022-08-02 PROCEDURE — 25000003 PHARM REV CODE 250: Performed by: THORACIC SURGERY (CARDIOTHORACIC VASCULAR SURGERY)

## 2022-08-02 PROCEDURE — 99291 CRITICAL CARE FIRST HOUR: CPT | Mod: ,,, | Performed by: ANESTHESIOLOGY

## 2022-08-02 PROCEDURE — 94761 N-INVAS EAR/PLS OXIMETRY MLT: CPT

## 2022-08-02 PROCEDURE — 97116 GAIT TRAINING THERAPY: CPT

## 2022-08-02 PROCEDURE — 99291 PR CRITICAL CARE, E/M 30-74 MINUTES: ICD-10-PCS | Mod: ,,, | Performed by: ANESTHESIOLOGY

## 2022-08-02 PROCEDURE — 84100 ASSAY OF PHOSPHORUS: CPT | Performed by: NURSE PRACTITIONER

## 2022-08-02 PROCEDURE — 80053 COMPREHEN METABOLIC PANEL: CPT

## 2022-08-02 PROCEDURE — 25000003 PHARM REV CODE 250: Performed by: NURSE PRACTITIONER

## 2022-08-02 PROCEDURE — 25000003 PHARM REV CODE 250

## 2022-08-02 PROCEDURE — 85730 THROMBOPLASTIN TIME PARTIAL: CPT | Performed by: NURSE PRACTITIONER

## 2022-08-02 PROCEDURE — 97535 SELF CARE MNGMENT TRAINING: CPT

## 2022-08-02 PROCEDURE — 63600175 PHARM REV CODE 636 W HCPCS

## 2022-08-02 PROCEDURE — 83735 ASSAY OF MAGNESIUM: CPT

## 2022-08-02 PROCEDURE — 99900035 HC TECH TIME PER 15 MIN (STAT)

## 2022-08-02 RX ORDER — ISOSORBIDE MONONITRATE 30 MG/1
30 TABLET, EXTENDED RELEASE ORAL
Status: DISCONTINUED | OUTPATIENT
Start: 2022-08-02 | End: 2022-08-03 | Stop reason: HOSPADM

## 2022-08-02 RX ORDER — FUROSEMIDE 10 MG/ML
20 INJECTION INTRAMUSCULAR; INTRAVENOUS 2 TIMES DAILY
Status: DISCONTINUED | OUTPATIENT
Start: 2022-08-02 | End: 2022-08-03 | Stop reason: HOSPADM

## 2022-08-02 RX ADMIN — NIFEDIPINE 90 MG: 60 TABLET, FILM COATED, EXTENDED RELEASE ORAL at 08:08

## 2022-08-02 RX ADMIN — MUPIROCIN 1 G: 20 OINTMENT TOPICAL at 08:08

## 2022-08-02 RX ADMIN — ENOXAPARIN SODIUM 40 MG: 100 INJECTION SUBCUTANEOUS at 06:08

## 2022-08-02 RX ADMIN — POTASSIUM & SODIUM PHOSPHATES POWDER PACK 280-160-250 MG 2 PACKET: 280-160-250 PACK at 10:08

## 2022-08-02 RX ADMIN — POTASSIUM CHLORIDE 20 MEQ: 1500 TABLET, EXTENDED RELEASE ORAL at 08:08

## 2022-08-02 RX ADMIN — ASPIRIN 325 MG ORAL TABLET 325 MG: 325 PILL ORAL at 08:08

## 2022-08-02 RX ADMIN — POTASSIUM & SODIUM PHOSPHATES POWDER PACK 280-160-250 MG 2 PACKET: 280-160-250 PACK at 04:08

## 2022-08-02 RX ADMIN — FUROSEMIDE 20 MG: 10 INJECTION, SOLUTION INTRAMUSCULAR; INTRAVENOUS at 06:08

## 2022-08-02 RX ADMIN — TAMSULOSIN HYDROCHLORIDE 0.4 MG: 0.4 CAPSULE ORAL at 08:08

## 2022-08-02 RX ADMIN — POLYETHYLENE GLYCOL 3350 17 G: 17 POWDER, FOR SOLUTION ORAL at 08:08

## 2022-08-02 RX ADMIN — ACETAMINOPHEN 1000 MG: 500 TABLET ORAL at 05:08

## 2022-08-02 RX ADMIN — FAMOTIDINE 20 MG: 20 TABLET ORAL at 08:08

## 2022-08-02 RX ADMIN — ISOSORBIDE MONONITRATE 30 MG: 30 TABLET, EXTENDED RELEASE ORAL at 01:08

## 2022-08-02 RX ADMIN — FUROSEMIDE 20 MG: 10 INJECTION, SOLUTION INTRAMUSCULAR; INTRAVENOUS at 08:08

## 2022-08-02 RX ADMIN — MUPIROCIN 1 G: 20 OINTMENT TOPICAL at 09:08

## 2022-08-02 RX ADMIN — ATORVASTATIN CALCIUM 40 MG: 20 TABLET, FILM COATED ORAL at 08:08

## 2022-08-02 RX ADMIN — ESCITALOPRAM OXALATE 10 MG: 5 TABLET, FILM COATED ORAL at 08:08

## 2022-08-02 RX ADMIN — ACETAMINOPHEN 1000 MG: 500 TABLET ORAL at 09:08

## 2022-08-02 RX ADMIN — ACETAMINOPHEN 1000 MG: 500 TABLET ORAL at 01:08

## 2022-08-02 RX ADMIN — DOCUSATE SODIUM 100 MG: 100 CAPSULE ORAL at 08:08

## 2022-08-02 RX ADMIN — METOPROLOL SUCCINATE 100 MG: 100 TABLET, EXTENDED RELEASE ORAL at 08:08

## 2022-08-02 NOTE — PLAN OF CARE
Pt vss, afebrile, sats >88% on RA. MAP >65. SBP <140.  Accu check before meals, no coverage required. Frequent rounds made in order to ensure pain control. No complaints of pain or discomfort reported at this time. Pt ambulated in halls x3, oobtc for majority of am shift. No skin breakdown noted to sacrum, buttock, and heels, preventative measures in place. POC reviewed with pt, no questions/concerns at this time. Transfer orders in place, awaiting bed assignment. Will continue to monitor.

## 2022-08-02 NOTE — ANESTHESIA POSTPROCEDURE EVALUATION
Anesthesia Post Evaluation    Patient: Christiano Frost    Procedure(s) Performed: Procedure(s) (LRB):  CORONARY ARTERY BYPASS GRAFT (CABG) (Left)  HARVEST-VEIN-ENDOVASCULAR (Left)    Final Anesthesia Type: general      Patient location during evaluation: ICU  Patient participation: Yes- Able to Participate  Level of consciousness: awake and alert and oriented  Post-procedure vital signs: reviewed and stable  Pain management: adequate  Airway patency: patent  JEEVAN mitigation strategies: Intraoperative administration of CPAP, nasopharyngeal airway, or oral appliance during sedation, Multimodal analgesia, Extubation while patient is awake, Verification of full reversal of neuromuscular block and Extubation and recovery carried out in lateral, semiupright, or other nonsupine position  PONV status at discharge: No PONV  Anesthetic complications: no      Cardiovascular status: hemodynamically stable  Respiratory status: unassisted  Hydration status: euvolemic  Follow-up not needed.          Vitals Value Taken Time   /55 08/02/22 1234   Temp 37.1 °C (98.8 °F) 08/02/22 1102   Pulse 81 08/02/22 1349   Resp 21 08/02/22 1349   SpO2 96 % 08/02/22 1349   Vitals shown include unvalidated device data.      No case tracking events are documented in the log.      Pain/Jame Score: Pain Rating Prior to Med Admin: 3 (8/2/2022  1:50 PM)  Pain Rating Post Med Admin: 0 (8/1/2022  9:05 PM)

## 2022-08-02 NOTE — ASSESSMENT & PLAN NOTE
71 year old male with CAD, depression, BPH, diabetes mellitus (HA1C 6.5), hyperlipidemia, hypertension, and previous TIA now s/p CABGx3      Neuro/Psych:   -- Sedation: none    -- Pain: PRN Oxy + Dilaudid  -- Scheduled Tylenol             Cards:   -- S/P CABG x 3 on 7/2922  -- HDS off pressors  -- Ventricular pacing wires. Removed 8/1  -- MAP > 65, Syst < 140  -- Cleviprex PRN, Nifedipine 90, weaned off cleviprex  -- Aspirin 325mg  -- Statin  -- Toprol      Pulm:   -- Goal O2 sat > 88%  -- ABG PRN  -- Wean comfort flow as tolerated  -- Mediastinal chest tubes x2 and pleural chest tube x1 to suction, plan to remove today  -- CPAP at night with home CPAP machine     Renal:  -- Trend BUN/Cr   -- Lasix BID      FEN / GI:   -- Replace lytes as needed  -- Nutrition: cardiac diet  -- GI ppx: famotidine  -- Bowel reg: miralax  -- 1500mL 5% Albumin in first 12 hours post-op, complete      ID:   -- Tm: afebrile; WBC stable  -- Maribeth-op ancef complete      Heme/Onc:   -- H/H stable   -- Daily CBC  -- 700mL Cell saver given back  -- Post-op coags wnl  -- Aspirin 325mg QD      Endo:   -- BG goal 140-180  -- Insulin SSI      PPx:   Feeding: Cardiac diet, 1500 mL restriction  Analgesia/Sedation: none / PRN Oxy + Dilaudid  Thromboembolic prevention: SCDs, lovenox  HOB >30: Yes  Stress Ulcer ppx: famotidine  Glucose control: Critical care goal 140-180 g/dl, ISS     Lines/Drains/Airway: CVC L subclavian, plan to remove today      Dispo/Code Status/Palliative:   -- Step down / Full Code.

## 2022-08-02 NOTE — PLAN OF CARE
SICU PLAN OF CARE NOTE    Dx: Coronary artery disease involving native coronary artery of native heart without angina pectoris    SHIFT EVENTS:  VSS / No acute events this shift. Questions and concerns addressed with patient and family. See flow sheets for full assessment details. Stepdown orders. Stopped Cleviprex gtt this AM. D/jamin L radial A line.        Dx: Coronary artery disease involving native coronary artery of native heart without angina pectoris        Neuro: AAO x4, Follows Commands and Moves All Extremities, ambulated in room with PT, x 2 assist, is still unsteady at this time, is able to transfer from chair to bed with x 1 assist.      Respiratory: O2 SATs % Room Air by end of shift, started shift on 3L via NC.      Cardiac: NSR; HR 70s - 80s, Lasix IVPs x2 daily, SBP goal <140.     Diet: Cardiac Diet, atif well.      Gtts: Abx/KVO      Urine Output: Hernandez d/jamin this AM, voids 2200 output.      Accuchecks: Q4h    Remained in chair majority of shift; tolerated well.

## 2022-08-02 NOTE — PLAN OF CARE
Plan of Care Note  Cardiothoracic Surgery    4 Days Post-Op s/p CABG    Specific issues: ASA/statin, lovenox, BB, nifedipine, lasix, step down    Plan of care for patient was discussed with ICU staff including nurses, residents, and faculty and appropriate consulting services.    Will continue to monitor patient's hemodynamics, functionality, neuro status, fluid status and renal function, and labs and will adjust medications and fluids as necessary while monitoring appropriateness for de-escalation of support and monitoring and transport to stepdown unit.    Jayda Madera MD, PGY-7  Cardiothoracic Surgery   [phone]

## 2022-08-02 NOTE — PT/OT/SLP PROGRESS
Occupational Therapy Treatment    Patient Name:  Christiano Frost   MRN:  [id number]  Admit Date: 7/29/2022  Admitting Diagnosis:  Coronary artery disease involving native coronary artery of native heart without angina pectoris   Length of Stay: 4 days  Recent Surgery: Procedure(s) (LRB):  CORONARY ARTERY BYPASS GRAFT (CABG) (Left)  HARVEST-VEIN-ENDOVASCULAR (Left) 4 Days Post-Op    Recommendations:     Discharge Recommendations: home health OT  Discharge Equipment Recommendations:  other (see comments) (TBD progress pending)  Barriers to discharge:  None    Plan:     Patient to be seen 5 x/week to address the above listed problems via self-care/home management, therapeutic activities, therapeutic exercises  · Plan of Care Expires: 08/28/22  · Plan of Care Reviewed with: patient, spouse    Assessment:   Christiano Frost is a 71 y.o. male with a medical diagnosis of Coronary artery disease involving native coronary artery of native heart without angina pectoris.  He presents with the following performance deficits affecting function: impaired endurance, impaired self care skills, impaired functional mobility, gait instability, impaired balance, pain, decreased safety awareness, decreased lower extremity function, decreased upper extremity function, impaired coordination, impaired cardiopulmonary response to activity.      Pt tolerated session fairly this date and was eager to participate. Demonstrating continued increased fatigue, decreased activity tolerance, impaired balance, and decreased safety awareness requiring increased time to complete functional tasks. Education reviewed of sternal precautions. Patient eager to complete functional mobility in hallway, continues with slight impaired balance and increased WOB following short distances, required standing rest break shelter through trial. Patient motivated to participate to return home. Patient is progressing towards established goals, and continues to benefit from  "acute skilled OT services to increase functional performance and improve quality of life. OT to continue to recommend HHHOT at discharge to improve pt functional independence and increase patient safety before returning home.      Rehab Prognosis: Fair; patient would benefit from acute skilled OT services to address these deficits and reach maximum level of function.        Subjective   Communicated with: Nurse prior to session.  Patient found up in chair with telemetry, pulse ox (continuous), peripheral IV upon OT entry to room. Pt agreeable to participate at this time.    Patient: " I feel good, I want to walk! "    Pain/Comfort:  · Pain Rating 1: 0/10  · Pain Rating Post-Intervention 1: 0/10    Objective:   Patient found with: telemetry, pulse ox (continuous), peripheral IV   General Precautions: Standard, Cardiac fall, sternal   Orthopedic Precautions:N/A   Braces: N/A   Oxygen Device: Room Air  Vitals: BP (!) (P) 140/63 (BP Location: Right arm, Patient Position: Lying)   Pulse 73   Temp 98.8 °F (37.1 °C) (Oral)   Resp (!) 21   Ht 5' 9" (1.753 m)   Wt 107.3 kg (236 lb 8.9 oz)   SpO2 97%   BMI 34.93 kg/m²     Outcome Measures:  [facility] 6 Click ADL: 19    Cognition:   · Alert and Cooperative  · Command following: follows two-step commands  · Communication: clear/fluent    Occupational Performance:  Bed Mobility:    · Not assessed    Functional Mobility/Transfers:   Patient completed Sit <> Stand Transfer from chair with stand by assistance  with  no assistive device x 2 trials   Static Standing Balance: SBA   Patient completed functional mobility of community distance ~250ft in hallway with CGA using no AD; patient with continued impaired balance and increased fatigue, requiring one standing rest break for safety.      Activities of Daily Living:  · Grooming: declined reports completed  · Upper Body Dressing: moderate assistance to don/doff robe  · Lower Body Dressing: maximal assistance to doff/don socks " for safety  · Toileting: declined reporting completed    [facility] 6 Click ADL:  AMPAC Total Score: 19    Treatment & Education:  -Pt education on OT role and POC.  -Importance of E/OOB activity with staff assistance, emphasis on daily participation  -Safety during functional transfer and mobility ensured  -Patient provided with education on importance of Bilateral UB/LB integration during functional tasks for improvement in functional performance.   -Education provided/reviewed, questions answered within OT scope of practice.   -Patient demonstrates understanding and learning this date.         Patient left up in chair with all lines intact, call button in reach and spouse present    GOALS:   Multidisciplinary Problems     Occupational Therapy Goals        Problem: Occupational Therapy    Goal Priority Disciplines Outcome Interventions   Occupational Therapy Goal     OT, PT/OT Ongoing, Progressing    Description: Goals to be met by: 8/13/2022    Patient will increase functional independence with ADLs by performing:    UE Dressing with Modified [place].  LE Dressing with Modified [place].  Grooming while standing at sink with Modified [place].  Toileting from toilet with Supervision for hygiene and clothing management.   Supine to sit with Modified [place].  Sit to stand transfer with Supervision without AD.  Toilet transfer to toilet with Supervision without AD.                     Time Tracking:     OT Date of Treatment: 08/02/22  OT Start Time: 1118  OT Stop Time: 1146  OT Total Time (min): 28 min    Billable Minutes:Self Care/Home Management 14  Therapeutic Activity 14      8/2/2022

## 2022-08-02 NOTE — SUBJECTIVE & OBJECTIVE
Interval History/Significant Events: No acute events overnight. Hemodynamically stable without inotropic or vasopressor support. Weaned off cleviprex yesterday. Tolerating CPAP at night.    Follow-up For: Procedure(s) (LRB):  CORONARY ARTERY BYPASS GRAFT (CABG) (Left)  HARVEST-VEIN-ENDOVASCULAR (Left)    Post-Operative Day: 4 Days Post-Op    Objective:     Vital Signs (Most Recent):  Temp: 98.8 °F (37.1 °C) (08/02/22 0700)  Pulse: (!) 120 (08/02/22 0900)  Resp: (!) 25 (08/02/22 0900)  BP: (!) 142/65 (08/02/22 0817)  SpO2: (!) 75 % (08/02/22 0900)   Vital Signs (24h Range):  Temp:  [97.9 °F (36.6 °C)-100.1 °F (37.8 °C)] 98.8 °F (37.1 °C)  Pulse:  [] 120  Resp:  [11-39] 25  SpO2:  [75 %-100 %] 75 %  BP: (115-165)/(54-88) 142/65  Arterial Line BP: (101-166)/(37-59) 145/51     Weight: 107.3 kg (236 lb 8.9 oz)  Body mass index is 34.93 kg/m².      Intake/Output Summary (Last 24 hours) at 8/2/2022 1022  Last data filed at 8/2/2022 0800  Gross per 24 hour   Intake --   Output 3825 ml   Net -3825 ml       Physical Exam  Constitutional:       General: He is not in acute distress.  HENT:      Head: Normocephalic and atraumatic.   Eyes:      Pupils: Pupils are equal, round, and reactive to light.   Neck:      Comments: L subclavian CVC    Cardiovascular:      Rate and Rhythm: Normal rate and regular rhythm.      Comments: Midline sternotomy c/d with dressing in place    Chest tube site with dressing in place.  Pulmonary:      Effort: Pulmonary effort is normal. No respiratory distress.      Comments: On CPAP  Abdominal:      General: There is no distension.      Palpations: Abdomen is soft.   Musculoskeletal:      Comments:        Skin:     General: Skin is warm and dry.      Capillary Refill: Capillary refill takes less than 2 seconds.   Neurological:      General: No focal deficit present.      Mental Status: He is alert and oriented to person, place, and time.       Vents:  Vent Mode: Spont (07/29/22 1956)  Ventilator  Initiated: Yes (07/29/22 1315)  Set Rate: 30 BPM (07/29/22 1813)  Vt Set: 480 mL (07/29/22 1813)  Pressure Support: 5 cmH20 (07/29/22 1956)  PEEP/CPAP: 8.5 cmH20 (07/29/22 1956)  Oxygen Concentration (%): 35 (08/02/22 0000)  Peak Airway Pressure: 14 cmH2O (07/29/22 1956)  Plateau Pressure: 0 cmH20 (07/29/22 1956)  Total Ve: 2.91 mL (07/29/22 1956)  Negative Inspiratory Force (cm H2O): -15 (07/29/22 1956)  F/VT Ratio<105 (RSBI): (!) 35.35 (07/29/22 1946)    Lines/Drains/Airways       Central Venous Catheter Line  Duration                  Percutaneous Central Line Insertion/Assessment - Quad lumen  07/29/22 0853 left subclavian 4 days    Percutaneous Central Line Insertion/Assessment - Quad Lumen  07/29/22 0853 left subclavian 4 days              Peripheral Intravenous Line  Duration                  Peripheral IV - Single Lumen 07/29/22 0540 18 G Left;Posterior Hand 4 days         Peripheral IV - Single Lumen 07/29/22 1345 22 G Posterior;Right Hand 3 days         Peripheral IV - Single Lumen 07/29/22 1350 18 G Anterior;Proximal;Right Forearm 3 days                    Significant Labs:    CBC/Anemia Profile:  Recent Labs   Lab 08/01/22 0315 08/02/22 0311   WBC 8.45 6.87   HGB 8.5* 9.0*   HCT 25.9* 26.5*   * 153   MCV 84 84   RDW 14.6* 14.7*        Chemistries:  Recent Labs   Lab 08/01/22 0315 08/01/22  0915 08/01/22  1622 08/01/22  1939 08/01/22  2339 08/02/22  0311 08/02/22  0839   *  --   --   --   --  134*  --    K 3.6   < > 4.0 3.7 4.0 4.0  --    CL 98  --   --   --   --  98  --    CO2 29  --   --   --   --  28  --    BUN 13  --   --   --   --  16  --    CREATININE 0.8  --   --   --   --  0.8  --    CALCIUM 7.7*  --   --   --   --  8.6*  --    ALBUMIN 3.3*  --   --   --   --  3.2*  --    PROT 5.5*  --   --   --   --  6.2  --    BILITOT 1.1*  --   --   --   --  0.9  --    ALKPHOS 41*  --   --   --   --  46*  --    ALT 20  --   --   --   --  25  --    AST 56*  --   --   --   --  49*  --    MG  --     < > 2.2  --  2.0  --  2.0   PHOS 1.9*  --   --   --   --  2.3*  --     < > = values in this interval not displayed.       Coagulation:   Recent Labs   Lab 08/02/22  0311   INR 1.0   APTT 31.0     All pertinent labs within the past 24 hours have been reviewed.    Significant Imaging:  I have reviewed all pertinent imaging results/findings within the past 24 hours.

## 2022-08-02 NOTE — PT/OT/SLP PROGRESS
Physical Therapy Treatment    Patient Name:  Christiano Frost   MRN:  [id number]    Recommendations:     Discharge Recommendations:  home health PT   Discharge Equipment Recommendations: none   Barriers to discharge: None    Assessment:     Christiano Frost is a 71 y.o. male admitted with a medical diagnosis of Coronary artery disease involving native coronary artery of native heart without angina pectoris.  He presents with the following impairments/functional limitations:  weakness, impaired endurance, impaired functional mobility, impaired balance, gait instability, decreased safety awareness pt tolerated treatment better being able to gait train farther. Pt will benefit from cont skilled PT 5x/wk to progress physically. Pt will be able to discharge home with HHPT when medically stable. Pt is s/p CABG 7/29/22.    Rehab Prognosis: Good; patient would benefit from acute skilled PT services to address these deficits and reach maximum level of function.    Recent Surgery: Procedure(s) (LRB):  CORONARY ARTERY BYPASS GRAFT (CABG) (Left)  HARVEST-VEIN-ENDOVASCULAR (Left) 4 Days Post-Op    Plan:     During this hospitalization, patient to be seen 5 x/week to address the identified rehab impairments via gait training, therapeutic activities and progress toward the following goals:    · Plan of Care Expires:  08/30/22    Subjective     Chief Complaint: pt c/o slight SOB after gait training.   Patient/Family Comments/goals:  To get better and go home.   Pain/Comfort:  · Pain Rating 1: 0/10  · Pain Rating Post-Intervention 1: 0/10      Objective:     Communicated with nurse prior to session.  Patient found up in chair with telemetry, pulse ox (continuous), blood pressure cuff (hep lock IV) upon PT entry to room.     General Precautions: Standard, fall, sternal   Orthopedic Precautions:N/A   Braces:    Respiratory Status: Room air     Functional Mobility:  · Transfers:  Pt needed verbal cues for functional mobility with sternal  precautions.    · Sit to Stand:  contact guard assistance with hand-held assist  ·   · Gait: pt received gait training ~ 250 ft with mask on, CGA and RN present with portable monitor. pt had 1 standing rest period due to SOB. Pt was SOB after gait training but pulse ox 96%.      AM-PAC 6 CLICK MOBILITY  Turning over in bed (including adjusting bedclothes, sheets and blankets)?: 3  Sitting down on and standing up from a chair with arms (e.g., wheelchair, bedside commode, etc.): 3  Moving from lying on back to sitting on the side of the bed?: 3  Moving to and from a bed to a chair (including a wheelchair)?: 3  Need to walk in hospital room?: 3  Climbing 3-5 steps with a railing?: 2  Basic Mobility Total Score: 17       Therapeutic Activities and Exercises:   pt received verbal instructions in PT POC and verbally expressed understanding of such.     Patient left up in chair with all lines intact, call button in reach and RN notified and present     GOALS:   Multidisciplinary Problems     Physical Therapy Goals        Problem: Physical Therapy    Goal Priority Disciplines Outcome Goal Variances Interventions   Physical Therapy Goal     PT, PT/OT Ongoing, Progressing     Description: Goals to be met by: 2022     Patient will increase functional independence with mobility by performin. Supine to sit with independence-not met  2. Sit to supine with independence -not met  3. Sit to stand transfer with modified independence -not met  4. Bed to chair transfer with modified independence using platform rolling walker as needed -not met  5. Gait  x 200 feet with modified independence using platform rolling walker as needed -not met  6. Lower extremity exercise program x10 reps, with independence -not met                   Time Tracking:     PT Received On: 22  PT Start Time: 1330     PT Stop Time: 1343  PT Total Time (min): 13 min     Billable Minutes: Gait Training 13 min    Treatment Type: Treatment  PT/PTA:  PT     PTA Visit Number: 0     08/02/2022

## 2022-08-02 NOTE — PROGRESS NOTES
[facility]  Critical Care - Surgery  Progress Note    Patient Name: Christiano Frost  MRN: [id number]  Admission Date: 7/29/2022  Hospital Length of Stay: 4 days  Code Status: Full Code  Attending Provider: Magen Martell MD  Primary Care Provider: Ellis Allan MD   Principal Problem: Coronary artery disease involving native coronary artery of native heart without angina pectoris    Subjective:     Hospital/ICU Course:  No notes on file    Interval History/Significant Events: No acute events overnight. Hemodynamically stable without inotropic or vasopressor support. Weaned off cleviprex yesterday. Tolerating CPAP at night.    Follow-up For: Procedure(s) (LRB):  CORONARY ARTERY BYPASS GRAFT (CABG) (Left)  HARVEST-VEIN-ENDOVASCULAR (Left)    Post-Operative Day: 4 Days Post-Op    Objective:     Vital Signs (Most Recent):  Temp: 98.8 °F (37.1 °C) (08/02/22 0700)  Pulse: (!) 120 (08/02/22 0900)  Resp: (!) 25 (08/02/22 0900)  BP: (!) 142/65 (08/02/22 0817)  SpO2: (!) 75 % (08/02/22 0900)   Vital Signs (24h Range):  Temp:  [97.9 °F (36.6 °C)-100.1 °F (37.8 °C)] 98.8 °F (37.1 °C)  Pulse:  [] 120  Resp:  [11-39] 25  SpO2:  [75 %-100 %] 75 %  BP: (115-165)/(54-88) 142/65  Arterial Line BP: (101-166)/(37-59) 145/51     Weight: 107.3 kg (236 lb 8.9 oz)  Body mass index is 34.93 kg/m².      Intake/Output Summary (Last 24 hours) at 8/2/2022 1022  Last data filed at 8/2/2022 0800  Gross per 24 hour   Intake --   Output 3825 ml   Net -3825 ml       Physical Exam  Constitutional:       General: He is not in acute distress.  HENT:      Head: Normocephalic and atraumatic.   Eyes:      Pupils: Pupils are equal, round, and reactive to light.   Neck:      Comments: L subclavian CVC    Cardiovascular:      Rate and Rhythm: Normal rate and regular rhythm.      Comments: Midline sternotomy c/d with dressing in place    Chest tube site with dressing in place.  Pulmonary:      Effort: Pulmonary effort is  normal. No respiratory distress.      Comments: On CPAP  Abdominal:      General: There is no distension.      Palpations: Abdomen is soft.   Musculoskeletal:      Comments:        Skin:     General: Skin is warm and dry.      Capillary Refill: Capillary refill takes less than 2 seconds.   Neurological:      General: No focal deficit present.      Mental Status: He is alert and oriented to person, place, and time.       Vents:  Vent Mode: Spont (07/29/22 1956)  Ventilator Initiated: Yes (07/29/22 1315)  Set Rate: 30 BPM (07/29/22 1813)  Vt Set: 480 mL (07/29/22 1813)  Pressure Support: 5 cmH20 (07/29/22 1956)  PEEP/CPAP: 8.5 cmH20 (07/29/22 1956)  Oxygen Concentration (%): 35 (08/02/22 0000)  Peak Airway Pressure: 14 cmH2O (07/29/22 1956)  Plateau Pressure: 0 cmH20 (07/29/22 1956)  Total Ve: 2.91 mL (07/29/22 1956)  Negative Inspiratory Force (cm H2O): -15 (07/29/22 1956)  F/VT Ratio<105 (RSBI): (!) 35.35 (07/29/22 1946)    Lines/Drains/Airways       Central Venous Catheter Line  Duration                  Percutaneous Central Line Insertion/Assessment - Quad lumen  07/29/22 0853 left subclavian 4 days    Percutaneous Central Line Insertion/Assessment - Quad Lumen  07/29/22 0853 left subclavian 4 days              Peripheral Intravenous Line  Duration                  Peripheral IV - Single Lumen 07/29/22 0540 18 G Left;Posterior Hand 4 days         Peripheral IV - Single Lumen 07/29/22 1345 22 G Posterior;Right Hand 3 days         Peripheral IV - Single Lumen 07/29/22 1350 18 G Anterior;Proximal;Right Forearm 3 days                    Significant Labs:    CBC/Anemia Profile:  Recent Labs   Lab 08/01/22 0315 08/02/22  0311   WBC 8.45 6.87   HGB 8.5* 9.0*   HCT 25.9* 26.5*   * 153   MCV 84 84   RDW 14.6* 14.7*        Chemistries:  Recent Labs   Lab 08/01/22  0315 08/01/22  0915 08/01/22  1622 08/01/22  1939 08/01/22  2339 08/02/22  0311 08/02/22  0839   *  --   --   --   --  134*  --    K 3.6   < > 4.0  3.7 4.0 4.0  --    CL 98  --   --   --   --  98  --    CO2 29  --   --   --   --  28  --    BUN 13  --   --   --   --  16  --    CREATININE 0.8  --   --   --   --  0.8  --    CALCIUM 7.7*  --   --   --   --  8.6*  --    ALBUMIN 3.3*  --   --   --   --  3.2*  --    PROT 5.5*  --   --   --   --  6.2  --    BILITOT 1.1*  --   --   --   --  0.9  --    ALKPHOS 41*  --   --   --   --  46*  --    ALT 20  --   --   --   --  25  --    AST 56*  --   --   --   --  49*  --    MG  --    < > 2.2  --  2.0  --  2.0   PHOS 1.9*  --   --   --   --  2.3*  --     < > = values in this interval not displayed.       Coagulation:   Recent Labs   Lab 08/02/22  0311   INR 1.0   APTT 31.0     All pertinent labs within the past 24 hours have been reviewed.    Significant Imaging:  I have reviewed all pertinent imaging results/findings within the past 24 hours.    Assessment/Plan:     * Coronary artery disease involving native coronary artery of native heart without angina pectoris  71 year old male with CAD, depression, BPH, diabetes mellitus (HA1C 6.5), hyperlipidemia, hypertension, and previous TIA now s/p CABGx3      Neuro/Psych:   -- Sedation: none    -- Pain: PRN Oxy + Dilaudid  -- Scheduled Tylenol             Cards:   -- S/P CABG x 3 on 7/2922  -- HDS off pressors  -- Ventricular pacing wires. Removed 8/1  -- MAP > 65, Syst < 140  -- Cleviprex PRN, Nifedipine 90, weaned off cleviprex  -- Aspirin 325mg  -- Statin  -- Toprol      Pulm:   -- Goal O2 sat > 88%  -- ABG PRN  -- Wean comfort flow as tolerated  -- Mediastinal chest tubes x2 and pleural chest tube x1 to suction, plan to remove today  -- CPAP at night with home CPAP machine     Renal:  -- Trend BUN/Cr   -- Lasix BID      FEN / GI:   -- Replace lytes as needed  -- Nutrition: cardiac diet  -- GI ppx: famotidine  -- Bowel reg: miralax  -- 1500mL 5% Albumin in first 12 hours post-op, complete      ID:   -- Tm: afebrile; WBC stable  -- Maribeth-op ancef complete      Heme/Onc:   --  H/H stable   -- Daily CBC  -- 700mL Cell saver given back  -- Post-op coags wnl  -- Aspirin 325mg QD      Endo:   -- BG goal 140-180  -- Insulin SSI      PPx:   Feeding: Cardiac diet, 1500 mL restriction  Analgesia/Sedation: none / PRN Oxy + Dilaudid  Thromboembolic prevention: SCDs, lovenox  HOB >30: Yes  Stress Ulcer ppx: famotidine  Glucose control: Critical care goal 140-180 g/dl, ISS     Lines/Drains/Airway: CVC L subclavian, plan to remove today      Dispo/Code Status/Palliative:   -- Step down / Full Code.            Emmy Jennings MD  Critical Care - Surgery  [facility]

## 2022-08-02 NOTE — CARE UPDATE
Care Update:     No acute events overnight. Patient on the SICU in room 50860/81191 A. Blood glucose stable. BG at and below goal on current insulin regimen (SSI ). Steroid use- None. 4 Days Post-Op  Renal function- Normal   Vasopressors-  None       Diet Cardiac Fluid - 1500mL     POCT Glucose   Date Value Ref Range Status   08/02/2022 110 70 - 110 mg/dL Final   08/01/2022 126 (H) 70 - 110 mg/dL Final   08/01/2022 125 (H) 70 - 110 mg/dL Final   08/01/2022 113 (H) 70 - 110 mg/dL Final   08/01/2022 120 (H) 70 - 110 mg/dL Final   08/01/2022 105 70 - 110 mg/dL Final   07/31/2022 124 (H) 70 - 110 mg/dL Final   07/31/2022 124 (H) 70 - 110 mg/dL Final   07/31/2022 119 (H) 70 - 110 mg/dL Final   07/31/2022 140 (H) 70 - 110 mg/dL Final   07/31/2022 126 (H) 70 - 110 mg/dL Final   07/31/2022 148 (H) 70 - 110 mg/dL Final   07/31/2022 118 (H) 70 - 110 mg/dL Final   07/30/2022 157 (H) 70 - 110 mg/dL Final   07/30/2022 138 (H) 70 - 110 mg/dL Final   07/30/2022 146 (H) 70 - 110 mg/dL Final     Lab Results   Component Value Date    HGBA1C 5.9 (H) 07/20/2022       Endocrinology consulted for BG management.   BG goal 140-180    Diabetes Medications             metFORMIN (GLUCOPHAGE) 500 MG tablet Take 1 tablet (500 mg total) by mouth daily with breakfast.      Hospital Medications   BG checks AC/HS            glucagon (human recombinant) injection 1 mg 1 mg, Intramuscular, As needed (PRN), Turn patient on their side, give IM, and NOTIFY MD IMMEDIATELY.<BR><BR>Feed the patient as soon as patient awakens and is able to swallow.    glucose chewable tablet 16 g 16 g, Oral, As needed (PRN), (16 grams = #  four 4gm glucose tablets)    glucose chewable tablet 24 g 24 g, Oral, As needed (PRN), (24 grams = # six 4gm glucose tablets)    insulin aspart U-100 pen 1-10 Units 1-10 Units, Subcutaneous, Before meals &amp; nightly PRN, **MODERATE CORRECTION DOSE**<BR>Blood Glucose<BR>mg/dL                  Pre-meal                2200<BR>151-200                 2 units                    1 unit<BR>201-250                4 units                    2 units  <BR>251-300                6 units                    3 units  <BR>301-350                8 units                    4 units <BR>&gt;350                     10 units                  5 units<BR>Administer subcutaneously if needed at times designated by monitoring schedule. <BR>DO NOT HOLD correction dose insulin for patients who are  NPO.<BR>&quot;HIGH ALERT MEDICATION&quot; - Administer with meals or TF/TPN.            ** Please notify Endocrine for any change and/or advance in diet**  ** Please call Endocrine for any BG related issues **    Discharge Planning:   TBD. Please notify endocrinology prior to discharge.      Edenilson Cassidy DNP, FNP-C  Department of Endocrinology  Inpatient Glycemic Management

## 2022-08-03 VITALS
HEART RATE: 77 BPM | SYSTOLIC BLOOD PRESSURE: 123 MMHG | TEMPERATURE: 98 F | RESPIRATION RATE: 18 BRPM | DIASTOLIC BLOOD PRESSURE: 65 MMHG | OXYGEN SATURATION: 92 % | WEIGHT: 229.5 LBS | HEIGHT: 69 IN | BODY MASS INDEX: 33.99 KG/M2

## 2022-08-03 LAB
ALBUMIN SERPL BCP-MCNC: 3.1 G/DL (ref 3.5–5.2)
ALP SERPL-CCNC: 44 U/L (ref 55–135)
ALT SERPL W/O P-5'-P-CCNC: 22 U/L (ref 10–44)
ANION GAP SERPL CALC-SCNC: 9 MMOL/L (ref 8–16)
APTT BLDCRRT: 30.9 SEC (ref 21–32)
AST SERPL-CCNC: 37 U/L (ref 10–40)
BASOPHILS # BLD AUTO: 0.05 K/UL (ref 0–0.2)
BASOPHILS NFR BLD: 1 % (ref 0–1.9)
BILIRUB SERPL-MCNC: 0.9 MG/DL (ref 0.1–1)
BUN SERPL-MCNC: 18 MG/DL (ref 8–23)
CALCIUM SERPL-MCNC: 8.6 MG/DL (ref 8.7–10.5)
CHLORIDE SERPL-SCNC: 98 MMOL/L (ref 95–110)
CO2 SERPL-SCNC: 25 MMOL/L (ref 23–29)
CREAT SERPL-MCNC: 0.8 MG/DL (ref 0.5–1.4)
DIFFERENTIAL METHOD: ABNORMAL
EOSINOPHIL # BLD AUTO: 0.2 K/UL (ref 0–0.5)
EOSINOPHIL NFR BLD: 4.2 % (ref 0–8)
ERYTHROCYTE [DISTWIDTH] IN BLOOD BY AUTOMATED COUNT: 14.6 % (ref 11.5–14.5)
EST. GFR  (NO RACE VARIABLE): >60 ML/MIN/1.73 M^2
GLUCOSE SERPL-MCNC: 98 MG/DL (ref 70–110)
HCT VFR BLD AUTO: 28.5 % (ref 40–54)
HGB BLD-MCNC: 9.8 G/DL (ref 14–18)
IMM GRANULOCYTES # BLD AUTO: 0.05 K/UL (ref 0–0.04)
IMM GRANULOCYTES NFR BLD AUTO: 1 % (ref 0–0.5)
INR PPP: 1 (ref 0.8–1.2)
LYMPHOCYTES # BLD AUTO: 1.6 K/UL (ref 1–4.8)
LYMPHOCYTES NFR BLD: 30.6 % (ref 18–48)
MAGNESIUM SERPL-MCNC: 2 MG/DL (ref 1.6–2.6)
MCH RBC QN AUTO: 28.8 PG (ref 27–31)
MCHC RBC AUTO-ENTMCNC: 34.4 G/DL (ref 32–36)
MCV RBC AUTO: 84 FL (ref 82–98)
MONOCYTES # BLD AUTO: 1 K/UL (ref 0.3–1)
MONOCYTES NFR BLD: 18.9 % (ref 4–15)
NEUTROPHILS # BLD AUTO: 2.3 K/UL (ref 1.8–7.7)
NEUTROPHILS NFR BLD: 44.3 % (ref 38–73)
NRBC BLD-RTO: 0 /100 WBC
PHOSPHATE SERPL-MCNC: 3.3 MG/DL (ref 2.7–4.5)
PLATELET # BLD AUTO: 186 K/UL (ref 150–450)
PMV BLD AUTO: 10.5 FL (ref 9.2–12.9)
POTASSIUM SERPL-SCNC: 4 MMOL/L (ref 3.5–5.1)
PROT SERPL-MCNC: 6.1 G/DL (ref 6–8.4)
PROTHROMBIN TIME: 10 SEC (ref 9–12.5)
RBC # BLD AUTO: 3.4 M/UL (ref 4.6–6.2)
SODIUM SERPL-SCNC: 132 MMOL/L (ref 136–145)
WBC # BLD AUTO: 5.19 K/UL (ref 3.9–12.7)

## 2022-08-03 PROCEDURE — 83735 ASSAY OF MAGNESIUM: CPT | Performed by: THORACIC SURGERY (CARDIOTHORACIC VASCULAR SURGERY)

## 2022-08-03 PROCEDURE — 27000221 HC OXYGEN, UP TO 24 HOURS

## 2022-08-03 PROCEDURE — 99900035 HC TECH TIME PER 15 MIN (STAT)

## 2022-08-03 PROCEDURE — 97530 THERAPEUTIC ACTIVITIES: CPT

## 2022-08-03 PROCEDURE — 84100 ASSAY OF PHOSPHORUS: CPT

## 2022-08-03 PROCEDURE — 85025 COMPLETE CBC W/AUTO DIFF WBC: CPT

## 2022-08-03 PROCEDURE — 25000003 PHARM REV CODE 250

## 2022-08-03 PROCEDURE — 36415 COLL VENOUS BLD VENIPUNCTURE: CPT

## 2022-08-03 PROCEDURE — 85730 THROMBOPLASTIN TIME PARTIAL: CPT

## 2022-08-03 PROCEDURE — 25000003 PHARM REV CODE 250: Performed by: THORACIC SURGERY (CARDIOTHORACIC VASCULAR SURGERY)

## 2022-08-03 PROCEDURE — 85610 PROTHROMBIN TIME: CPT

## 2022-08-03 PROCEDURE — 63600175 PHARM REV CODE 636 W HCPCS: Performed by: THORACIC SURGERY (CARDIOTHORACIC VASCULAR SURGERY)

## 2022-08-03 PROCEDURE — 94761 N-INVAS EAR/PLS OXIMETRY MLT: CPT

## 2022-08-03 PROCEDURE — 80053 COMPREHEN METABOLIC PANEL: CPT

## 2022-08-03 RX ORDER — ACETAMINOPHEN 500 MG
1000 TABLET ORAL EVERY 6 HOURS PRN
Qty: 30 TABLET | Refills: 0 | Status: SHIPPED | OUTPATIENT
Start: 2022-08-03

## 2022-08-03 RX ORDER — NIFEDIPINE 90 MG/1
90 TABLET, EXTENDED RELEASE ORAL
Qty: 30 TABLET | Refills: 11 | Status: SHIPPED | OUTPATIENT
Start: 2022-08-03 | End: 2022-09-29 | Stop reason: DRUGHIGH

## 2022-08-03 RX ORDER — OXYCODONE HYDROCHLORIDE 5 MG/1
5 TABLET ORAL EVERY 4 HOURS PRN
Qty: 42 TABLET | Refills: 0 | Status: SHIPPED | OUTPATIENT
Start: 2022-08-03 | End: 2022-08-10

## 2022-08-03 RX ORDER — METOPROLOL SUCCINATE 100 MG/1
100 TABLET, EXTENDED RELEASE ORAL DAILY
Qty: 30 TABLET | Refills: 11 | Status: SHIPPED | OUTPATIENT
Start: 2022-08-03 | End: 2023-05-02

## 2022-08-03 RX ORDER — ASPIRIN 325 MG
325 TABLET ORAL DAILY
Qty: 30 TABLET | Refills: 11 | Status: SHIPPED | OUTPATIENT
Start: 2022-08-03 | End: 2024-02-27 | Stop reason: DRUGHIGH

## 2022-08-03 RX ORDER — DOCUSATE SODIUM 100 MG/1
100 CAPSULE, LIQUID FILLED ORAL 2 TIMES DAILY PRN
Qty: 30 CAPSULE | Refills: 0 | Status: SHIPPED | OUTPATIENT
Start: 2022-08-03 | End: 2022-09-07 | Stop reason: ALTCHOICE

## 2022-08-03 RX ORDER — FUROSEMIDE 40 MG/1
40 TABLET ORAL DAILY
Qty: 5 TABLET | Refills: 0 | Status: SHIPPED | OUTPATIENT
Start: 2022-08-03 | End: 2022-09-07 | Stop reason: ALTCHOICE

## 2022-08-03 RX ORDER — ATORVASTATIN CALCIUM 40 MG/1
40 TABLET, FILM COATED ORAL DAILY
Qty: 90 TABLET | Refills: 3 | Status: SHIPPED | OUTPATIENT
Start: 2022-08-03 | End: 2022-10-27

## 2022-08-03 RX ORDER — ISOSORBIDE MONONITRATE 30 MG/1
30 TABLET, EXTENDED RELEASE ORAL
Qty: 30 TABLET | Refills: 11 | Status: SHIPPED | OUTPATIENT
Start: 2022-08-03 | End: 2022-09-07 | Stop reason: ALTCHOICE

## 2022-08-03 RX ORDER — POTASSIUM CHLORIDE 20 MEQ/1
20 TABLET, EXTENDED RELEASE ORAL DAILY
Qty: 5 TABLET | Refills: 0 | Status: SHIPPED | OUTPATIENT
Start: 2022-08-03 | End: 2022-08-08

## 2022-08-03 RX ORDER — POLYETHYLENE GLYCOL 3350 17 G/17G
17 POWDER, FOR SOLUTION ORAL 2 TIMES DAILY PRN
Qty: 5 EACH | Refills: 0 | Status: SHIPPED | OUTPATIENT
Start: 2022-08-03 | End: 2022-09-07 | Stop reason: ALTCHOICE

## 2022-08-03 RX ADMIN — DOCUSATE SODIUM 100 MG: 100 CAPSULE ORAL at 09:08

## 2022-08-03 RX ADMIN — ATORVASTATIN CALCIUM 40 MG: 20 TABLET, FILM COATED ORAL at 09:08

## 2022-08-03 RX ADMIN — ESCITALOPRAM OXALATE 10 MG: 5 TABLET, FILM COATED ORAL at 09:08

## 2022-08-03 RX ADMIN — POTASSIUM CHLORIDE 20 MEQ: 1500 TABLET, EXTENDED RELEASE ORAL at 09:08

## 2022-08-03 RX ADMIN — METOPROLOL SUCCINATE 100 MG: 100 TABLET, EXTENDED RELEASE ORAL at 09:08

## 2022-08-03 RX ADMIN — MUPIROCIN 1 G: 20 OINTMENT TOPICAL at 09:08

## 2022-08-03 RX ADMIN — ASPIRIN 325 MG ORAL TABLET 325 MG: 325 PILL ORAL at 09:08

## 2022-08-03 RX ADMIN — FUROSEMIDE 20 MG: 10 INJECTION, SOLUTION INTRAMUSCULAR; INTRAVENOUS at 09:08

## 2022-08-03 RX ADMIN — POLYETHYLENE GLYCOL 3350 17 G: 17 POWDER, FOR SOLUTION ORAL at 09:08

## 2022-08-03 RX ADMIN — TAMSULOSIN HYDROCHLORIDE 0.4 MG: 0.4 CAPSULE ORAL at 09:08

## 2022-08-03 RX ADMIN — ISOSORBIDE MONONITRATE 30 MG: 30 TABLET, EXTENDED RELEASE ORAL at 11:08

## 2022-08-03 RX ADMIN — ACETAMINOPHEN 1000 MG: 500 TABLET ORAL at 05:08

## 2022-08-03 RX ADMIN — FAMOTIDINE 20 MG: 20 TABLET ORAL at 09:08

## 2022-08-03 RX ADMIN — NIFEDIPINE 90 MG: 60 TABLET, FILM COATED, EXTENDED RELEASE ORAL at 09:08

## 2022-08-03 NOTE — HPI
Mr. Frost is a 71 year-old gentleman with a history of prior stroke/TIA, hypertension, diabetes (HbA1C 6.5), and BMI of 35.  Recently, he has been symptomatic with dyspnea on exertion symptoms interfering with his quality of life.  His most recent echocardiogram showed 60% ejection fraction with mild mitral regurgitation.  Angiogram showed 50% left main disease, totally occluded early mid LAD with a small to moderate sized mid and distal LAD, small to moderate sized second diagonal artery (small first diagonal artery), 80% proximal lesion in a small/small-moderate OM1, large OM2, totally occluded ostial RCA with a small/small-moderate PDA (fills via left to right collaterals).     We had an extensive discussion with him regarding the ACC/AHA guidelines and we agreed that he has class I indications for surgery involving coronary artery bypass surgery x 3 or 4 (LIMA-LAD, SVG-OM2, possible SVG-D2, possible SVG-OM1, possible SVG-PDA).  The risks and benefits were explained and informed consent was obtained

## 2022-08-03 NOTE — PLAN OF CARE
Problem: Adult Inpatient Plan of Care  Goal: Plan of Care Review  Outcome: Met  Goal: Patient-Specific Goal (Individualized)  Outcome: Met  Goal: Absence of Hospital-Acquired Illness or Injury  Outcome: Met  Goal: Optimal Comfort and Wellbeing  Outcome: Met  Goal: Readiness for Transition of Care  Outcome: Met     Problem: Diabetes Comorbidity  Goal: Blood Glucose Level Within Targeted Range  Outcome: Met     Problem: Activity Intolerance (Cardiovascular Surgery)  Goal: Improved Activity Tolerance  Outcome: Met     Problem: Adjustment to Surgery (Cardiovascular Surgery)  Goal: Optimal Coping with Heart Surgery  Outcome: Met     Problem: Bleeding (Cardiovascular Surgery)  Goal: Bleeding (Cardiovascular Surgery)  Outcome: Met     Problem: Bowel Motility Impaired (Cardiovascular Surgery)  Goal: Effective Bowel Elimination (Cardiovascular Surgery)  Outcome: Met     Problem: Cardiac Function Impaired (Cardiovascular Surgery)  Goal: Effective Cardiac Function  Outcome: Met     Problem: Cerebral Tissue Perfusion (Cardiovascular Surgery)  Goal: Optimal Cerebral Tissue Perfusion (Cardiovascular Surgery)  Outcome: Met     Problem: Fluid and Electrolyte Imbalance (Cardiovascular Surgery)  Goal: Fluid and Electrolyte Balance (Cardiovascular Surgery)  Outcome: Met     Problem: Glycemic Control Impaired (Cardiovascular Surgery)  Goal: Blood Glucose Level Within Targeted Range (Cardiovascular Surgery)  Outcome: Met     Problem: Infection (Cardiovascular Surgery)  Goal: Absence of Infection Signs and Symptoms  Outcome: Met     Problem: Ongoing Anesthesia Effects (Cardiovascular Surgery)  Goal: Anesthesia/Sedation Recovery  Outcome: Met     Problem: Pain (Cardiovascular Surgery)  Goal: Acceptable Pain Control  Outcome: Met     Problem: Postoperative Nausea and Vomiting (Cardiovascular Surgery)  Goal: Nausea and Vomiting Relief (Cardiovascular Surgery)  Outcome: Met     Problem: Postoperative Urinary Retention (Cardiovascular  Surgery)  Goal: Effective Urinary Elimination (Cardiovascular Surgery)  Outcome: Met     Problem: Respiratory Compromise (Cardiovascular Surgery)  Goal: Effective Oxygenation and Ventilation (Cardiovascular Surgery)  Outcome: Met     Problem: Infection  Goal: Absence of Infection Signs and Symptoms  Outcome: Met     Problem: Communication Impairment (Mechanical Ventilation, Invasive)  Goal: Effective Communication  Outcome: Met     Problem: Device-Related Complication Risk (Mechanical Ventilation, Invasive)  Goal: Optimal Device Function  Outcome: Met     Problem: Inability to Wean (Mechanical Ventilation, Invasive)  Goal: Mechanical Ventilation Liberation  Outcome: Met     Problem: Nutrition Impairment (Mechanical Ventilation, Invasive)  Goal: Optimal Nutrition Delivery  Outcome: Met     Problem: Skin and Tissue Injury (Mechanical Ventilation, Invasive)  Goal: Absence of Device-Related Skin and Tissue Injury  Outcome: Met     Problem: Ventilator-Induced Lung Injury (Mechanical Ventilation, Invasive)  Goal: Absence of Ventilator-Induced Lung Injury  Outcome: Met     Problem: Communication Impairment (Artificial Airway)  Goal: Effective Communication  Outcome: Met     Problem: Device-Related Complication Risk (Artificial Airway)  Goal: Optimal Device Function  Outcome: Met     Problem: Skin and Tissue Injury (Artificial Airway)  Goal: Absence of Device-Related Skin or Tissue Injury  Outcome: Met     Problem: Fall Injury Risk  Goal: Absence of Fall and Fall-Related Injury  Outcome: Met     Problem: Restraint, Nonbehavioral (Nonviolent)  Goal: Absence of Harm or Injury  Outcome: Met     Problem: Skin Injury Risk Increased  Goal: Skin Health and Integrity  Outcome: Met

## 2022-08-03 NOTE — HOSPITAL COURSE
On 7/29/22, the patient was taken to the Operating Room for the above stated procedure. Please see the previously dictated operative report for complete details. Postoperatively, the patient was taken from the  Operating Room to the ICU where the vital signs were monitored and pain was kept under control. The patient was weaned from the drips and extubated in the ICU per protocol. Once hemodynamically stable, the patient was transferred to the Cardiac Step-Down floor for continued strengthening and ambulation. On postoperative day 5, the patient was ready for discharge to home. At the time of discharge, the patient was ambulating unassisted. Pain was well controlled with oral analgesics and the patient was tolerating the diet.     MOBILITY AND ACTIVITY: As tolerated. Patient may shower. No heavy lifting of greater than 5 pounds and no driving.     DIET: An 1800-calorie ADA with a 1500 mL fluid restriction.     WOUND CARE INSTRUCTIONS: Check for redness, swelling and drainage around the  incision or wound. Patient is to call for any obvious bleeding, drainage, pus from the wound, unusual problems or difficulties or temperature of greater than 101   degrees.     FOLLOWUP: Follow up with Dr. Martell in approximately 5 weeks. Prior to this  appointment, the patient will have an EKG.    Patient not placed on Ace-Inhibitor at the time of discharge due to potential for hypotension     DISCHARGE CONDITION: At the time of discharge, the patient was in sinus rhythm and afebrile with stable vital signs.

## 2022-08-03 NOTE — NURSING TRANSFER
Nursing Transfer Note      8/2/2022     Pt transferred to CSU Rm 350. Pt connected to tele monitor and on RA. Transfer via wheelchair. Report given to FLAVIA Morgan. Nurse present at pt bedside upon arrival to assess pt. All medications given to RN. All pt personal items and home CPAP machine sent with pt. Chart given to .

## 2022-08-03 NOTE — PLAN OF CARE
[facility] - Cardiology Stepdown  Discharge Final Note    Primary Care Provider: Ellis Allan MD    Expected Discharge Date: 8/3/2022    Final Discharge Note (most recent)     Final Note - 08/03/22 1237        Final Note    Assessment Type Final Discharge Note     Anticipated Discharge Disposition Home-Health Care Svc        Post-Acute Status    Post-Acute Authorization Home Health     Home Health Status Set-up Complete/Auth obtained               Pt accepted by [facility] for admit Friday 8/5.    Lady Ellington LMSW  [facility]  [id number]      Future Appointments   Date Time Provider Department Center   9/7/2022  9:15 AM EKG, APPT [facility] EKG [facility]   9/7/2022  9:50 AM Magen Martell MD [facility] CARDVAS [facility]   11/8/2022  1:00 PM John Meyers MD [facility] CARDIO [facility]         Contact Info     [facility]   Specialty: Home Health Services, Home Therapy Services, Home Living Aide Services    [address] [address]   Phone: [phone]       Next Steps: Follow up    Instructions: They will contact you to schedule date and time.

## 2022-08-03 NOTE — PLAN OF CARE
Problem: Occupational Therapy  Goal: Occupational Therapy Goal  Description: Goals to be met by: 8/13/2022    Patient will increase functional independence with ADLs by performing:    UE Dressing with Modified [place].  LE Dressing with Modified [place].  Grooming while standing at sink with Modified [place].  Toileting from toilet with Supervision for hygiene and clothing management.   Supine to sit with Modified [place].  Sit to stand transfer with Supervision without AD.  Toilet transfer to toilet with Supervision without AD.    Outcome: Ongoing, Progressing

## 2022-08-03 NOTE — PLAN OF CARE
08/03/22 1052   Post-Acute Status   Post-Acute Authorization Home Health   Home Health Status Referrals Sent     HH referrals sent to [facility] and [facility] via [facility].    Lady Ellington LMSW  [facility]  [id number]

## 2022-08-03 NOTE — NURSING
Patient is ready for discharge. Patient stable alert and oriented. Mid sternal incision open to air approximated with durmabond, no redness, swelling, bruising or tenderness noted. Donor site to left leg, no swelling bruising or tenderness. Peripheral pulses intact and palpable   IVs removed. No complaints of pain. Discussed discharge plan. Reviewed medications and side effects, appointments, and answered questions with patient and wife, Aurora. RX sent to patient's home pharmacy, [facility].   Wheeled off unit in wheelchair.

## 2022-08-03 NOTE — PLAN OF CARE
Problem: Adult Inpatient Plan of Care  Goal: Plan of Care Review  Outcome: Ongoing, Progressing  Goal: Patient-Specific Goal (Individualized)  Outcome: Ongoing, Progressing  Goal: Absence of Hospital-Acquired Illness or Injury  Outcome: Ongoing, Progressing  Goal: Optimal Comfort and Wellbeing  Outcome: Ongoing, Progressing  Goal: Readiness for Transition of Care  Outcome: Ongoing, Progressing     Problem: Diabetes Comorbidity  Goal: Blood Glucose Level Within Targeted Range  Outcome: Ongoing, Progressing     Problem: Activity Intolerance (Cardiovascular Surgery)  Goal: Improved Activity Tolerance  Outcome: Ongoing, Progressing     Problem: Adjustment to Surgery (Cardiovascular Surgery)  Goal: Optimal Coping with Heart Surgery  Outcome: Ongoing, Progressing     Problem: Bleeding (Cardiovascular Surgery)  Goal: Bleeding (Cardiovascular Surgery)  Outcome: Ongoing, Progressing     Problem: Bowel Motility Impaired (Cardiovascular Surgery)  Goal: Effective Bowel Elimination (Cardiovascular Surgery)  Outcome: Ongoing, Progressing     Problem: Cardiac Function Impaired (Cardiovascular Surgery)  Goal: Effective Cardiac Function  Outcome: Ongoing, Progressing     Problem: Cerebral Tissue Perfusion (Cardiovascular Surgery)  Goal: Optimal Cerebral Tissue Perfusion (Cardiovascular Surgery)  Outcome: Ongoing, Progressing     Problem: Fluid and Electrolyte Imbalance (Cardiovascular Surgery)  Goal: Fluid and Electrolyte Balance (Cardiovascular Surgery)  Outcome: Ongoing, Progressing     Problem: Glycemic Control Impaired (Cardiovascular Surgery)  Goal: Blood Glucose Level Within Targeted Range (Cardiovascular Surgery)  Outcome: Ongoing, Progressing     Problem: Infection (Cardiovascular Surgery)  Goal: Absence of Infection Signs and Symptoms  Outcome: Ongoing, Progressing     Problem: Ongoing Anesthesia Effects (Cardiovascular Surgery)  Goal: Anesthesia/Sedation Recovery  Outcome: Ongoing, Progressing     Problem: Pain  (Cardiovascular Surgery)  Goal: Acceptable Pain Control  Outcome: Ongoing, Progressing     Problem: Postoperative Nausea and Vomiting (Cardiovascular Surgery)  Goal: Nausea and Vomiting Relief (Cardiovascular Surgery)  Outcome: Ongoing, Progressing     Problem: Postoperative Urinary Retention (Cardiovascular Surgery)  Goal: Effective Urinary Elimination (Cardiovascular Surgery)  Outcome: Ongoing, Progressing     Problem: Respiratory Compromise (Cardiovascular Surgery)  Goal: Effective Oxygenation and Ventilation (Cardiovascular Surgery)  Outcome: Ongoing, Progressing     Problem: Infection  Goal: Absence of Infection Signs and Symptoms  Outcome: Ongoing, Progressing     Problem: Communication Impairment (Mechanical Ventilation, Invasive)  Goal: Effective Communication  Outcome: Ongoing, Progressing     Problem: Device-Related Complication Risk (Mechanical Ventilation, Invasive)  Goal: Optimal Device Function  Outcome: Ongoing, Progressing     Problem: Inability to Wean (Mechanical Ventilation, Invasive)  Goal: Mechanical Ventilation Liberation  Outcome: Ongoing, Progressing     Problem: Nutrition Impairment (Mechanical Ventilation, Invasive)  Goal: Optimal Nutrition Delivery  Outcome: Ongoing, Progressing     Problem: Skin and Tissue Injury (Mechanical Ventilation, Invasive)  Goal: Absence of Device-Related Skin and Tissue Injury  Outcome: Ongoing, Progressing     Problem: Ventilator-Induced Lung Injury (Mechanical Ventilation, Invasive)  Goal: Absence of Ventilator-Induced Lung Injury  Outcome: Ongoing, Progressing     Problem: Communication Impairment (Artificial Airway)  Goal: Effective Communication  Outcome: Ongoing, Progressing     Problem: Device-Related Complication Risk (Artificial Airway)  Goal: Optimal Device Function  Outcome: Ongoing, Progressing     Problem: Skin and Tissue Injury (Artificial Airway)  Goal: Absence of Device-Related Skin or Tissue Injury  Outcome: Ongoing, Progressing     Problem: Fall  Injury Risk  Goal: Absence of Fall and Fall-Related Injury  Outcome: Ongoing, Progressing     Problem: Restraint, Nonbehavioral (Nonviolent)  Goal: Absence of Harm or Injury  Outcome: Ongoing, Progressing     Problem: Skin Injury Risk Increased  Goal: Skin Health and Integrity  Outcome: Ongoing, Progressing

## 2022-08-03 NOTE — PT/OT/SLP PROGRESS
Occupational Therapy   Treatment    Name: Christiano Frost  MRN: [id number]  Admitting Diagnosis:  Coronary artery disease involving native coronary artery of native heart without angina pectoris  5 Days Post-Op    Recommendations:     Discharge Recommendations: home  Discharge Equipment Recommendations:  none  Barriers to discharge:  None    Assessment:     Christiano Frost is a 71 y.o. male with a medical diagnosis of Coronary artery disease involving native coronary artery of native heart without angina pectoris.  He presents with progress towards goals as evidenced by ability to mobilize throughout room with Supervision while retrieving ADL grooming items and performing various simulated HH tasks. Performance deficits affecting function are decreased safety awareness, impaired endurance, impaired cardiopulmonary response to activity, impaired self care skills, impaired functional mobility, gait instability, decreased upper extremity function.     Rehab Prognosis:  Good; patient would benefit from acute skilled OT services to address these deficits and reach maximum level of function.       Plan:     Patient to be seen 5 x/week to address the above listed problems via self-care/home management, therapeutic activities, therapeutic exercises  · Plan of Care Expires: 08/28/22  · Plan of Care Reviewed with: patient, spouse    Subjective     Pain/Comfort:  · Pain Rating 1: 0/10  · Pain Rating Post-Intervention 1: 0/10    Objective:     Communicated with: RN prior to session.  Patient found up in chair with telemetry, peripheral IV upon OT entry to room.    General Precautions: Standard, fall, sternal   Orthopedic Precautions:N/A   Braces:    Respiratory Status: Room air     Occupational Performance:     Bed Mobility:    · NT     Functional Mobility/Transfers:  · Patient completed Sit <> Stand Transfer with supervision  with  no assistive device   · Patient completed Toilet Transfer Step Transfer and STS technique with  supervision with  no AD  · Functional Mobility: Supervision to/from bathroom and around room without AD    Activities of Daily Living:  · Feeding:  independence    · Grooming: independence    · Upper Body Dressing: modified independence    · Lower Body Dressing: supervision    · Toileting: supervision        AMPAC 6 Click ADL: 21    Treatment & Education:   Pt ed on OT POC  Pt recalled 3/3 sternal precautions  Pt ed on principles of energy conservation; handout provided    Patient left up in chair with all lines intact, call button in reach, RN notified and spouse presentEducation:      GOALS:   Multidisciplinary Problems     Occupational Therapy Goals        Problem: Occupational Therapy    Goal Priority Disciplines Outcome Interventions   Occupational Therapy Goal     OT, PT/OT Ongoing, Progressing    Description: Goals to be met by: 8/13/2022    Patient will increase functional independence with ADLs by performing:    UE Dressing with Modified [place].  LE Dressing with Modified [place].  Grooming while standing at sink with Modified [place].  Toileting from toilet with Supervision for hygiene and clothing management.   Supine to sit with Modified [place].  Sit to stand transfer with Supervision without AD.  Toilet transfer to toilet with Supervision without AD.                     Time Tracking:     OT Date of Treatment: 08/03/22  OT Start Time: 1010  OT Stop Time: 1036  OT Total Time (min): 26 min    Billable Minutes:Therapeutic Activity 26               8/3/2022

## 2022-08-03 NOTE — DISCHARGE SUMMARY
[facility]  [facility]  Discharge Summary      Patient Name: Christiano Frost  MRN: [id number]  Admission Date: 7/29/2022  Hospital Length of Stay: 5 days  Discharge Date and Time:  08/03/2022 10:18 AM  Attending Physician: Magen Martell MD   Discharging Provider: Ernestine Zheng PA-C  Primary Care Provider: Ellis Allan MD    HPI:   Mr. Frost is a 71 year-old gentleman with a history of prior stroke/TIA, hypertension, diabetes (HbA1C 6.5), and BMI of 35.  Recently, he has been symptomatic with dyspnea on exertion symptoms interfering with his quality of life.  His most recent echocardiogram showed 60% ejection fraction with mild mitral regurgitation.  Angiogram showed 50% left main disease, totally occluded early mid LAD with a small to moderate sized mid and distal LAD, small to moderate sized second diagonal artery (small first diagonal artery), 80% proximal lesion in a small/small-moderate OM1, large OM2, totally occluded ostial RCA with a small/small-moderate PDA (fills via left to right collaterals).     We had an extensive discussion with him regarding the ACC/AHA guidelines and we agreed that he has class I indications for surgery involving coronary artery bypass surgery x 3 or 4 (LIMA-LAD, SVG-OM2, possible SVG-D2, possible SVG-OM1, possible SVG-PDA).  The risks and benefits were explained and informed consent was obtained      Procedure(s) (LRB):  CORONARY ARTERY BYPASS GRAFT (CABG) (Left)  HARVEST-VEIN-ENDOVASCULAR (Left)        Hospital Course: On 7/29/22, the patient was taken to the Operating Room for the above stated procedure. Please see the previously dictated operative report for complete details. Postoperatively, the patient was taken from the  Operating Room to the ICU where the vital signs were monitored and pain was kept under control. The patient was weaned from the drips and extubated in the ICU per protocol. Once hemodynamically stable, the  patient was transferred to the Cardiac Step-Down floor for continued strengthening and ambulation. On postoperative day 5, the patient was ready for discharge to home. At the time of discharge, the patient was ambulating unassisted. Pain was well controlled with oral analgesics and the patient was tolerating the diet.     MOBILITY AND ACTIVITY: As tolerated. Patient may shower. No heavy lifting of greater than 5 pounds and no driving.     DIET: An 1800-calorie ADA with a 1500 mL fluid restriction.     WOUND CARE INSTRUCTIONS: Check for redness, swelling and drainage around the  incision or wound. Patient is to call for any obvious bleeding, drainage, pus from the wound, unusual problems or difficulties or temperature of greater than 101   degrees.     FOLLOWUP: Follow up with Dr. Martell in approximately 5 weeks. Prior to this  appointment, the patient will have an EKG.    Patient not placed on Ace-Inhibitor at the time of discharge due to potential for hypotension     DISCHARGE CONDITION: At the time of discharge, the patient was in sinus rhythm and afebrile with stable vital signs.        Goals of Care Treatment Preferences:  Code Status: Full Code      Consults (From admission, onward)        Status Ordering Provider     Consult to Endocrinology  Once        Provider:  (Not yet assigned)    Completed JACOB BOYD     Consult Case Management/Social Work  Once        Provider:  (Not yet assigned)    Acknowledged ALCIRA SOLANO          No new Assessment & Plan notes have been filed under this hospital service since the last note was generated.  Service: Cardiothoracic Surgery    Final Active Diagnoses:    Diagnosis Date Noted POA    PRINCIPAL PROBLEM:  Coronary artery disease involving native coronary artery of native heart without angina pectoris [I25.10]  Yes    Surgical wound present [T14.8XXA] 07/29/2022 Unknown    Type 2 diabetes mellitus without complication [E11.9] 06/22/2016 Yes      Problems  Resolved During this Admission:      Discharged Condition: stable    Disposition: Home or Self Care    Follow Up:    Patient Instructions:      Ambulatory referral/consult to Home Health   Standing Status: Future   Referral Priority: Routine Referral Type: Home Health   Referral Reason: Specialty Services Required   Requested Specialty: Home Health Services   Number of Visits Requested: 1     Medications:  Reconciled Home Medications:      Medication List      START taking these medications    acetaminophen 500 MG tablet  Commonly known as: TYLENOL  Take 2 tablets (1,000 mg total) by mouth every 6 (six) hours as needed for Pain.     aspirin 325 MG tablet  Take 1 tablet (325 mg total) by mouth once daily.  Replaces: aspirin 81 MG EC tablet     docusate sodium 100 MG capsule  Commonly known as: COLACE  Take 1 capsule (100 mg total) by mouth 2 (two) times daily as needed for Constipation.     furosemide 40 MG tablet  Commonly known as: LASIX  Take 1 tablet (40 mg total) by mouth once daily. for 5 days     isosorbide mononitrate 30 MG 24 hr tablet  Commonly known as: IMDUR  Take 1 tablet (30 mg total) by mouth daily with lunch.     metoprolol succinate 100 MG 24 hr tablet  Commonly known as: TOPROL-XL  Take 1 tablet (100 mg total) by mouth once daily.     NIFEdipine 90 MG (OSM) 24 hr tablet  Commonly known as: PROCARDIA-XL  Take 1 tablet (90 mg total) by mouth daily with breakfast.     oxyCODONE 5 MG immediate release tablet  Commonly known as: ROXICODONE  Take 1 tablet (5 mg total) by mouth every 4 (four) hours as needed for Pain.     polyethylene glycol 17 gram Pwpk  Commonly known as: GLYCOLAX  Take 17 g by mouth 2 (two) times daily as needed (constipation).     potassium chloride SA 20 MEQ tablet  Commonly known as: K-DUR,KLOR-CON  Take 1 tablet (20 mEq total) by mouth once daily. for 5 days        CHANGE how you take these medications    atorvastatin 40 MG tablet  Commonly known as: LIPITOR  Take 1 tablet (40 mg  total) by mouth once daily.  What changed:   · medication strength  · how much to take        CONTINUE taking these medications    dutasteride 0.5 mg capsule  Commonly known as: AVODART  Take 0.5 mg by mouth once daily.     EScitalopram oxalate 10 MG tablet  Commonly known as: LEXAPRO  Take 10 mg by mouth once daily.     esomeprazole 40 MG capsule  Commonly known as: NEXIUM  Take by mouth.     omega-3 fatty acids 1,000 mg Cap  Take by mouth.     tamsulosin 0.4 mg Cap  Commonly known as: FLOMAX  0.4 mg once daily.        STOP taking these medications    aspirin 81 MG EC tablet  Commonly known as: ECOTRIN  Replaced by: aspirin 325 MG tablet     clopidogreL 75 mg tablet  Commonly known as: PLAVIX     enalapril 5 MG tablet  Commonly known as: VASOTEC     HYDROcodone-acetaminophen  mg per tablet  Commonly known as: NORCO     ibuprofen 400 MG tablet  Commonly known as: ADVIL,MOTRIN     metFORMIN 500 MG tablet  Commonly known as: GLUCOPHAGE          Time spent on the discharge of patient: 30 minutes    Ernestine Zheng PA-C  Cardiothoracic Surgery  [facility] - Cardiology Stepdown

## 2022-08-03 NOTE — CARE UPDATE
Care Update:     No acute events overnight. Patient on the SICU in room 350/350 A. Blood glucose stable. BG at and below goal on current insulin regimen (SSI ). Steroid use- None. 5 Days Post-Op  Renal function- Normal   Vasopressors-  None       Diet Cardiac Fluid - 1500mL     POCT Glucose   Date Value Ref Range Status   08/02/2022 136 (H) 70 - 110 mg/dL Final   08/02/2022 122 (H) 70 - 110 mg/dL Final   08/02/2022 110 70 - 110 mg/dL Final   08/02/2022 110 70 - 110 mg/dL Final   08/01/2022 126 (H) 70 - 110 mg/dL Final   08/01/2022 125 (H) 70 - 110 mg/dL Final   08/01/2022 113 (H) 70 - 110 mg/dL Final   08/01/2022 120 (H) 70 - 110 mg/dL Final   08/01/2022 105 70 - 110 mg/dL Final   07/31/2022 124 (H) 70 - 110 mg/dL Final   07/31/2022 124 (H) 70 - 110 mg/dL Final   07/31/2022 119 (H) 70 - 110 mg/dL Final     Lab Results   Component Value Date    HGBA1C 5.9 (H) 07/20/2022       Endocrinology consulted for BG management.   BG goal 140-180    Diabetes Medications             metFORMIN (GLUCOPHAGE) 500 MG tablet Take 1 tablet (500 mg total) by mouth daily with breakfast.      Hospital Medications   BG checks AC/HS            glucagon (human recombinant) injection 1 mg 1 mg, Intramuscular, As needed (PRN), Turn patient on their side, give IM, and NOTIFY MD IMMEDIATELY.<BR><BR>Feed the patient as soon as patient awakens and is able to swallow.    glucose chewable tablet 16 g 16 g, Oral, As needed (PRN), (16 grams = #  four 4gm glucose tablets)    glucose chewable tablet 24 g 24 g, Oral, As needed (PRN), (24 grams = # six 4gm glucose tablets)    insulin aspart U-100 pen 1-10 Units 1-10 Units, Subcutaneous, Before meals &amp; nightly PRN, **MODERATE CORRECTION DOSE**<BR>Blood Glucose<BR>mg/dL                  Pre-meal                2200<BR>151-200                2 units                    1 unit<BR>201-250                4 units                    2 units  <BR>251-300                6 units                    3 units   <BR>301-350                8 units                    4 units <BR>&gt;350                     10 units                  5 units<BR>Administer subcutaneously if needed at times designated by monitoring schedule. <BR>DO NOT HOLD correction dose insulin for patients who are  NPO.<BR>&quot;HIGH ALERT MEDICATION&quot; - Administer with meals or TF/TPN.            ** Please notify Endocrine for any change and/or advance in diet**  ** Please call Endocrine for any BG related issues **    Discharge Planning:   TBD. Please notify endocrinology prior to discharge.      Edenilson Cassidy DNP, FNP-C  Department of Endocrinology  Inpatient Glycemic Management

## 2022-08-04 LAB — POCT GLUCOSE: 98 MG/DL (ref 70–110)

## 2022-08-04 PROCEDURE — G0180 MD CERTIFICATION HHA PATIENT: HCPCS | Mod: ,,, | Performed by: PHYSICIAN ASSISTANT

## 2022-08-04 PROCEDURE — G0180 PR HOME HEALTH MD CERTIFICATION: ICD-10-PCS | Mod: ,,, | Performed by: PHYSICIAN ASSISTANT

## 2022-08-12 NOTE — ADDENDUM NOTE
Addendum  created 08/12/22 0910 by Demetri Day MD    Attestation recorded in Intraprocedure, Intraprocedure Attestations filed

## 2022-08-12 NOTE — ADDENDUM NOTE
Addendum  created 08/12/22 1744 by Bran Bradford MD    Clinical Note Signed, Intraprocedure Blocks edited, SmartForm saved

## 2022-08-16 ENCOUNTER — LAB VISIT (OUTPATIENT)
Dept: LAB | Facility: HOSPITAL | Age: 72
End: 2022-08-16
Attending: INTERNAL MEDICINE
Payer: MEDICARE

## 2022-08-16 DIAGNOSIS — I65.23 BILATERAL CAROTID ARTERY OCCLUSION: Primary | ICD-10-CM

## 2022-08-16 LAB
BACTERIA #/AREA URNS AUTO: ABNORMAL /HPF
BILIRUB UR QL STRIP: NEGATIVE
CLARITY UR REFRACT.AUTO: ABNORMAL
COLOR UR AUTO: YELLOW
GLUCOSE UR QL STRIP: NEGATIVE
HGB UR QL STRIP: ABNORMAL
HYALINE CASTS UR QL AUTO: 0 /LPF
KETONES UR QL STRIP: NEGATIVE
LEUKOCYTE ESTERASE UR QL STRIP: ABNORMAL
MICROSCOPIC COMMENT: ABNORMAL
NITRITE UR QL STRIP: POSITIVE
PH UR STRIP: 6 [PH] (ref 5–8)
PROT UR QL STRIP: ABNORMAL
RBC #/AREA URNS AUTO: 5 /HPF (ref 0–4)
SP GR UR STRIP: 1.01 (ref 1–1.03)
SQUAMOUS #/AREA URNS AUTO: 4 /HPF
URN SPEC COLLECT METH UR: ABNORMAL
WBC #/AREA URNS AUTO: >100 /HPF (ref 0–5)
WBC CLUMPS UR QL AUTO: ABNORMAL

## 2022-08-16 PROCEDURE — 87077 CULTURE AEROBIC IDENTIFY: CPT | Performed by: INTERNAL MEDICINE

## 2022-08-16 PROCEDURE — 87186 SC STD MICRODIL/AGAR DIL: CPT | Performed by: INTERNAL MEDICINE

## 2022-08-16 PROCEDURE — 87086 URINE CULTURE/COLONY COUNT: CPT | Performed by: INTERNAL MEDICINE

## 2022-08-16 PROCEDURE — 81001 URINALYSIS AUTO W/SCOPE: CPT | Performed by: INTERNAL MEDICINE

## 2022-08-16 PROCEDURE — 87088 URINE BACTERIA CULTURE: CPT | Performed by: INTERNAL MEDICINE

## 2022-08-18 LAB — BACTERIA UR CULT: ABNORMAL

## 2022-09-01 ENCOUNTER — DOCUMENT SCAN (OUTPATIENT)
Dept: HOME HEALTH SERVICES | Facility: HOSPITAL | Age: 72
End: 2022-09-01
Payer: MEDICARE

## 2022-09-02 ENCOUNTER — EXTERNAL HOME HEALTH (OUTPATIENT)
Dept: HOME HEALTH SERVICES | Facility: HOSPITAL | Age: 72
End: 2022-09-02
Payer: MEDICARE

## 2022-09-06 NOTE — PROGRESS NOTES
Patient seen and examined. Patient is progressively increasing activity. Patient reports having difficulty urinating. Has seen his urologist and there is an ultrasound ordered for next week. Also has a sore to his left shin for which he has been putting antibiotic cream.      Sternum: stable, incision CDI  EKG: NSR     Assessment:  S/P CABG 7/29/22     Plan:  Can begin driving as long as he has power steering  Can begin cardiac rehab in the next couple of weeks  We will refer to cardiology to assume care   DC lasix  DC potassium  10 days Doxy. Offered wound care referral but patient defers for now.      No scheduled appointment, RTC prn    I have seen the patient and reviewed the physician assistant's note above. I have personally interviewed and examined the patient at bedside and agree with the findings.     Mr. Frost is doing well after his triple vessel coronary artery bypass surgery on July 29, 2022.  He is walking daily and feeling stronger.  He does have a 2cm x 2cm fresh open wound on his left leg from a blister which is erythematous.  We will give him a course of antibiotics and cleaning supplies.  He can see me in clinic as needed.      Magen Martell MD  Cardiothoracic Surgery  [facility]

## 2022-09-07 ENCOUNTER — DOCUMENTATION ONLY (OUTPATIENT)
Dept: CARDIOTHORACIC SURGERY | Facility: CLINIC | Age: 72
End: 2022-09-07
Payer: MEDICARE

## 2022-09-07 ENCOUNTER — OFFICE VISIT (OUTPATIENT)
Dept: CARDIOTHORACIC SURGERY | Facility: CLINIC | Age: 72
End: 2022-09-07
Payer: MEDICARE

## 2022-09-07 ENCOUNTER — HOSPITAL ENCOUNTER (OUTPATIENT)
Dept: CARDIOLOGY | Facility: CLINIC | Age: 72
Discharge: HOME | End: 2022-09-07
Payer: MEDICARE

## 2022-09-07 VITALS
DIASTOLIC BLOOD PRESSURE: 70 MMHG | RESPIRATION RATE: 18 BRPM | HEART RATE: 87 BPM | HEIGHT: 69 IN | OXYGEN SATURATION: 95 % | SYSTOLIC BLOOD PRESSURE: 124 MMHG | WEIGHT: 227.94 LBS | BODY MASS INDEX: 33.76 KG/M2

## 2022-09-07 DIAGNOSIS — Z95.1 S/P CABG X 3: ICD-10-CM

## 2022-09-07 DIAGNOSIS — Z95.1 S/P CABG (CORONARY ARTERY BYPASS GRAFT): ICD-10-CM

## 2022-09-07 PROCEDURE — 3074F SYST BP LT 130 MM HG: CPT | Mod: CPTII,S$GLB,, | Performed by: THORACIC SURGERY (CARDIOTHORACIC VASCULAR SURGERY)

## 2022-09-07 PROCEDURE — 4010F ACE/ARB THERAPY RXD/TAKEN: CPT | Mod: CPTII,S$GLB,, | Performed by: THORACIC SURGERY (CARDIOTHORACIC VASCULAR SURGERY)

## 2022-09-07 PROCEDURE — 3078F PR MOST RECENT DIASTOLIC BLOOD PRESSURE < 80 MM HG: ICD-10-PCS | Mod: CPTII,S$GLB,, | Performed by: THORACIC SURGERY (CARDIOTHORACIC VASCULAR SURGERY)

## 2022-09-07 PROCEDURE — 1126F AMNT PAIN NOTED NONE PRSNT: CPT | Mod: CPTII,S$GLB,, | Performed by: THORACIC SURGERY (CARDIOTHORACIC VASCULAR SURGERY)

## 2022-09-07 PROCEDURE — 93005 EKG 12-LEAD: ICD-10-PCS | Mod: S$GLB,,, | Performed by: THORACIC SURGERY (CARDIOTHORACIC VASCULAR SURGERY)

## 2022-09-07 PROCEDURE — 93010 ELECTROCARDIOGRAM REPORT: CPT | Mod: S$GLB,,, | Performed by: INTERNAL MEDICINE

## 2022-09-07 PROCEDURE — 1126F PR PAIN SEVERITY QUANTIFIED, NO PAIN PRESENT: ICD-10-PCS | Mod: CPTII,S$GLB,, | Performed by: THORACIC SURGERY (CARDIOTHORACIC VASCULAR SURGERY)

## 2022-09-07 PROCEDURE — 99024 PR POST-OP FOLLOW-UP VISIT: ICD-10-PCS | Mod: S$GLB,,, | Performed by: THORACIC SURGERY (CARDIOTHORACIC VASCULAR SURGERY)

## 2022-09-07 PROCEDURE — 1101F PR PT FALLS ASSESS DOC 0-1 FALLS W/OUT INJ PAST YR: ICD-10-PCS | Mod: CPTII,S$GLB,, | Performed by: THORACIC SURGERY (CARDIOTHORACIC VASCULAR SURGERY)

## 2022-09-07 PROCEDURE — 99999 PR PBB SHADOW E&M-EST. PATIENT-LVL IV: CPT | Mod: PBBFAC,,, | Performed by: THORACIC SURGERY (CARDIOTHORACIC VASCULAR SURGERY)

## 2022-09-07 PROCEDURE — 3078F DIAST BP <80 MM HG: CPT | Mod: CPTII,S$GLB,, | Performed by: THORACIC SURGERY (CARDIOTHORACIC VASCULAR SURGERY)

## 2022-09-07 PROCEDURE — 93010 EKG 12-LEAD: ICD-10-PCS | Mod: S$GLB,,, | Performed by: INTERNAL MEDICINE

## 2022-09-07 PROCEDURE — 3044F HG A1C LEVEL LT 7.0%: CPT | Mod: CPTII,S$GLB,, | Performed by: THORACIC SURGERY (CARDIOTHORACIC VASCULAR SURGERY)

## 2022-09-07 PROCEDURE — 1101F PT FALLS ASSESS-DOCD LE1/YR: CPT | Mod: CPTII,S$GLB,, | Performed by: THORACIC SURGERY (CARDIOTHORACIC VASCULAR SURGERY)

## 2022-09-07 PROCEDURE — 1159F MED LIST DOCD IN RCRD: CPT | Mod: CPTII,S$GLB,, | Performed by: THORACIC SURGERY (CARDIOTHORACIC VASCULAR SURGERY)

## 2022-09-07 PROCEDURE — 99999 PR PBB SHADOW E&M-EST. PATIENT-LVL IV: ICD-10-PCS | Mod: PBBFAC,,, | Performed by: THORACIC SURGERY (CARDIOTHORACIC VASCULAR SURGERY)

## 2022-09-07 PROCEDURE — 93005 ELECTROCARDIOGRAM TRACING: CPT | Mod: S$GLB,,, | Performed by: THORACIC SURGERY (CARDIOTHORACIC VASCULAR SURGERY)

## 2022-09-07 PROCEDURE — 1159F PR MEDICATION LIST DOCUMENTED IN MEDICAL RECORD: ICD-10-PCS | Mod: CPTII,S$GLB,, | Performed by: THORACIC SURGERY (CARDIOTHORACIC VASCULAR SURGERY)

## 2022-09-07 PROCEDURE — 4010F PR ACE/ARB THEARPY RXD/TAKEN: ICD-10-PCS | Mod: CPTII,S$GLB,, | Performed by: THORACIC SURGERY (CARDIOTHORACIC VASCULAR SURGERY)

## 2022-09-07 PROCEDURE — 3288F FALL RISK ASSESSMENT DOCD: CPT | Mod: CPTII,S$GLB,, | Performed by: THORACIC SURGERY (CARDIOTHORACIC VASCULAR SURGERY)

## 2022-09-07 PROCEDURE — 3288F PR FALLS RISK ASSESSMENT DOCUMENTED: ICD-10-PCS | Mod: CPTII,S$GLB,, | Performed by: THORACIC SURGERY (CARDIOTHORACIC VASCULAR SURGERY)

## 2022-09-07 PROCEDURE — 3044F PR MOST RECENT HEMOGLOBIN A1C LEVEL <7.0%: ICD-10-PCS | Mod: CPTII,S$GLB,, | Performed by: THORACIC SURGERY (CARDIOTHORACIC VASCULAR SURGERY)

## 2022-09-07 PROCEDURE — 3008F PR BODY MASS INDEX (BMI) DOCUMENTED: ICD-10-PCS | Mod: CPTII,S$GLB,, | Performed by: THORACIC SURGERY (CARDIOTHORACIC VASCULAR SURGERY)

## 2022-09-07 PROCEDURE — 3008F BODY MASS INDEX DOCD: CPT | Mod: CPTII,S$GLB,, | Performed by: THORACIC SURGERY (CARDIOTHORACIC VASCULAR SURGERY)

## 2022-09-07 PROCEDURE — 99024 POSTOP FOLLOW-UP VISIT: CPT | Mod: S$GLB,,, | Performed by: THORACIC SURGERY (CARDIOTHORACIC VASCULAR SURGERY)

## 2022-09-07 PROCEDURE — 3074F PR MOST RECENT SYSTOLIC BLOOD PRESSURE < 130 MM HG: ICD-10-PCS | Mod: CPTII,S$GLB,, | Performed by: THORACIC SURGERY (CARDIOTHORACIC VASCULAR SURGERY)

## 2022-09-07 RX ORDER — DOXYCYCLINE HYCLATE 100 MG
100 TABLET ORAL 2 TIMES DAILY
Qty: 20 TABLET | Refills: 0 | Status: SHIPPED | OUTPATIENT
Start: 2022-09-07 | End: 2022-09-17

## 2022-09-07 NOTE — PROGRESS NOTES
Pt instructed to follow-up with his cardiologist.  Pt may advance to lifting, pushing, and pulling up to 20 pounds for 6 weeks, for a total of 12 weeks of restrictions.  Pt reminded to continue washing his incisions everyday with soap and water.  Pt was provided with a copy of his AVS, and instructed on medication changes.  Pt verbalized understanding of all instructions.

## 2022-09-07 NOTE — LETTER
[facility] - Cardiovasc Surg 2nd Fl  [address]  [facility] [address]  Phone: [phone]               September 7, 2022      Patient: Christiano Frost   MR Number: [id number]   YOB: 1950   Date of Visit: 9/7/2022     Ben Ortega MD  [address] [facility] [address]    Dear Dr. Ortega,     It was a pleasure seeing your patient, Mr. Frost, in our follow-up clinic today after his triple vessel coronary artery bypass surgery on July 29, 2022.  His postoperative recovery was normal and he is doing excellent.  His most recent echocardiogram showed good left ventricular function.  He is an active individual who walks daily and has no respiratory issues.     On examination today, his vital signs are normal and EKG is sinus rhythm.  The incision has healed very well and the sternum is stable.  Lungs are clear bilaterally and there is no significant heart murmur.  There are no signs of heart failure or peripheral edema.  He does have a lower extremity open wound (2cm x 2cm) from a prior blister for which we will start antibiotics.  He is now discharged from our clinic but please do not hesitate to contact me if there is any problem.     Thank you for your referral and for your trust and confidence in our Cardiac Surgery Reference [facility].  For convenience, attached below is a copy of the operative report.     Kindest regards,    Magen Martell MD  Cardiothoracic Surgery  [facility]  Cardiothoracic Surgery Operative Report     Patient Name:  Christiano Frost; [id number]     Preoperative Diagnosis: Coronary artery disease     Postoperative Diagnosis:  Same     Date of Operation:  07/29/2022      Operation:  Triple vessel coronary artery bypass grafting  Left internal mammary artery to the distal left anterior descending artery  Saphenous vein graft to the second obtuse marginal artery  Saphenous vein graft to the posterior descending  artery  Endoscopic saphenous vein harvest from the left lower extremity     Surgeon:  Magen Martell MD     Assistant Surgeon:  none     Fellow:  none     Anesthesiologist:  Leeroy Day MD     ---------------------------------------------------------------------------------------------------------        Indications for surgery: Mr. Frost is a 71 year-old gentleman with a history of prior stroke/TIA, hypertension, diabetes (HbA1C 6.5), and BMI of 35.  Recently, he has been symptomatic with dyspnea on exertion symptoms interfering with his quality of life.  His most recent echocardiogram showed 60% ejection fraction with mild mitral regurgitation.  Angiogram showed 50% left main disease, totally occluded early mid LAD with a small to moderate sized mid and distal LAD, small to moderate sized second diagonal artery (small first diagonal artery), 80% proximal lesion in a small/small-moderate OM1, large OM2, totally occluded ostial RCA with a small/small-moderate PDA (fills via left to right collaterals).     We had an extensive discussion with him regarding the ACC/AHA guidelines and we agreed that he has class I indications for surgery involving coronary artery bypass surgery x 3 or 4 (LIMA-LAD, SVG-OM2, possible SVG-D2, possible SVG-OM1, possible SVG-PDA).  The risks and benefits were explained and informed consent was obtained.      Gross findings: No pericardial adhesions.  Small to moderate LAD target, small D2 target (nonbypassable), small OM1 target (nonbypassable), moderate-large sized OM2 target, small to moderate sized PDA target.  Good biventricular function pre and post bypass.        Procedure:  The patient was brought to the holding area and the indications for surgery were reviewed.  Afterwards, the patient was placed supine on the operating room table and prepped and draped in the usual sterile fashion. A surgical time out was performed.      A midline incision was followed with division of the  sternum. The left internal mammary artery was harvested. Simultaneously, two pieces of saphenous vein were harvested endoscopically from the leg. ACT guided heparinization was administered.  The ascending aorta and right atrium were cannulated.  Cardiopulmonary bypass was commenced.  The ascending aorta was cannulated for administration of cardioplegia.  A cross-clamp was applied and 1500cc of antegrade cold blood cardioplegia was administered. Subsequent doses of 500cc of antegrade cardioplegia were administered every 20 minutes.     Attention was turned to the posterior descending artery. The heart was positioned and the vessel was exposed. The artery was identified and arteriotomy performed with an 11 blade scalpel and elongated with scissors.  A segment of vein was prepared for use.  An end to side anastomosis was constructed with continuous 7-0 prolene. The vein was infused with 80cc of cardioplegia to confirm patency and hemostasis.  Afterwards, an aortotomy was made and the proximal anastomosis was constructed with continuous 6-0 prolene suture.     Attention was turned to the second obtuse marginal artery. The heart was repositioned and elevated from the pericardial well.   The vessel was identified and opened with an 11 blade scalpel.  A second segment of vein was prepared for use.  An end to side anastomosis was constructed with continuous 7-0 prolene.  80cc of cardioplegia was infused to check for patency and hemostasis.  Afterwards, a second aortotomy was made and the proximal anastomosis was again constructed with continuous 6-0 prolene suture.     Attention was next turned to the distal left anterior descending artery. The heart was repositioned and the LAD was identified. The vessel was opened and the arteriotomy elongated with scissors.  The left internal mammary artery was brought to the field and prepared for use.  The left internal mammary artery was sewn end to side to the LAD with continuous 7-0  "prolene. The vessel was briefly unclamped to assess for hemostasis.      A dose of warm "hot shot" cardioplegia was given antegrade.  Air was evacuated and the cross-clamp was removed. All anastomoses were checked for hemostasis and the patient was weaned from bypass on a low amount of inotropic support.  The total cardiopulmonary bypass time was 80 minutes and cross clamp time was 59 minutes.  The cannulae were removed and heparin was reversed.  Temporary ventricular pacing wires were placed on the heart.  Drainage tubes were placed behind the sternum and in the pleural space. The chest was closed with steel wires and the soft tissue was closed with absorbable suture.  A sterile dressing was applied and the patient was brought to the ICU in stable condition.       I was present in the operating room for the entire operation and immediately available thereafter.         "

## 2022-09-09 ENCOUNTER — TELEPHONE (OUTPATIENT)
Dept: CARDIOTHORACIC SURGERY | Facility: CLINIC | Age: 72
End: 2022-09-09
Payer: MEDICARE

## 2022-09-09 NOTE — TELEPHONE ENCOUNTER
----- Message from Abhilash Hadley sent at 9/9/2022 10:18 AM CDT -----  Contact: [phone]  Pt wants to speak with staff in ref to recent findings give pt a callback [phone]

## 2022-09-09 NOTE — TELEPHONE ENCOUNTER
Returned call to pt. Pt states he recently had blood work that showed he is anemic and his urologist, who ordered the blood work, states he should let his surgeon and PCP know. Pt is over 6 weeks post-op - anemia likely not from surgery. Pt should contact his PCP to see if he wants to start him on supplements or has other recommendations. Pt also complaining of not being able to walk far and feeling badly. Pt states he does not have an appt with his cardiologist until November. Encouraged pt to try to get appointment sooner than that. Offered referral to cardiac rehab, pt states he already has referral and is just waiting on insurance approval. Pt stated he will contact his PCP.

## 2022-09-13 ENCOUNTER — TELEPHONE (OUTPATIENT)
Dept: CARDIOLOGY | Facility: CLINIC | Age: 72
End: 2022-09-13
Payer: MEDICARE

## 2022-09-13 DIAGNOSIS — D64.9 ANEMIA, UNSPECIFIED TYPE: Primary | ICD-10-CM

## 2022-09-13 NOTE — TELEPHONE ENCOUNTER
I spoke with pt.  CABG 7/29/22  Pt concerned about anemia post op  Low sodium noted.  Low albumin noted.  Pt reports recent hgb 8.8 per urologist.  No obvious bleeding   Last colonoscopy 2 years ago.  Will repeat CBC and CMP and iron levels and SPE  Pt to befin OTC iron tablet one a day and continue Nexium  GI f/u  Pt needs office appt with me in [place] or with Dr Ortega in [place].

## 2022-09-13 NOTE — TELEPHONE ENCOUNTER
Returned call to pt. Pt states he recently had blood work that showed he is anemic and his urologist, who ordered the blood work, states he should let his cardiologist, surgeon and PCP know. S/P CABG 07/2022. Patient told he should contact his PCP to see if he wants to start him on supplements or has other recommendations. States he saw the NP at his PCP office, who ordered repeat labs in one month.  He is to start cardiac rehab soon and is concerned due to the anemia he will not be able to exercise. Pt stated he will contact his PCP to try and see the physician not the NP.        ---- Message from Magda Stafford sent at 9/13/2022  2:31 PM CDT -----  Regarding: call back  Contact: [phone]  Who Called: PT     Patient called in requesting to speak with you. Did not specify why just would like to get answers to help with weight loss and anemia. Please advise.

## 2022-09-14 ENCOUNTER — TELEPHONE (OUTPATIENT)
Dept: CARDIOLOGY | Facility: CLINIC | Age: 72
End: 2022-09-14
Payer: MEDICARE

## 2022-09-14 ENCOUNTER — LAB VISIT (OUTPATIENT)
Dept: LAB | Facility: HOSPITAL | Age: 72
End: 2022-09-14
Attending: INTERNAL MEDICINE
Payer: MEDICARE

## 2022-09-14 DIAGNOSIS — D64.9 ANEMIA, UNSPECIFIED TYPE: ICD-10-CM

## 2022-09-14 LAB
ALBUMIN SERPL BCP-MCNC: 3.4 G/DL (ref 3.5–5.2)
ALP SERPL-CCNC: 52 U/L (ref 55–135)
ALT SERPL W/O P-5'-P-CCNC: 11 U/L (ref 10–44)
ANION GAP SERPL CALC-SCNC: 9 MMOL/L (ref 8–16)
AST SERPL-CCNC: 13 U/L (ref 10–40)
BASOPHILS # BLD AUTO: 0.13 K/UL (ref 0–0.2)
BASOPHILS NFR BLD: 1.6 % (ref 0–1.9)
BILIRUB SERPL-MCNC: 0.4 MG/DL (ref 0.1–1)
BUN SERPL-MCNC: 14 MG/DL (ref 8–23)
CALCIUM SERPL-MCNC: 9.7 MG/DL (ref 8.7–10.5)
CHLORIDE SERPL-SCNC: 101 MMOL/L (ref 95–110)
CO2 SERPL-SCNC: 27 MMOL/L (ref 23–29)
CREAT SERPL-MCNC: 1 MG/DL (ref 0.5–1.4)
DIFFERENTIAL METHOD: ABNORMAL
EOSINOPHIL # BLD AUTO: 0.4 K/UL (ref 0–0.5)
EOSINOPHIL NFR BLD: 5.4 % (ref 0–8)
ERYTHROCYTE [DISTWIDTH] IN BLOOD BY AUTOMATED COUNT: 16.3 % (ref 11.5–14.5)
EST. GFR  (NO RACE VARIABLE): >60 ML/MIN/1.73 M^2
GLUCOSE SERPL-MCNC: 120 MG/DL (ref 70–110)
HCT VFR BLD AUTO: 33.8 % (ref 40–54)
HGB BLD-MCNC: 10.3 G/DL (ref 14–18)
IMM GRANULOCYTES # BLD AUTO: 0.1 K/UL (ref 0–0.04)
IMM GRANULOCYTES NFR BLD AUTO: 1.3 % (ref 0–0.5)
IRON SERPL-MCNC: 24 UG/DL (ref 45–160)
LYMPHOCYTES # BLD AUTO: 2.3 K/UL (ref 1–4.8)
LYMPHOCYTES NFR BLD: 28.3 % (ref 18–48)
MCH RBC QN AUTO: 24.6 PG (ref 27–31)
MCHC RBC AUTO-ENTMCNC: 30.5 G/DL (ref 32–36)
MCV RBC AUTO: 81 FL (ref 82–98)
MONOCYTES # BLD AUTO: 0.7 K/UL (ref 0.3–1)
MONOCYTES NFR BLD: 8.3 % (ref 4–15)
NEUTROPHILS # BLD AUTO: 4.4 K/UL (ref 1.8–7.7)
NEUTROPHILS NFR BLD: 55.1 % (ref 38–73)
NRBC BLD-RTO: 0 /100 WBC
PLATELET # BLD AUTO: 483 K/UL (ref 150–450)
PMV BLD AUTO: 9.4 FL (ref 9.2–12.9)
POTASSIUM SERPL-SCNC: 4.5 MMOL/L (ref 3.5–5.1)
PROT SERPL-MCNC: 7.4 G/DL (ref 6–8.4)
RBC # BLD AUTO: 4.19 M/UL (ref 4.6–6.2)
SATURATED IRON: 6 % (ref 20–50)
SODIUM SERPL-SCNC: 137 MMOL/L (ref 136–145)
TOTAL IRON BINDING CAPACITY: 373 UG/DL (ref 250–450)
TRANSFERRIN SERPL-MCNC: 252 MG/DL (ref 200–375)
WBC # BLD AUTO: 7.95 K/UL (ref 3.9–12.7)

## 2022-09-14 PROCEDURE — 85025 COMPLETE CBC W/AUTO DIFF WBC: CPT | Performed by: INTERNAL MEDICINE

## 2022-09-14 PROCEDURE — 84165 PATHOLOGIST INTERPRETATION SPE: ICD-10-PCS | Mod: 26,,, | Performed by: PATHOLOGY

## 2022-09-14 PROCEDURE — 84165 PROTEIN E-PHORESIS SERUM: CPT | Mod: 26,,, | Performed by: PATHOLOGY

## 2022-09-14 PROCEDURE — 80053 COMPREHEN METABOLIC PANEL: CPT | Performed by: INTERNAL MEDICINE

## 2022-09-14 PROCEDURE — 84165 PROTEIN E-PHORESIS SERUM: CPT | Performed by: INTERNAL MEDICINE

## 2022-09-14 PROCEDURE — 84466 ASSAY OF TRANSFERRIN: CPT | Performed by: INTERNAL MEDICINE

## 2022-09-14 PROCEDURE — 86334 IMMUNOFIX E-PHORESIS SERUM: CPT | Performed by: INTERNAL MEDICINE

## 2022-09-14 PROCEDURE — 86334 IMMUNOFIX E-PHORESIS SERUM: CPT | Mod: 26,,, | Performed by: PATHOLOGY

## 2022-09-14 PROCEDURE — 36415 COLL VENOUS BLD VENIPUNCTURE: CPT | Mod: PO | Performed by: INTERNAL MEDICINE

## 2022-09-14 PROCEDURE — 86334 PATHOLOGIST INTERPRETATION IFE: ICD-10-PCS | Mod: 26,,, | Performed by: PATHOLOGY

## 2022-09-14 NOTE — TELEPHONE ENCOUNTER
Spoke with patient.  Labs drawn today.  Patient did not  iron yet.  Will try to get it today.  Will make appointment with GI.  Appointment scheduled [facility] Dr Meyers in [facility].

## 2022-09-14 NOTE — TELEPHONE ENCOUNTER
I reviewed labs and spoke with pt.  Pt has an iron deficiency anemia which is improving.  Recommend iron sulfate 324 mg daily  OK to begin cardiac rehab.  Pt's last colonoscopy was 2 years ago.   Pt does note blood in his underwear at times. (Hemorrhoidal bleeding?)  Recommend f/u with GI.

## 2022-09-15 LAB
ALBUMIN SERPL ELPH-MCNC: 3.55 G/DL (ref 3.35–5.55)
ALPHA1 GLOB SERPL ELPH-MCNC: 0.38 G/DL (ref 0.17–0.41)
ALPHA2 GLOB SERPL ELPH-MCNC: 0.84 G/DL (ref 0.43–0.99)
B-GLOBULIN SERPL ELPH-MCNC: 0.88 G/DL (ref 0.5–1.1)
GAMMA GLOB SERPL ELPH-MCNC: 1.35 G/DL (ref 0.67–1.58)
PROT SERPL-MCNC: 7 G/DL (ref 6–8.4)

## 2022-09-16 LAB
INTERPRETATION SERPL IFE-IMP: NORMAL
PATHOLOGIST INTERPRETATION SPE: NORMAL

## 2022-09-20 LAB — PATHOLOGIST INTERPRETATION IFE: NORMAL

## 2022-09-29 ENCOUNTER — OFFICE VISIT (OUTPATIENT)
Dept: CARDIOLOGY | Facility: CLINIC | Age: 72
End: 2022-09-29
Payer: MEDICARE

## 2022-09-29 VITALS
OXYGEN SATURATION: 96 % | SYSTOLIC BLOOD PRESSURE: 124 MMHG | DIASTOLIC BLOOD PRESSURE: 60 MMHG | BODY MASS INDEX: 33.48 KG/M2 | HEART RATE: 68 BPM | WEIGHT: 226.06 LBS | HEIGHT: 69 IN

## 2022-09-29 DIAGNOSIS — E78.5 HYPERLIPIDEMIA, UNSPECIFIED HYPERLIPIDEMIA TYPE: ICD-10-CM

## 2022-09-29 DIAGNOSIS — I10 ESSENTIAL HYPERTENSION: ICD-10-CM

## 2022-09-29 DIAGNOSIS — Z95.1 HX OF CABG: Primary | ICD-10-CM

## 2022-09-29 DIAGNOSIS — D50.0 IRON DEFICIENCY ANEMIA DUE TO CHRONIC BLOOD LOSS: ICD-10-CM

## 2022-09-29 DIAGNOSIS — E11.9 TYPE 2 DIABETES MELLITUS WITHOUT COMPLICATION, WITHOUT LONG-TERM CURRENT USE OF INSULIN: ICD-10-CM

## 2022-09-29 DIAGNOSIS — I25.10 CORONARY ARTERY DISEASE INVOLVING NATIVE CORONARY ARTERY OF NATIVE HEART WITHOUT ANGINA PECTORIS: ICD-10-CM

## 2022-09-29 DIAGNOSIS — Z95.1 S/P CABG (CORONARY ARTERY BYPASS GRAFT): ICD-10-CM

## 2022-09-29 PROCEDURE — 3078F DIAST BP <80 MM HG: CPT | Mod: CPTII,S$GLB,, | Performed by: INTERNAL MEDICINE

## 2022-09-29 PROCEDURE — 1125F PR PAIN SEVERITY QUANTIFIED, PAIN PRESENT: ICD-10-PCS | Mod: CPTII,S$GLB,, | Performed by: INTERNAL MEDICINE

## 2022-09-29 PROCEDURE — 1101F PT FALLS ASSESS-DOCD LE1/YR: CPT | Mod: CPTII,S$GLB,, | Performed by: INTERNAL MEDICINE

## 2022-09-29 PROCEDURE — 93010 EKG 12-LEAD: ICD-10-PCS | Mod: S$GLB,,, | Performed by: INTERNAL MEDICINE

## 2022-09-29 PROCEDURE — 3008F BODY MASS INDEX DOCD: CPT | Mod: CPTII,S$GLB,, | Performed by: INTERNAL MEDICINE

## 2022-09-29 PROCEDURE — 1159F PR MEDICATION LIST DOCUMENTED IN MEDICAL RECORD: ICD-10-PCS | Mod: CPTII,S$GLB,, | Performed by: INTERNAL MEDICINE

## 2022-09-29 PROCEDURE — 3074F PR MOST RECENT SYSTOLIC BLOOD PRESSURE < 130 MM HG: ICD-10-PCS | Mod: CPTII,S$GLB,, | Performed by: INTERNAL MEDICINE

## 2022-09-29 PROCEDURE — 3008F PR BODY MASS INDEX (BMI) DOCUMENTED: ICD-10-PCS | Mod: CPTII,S$GLB,, | Performed by: INTERNAL MEDICINE

## 2022-09-29 PROCEDURE — 1160F RVW MEDS BY RX/DR IN RCRD: CPT | Mod: CPTII,S$GLB,, | Performed by: INTERNAL MEDICINE

## 2022-09-29 PROCEDURE — 93005 EKG 12-LEAD: ICD-10-PCS | Mod: S$GLB,,, | Performed by: INTERNAL MEDICINE

## 2022-09-29 PROCEDURE — 4010F PR ACE/ARB THEARPY RXD/TAKEN: ICD-10-PCS | Mod: CPTII,S$GLB,, | Performed by: INTERNAL MEDICINE

## 2022-09-29 PROCEDURE — 99214 PR OFFICE/OUTPT VISIT, EST, LEVL IV, 30-39 MIN: ICD-10-PCS | Mod: S$GLB,,, | Performed by: INTERNAL MEDICINE

## 2022-09-29 PROCEDURE — 93010 ELECTROCARDIOGRAM REPORT: CPT | Mod: S$GLB,,, | Performed by: INTERNAL MEDICINE

## 2022-09-29 PROCEDURE — 3044F PR MOST RECENT HEMOGLOBIN A1C LEVEL <7.0%: ICD-10-PCS | Mod: CPTII,S$GLB,, | Performed by: INTERNAL MEDICINE

## 2022-09-29 PROCEDURE — 99214 OFFICE O/P EST MOD 30 MIN: CPT | Mod: S$GLB,,, | Performed by: INTERNAL MEDICINE

## 2022-09-29 PROCEDURE — 1159F MED LIST DOCD IN RCRD: CPT | Mod: CPTII,S$GLB,, | Performed by: INTERNAL MEDICINE

## 2022-09-29 PROCEDURE — 99999 PR PBB SHADOW E&M-EST. PATIENT-LVL III: ICD-10-PCS | Mod: PBBFAC,,, | Performed by: INTERNAL MEDICINE

## 2022-09-29 PROCEDURE — 1101F PR PT FALLS ASSESS DOC 0-1 FALLS W/OUT INJ PAST YR: ICD-10-PCS | Mod: CPTII,S$GLB,, | Performed by: INTERNAL MEDICINE

## 2022-09-29 PROCEDURE — 99999 PR PBB SHADOW E&M-EST. PATIENT-LVL III: CPT | Mod: PBBFAC,,, | Performed by: INTERNAL MEDICINE

## 2022-09-29 PROCEDURE — 3288F FALL RISK ASSESSMENT DOCD: CPT | Mod: CPTII,S$GLB,, | Performed by: INTERNAL MEDICINE

## 2022-09-29 PROCEDURE — 3078F PR MOST RECENT DIASTOLIC BLOOD PRESSURE < 80 MM HG: ICD-10-PCS | Mod: CPTII,S$GLB,, | Performed by: INTERNAL MEDICINE

## 2022-09-29 PROCEDURE — 4010F ACE/ARB THERAPY RXD/TAKEN: CPT | Mod: CPTII,S$GLB,, | Performed by: INTERNAL MEDICINE

## 2022-09-29 PROCEDURE — 1125F AMNT PAIN NOTED PAIN PRSNT: CPT | Mod: CPTII,S$GLB,, | Performed by: INTERNAL MEDICINE

## 2022-09-29 PROCEDURE — 93005 ELECTROCARDIOGRAM TRACING: CPT | Mod: S$GLB,,, | Performed by: INTERNAL MEDICINE

## 2022-09-29 PROCEDURE — 3044F HG A1C LEVEL LT 7.0%: CPT | Mod: CPTII,S$GLB,, | Performed by: INTERNAL MEDICINE

## 2022-09-29 PROCEDURE — 3288F PR FALLS RISK ASSESSMENT DOCUMENTED: ICD-10-PCS | Mod: CPTII,S$GLB,, | Performed by: INTERNAL MEDICINE

## 2022-09-29 PROCEDURE — 3074F SYST BP LT 130 MM HG: CPT | Mod: CPTII,S$GLB,, | Performed by: INTERNAL MEDICINE

## 2022-09-29 PROCEDURE — 1160F PR REVIEW ALL MEDS BY PRESCRIBER/CLIN PHARMACIST DOCUMENTED: ICD-10-PCS | Mod: CPTII,S$GLB,, | Performed by: INTERNAL MEDICINE

## 2022-09-29 RX ORDER — NIFEDIPINE 60 MG/1
60 TABLET, EXTENDED RELEASE ORAL DAILY
Qty: 30 TABLET | Refills: 11 | Status: SHIPPED | OUTPATIENT
Start: 2022-09-29 | End: 2022-09-29

## 2022-09-29 RX ORDER — NIFEDIPINE 60 MG/1
60 TABLET, EXTENDED RELEASE ORAL DAILY
Qty: 90 TABLET | Refills: 3 | Status: SHIPPED | OUTPATIENT
Start: 2022-09-29 | End: 2022-10-27 | Stop reason: DRUGHIGH

## 2022-09-29 RX ORDER — NITROFURANTOIN 25; 75 MG/1; MG/1
100 CAPSULE ORAL 2 TIMES DAILY
COMMUNITY
Start: 2022-09-08 | End: 2022-10-27 | Stop reason: ALTCHOICE

## 2022-09-29 NOTE — PROGRESS NOTES
"  Subjective:      Patient ID: Christiano Frost is a 71 y.o. male.    Chief Complaint: Hospital Follow Up (CABG 9 wks ago & anemia f/u) and Dizziness    HPI:  Pt is seeing Dr Dejesus urologist for UTI.    "I feel strong"    Anemia is better after taking iron.    Pt has not heard from cardiac rehab.    Pt felt faint after exercising recently and couldn't focus.  "I think the heat got to me"    Review of Systems   Cardiovascular:  Positive for leg swelling (left foot swells since SVBG harvest). Negative for chest pain, claudication, dyspnea on exertion, irregular heartbeat, near-syncope, orthopnea, palpitations and syncope.      Past Medical History:   Diagnosis Date    BPH (benign prostatic hyperplasia)     Depression     Diabetes mellitus     GERD (gastroesophageal reflux disease)     Hyperlipidemia     Hypertension     Obesity     TIA (transient ischemic attack) 0322/2016        Past Surgical History:   Procedure Laterality Date    CORONARY ARTERY BYPASS GRAFT (CABG) Left 7/29/2022    Procedure: CORONARY ARTERY BYPASS GRAFT (CABG);  Surgeon: Magen Martell MD;  Location: [facility] OR [address] [place] [address];  Service: Cardiovascular;  Laterality: Left;  cabg3 with vein harvest    ENDOSCOPIC HARVEST OF VEIN Left 7/29/2022    Procedure: HARVEST-VEIN-ENDOVASCULAR;  Surgeon: Magen Martell MD;  Location: [facility] OR [address] [place] [address];  Service: Cardiovascular;  Laterality: Left;  Vein [place] Start time: 0818am  Vein [place] Stop time: 0926am    Vein Prep Start time: 0928am  Vein Prep Stop time: 1002am    LEFT HEART CATHETERIZATION Left 7/19/2022    Procedure: Left heart cath;  Surgeon: Ben Ortega MD;  Location: [facility] CATH LAB/EP;  Service: Cardiology;  Laterality: Left;    SPINE SURGERY  2044 2005 2008       No family history on file.    Social History     Socioeconomic History    Marital status:    Tobacco Use    Smoking status: Never    Smokeless tobacco: Never       Current Outpatient Medications on File Prior to Visit " "  Medication Sig Dispense Refill    acetaminophen (TYLENOL) 500 MG tablet Take 2 tablets (1,000 mg total) by mouth every 6 (six) hours as needed for Pain. 30 tablet 0    aspirin 325 MG tablet Take 1 tablet (325 mg total) by mouth once daily. 30 tablet 11    dutasteride (AVODART) 0.5 mg capsule Take 0.5 mg by mouth once daily.      escitalopram oxalate (LEXAPRO) 10 MG tablet Take 10 mg by mouth once daily.       esomeprazole (NEXIUM) 40 MG capsule Take by mouth.      metoprolol succinate (TOPROL-XL) 100 MG 24 hr tablet Take 1 tablet (100 mg total) by mouth once daily. 30 tablet 11    nitrofurantoin, macrocrystal-monohydrate, (MACROBID) 100 MG capsule Take 100 mg by mouth 2 (two) times daily.      omega-3 fatty acids 1,000 mg Cap Take by mouth.      tamsulosin (FLOMAX) 0.4 mg Cp24 0.4 mg once daily.       [DISCONTINUED] NIFEdipine (PROCARDIA-XL) 90 MG (OSM) 24 hr tablet Take 1 tablet (90 mg total) by mouth daily with breakfast. 30 tablet 11    atorvastatin (LIPITOR) 40 MG tablet Take 1 tablet (40 mg total) by mouth once daily. (Patient not taking: No sig reported) 90 tablet 3    [DISCONTINUED] clopidogreL (PLAVIX) 75 mg tablet Take 1 tablet (75 mg total) by mouth once daily. 30 tablet 3    [DISCONTINUED] enalapril (VASOTEC) 5 MG tablet TK 1 T PO  QD  0    [DISCONTINUED] metFORMIN (GLUCOPHAGE) 500 MG tablet Take 1 tablet (500 mg total) by mouth daily with breakfast. (Patient not taking: No sig reported)       No current facility-administered medications on file prior to visit.       Review of patient's allergies indicates:  No Known Allergies  Objective:     Vitals:    09/29/22 1108   BP: 124/60   BP Location: Left arm   Patient Position: Sitting   BP Method: Large (Automatic)   Pulse: 68   SpO2: 96%   Weight: 102.6 kg (226 lb 1.3 oz)   Height: 5' 9" (1.753 m)        Physical Exam  Vitals reviewed.   Constitutional:       General: He is not in acute distress.     Appearance: He is well-developed. He is not diaphoretic. "   Eyes:      General: No scleral icterus.  Neck:      Vascular: No carotid bruit or JVD.   Cardiovascular:      Rate and Rhythm: Regular rhythm.      Heart sounds: Normal heart sounds. No murmur heard.    No friction rub. No gallop.   Pulmonary:      Effort: Pulmonary effort is normal. No respiratory distress.      Breath sounds: Normal breath sounds.   Musculoskeletal:      Right lower leg: No edema.      Left lower leg: Edema (trace) present.   Skin:     General: Skin is warm and dry.   Neurological:      Mental Status: He is alert and oriented to person, place, and time.   Psychiatric:         Behavior: Behavior normal.         Thought Content: Thought content normal.         Judgment: Judgment normal.      Lab Visit on 09/14/2022   Component Date Value Ref Range Status    WBC 09/14/2022 7.95  3.90 - 12.70 K/uL Final    RBC 09/14/2022 4.19 (L)  4.60 - 6.20 M/uL Final    Hemoglobin 09/14/2022 10.3 (L)  14.0 - 18.0 g/dL Final    Hematocrit 09/14/2022 33.8 (L)  40.0 - 54.0 % Final    MCV 09/14/2022 81 (L)  82 - 98 fL Final    MCH 09/14/2022 24.6 (L)  27.0 - 31.0 pg Final    MCHC 09/14/2022 30.5 (L)  32.0 - 36.0 g/dL Final    RDW 09/14/2022 16.3 (H)  11.5 - 14.5 % Final    Platelets 09/14/2022 483 (H)  150 - 450 K/uL Final    MPV 09/14/2022 9.4  9.2 - 12.9 fL Final    Immature Granulocytes 09/14/2022 1.3 (H)  0.0 - 0.5 % Final    Gran # (ANC) 09/14/2022 4.4  1.8 - 7.7 K/uL Final    Immature Grans (Abs) 09/14/2022 0.10 (H)  0.00 - 0.04 K/uL Final    Lymph # 09/14/2022 2.3  1.0 - 4.8 K/uL Final    Mono # 09/14/2022 0.7  0.3 - 1.0 K/uL Final    Eos # 09/14/2022 0.4  0.0 - 0.5 K/uL Final    Baso # 09/14/2022 0.13  0.00 - 0.20 K/uL Final    nRBC 09/14/2022 0  0 /100 WBC Final    Gran % 09/14/2022 55.1  38.0 - 73.0 % Final    Lymph % 09/14/2022 28.3  18.0 - 48.0 % Final    Mono % 09/14/2022 8.3  4.0 - 15.0 % Final    Eosinophil % 09/14/2022 5.4  0.0 - 8.0 % Final    Basophil % 09/14/2022 1.6  0.0 - 1.9 % Final     Differential Method 09/14/2022 Automated   Final    Sodium 09/14/2022 137  136 - 145 mmol/L Final    Potassium 09/14/2022 4.5  3.5 - 5.1 mmol/L Final    Chloride 09/14/2022 101  95 - 110 mmol/L Final    CO2 09/14/2022 27  23 - 29 mmol/L Final    Glucose 09/14/2022 120 (H)  70 - 110 mg/dL Final    BUN 09/14/2022 14  8 - 23 mg/dL Final    Creatinine 09/14/2022 1.0  0.5 - 1.4 mg/dL Final    Calcium 09/14/2022 9.7  8.7 - 10.5 mg/dL Final    Total Protein 09/14/2022 7.4  6.0 - 8.4 g/dL Final    Albumin 09/14/2022 3.4 (L)  3.5 - 5.2 g/dL Final    Total Bilirubin 09/14/2022 0.4  0.1 - 1.0 mg/dL Final    Alkaline Phosphatase 09/14/2022 52 (L)  55 - 135 U/L Final    AST 09/14/2022 13  10 - 40 U/L Final    ALT 09/14/2022 11  10 - 44 U/L Final    Anion Gap 09/14/2022 9  8 - 16 mmol/L Final    eGFR 09/14/2022 >60.0  >60 mL/min/1.73 m^2 Final    Iron 09/14/2022 24 (L)  45 - 160 ug/dL Final    Transferrin 09/14/2022 252  200 - 375 mg/dL Final    TIBC 09/14/2022 373  250 - 450 ug/dL Final    Saturated Iron 09/14/2022 6 (L)  20 - 50 % Final    Protein, Serum 09/14/2022 7.0  6.0 - 8.4 g/dL Final    Albumin 09/14/2022 3.55  3.35 - 5.55 g/dL Final    Alpha-1 09/14/2022 0.38  0.17 - 0.41 g/dL Final    Alpha-2 09/14/2022 0.84  0.43 - 0.99 g/dL Final    Beta 09/14/2022 0.88  0.50 - 1.10 g/dL Final    Gamma 09/14/2022 1.35  0.67 - 1.58 g/dL Final    Immunofix Interp. 09/14/2022 SEE COMMENT   Final    Pathologist Interpretation SPE 09/14/2022 REVIEWED   Final    Pathologist Interpretation BRANDEE 09/14/2022 REVIEWED   Final   Lab Visit on 08/16/2022   Component Date Value Ref Range Status    Specimen UA 08/16/2022 Urine, Unspecified   Final    Color, UA 08/16/2022 Yellow  Yellow, Straw, Jackie Final    Appearance, UA 08/16/2022 Hazy (A)  Clear Final    pH, UA 08/16/2022 6.0  5.0 - 8.0 Final    Specific Gravity, UA 08/16/2022 1.015  1.005 - 1.030 Final    Protein, UA 08/16/2022 1+ (A)  Negative Final    Glucose, UA 08/16/2022 Negative  Negative  Final    Ketones, UA 08/16/2022 Negative  Negative Final    Bilirubin (UA) 08/16/2022 Negative  Negative Final    Occult Blood UA 08/16/2022 Trace (A)  Negative Final    Nitrite, UA 08/16/2022 Positive (A)  Negative Final    Leukocytes, UA 08/16/2022 3+ (A)  Negative Final    Urine Culture, Routine 08/16/2022  (A)   Final                    Value:ESCHERICHIA COLI  >100,000 cfu/ml      RBC, UA 08/16/2022 5 (H)  0 - 4 /hpf Final    WBC, UA 08/16/2022 >100 (H)  0 - 5 /hpf Final    WBC Clumps, UA 08/16/2022 Many (A)  None-Rare Final    Bacteria 08/16/2022 Moderate (A)  None-Occ /hpf Final    Squam Epithel, UA 08/16/2022 4  /hpf Final    Hyaline Casts, UA 08/16/2022 0  0-1/lpf /lpf Final    Microscopic Comment 08/16/2022 SEE COMMENT   Final   No results displayed because visit has over 200 results.      Lab Visit on 07/20/2022   Component Date Value Ref Range Status    Hemoglobin A1C 07/20/2022 5.9 (H)  4.0 - 5.6 % Final    Estimated Avg Glucose 07/20/2022 123  68 - 131 mg/dL Final   Admission on 07/19/2022, Discharged on 07/19/2022   Component Date Value Ref Range Status    POCT Glucose 07/19/2022 96  70 - 110 mg/dL Final    Hemoglobin A1C 07/19/2022 6.5 (H)  4.0 - 5.6 % Final    Estimated Avg Glucose 07/19/2022 140 (H)  68 - 131 mg/dL Final    BSA 07/19/2022 2.28  m2 Final    TDI SEPTAL 07/19/2022 0.04  m/s Final    LV LATERAL E/E' RATIO 07/19/2022 13.80  m/s Final    LV SEPTAL E/E' RATIO 07/19/2022 17.25  m/s Final    LA WIDTH 07/19/2022 4.18  cm Final    TDI LATERAL 07/19/2022 0.05  m/s Final    PV PEAK VELOCITY 07/19/2022 0.84  cm/s Final    LVIDd 07/19/2022 5.03  3.5 - 6.0 cm Final    IVS 07/19/2022 1.51 (A)  0.6 - 1.1 cm Final    Posterior Wall 07/19/2022 1.04  0.6 - 1.1 cm Final    Ao root annulus 07/19/2022 3.93  cm Final    LVIDs 07/19/2022 3.56  2.1 - 4.0 cm Final    FS 07/19/2022 29  28 - 44 % Final    LA volume 07/19/2022 81.34  cm3 Final    STJ 07/19/2022 3.30  cm Final    Ascending aorta 07/19/2022 3.24   "cm Final    LV mass 07/19/2022 257.07  g Final    LA size 07/19/2022 4.38  cm Final    RVDD 07/19/2022 3.62  cm Final    TAPSE 07/19/2022 1.75  cm Final    Left Ventricle Relative Wall Thick* 07/19/2022 0.41  cm Final    AV mean gradient 07/19/2022 3  mmHg Final    AV valve area 07/19/2022 3.15  cm2 Final    AV Velocity Ratio 07/19/2022 0.82   Final    AV index (prosthetic) 07/19/2022 0.71   Final    MV mean gradient 07/19/2022 1  mmHg Final    MV valve area p 1/2 method 07/19/2022 2.78  cm2 Final    MV valve area by continuity eq 07/19/2022 3.65  cm2 Final    E/A ratio 07/19/2022 0.75   Final    Mean e' 07/19/2022 0.05  m/s Final    E wave deceleration time 07/19/2022 272.83  msec Final    MV "A" wave duration 07/19/2022 10.66  msec Final    Pulm vein S/D ratio 07/19/2022 1.74   Final    LVOT diameter 07/19/2022 2.38  cm Final    LVOT area 07/19/2022 4.4  cm2 Final    LVOT peak jesse 07/19/2022 0.93  m/s Final    LVOT peak VTI 07/19/2022 22.25  cm Final    Ao peak jesse 07/19/2022 1.14  m/s Final    Ao VTI 07/19/2022 31.41  cm Final    LVOT stroke volume 07/19/2022 98.94  cm3 Final    AV peak gradient 07/19/2022 5  mmHg Final    MV peak gradient 07/19/2022 3  mmHg Final    E/E' ratio 07/19/2022 15.33  m/s Final    MV Peak E Jesse 07/19/2022 0.69  m/s Final    TR Max Jesse 07/19/2022 1.45  m/s Final    MV VTI 07/19/2022 27.10  cm Final    MV stenosis pressure 1/2 time 07/19/2022 79.12  ms Final    MV Peak A Jesse 07/19/2022 0.92  m/s Final    PV Peak S Jesse 07/19/2022 0.75  m/s Final    PV Peak D Jesse 07/19/2022 0.43  m/s Final    LV Systolic Volume 07/19/2022 52.97  mL Final    LV Systolic Volume Index 07/19/2022 23.9  mL/m2 Final    LV Diastolic Volume 07/19/2022 119.64  mL Final    LV Diastolic Volume Index 07/19/2022 53.89  mL/m2 Final    LA Volume Index 07/19/2022 36.6  mL/m2 Final    LV Mass Index 07/19/2022 116  g/m2 Final    RA Major Axis 07/19/2022 4.53  cm Final    Left Atrium Minor Axis 07/19/2022 4.77  cm Final    " Left Atrium Major Axis 07/19/2022 5.78  cm Final    Triscuspid Valve Regurgitation Pea* 07/19/2022 8  mmHg Final    LA Volume Index (Mod) 07/19/2022 24.5  mL/m2 Final    LA volume (mod) 07/19/2022 54.49  cm3 Final    RA Width 07/19/2022 2.45  cm Final    Right Atrial Pressure (from IVC) 07/19/2022 8  mmHg Final    EF 07/19/2022 60  % Final    TV rest pulmonary artery pressure 07/19/2022 16  mmHg Final   Lab Visit on 07/18/2022   Component Date Value Ref Range Status    WBC 07/18/2022 8.31  3.90 - 12.70 K/uL Final    RBC 07/18/2022 4.94  4.60 - 6.20 M/uL Final    Hemoglobin 07/18/2022 13.5 (L)  14.0 - 18.0 g/dL Final    Hematocrit 07/18/2022 44.2  40.0 - 54.0 % Final    MCV 07/18/2022 90  82 - 98 fL Final    MCH 07/18/2022 27.3  27.0 - 31.0 pg Final    MCHC 07/18/2022 30.5 (L)  32.0 - 36.0 g/dL Final    RDW 07/18/2022 14.5  11.5 - 14.5 % Final    Platelets 07/18/2022 229  150 - 450 K/uL Final    MPV 07/18/2022 10.2  9.2 - 12.9 fL Final    Immature Granulocytes 07/18/2022 0.2  0.0 - 0.5 % Final    Gran # (ANC) 07/18/2022 4.0  1.8 - 7.7 K/uL Final    Immature Grans (Abs) 07/18/2022 0.02  0.00 - 0.04 K/uL Final    Lymph # 07/18/2022 2.9  1.0 - 4.8 K/uL Final    Mono # 07/18/2022 1.0  0.3 - 1.0 K/uL Final    Eos # 07/18/2022 0.3  0.0 - 0.5 K/uL Final    Baso # 07/18/2022 0.11  0.00 - 0.20 K/uL Final    nRBC 07/18/2022 0  0 /100 WBC Final    Gran % 07/18/2022 48.1  38.0 - 73.0 % Final    Lymph % 07/18/2022 35.3  18.0 - 48.0 % Final    Mono % 07/18/2022 11.7  4.0 - 15.0 % Final    Eosinophil % 07/18/2022 3.4  0.0 - 8.0 % Final    Basophil % 07/18/2022 1.3  0.0 - 1.9 % Final    Differential Method 07/18/2022 Automated   Final    Sodium 07/18/2022 136  136 - 145 mmol/L Final    Potassium 07/18/2022 4.2  3.5 - 5.1 mmol/L Final    Chloride 07/18/2022 101  95 - 110 mmol/L Final    CO2 07/18/2022 27  23 - 29 mmol/L Final    Glucose 07/18/2022 97  70 - 110 mg/dL Final    BUN 07/18/2022 15  8 - 23 mg/dL Final    Creatinine  07/18/2022 1.0  0.5 - 1.4 mg/dL Final    Calcium 07/18/2022 9.7  8.7 - 10.5 mg/dL Final    Anion Gap 07/18/2022 8  8 - 16 mmol/L Final    eGFR if  07/18/2022 >60.0  >60 mL/min/1.73 m^2 Final    eGFR if non African American 07/18/2022 >60.0  >60 mL/min/1.73 m^2 Final   Hospital Outpatient Visit on 07/14/2022   Component Date Value Ref Range Status    85% Max Predicted HR 07/14/2022 125   Final    Max Predicted HR 07/14/2022 147   Final    [facility] CV CPX PATIENT IS MALE 07/14/2022 1.0   Final    [facility] CV CPX PATIENT IS FEMALE 07/14/2022 0.0   Final    HR at rest 07/14/2022 75  bpm Final    Systolic blood pressure 07/14/2022 157  mmHg Final    Diastolic blood pressure 07/14/2022 82  mmHg Final    RPP 07/14/2022 11,775   Final    Peak HR 07/14/2022 69  bpm Final    Peak Systolic BP 07/14/2022 139  mmHg Final    Peak Diatolic BP 07/14/2022 60  mmHg Final    Peak RPP 07/14/2022 9,591   Final    % Max HR Achieved 07/14/2022 47   Final    dose 07/14/2022 0.4  mg Final    Size in % 07/14/2022 4  % Final    DEFECT WORSENS SIZE IN % 07/14/2022 34  % Final    Size in % 07/14/2022 5  % Final    Lateral Flow at Rest 07/14/2022 0.61  cc/min/g Final    Inferior Flow at Rest 07/14/2022 0.56  cc/min/g Final    Septal Flow at Rest 07/14/2022 0.41  cc/min/g Final    Anterior Flow at Rest 07/14/2022 0.55  cc/min/g Final    Minimum Flow at Rest 07/14/2022 0.30  cc/min/g Final    Maximum Flow at Rest 07/14/2022 0.76  cc/min/g Final    Whole Heart Flow at Rest 07/14/2022 0.53  cc/min/g Final    Lateral Flow at Stress 07/14/2022 0.94  cc/min/g Final    Inferior Flow at Stress 07/14/2022 0.55  cc/min/g Final    Septal Flow at Stress 07/14/2022 0.50  cc/min/g Final    Anterior Flow at Stress 07/14/2022 0.63  cc/min/g Final    Minimum Flow at Stress 07/14/2022 0.21  cc/min/g Final    Maximum Flow at Stress 07/14/2022 1.16  cc/min/g Final    Whole Heart Flow at Rest 07/14/2022 0.66  cc/min/g Final    Lateral Flow - CFR 07/14/2022 1.55    Final    Inferior Flow - CFR 07/14/2022 0.95   Final    Septal Flow - CFR 07/14/2022 1.17   Final    Anterior Flow - CFR 07/14/2022 1.15   Final    Minimum Flow - CFR 07/14/2022 0.52   Final    Maximum Flow - CFR 07/14/2022 1.95   Final    Whole Heart Flow - CFR 07/14/2022 1.21   Final    EF + QEF 07/14/2022 53  % Final    Ejection Fraction- High Stress 07/14/2022 65  % Final    End diastolic index (mL/m2) 07/14/2022 93  mL/m2 Final    End diastolic index (mL/m2) 07/14/2022 153  mL/m2 Final    End systolic index (mL/m2) 07/14/2022 31  mL/m2 Final    End systolic index (mL/m2) 07/14/2022 71  mL/m2 Final    Nuc Rest EF 07/14/2022 49   Final    Nuc Rest Diastolic Volume Index 07/14/2022 136   Final    Nuc Rest Systolic Volume Index 07/14/2022 69   Final    Nuc Stress EF 07/14/2022 50  % Final    Nuc Rest Diastolic Volume Index 07/14/2022 137   Final    Nuc Rest Systolic Volume Index 07/14/2022 68   Final   (    Impression   =========     RIGHT SIDE:   1-39% Right ICA stenosis.   Heterogeneous plaque noted in the right internal carotid artery.   Heterogeneous plaque noted in the right common carotid artery.   Antegrade flow noted in the right vertebral artery.     LEFT SIDE:   1-39% Left ICA stenosis. The ICA is highly tortuous in nature and a high bifurcation is identified.   Heterogeneous plaque noted in the left internal carotid artery.   Heterogeneous plaque noted in the left common carotid artery.   Antegrade flow noted in the left vertebral artery.       DATE OF SERVICE: 07/20/2022                                                Assessment:     1. Hx of CABG    2. Coronary artery disease involving native coronary artery of native heart without angina pectoris    3. S/P CABG (coronary artery bypass graft)    4. Hyperlipidemia, unspecified hyperlipidemia type    5. Essential hypertension    6. Iron deficiency anemia due to chronic blood loss    7. Type 2 diabetes mellitus without complication, without long-term  "current use of insulin      Plan:   Christiano was seen today for hospital follow up and dizziness.    Diagnoses and all orders for this visit:    Hx of CABG  -     IN OFFICE EKG 12-LEAD (to [place])  -     Ambulatory referral/consult to Cardiac Rehab; Future  -     Holter monitor - 48 hour; Future  -     Cardiac rehab phase II; Future    Coronary artery disease involving native coronary artery of native heart without angina pectoris  -     Holter monitor - 48 hour; Future  -     Cardiac rehab phase II; Future    S/P CABG (coronary artery bypass graft)  -     Holter monitor - 48 hour; Future  -     Cardiac rehab phase II; Future    Hyperlipidemia, unspecified hyperlipidemia type  -     Holter monitor - 48 hour; Future  -     Cardiac rehab phase II; Future    Essential hypertension  -     Holter monitor - 48 hour; Future  -     Cardiac rehab phase II; Future    Iron deficiency anemia due to chronic blood loss  -     Holter monitor - 48 hour; Future  -     Cardiac rehab phase II; Future    Type 2 diabetes mellitus without complication, without long-term current use of insulin  -     Holter monitor - 48 hour; Future  -     Cardiac rehab phase II; Future    Other orders  -     Discontinue: NIFEdipine (PROCARDIA-XL) 60 MG (OSM) 24 hr tablet; Take 1 tablet (60 mg total) by mouth once daily.  -     NIFEdipine (PROCARDIA-XL) 60 MG (OSM) 24 hr tablet; Take 1 tablet (60 mg total) by mouth once daily.         Consult cardiac rehab.    The episode of feeling dazed and "out of it" may have been orthostatic hypotension exacerbated by a UTI. "People were talking and it just sounded like noise."    Therefore will reduce the dose of nifedipine to 60 mg daily    Will also get a holter monitor    Resume atorvastatin 40 mg daily    Rest of meds the same    Pt to keep a BP log at home    Follow up in about 4 weeks (around 10/27/2022).    "

## 2022-09-30 ENCOUNTER — TELEPHONE (OUTPATIENT)
Dept: CARDIAC REHAB | Facility: CLINIC | Age: 72
End: 2022-09-30
Payer: MEDICARE

## 2022-09-30 NOTE — LETTER
September 30, 2022    Christiano Frost  [address]   [place] [address]             [facility]  [address]  [place] [address]  Phone: [phone]                                  [facility]   [address] [facility]   [place] [address]  [phone]         [facility]   [address] [place] [address] [phone]         [facility]    [address] [facility] [address] [place] [id number] [phone]   Re: Christiano Frost  Clinic number: [id number]    Dear Mr. Frost:    You were recently admitted to an [facility] facility for cardiac (heart) problem.  Your physician has referred you to [facility].  Cardiac Rehab Phase 2 is an educational and exercise program, conducted in a outpatient setting, proven to help reduce your risk for recurrent heart events.    Cardiac rehab has two major parts:    1. Exercise training to help you achieve cardiovascular fitness while learning how to exercise safely and improve muscle strength and endurance.  Your exercise prescription will be based on the results of the cardiopulmonary stress test (CPX) which will be done before entering the program and at completion.  2. Education, counseling and training to help you understand your heart condition and find ways to reduce your risk of future heart problems.  The cardiac rehab team will help you learn how to cope with the stress of adjusting to a new lifestyle and to deal with your fears about the future.    Phase 2 is a 36-session program, meeting 3 times a week for 12 weeks.  Each session consists of an hour of exercise and half-hour dedicated to the educational topic of the day.  Class days vary per location.  Please contact your nearest facility for details.    Through cardiac rehab you will learn:  About your heart condition, medical therapies, and medication  Risk factors in y our lifestyle contributing to heart  disease  New strategies to modify your risk factors  About a healthy diet that can lower your blood cholesterol, control weight, help prevent or control high blood pressure, and diabetes  How to stop smoking  How to manage stress    If you are interested in getting started, call the [facility] of your choosing.     Sincerely,     [facility]

## 2022-09-30 NOTE — TELEPHONE ENCOUNTER
Letter regarding Phase II cardiac rehab was sent to patient.  Will contact patient in 2 weeks to see if interested.   Charley Navarro RN  Cardiac Rehab Nurse

## 2022-10-03 ENCOUNTER — HOSPITAL ENCOUNTER (OUTPATIENT)
Dept: CARDIOLOGY | Facility: HOSPITAL | Age: 72
Discharge: HOME | End: 2022-10-03
Attending: INTERNAL MEDICINE
Payer: MEDICARE

## 2022-10-03 DIAGNOSIS — D50.0 IRON DEFICIENCY ANEMIA DUE TO CHRONIC BLOOD LOSS: ICD-10-CM

## 2022-10-03 DIAGNOSIS — Z95.1 S/P CABG (CORONARY ARTERY BYPASS GRAFT): ICD-10-CM

## 2022-10-03 DIAGNOSIS — E11.9 TYPE 2 DIABETES MELLITUS WITHOUT COMPLICATION, WITHOUT LONG-TERM CURRENT USE OF INSULIN: ICD-10-CM

## 2022-10-03 DIAGNOSIS — Z95.1 HX OF CABG: ICD-10-CM

## 2022-10-03 DIAGNOSIS — I10 ESSENTIAL HYPERTENSION: ICD-10-CM

## 2022-10-03 DIAGNOSIS — E78.5 HYPERLIPIDEMIA, UNSPECIFIED HYPERLIPIDEMIA TYPE: ICD-10-CM

## 2022-10-03 DIAGNOSIS — I25.10 CORONARY ARTERY DISEASE INVOLVING NATIVE CORONARY ARTERY OF NATIVE HEART WITHOUT ANGINA PECTORIS: ICD-10-CM

## 2022-10-03 PROCEDURE — 93225 XTRNL ECG REC<48 HRS REC: CPT

## 2022-10-03 PROCEDURE — 93227 HOLTER MONITOR - 48 HOUR (CUPID ONLY): ICD-10-PCS | Mod: ,,, | Performed by: INTERNAL MEDICINE

## 2022-10-03 PROCEDURE — 93227 XTRNL ECG REC<48 HR R&I: CPT | Mod: ,,, | Performed by: INTERNAL MEDICINE

## 2022-10-06 ENCOUNTER — TELEPHONE (OUTPATIENT)
Dept: CARDIOLOGY | Facility: CLINIC | Age: 72
End: 2022-10-06
Payer: MEDICARE

## 2022-10-06 LAB
OHS CV EVENT MONITOR DAY: 0
OHS CV HOLTER LENGTH DECIMAL HOURS: 47.98
OHS CV HOLTER LENGTH HOURS: 47
OHS CV HOLTER LENGTH MINUTES: 59
OHS CV HOLTER SINUS AVERAGE HR: 68
OHS CV HOLTER SINUS MAX HR: 126
OHS CV HOLTER SINUS MIN HR: 55

## 2022-10-07 ENCOUNTER — TELEPHONE (OUTPATIENT)
Dept: CARDIAC REHAB | Facility: CLINIC | Age: 72
End: 2022-10-07
Payer: MEDICARE

## 2022-10-07 NOTE — TELEPHONE ENCOUNTER
Spoke with pt re: cardiac rehab authorization through [employer] first then we will schedule him for CPX, labs and orientation. Pt verbalized understanding.

## 2022-10-07 NOTE — TELEPHONE ENCOUNTER
----- Message from Maricarmen Nolan MA sent at 10/7/2022  9:27 AM CDT -----  Contact: self  Pt is calling to enroll in cardiac rehab. Please [phone].

## 2022-10-11 ENCOUNTER — TELEPHONE (OUTPATIENT)
Dept: CARDIAC REHAB | Facility: CLINIC | Age: 72
End: 2022-10-11
Payer: MEDICARE

## 2022-10-11 DIAGNOSIS — Z95.1 POSTSURGICAL AORTOCORONARY BYPASS STATUS: Primary | ICD-10-CM

## 2022-10-11 DIAGNOSIS — I25.10 CORONARY ARTERY DISEASE, UNSPECIFIED VESSEL OR LESION TYPE, UNSPECIFIED WHETHER ANGINA PRESENT, UNSPECIFIED WHETHER NATIVE OR TRANSPLANTED HEART: ICD-10-CM

## 2022-10-27 ENCOUNTER — OFFICE VISIT (OUTPATIENT)
Dept: CARDIOLOGY | Facility: CLINIC | Age: 72
End: 2022-10-27
Payer: MEDICARE

## 2022-10-27 VITALS
DIASTOLIC BLOOD PRESSURE: 66 MMHG | HEART RATE: 62 BPM | SYSTOLIC BLOOD PRESSURE: 117 MMHG | OXYGEN SATURATION: 98 % | WEIGHT: 227.94 LBS | HEIGHT: 69 IN | BODY MASS INDEX: 33.76 KG/M2

## 2022-10-27 DIAGNOSIS — I69.398: ICD-10-CM

## 2022-10-27 DIAGNOSIS — F07.0: ICD-10-CM

## 2022-10-27 DIAGNOSIS — R60.0 LOCALIZED EDEMA: ICD-10-CM

## 2022-10-27 DIAGNOSIS — I25.10 CORONARY ARTERY DISEASE INVOLVING NATIVE CORONARY ARTERY OF NATIVE HEART WITHOUT ANGINA PECTORIS: ICD-10-CM

## 2022-10-27 DIAGNOSIS — I10 ESSENTIAL HYPERTENSION: Primary | ICD-10-CM

## 2022-10-27 DIAGNOSIS — Z95.1 S/P CABG (CORONARY ARTERY BYPASS GRAFT): ICD-10-CM

## 2022-10-27 DIAGNOSIS — E11.9 TYPE 2 DIABETES MELLITUS WITHOUT COMPLICATION, WITHOUT LONG-TERM CURRENT USE OF INSULIN: ICD-10-CM

## 2022-10-27 DIAGNOSIS — E78.5 HYPERLIPIDEMIA, UNSPECIFIED HYPERLIPIDEMIA TYPE: ICD-10-CM

## 2022-10-27 DIAGNOSIS — I65.23 BILATERAL CAROTID ARTERY STENOSIS: ICD-10-CM

## 2022-10-27 PROCEDURE — 99999 PR PBB SHADOW E&M-EST. PATIENT-LVL III: CPT | Mod: PBBFAC,,, | Performed by: INTERNAL MEDICINE

## 2022-10-27 PROCEDURE — 3044F PR MOST RECENT HEMOGLOBIN A1C LEVEL <7.0%: ICD-10-PCS | Mod: CPTII,S$GLB,, | Performed by: INTERNAL MEDICINE

## 2022-10-27 PROCEDURE — 1159F MED LIST DOCD IN RCRD: CPT | Mod: CPTII,S$GLB,, | Performed by: INTERNAL MEDICINE

## 2022-10-27 PROCEDURE — 99214 PR OFFICE/OUTPT VISIT, EST, LEVL IV, 30-39 MIN: ICD-10-PCS | Mod: S$GLB,,, | Performed by: INTERNAL MEDICINE

## 2022-10-27 PROCEDURE — 1101F PT FALLS ASSESS-DOCD LE1/YR: CPT | Mod: CPTII,S$GLB,, | Performed by: INTERNAL MEDICINE

## 2022-10-27 PROCEDURE — 3074F PR MOST RECENT SYSTOLIC BLOOD PRESSURE < 130 MM HG: ICD-10-PCS | Mod: CPTII,S$GLB,, | Performed by: INTERNAL MEDICINE

## 2022-10-27 PROCEDURE — 3078F DIAST BP <80 MM HG: CPT | Mod: CPTII,S$GLB,, | Performed by: INTERNAL MEDICINE

## 2022-10-27 PROCEDURE — 1126F PR PAIN SEVERITY QUANTIFIED, NO PAIN PRESENT: ICD-10-PCS | Mod: CPTII,S$GLB,, | Performed by: INTERNAL MEDICINE

## 2022-10-27 PROCEDURE — 1159F PR MEDICATION LIST DOCUMENTED IN MEDICAL RECORD: ICD-10-PCS | Mod: CPTII,S$GLB,, | Performed by: INTERNAL MEDICINE

## 2022-10-27 PROCEDURE — 4010F PR ACE/ARB THEARPY RXD/TAKEN: ICD-10-PCS | Mod: CPTII,S$GLB,, | Performed by: INTERNAL MEDICINE

## 2022-10-27 PROCEDURE — 1160F RVW MEDS BY RX/DR IN RCRD: CPT | Mod: CPTII,S$GLB,, | Performed by: INTERNAL MEDICINE

## 2022-10-27 PROCEDURE — 3288F PR FALLS RISK ASSESSMENT DOCUMENTED: ICD-10-PCS | Mod: CPTII,S$GLB,, | Performed by: INTERNAL MEDICINE

## 2022-10-27 PROCEDURE — 3074F SYST BP LT 130 MM HG: CPT | Mod: CPTII,S$GLB,, | Performed by: INTERNAL MEDICINE

## 2022-10-27 PROCEDURE — 3288F FALL RISK ASSESSMENT DOCD: CPT | Mod: CPTII,S$GLB,, | Performed by: INTERNAL MEDICINE

## 2022-10-27 PROCEDURE — 1101F PR PT FALLS ASSESS DOC 0-1 FALLS W/OUT INJ PAST YR: ICD-10-PCS | Mod: CPTII,S$GLB,, | Performed by: INTERNAL MEDICINE

## 2022-10-27 PROCEDURE — 3078F PR MOST RECENT DIASTOLIC BLOOD PRESSURE < 80 MM HG: ICD-10-PCS | Mod: CPTII,S$GLB,, | Performed by: INTERNAL MEDICINE

## 2022-10-27 PROCEDURE — 1160F PR REVIEW ALL MEDS BY PRESCRIBER/CLIN PHARMACIST DOCUMENTED: ICD-10-PCS | Mod: CPTII,S$GLB,, | Performed by: INTERNAL MEDICINE

## 2022-10-27 PROCEDURE — 4010F ACE/ARB THERAPY RXD/TAKEN: CPT | Mod: CPTII,S$GLB,, | Performed by: INTERNAL MEDICINE

## 2022-10-27 PROCEDURE — 99214 OFFICE O/P EST MOD 30 MIN: CPT | Mod: S$GLB,,, | Performed by: INTERNAL MEDICINE

## 2022-10-27 PROCEDURE — 1126F AMNT PAIN NOTED NONE PRSNT: CPT | Mod: CPTII,S$GLB,, | Performed by: INTERNAL MEDICINE

## 2022-10-27 PROCEDURE — 99999 PR PBB SHADOW E&M-EST. PATIENT-LVL III: ICD-10-PCS | Mod: PBBFAC,,, | Performed by: INTERNAL MEDICINE

## 2022-10-27 PROCEDURE — 3044F HG A1C LEVEL LT 7.0%: CPT | Mod: CPTII,S$GLB,, | Performed by: INTERNAL MEDICINE

## 2022-10-27 RX ORDER — ATORVASTATIN CALCIUM 40 MG/1
40 TABLET, FILM COATED ORAL DAILY
Qty: 90 TABLET | Refills: 3 | Status: SHIPPED | OUTPATIENT
Start: 2022-10-27 | End: 2023-05-02 | Stop reason: SDUPTHER

## 2022-10-27 RX ORDER — NIFEDIPINE 30 MG/1
30 TABLET, EXTENDED RELEASE ORAL DAILY
Qty: 90 TABLET | Refills: 3 | Status: SHIPPED | OUTPATIENT
Start: 2022-10-27 | End: 2023-05-02

## 2022-10-27 NOTE — PROGRESS NOTES
Subjective:      Patient ID: Christiano Frost is a 71 y.o. male.    Chief Complaint: Follow-up    HPI:  Feels OK.    Limited by chronic back pain (Pt has had 4 prior back surgeries).    Walks a mile a day    Edema of both legs is improving.    Skin ulcer left anterior ankle area is improving.    No recurrent weak or dazed spells.    BP at home 120/60    Review of Systems   Cardiovascular:  Positive for leg swelling. Negative for chest pain, claudication, dyspnea on exertion, irregular heartbeat, near-syncope, orthopnea, palpitations and syncope.      Past Medical History:   Diagnosis Date    BPH (benign prostatic hyperplasia)     Depression     Diabetes mellitus     GERD (gastroesophageal reflux disease)     Hyperlipidemia     Hypertension     Obesity     TIA (transient ischemic attack) 0322/2016        Past Surgical History:   Procedure Laterality Date    CORONARY ARTERY BYPASS GRAFT (CABG) Left 7/29/2022    Procedure: CORONARY ARTERY BYPASS GRAFT (CABG);  Surgeon: Magen Martell MD;  Location: [facility] OR [address] [place] [address];  Service: Cardiovascular;  Laterality: Left;  cabg3 with vein harvest    ENDOSCOPIC HARVEST OF VEIN Left 7/29/2022    Procedure: HARVEST-VEIN-ENDOVASCULAR;  Surgeon: Magen Martell MD;  Location: [address];  Service: Cardiovascular;  Laterality: Left;  Vein [place] Start time: 0818am  Vein [place] Stop time: 0926am    Vein Prep Start time: 0928am  Vein Prep Stop time: 1002am    LEFT HEART CATHETERIZATION Left 7/19/2022    Procedure: Left heart cath;  Surgeon: Ben Ortega MD;  Location: [facility] CATH LAB/EP;  Service: Cardiology;  Laterality: Left;    SPINE SURGERY  2044 2005 2008       No family history on file.    Social History     Socioeconomic History    Marital status:    Tobacco Use    Smoking status: Never    Smokeless tobacco: Never       Current Outpatient Medications on File Prior to Visit   Medication Sig Dispense Refill    acetaminophen (TYLENOL) 500 MG tablet Take 2  "tablets (1,000 mg total) by mouth every 6 (six) hours as needed for Pain. 30 tablet 0    aspirin 325 MG tablet Take 1 tablet (325 mg total) by mouth once daily. 30 tablet 11    dutasteride (AVODART) 0.5 mg capsule Take 0.5 mg by mouth once daily.      escitalopram oxalate (LEXAPRO) 10 MG tablet Take 10 mg by mouth once daily.       esomeprazole (NEXIUM) 40 MG capsule Take by mouth.      metoprolol succinate (TOPROL-XL) 100 MG 24 hr tablet Take 1 tablet (100 mg total) by mouth once daily. 30 tablet 11    omega-3 fatty acids 1,000 mg Cap Take by mouth.      tamsulosin (FLOMAX) 0.4 mg Cp24 0.4 mg once daily.       [DISCONTINUED] atorvastatin (LIPITOR) 40 MG tablet Take 1 tablet (40 mg total) by mouth once daily. 90 tablet 3    [DISCONTINUED] NIFEdipine (PROCARDIA-XL) 60 MG (OSM) 24 hr tablet Take 1 tablet (60 mg total) by mouth once daily. 90 tablet 3    [DISCONTINUED] clopidogreL (PLAVIX) 75 mg tablet Take 1 tablet (75 mg total) by mouth once daily. 30 tablet 3    [DISCONTINUED] enalapril (VASOTEC) 5 MG tablet TK 1 T PO  QD  0    [DISCONTINUED] metFORMIN (GLUCOPHAGE) 500 MG tablet Take 1 tablet (500 mg total) by mouth daily with breakfast. (Patient not taking: No sig reported)      [DISCONTINUED] nitrofurantoin, macrocrystal-monohydrate, (MACROBID) 100 MG capsule Take 100 mg by mouth 2 (two) times daily.       No current facility-administered medications on file prior to visit.       Review of patient's allergies indicates:  No Known Allergies  Objective:     Vitals:    10/27/22 1024   BP: 117/66   BP Location: Left arm   Patient Position: Sitting   BP Method: Large (Automatic)   Pulse: 62   SpO2: 98%   Weight: 103.4 kg (227 lb 15.3 oz)   Height: 5' 9" (1.753 m)        Physical Exam  Vitals reviewed.   Constitutional:       General: He is not in acute distress.     Appearance: He is well-developed. He is not diaphoretic.   Eyes:      General: No scleral icterus.  Neck:      Vascular: No carotid bruit or JVD. "   Cardiovascular:      Rate and Rhythm: Regular rhythm.      Heart sounds: Normal heart sounds. No murmur heard.    No friction rub. No gallop.   Pulmonary:      Effort: Pulmonary effort is normal. No respiratory distress.      Breath sounds: Normal breath sounds.   Musculoskeletal:      Right lower leg: Edema present.      Left lower leg: Edema (one plus edema bilaterally.  1 to 2 cm very superficial skin lesion not weeping left anterior ankle/tibia) present.   Skin:     General: Skin is warm and dry.   Neurological:      Mental Status: He is alert and oriented to person, place, and time.   Psychiatric:         Behavior: Behavior normal.         Thought Content: Thought content normal.         Judgment: Judgment normal.      Hospital Outpatient Visit on 10/03/2022   Component Date Value Ref Range Status    Holter length hours 10/03/2022 47   Final    holter length minutes 10/03/2022 59   Final    holter length dec hours 10/03/2022 47.98   Final    Sinus min HR 10/03/2022 55   Final    Sinus max hr 10/03/2022 126   Final    Sinus avg hr 10/03/2022 68   Final    Event Monitor Day 10/03/2022 0   Final   Lab Visit on 09/14/2022   Component Date Value Ref Range Status    WBC 09/14/2022 7.95  3.90 - 12.70 K/uL Final    RBC 09/14/2022 4.19 (L)  4.60 - 6.20 M/uL Final    Hemoglobin 09/14/2022 10.3 (L)  14.0 - 18.0 g/dL Final    Hematocrit 09/14/2022 33.8 (L)  40.0 - 54.0 % Final    MCV 09/14/2022 81 (L)  82 - 98 fL Final    MCH 09/14/2022 24.6 (L)  27.0 - 31.0 pg Final    MCHC 09/14/2022 30.5 (L)  32.0 - 36.0 g/dL Final    RDW 09/14/2022 16.3 (H)  11.5 - 14.5 % Final    Platelets 09/14/2022 483 (H)  150 - 450 K/uL Final    MPV 09/14/2022 9.4  9.2 - 12.9 fL Final    Immature Granulocytes 09/14/2022 1.3 (H)  0.0 - 0.5 % Final    Gran # (ANC) 09/14/2022 4.4  1.8 - 7.7 K/uL Final    Immature Grans (Abs) 09/14/2022 0.10 (H)  0.00 - 0.04 K/uL Final    Lymph # 09/14/2022 2.3  1.0 - 4.8 K/uL Final    Mono # 09/14/2022 0.7  0.3 -  1.0 K/uL Final    Eos # 09/14/2022 0.4  0.0 - 0.5 K/uL Final    Baso # 09/14/2022 0.13  0.00 - 0.20 K/uL Final    nRBC 09/14/2022 0  0 /100 WBC Final    Gran % 09/14/2022 55.1  38.0 - 73.0 % Final    Lymph % 09/14/2022 28.3  18.0 - 48.0 % Final    Mono % 09/14/2022 8.3  4.0 - 15.0 % Final    Eosinophil % 09/14/2022 5.4  0.0 - 8.0 % Final    Basophil % 09/14/2022 1.6  0.0 - 1.9 % Final    Differential Method 09/14/2022 Automated   Final    Sodium 09/14/2022 137  136 - 145 mmol/L Final    Potassium 09/14/2022 4.5  3.5 - 5.1 mmol/L Final    Chloride 09/14/2022 101  95 - 110 mmol/L Final    CO2 09/14/2022 27  23 - 29 mmol/L Final    Glucose 09/14/2022 120 (H)  70 - 110 mg/dL Final    BUN 09/14/2022 14  8 - 23 mg/dL Final    Creatinine 09/14/2022 1.0  0.5 - 1.4 mg/dL Final    Calcium 09/14/2022 9.7  8.7 - 10.5 mg/dL Final    Total Protein 09/14/2022 7.4  6.0 - 8.4 g/dL Final    Albumin 09/14/2022 3.4 (L)  3.5 - 5.2 g/dL Final    Total Bilirubin 09/14/2022 0.4  0.1 - 1.0 mg/dL Final    Alkaline Phosphatase 09/14/2022 52 (L)  55 - 135 U/L Final    AST 09/14/2022 13  10 - 40 U/L Final    ALT 09/14/2022 11  10 - 44 U/L Final    Anion Gap 09/14/2022 9  8 - 16 mmol/L Final    eGFR 09/14/2022 >60.0  >60 mL/min/1.73 m^2 Final    Iron 09/14/2022 24 (L)  45 - 160 ug/dL Final    Transferrin 09/14/2022 252  200 - 375 mg/dL Final    TIBC 09/14/2022 373  250 - 450 ug/dL Final    Saturated Iron 09/14/2022 6 (L)  20 - 50 % Final    Protein, Serum 09/14/2022 7.0  6.0 - 8.4 g/dL Final    Albumin 09/14/2022 3.55  3.35 - 5.55 g/dL Final    Alpha-1 09/14/2022 0.38  0.17 - 0.41 g/dL Final    Alpha-2 09/14/2022 0.84  0.43 - 0.99 g/dL Final    Beta 09/14/2022 0.88  0.50 - 1.10 g/dL Final    Gamma 09/14/2022 1.35  0.67 - 1.58 g/dL Final    Immunofix Interp. 09/14/2022 SEE COMMENT   Final    Pathologist Interpretation SPE 09/14/2022 REVIEWED   Final    Pathologist Interpretation BRANDEE 09/14/2022 REVIEWED   Final   Lab Visit on 08/16/2022    Component Date Value Ref Range Status    Specimen UA 08/16/2022 Urine, Unspecified   Final    Color, UA 08/16/2022 Yellow  Yellow, Straw, Jackie Final    Appearance, UA 08/16/2022 Hazy (A)  Clear Final    pH, UA 08/16/2022 6.0  5.0 - 8.0 Final    Specific Gravity, UA 08/16/2022 1.015  1.005 - 1.030 Final    Protein, UA 08/16/2022 1+ (A)  Negative Final    Glucose, UA 08/16/2022 Negative  Negative Final    Ketones, UA 08/16/2022 Negative  Negative Final    Bilirubin (UA) 08/16/2022 Negative  Negative Final    Occult Blood UA 08/16/2022 Trace (A)  Negative Final    Nitrite, UA 08/16/2022 Positive (A)  Negative Final    Leukocytes, UA 08/16/2022 3+ (A)  Negative Final    Urine Culture, Routine 08/16/2022  (A)   Final                    Value:ESCHERICHIA COLI  >100,000 cfu/ml      RBC, UA 08/16/2022 5 (H)  0 - 4 /hpf Final    WBC, UA 08/16/2022 >100 (H)  0 - 5 /hpf Final    WBC Clumps, UA 08/16/2022 Many (A)  None-Rare Final    Bacteria 08/16/2022 Moderate (A)  None-Occ /hpf Final    Squam Epithel, UA 08/16/2022 4  /hpf Final    Hyaline Casts, UA 08/16/2022 0  0-1/lpf /lpf Final    Microscopic Comment 08/16/2022 SEE COMMENT   Final   No results displayed because visit has over 200 results.      Lab Visit on 07/20/2022   Component Date Value Ref Range Status    Hemoglobin A1C 07/20/2022 5.9 (H)  4.0 - 5.6 % Final    Estimated Avg Glucose 07/20/2022 123  68 - 131 mg/dL Final   Admission on 07/19/2022, Discharged on 07/19/2022   Component Date Value Ref Range Status    POCT Glucose 07/19/2022 96  70 - 110 mg/dL Final    Hemoglobin A1C 07/19/2022 6.5 (H)  4.0 - 5.6 % Final    Estimated Avg Glucose 07/19/2022 140 (H)  68 - 131 mg/dL Final    BSA 07/19/2022 2.28  m2 Final    TDI SEPTAL 07/19/2022 0.04  m/s Final    LV LATERAL E/E' RATIO 07/19/2022 13.80  m/s Final    LV SEPTAL E/E' RATIO 07/19/2022 17.25  m/s Final    LA WIDTH 07/19/2022 4.18  cm Final    TDI LATERAL 07/19/2022 0.05  m/s Final    PV PEAK VELOCITY 07/19/2022  "0.84  cm/s Final    LVIDd 07/19/2022 5.03  3.5 - 6.0 cm Final    IVS 07/19/2022 1.51 (A)  0.6 - 1.1 cm Final    Posterior Wall 07/19/2022 1.04  0.6 - 1.1 cm Final    Ao root annulus 07/19/2022 3.93  cm Final    LVIDs 07/19/2022 3.56  2.1 - 4.0 cm Final    FS 07/19/2022 29  28 - 44 % Final    LA volume 07/19/2022 81.34  cm3 Final    STJ 07/19/2022 3.30  cm Final    Ascending aorta 07/19/2022 3.24  cm Final    LV mass 07/19/2022 257.07  g Final    LA size 07/19/2022 4.38  cm Final    RVDD 07/19/2022 3.62  cm Final    TAPSE 07/19/2022 1.75  cm Final    Left Ventricle Relative Wall Thick* 07/19/2022 0.41  cm Final    AV mean gradient 07/19/2022 3  mmHg Final    AV valve area 07/19/2022 3.15  cm2 Final    AV Velocity Ratio 07/19/2022 0.82   Final    AV index (prosthetic) 07/19/2022 0.71   Final    MV mean gradient 07/19/2022 1  mmHg Final    MV valve area p 1/2 method 07/19/2022 2.78  cm2 Final    MV valve area by continuity eq 07/19/2022 3.65  cm2 Final    E/A ratio 07/19/2022 0.75   Final    Mean e' 07/19/2022 0.05  m/s Final    E wave deceleration time 07/19/2022 272.83  msec Final    MV "A" wave duration 07/19/2022 10.66  msec Final    Pulm vein S/D ratio 07/19/2022 1.74   Final    LVOT diameter 07/19/2022 2.38  cm Final    LVOT area 07/19/2022 4.4  cm2 Final    LVOT peak jesse 07/19/2022 0.93  m/s Final    LVOT peak VTI 07/19/2022 22.25  cm Final    Ao peak jesse 07/19/2022 1.14  m/s Final    Ao VTI 07/19/2022 31.41  cm Final    LVOT stroke volume 07/19/2022 98.94  cm3 Final    AV peak gradient 07/19/2022 5  mmHg Final    MV peak gradient 07/19/2022 3  mmHg Final    E/E' ratio 07/19/2022 15.33  m/s Final    MV Peak E Jesse 07/19/2022 0.69  m/s Final    TR Max Jesse 07/19/2022 1.45  m/s Final    MV VTI 07/19/2022 27.10  cm Final    MV stenosis pressure 1/2 time 07/19/2022 79.12  ms Final    MV Peak A Jesse 07/19/2022 0.92  m/s Final    PV Peak S Jesse 07/19/2022 0.75  m/s Final    PV Peak D Jesse 07/19/2022 0.43  m/s Final    LV " Systolic Volume 07/19/2022 52.97  mL Final    LV Systolic Volume Index 07/19/2022 23.9  mL/m2 Final    LV Diastolic Volume 07/19/2022 119.64  mL Final    LV Diastolic Volume Index 07/19/2022 53.89  mL/m2 Final    LA Volume Index 07/19/2022 36.6  mL/m2 Final    LV Mass Index 07/19/2022 116  g/m2 Final    RA Major Axis 07/19/2022 4.53  cm Final    Left Atrium Minor Axis 07/19/2022 4.77  cm Final    Left Atrium Major Axis 07/19/2022 5.78  cm Final    Triscuspid Valve Regurgitation Pea* 07/19/2022 8  mmHg Final    LA Volume Index (Mod) 07/19/2022 24.5  mL/m2 Final    LA volume (mod) 07/19/2022 54.49  cm3 Final    RA Width 07/19/2022 2.45  cm Final    Right Atrial Pressure (from IVC) 07/19/2022 8  mmHg Final    EF 07/19/2022 60  % Final    TV rest pulmonary artery pressure 07/19/2022 16  mmHg Final   Lab Visit on 07/18/2022   Component Date Value Ref Range Status    WBC 07/18/2022 8.31  3.90 - 12.70 K/uL Final    RBC 07/18/2022 4.94  4.60 - 6.20 M/uL Final    Hemoglobin 07/18/2022 13.5 (L)  14.0 - 18.0 g/dL Final    Hematocrit 07/18/2022 44.2  40.0 - 54.0 % Final    MCV 07/18/2022 90  82 - 98 fL Final    MCH 07/18/2022 27.3  27.0 - 31.0 pg Final    MCHC 07/18/2022 30.5 (L)  32.0 - 36.0 g/dL Final    RDW 07/18/2022 14.5  11.5 - 14.5 % Final    Platelets 07/18/2022 229  150 - 450 K/uL Final    MPV 07/18/2022 10.2  9.2 - 12.9 fL Final    Immature Granulocytes 07/18/2022 0.2  0.0 - 0.5 % Final    Gran # (ANC) 07/18/2022 4.0  1.8 - 7.7 K/uL Final    Immature Grans (Abs) 07/18/2022 0.02  0.00 - 0.04 K/uL Final    Lymph # 07/18/2022 2.9  1.0 - 4.8 K/uL Final    Mono # 07/18/2022 1.0  0.3 - 1.0 K/uL Final    Eos # 07/18/2022 0.3  0.0 - 0.5 K/uL Final    Baso # 07/18/2022 0.11  0.00 - 0.20 K/uL Final    nRBC 07/18/2022 0  0 /100 WBC Final    Gran % 07/18/2022 48.1  38.0 - 73.0 % Final    Lymph % 07/18/2022 35.3  18.0 - 48.0 % Final    Mono % 07/18/2022 11.7  4.0 - 15.0 % Final    Eosinophil % 07/18/2022 3.4  0.0 - 8.0 % Final     Basophil % 07/18/2022 1.3  0.0 - 1.9 % Final    Differential Method 07/18/2022 Automated   Final    Sodium 07/18/2022 136  136 - 145 mmol/L Final    Potassium 07/18/2022 4.2  3.5 - 5.1 mmol/L Final    Chloride 07/18/2022 101  95 - 110 mmol/L Final    CO2 07/18/2022 27  23 - 29 mmol/L Final    Glucose 07/18/2022 97  70 - 110 mg/dL Final    BUN 07/18/2022 15  8 - 23 mg/dL Final    Creatinine 07/18/2022 1.0  0.5 - 1.4 mg/dL Final    Calcium 07/18/2022 9.7  8.7 - 10.5 mg/dL Final    Anion Gap 07/18/2022 8  8 - 16 mmol/L Final    eGFR if  07/18/2022 >60.0  >60 mL/min/1.73 m^2 Final    eGFR if non African American 07/18/2022 >60.0  >60 mL/min/1.73 m^2 Final   Hospital Outpatient Visit on 07/14/2022   Component Date Value Ref Range Status    85% Max Predicted HR 07/14/2022 125   Final    Max Predicted HR 07/14/2022 147   Final    [facility] CV CPX PATIENT IS MALE 07/14/2022 1.0   Final    [facility] CV CPX PATIENT IS FEMALE 07/14/2022 0.0   Final    HR at rest 07/14/2022 75  bpm Final    Systolic blood pressure 07/14/2022 157  mmHg Final    Diastolic blood pressure 07/14/2022 82  mmHg Final    RPP 07/14/2022 11,775   Final    Peak HR 07/14/2022 69  bpm Final    Peak Systolic BP 07/14/2022 139  mmHg Final    Peak Diatolic BP 07/14/2022 60  mmHg Final    Peak RPP 07/14/2022 9,591   Final    % Max HR Achieved 07/14/2022 47   Final    dose 07/14/2022 0.4  mg Final    Size in % 07/14/2022 4  % Final    DEFECT WORSENS SIZE IN % 07/14/2022 34  % Final    Size in % 07/14/2022 5  % Final    Lateral Flow at Rest 07/14/2022 0.61  cc/min/g Final    Inferior Flow at Rest 07/14/2022 0.56  cc/min/g Final    Septal Flow at Rest 07/14/2022 0.41  cc/min/g Final    Anterior Flow at Rest 07/14/2022 0.55  cc/min/g Final    Minimum Flow at Rest 07/14/2022 0.30  cc/min/g Final    Maximum Flow at Rest 07/14/2022 0.76  cc/min/g Final    Whole Heart Flow at Rest 07/14/2022 0.53  cc/min/g Final    Lateral Flow at Stress 07/14/2022 0.94  cc/min/g  Final    Inferior Flow at Stress 07/14/2022 0.55  cc/min/g Final    Septal Flow at Stress 07/14/2022 0.50  cc/min/g Final    Anterior Flow at Stress 07/14/2022 0.63  cc/min/g Final    Minimum Flow at Stress 07/14/2022 0.21  cc/min/g Final    Maximum Flow at Stress 07/14/2022 1.16  cc/min/g Final    Whole Heart Flow at Rest 07/14/2022 0.66  cc/min/g Final    Lateral Flow - CFR 07/14/2022 1.55   Final    Inferior Flow - CFR 07/14/2022 0.95   Final    Septal Flow - CFR 07/14/2022 1.17   Final    Anterior Flow - CFR 07/14/2022 1.15   Final    Minimum Flow - CFR 07/14/2022 0.52   Final    Maximum Flow - CFR 07/14/2022 1.95   Final    Whole Heart Flow - CFR 07/14/2022 1.21   Final    EF + QEF 07/14/2022 53  % Final    Ejection Fraction- High Stress 07/14/2022 65  % Final    End diastolic index (mL/m2) 07/14/2022 93  mL/m2 Final    End diastolic index (mL/m2) 07/14/2022 153  mL/m2 Final    End systolic index (mL/m2) 07/14/2022 31  mL/m2 Final    End systolic index (mL/m2) 07/14/2022 71  mL/m2 Final    Nuc Rest EF 07/14/2022 49   Final    Nuc Rest Diastolic Volume Index 07/14/2022 136   Final    Nuc Rest Systolic Volume Index 07/14/2022 69   Final    Nuc Stress EF 07/14/2022 50  % Final    Nuc Rest Diastolic Volume Index 07/14/2022 137   Final    Nuc Rest Systolic Volume Index 07/14/2022 68   Final   (    Assessment:     1. Essential hypertension    2. S/P CABG (coronary artery bypass graft)    3. Hyperlipidemia, unspecified hyperlipidemia type    4. Bilateral carotid artery stenosis    5. Coronary artery disease involving native coronary artery of native heart without angina pectoris    6. 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 Type 2 diabetes mellitus without complication, without long-term current use of insulin    8. Localized edema      Plan:   Christiano was seen today for follow-up.    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  -     NIFEdipine (PROCARDIA-XL) 30 MG (OSM) 24 hr tablet; Take 1 tablet (30 mg total) by mouth once daily.     Due to venous stasis ulcer left ankle will order venous compression stockings    Due to persistent venous stasis edema and left ankle venous stasis ulcer and since the blood pressure is so well controlled will reduce the nifedipine to 30 mg daily    Rest of meds the same     Cardiac rehab to begin next week.    Keep appt with me in [place] in December    No follow-ups on file.

## 2022-10-29 ENCOUNTER — LAB VISIT (OUTPATIENT)
Dept: SURGERY | Facility: CLINIC | Age: 72
End: 2022-10-29
Payer: MEDICARE

## 2022-10-29 DIAGNOSIS — Z01.818 PRE-OP TESTING: ICD-10-CM

## 2022-10-29 LAB — SARS-COV-2 RNA RESP QL NAA+PROBE: NOT DETECTED

## 2022-10-29 PROCEDURE — U0005 INFEC AGEN DETEC AMPLI PROBE: HCPCS | Performed by: INTERNAL MEDICINE

## 2022-10-29 PROCEDURE — U0003 INFECTIOUS AGENT DETECTION BY NUCLEIC ACID (DNA OR RNA); SEVERE ACUTE RESPIRATORY SYNDROME CORONAVIRUS 2 (SARS-COV-2) (CORONAVIRUS DISEASE [COVID-19]), AMPLIFIED PROBE TECHNIQUE, MAKING USE OF HIGH THROUGHPUT TECHNOLOGIES AS DESCRIBED BY CMS-2020-01-R: HCPCS | Performed by: INTERNAL MEDICINE

## 2022-11-01 ENCOUNTER — HOSPITAL ENCOUNTER (OUTPATIENT)
Dept: CARDIOLOGY | Facility: HOSPITAL | Age: 72
Discharge: HOME | End: 2022-11-01
Attending: INTERNAL MEDICINE
Payer: MEDICARE

## 2022-11-01 ENCOUNTER — TELEPHONE (OUTPATIENT)
Dept: CARDIOLOGY | Facility: CLINIC | Age: 72
End: 2022-11-01
Payer: MEDICARE

## 2022-11-01 VITALS
BODY MASS INDEX: 33.92 KG/M2 | HEIGHT: 69 IN | WEIGHT: 229 LBS | DIASTOLIC BLOOD PRESSURE: 54 MMHG | HEART RATE: 65 BPM | SYSTOLIC BLOOD PRESSURE: 104 MMHG

## 2022-11-01 DIAGNOSIS — I25.10 CORONARY ARTERY DISEASE, UNSPECIFIED VESSEL OR LESION TYPE, UNSPECIFIED WHETHER ANGINA PRESENT, UNSPECIFIED WHETHER NATIVE OR TRANSPLANTED HEART: ICD-10-CM

## 2022-11-01 DIAGNOSIS — Z95.1 POSTSURGICAL AORTOCORONARY BYPASS STATUS: ICD-10-CM

## 2022-11-01 LAB
CV STRESS BASE HR: 65 BPM
DIASTOLIC BLOOD PRESSURE: 54 MMHG
OHS CV CPX 1 MINUTE RECOVERY HEART RATE: 110 BPM
OHS CV CPX 85 PERCENT MAX PREDICTED HEART RATE MALE: 125
OHS CV CPX ABDOMINAL GIRTH: 49.5 CM
OHS CV CPX ANAEROBIC THRESHOLD DIASTOLIC BLOOD PRESSURE: 53 MMHG
OHS CV CPX ANAEROBIC THRESHOLD HEART RATE: 98
OHS CV CPX ANAEROBIC THRESHOLD RATE PRESSURE PRODUCT: NORMAL
OHS CV CPX ANAEROBIC THRESHOLD SYSTOLIC BLOOD PRESSURE: 106
OHS CV CPX DATA GRADE - AT: 2.5
OHS CV CPX DATA GRADE - PEAK: 3.5
OHS CV CPX DATA O2 SAT - PEAK: 97
OHS CV CPX DATA O2 SAT - REST: 97
OHS CV CPX DATA SPEED - AT: 2.3
OHS CV CPX DATA SPEED - PEAK: 2.6
OHS CV CPX DATA TIME - AT: 4.23
OHS CV CPX DATA TIME - PEAK: 5.52
OHS CV CPX DATA VE/VCO2 - AT: 33
OHS CV CPX DATA VE/VCO2 - PEAK: 34
OHS CV CPX DATA VE/VO2 - AT: 29
OHS CV CPX DATA VE/VO2 - PEAK: 34
OHS CV CPX DATA VO2 - AT: 14.3
OHS CV CPX DATA VO2 - PEAK: 15.8
OHS CV CPX DATA VO2 - REST: 4.1
OHS CV CPX ESTIMATED METS: 5
OHS CV CPX FEV1/FVC: 0.74
OHS CV CPX FORCED EXPIRATORY VOLUME: 2.29
OHS CV CPX FORCED VITAL CAPACITY (FVC): 3.08
OHS CV CPX HIGHEST VO: 29.4
OHS CV CPX MAX PREDICTED HEART RATE: 147
OHS CV CPX MAXIMAL VOLUNTARY VENTILATION (MVV) PREDICTED: 91.6
OHS CV CPX MAXIMAL VOLUNTARY VENTILATION (MVV): 64
OHS CV CPX MAXIUMUM EXERCISE VENTILATION (VE MAX): 55.4
OHS CV CPX PATIENT AGE: 73
OHS CV CPX PATIENT HEIGHT IN: 69
OHS CV CPX PATIENT IS FEMALE AGE 11-19: 0
OHS CV CPX PATIENT IS FEMALE AGE GREATER THAN 19: 0
OHS CV CPX PATIENT IS FEMALE AGE LESS THAN 11: 0
OHS CV CPX PATIENT IS FEMALE: 0
OHS CV CPX PATIENT IS MALE AGE 11-25: 0
OHS CV CPX PATIENT IS MALE AGE GREATER THAN 25: 1
OHS CV CPX PATIENT IS MALE AGE LESS THAN 11: 0
OHS CV CPX PATIENT IS MALE GREATER THAN 18: 1
OHS CV CPX PATIENT IS MALE LESS THAN OR EQUAL TO 18: 0
OHS CV CPX PATIENT IS MALE: 1
OHS CV CPX PATIENT WEIGHT RETURNED IN OZ: 3664
OHS CV CPX PEAK DIASTOLIC BLOOD PRESSURE: 56 MMHG
OHS CV CPX PEAK HEAR RATE: 111 BPM
OHS CV CPX PEAK RATE PRESSURE PRODUCT: NORMAL
OHS CV CPX PEAK SYSTOLIC BLOOD PRESSURE: 105 MMHG
OHS CV CPX PERCENT BODY FAT: 20.3
OHS CV CPX PERCENT MAX PREDICTED HEART RATE ACHIEVED: 76
OHS CV CPX PREDICTED VO2: 29.4 ML/KG/MIN
OHS CV CPX RATE PRESSURE PRODUCT PRESENTING: 6760
OHS CV CPX REST PET CO2: 29
OHS CV CPX VE/VCO2 SLOPE: 29.8
STRESS ECHO POST EXERCISE DUR MIN: 5 MINUTES
STRESS ECHO POST EXERCISE DUR SEC: 31 SECONDS
STRESS ST DEPRESSION: 0.5 MM
SYSTOLIC BLOOD PRESSURE: 104 MMHG

## 2022-11-01 PROCEDURE — 94621 CARDIOPULM EXERCISE TESTING: CPT

## 2022-11-01 PROCEDURE — 94621 CARDIOPULMONARY EXERCISE TESTING (CUPID ONLY): ICD-10-PCS | Mod: 26,,, | Performed by: INTERNAL MEDICINE

## 2022-11-01 PROCEDURE — 94621 CARDIOPULM EXERCISE TESTING: CPT | Mod: 26,,, | Performed by: INTERNAL MEDICINE

## 2022-11-02 NOTE — PROGRESS NOTES
Session: Orientation   Cardiac Rehab Individual Treatment Plan - Initial Assessment      Patient Name: Christiano Frost MRN: [id number]   : 1950   Age: 71 y.o.   Primary Diagnosis: CABG  Date of Event: 22  EF: 60%  Risk Stratification: high  Referring Physician: SUSANA   Exercise Assessment:     CPX/TM Date: 22 Results   RHR 65   Max    Peak VO2 (CPX only) 15.8   Actual METS (CPX only) 4.5   Estimated METS 5.0     Anthropometrics    Height 69 inches   Weight 229 lbs   BMI 33.9   Abdominal Girth 49.5   Body Composition 20.3%     ST Depression noted on Stress Test?:No  Angina with exercise?: No   Fall Risk: Yes   Assistive Devices:  independent   Currently exercising? Yes: Walking; Frequency: 3 days per week; Duration 45 minutes with some stops inbetween  There were some limitations noted by the patient due to chronic back pain.  It limits walking and standing time.      Exercise Plan:   Goals:  CR Exercise Goals: Attend Cardiac Rehab 3 times/week: In Progress  Home Aerobic Exercise: 2 additional days/week for 30-60 minutes: In Progress  Intensity of 12-15 on the Rate of Perceived Exertion (RPE) scale: In Progress  30% increase in entry estimated METS: 6.5 : In Progress  5 days/week for 30-60 minutes: In Progress    Intervention:   Discussed importance of regular attendance to cardiac rehab class    Exercise Prescription:  THR Range    Mode: Treadmill  Recumbent Bike  Upright Bike  Nustep   Frequency:  3 days/week   Duration:  30 - 60 minutes   Intensity:  12 - 15 RPE   Resistance Training:  Yes: 5 lb weights with 10-15 reps based on strength and range of motion assessment     Home Prescription:  Mode Aerobic   Frequency: 2- 3 days/week   Duration: 30-60 minutes   Resistance Training: None        Education:  Orientation to Equipment; verbalizes understanding; Date: 11-10-22  Exercise Recommendations; verbalizes understanding; Date: 11-10-22  Exercise Safety; verbalizes understanding; Date:  11-10-22  Class Preparation: verbalizes understanding; Date: 11-10-22  Signs and symptoms to report: verbalizes understanding; Date: 11-10-22  Caffeine/Hydration: verbalizes understanding; Date: 11-10-22  Exercise Terminology: verbalizes understanding; Date: 11-10-22  Resistance Training: verbalizes understanding; Date: 11-10-22    Comments:  Mr. Alvarado was encouraged to continue walking at least 2 non-rehab days per week for at least 30 minutes in addition to attending Phase II cardiac rehab classes 3 days per week.  He stated understanding.     All consent forms were signed, proper attire and shoes were discussed.       Mr. Alvarado will begin Cardiac Rehab on Wednesday, November 16 at 10:00am.    The exercise prescription will be adjusted based on tolerance of exercise intensity by patient.    Moises Panda., CEP

## 2022-11-08 ENCOUNTER — TELEPHONE (OUTPATIENT)
Dept: CARDIAC REHAB | Facility: CLINIC | Age: 72
End: 2022-11-08
Payer: MEDICARE

## 2022-11-10 ENCOUNTER — CLINICAL SUPPORT (OUTPATIENT)
Dept: CARDIAC REHAB | Facility: CLINIC | Age: 72
End: 2022-11-10
Payer: MEDICARE

## 2022-11-10 DIAGNOSIS — I10 ESSENTIAL HYPERTENSION: ICD-10-CM

## 2022-11-10 DIAGNOSIS — Z95.1 HX OF CABG: ICD-10-CM

## 2022-11-10 DIAGNOSIS — D50.0 IRON DEFICIENCY ANEMIA DUE TO CHRONIC BLOOD LOSS: ICD-10-CM

## 2022-11-10 DIAGNOSIS — E78.5 HYPERLIPIDEMIA, UNSPECIFIED HYPERLIPIDEMIA TYPE: ICD-10-CM

## 2022-11-10 DIAGNOSIS — Z95.1 S/P CABG (CORONARY ARTERY BYPASS GRAFT): ICD-10-CM

## 2022-11-10 DIAGNOSIS — I25.10 CORONARY ARTERY DISEASE INVOLVING NATIVE CORONARY ARTERY OF NATIVE HEART WITHOUT ANGINA PECTORIS: ICD-10-CM

## 2022-11-10 DIAGNOSIS — E11.9 TYPE 2 DIABETES MELLITUS WITHOUT COMPLICATION, WITHOUT LONG-TERM CURRENT USE OF INSULIN: ICD-10-CM

## 2022-11-10 PROCEDURE — 93798 PHYS/QHP OP CAR RHAB W/ECG: CPT | Mod: S$GLB,,, | Performed by: INTERNAL MEDICINE

## 2022-11-10 PROCEDURE — 99999 PR PBB SHADOW E&M-EST. PATIENT-LVL III: CPT | Mod: PBBFAC,,,

## 2022-11-10 PROCEDURE — 93798 PR CARDIAC REHAB/MONITOR: ICD-10-PCS | Mod: S$GLB,,, | Performed by: INTERNAL MEDICINE

## 2022-11-10 PROCEDURE — 99999 PR PBB SHADOW E&M-EST. PATIENT-LVL III: ICD-10-PCS | Mod: PBBFAC,,,

## 2022-11-10 RX ORDER — VIT C/E/ZN/COPPR/LUTEIN/ZEAXAN 250MG-90MG
1000 CAPSULE ORAL DAILY
COMMUNITY

## 2022-11-10 NOTE — PROGRESS NOTES
Orientation   Cardiac Rehab Individual Treatment Plan - Initial Assessment      Patient Name: Christiano Frost MRN: [id number]   : 1950   Age: 72 y.o.   Primary Diagnosis: s/p CABG, CAD, HLD, CVD, h/o stroke DM    Nutrition Assessment:     Anthropometrics    Height 69 inches   Weight 229 lbs   BMI 33.9   Abdominal Girth 49.5   Body Composition 20.3       Drug Allergies and Intolerances:  Review of patient's allergies indicates:  No Known Allergies    Food Allergies and Intolerances:  NA    Past Medical History:  Past Medical History:   Diagnosis Date    BPH (benign prostatic hyperplasia)     Depression     Diabetes mellitus     GERD (gastroesophageal reflux disease)     Hyperlipidemia     Hypertension     Obesity     TIA (transient ischemic attack)        Past Surgical History:  Past Surgical History:   Procedure Laterality Date    CORONARY ARTERY BYPASS GRAFT (CABG) Left 2022    Procedure: CORONARY ARTERY BYPASS GRAFT (CABG);  Surgeon: Magen Martell MD;  Location: [address];  Service: Cardiovascular;  Laterality: Left;  cabg3 with vein harvest    ENDOSCOPIC HARVEST OF VEIN Left 2022    Procedure: HARVEST-VEIN-ENDOVASCULAR;  Surgeon: Magen Martell MD;  Location: [address];  Service: Cardiovascular;  Laterality: Left;  Vein [place] Start time: 0818am  Vein [place] Stop time: 0926am    Vein Prep Start time: 0928am  Vein Prep Stop time: 1002am    LEFT HEART CATHETERIZATION Left 2022    Procedure: Left heart cath;  Surgeon: Ben Ortega MD;  Location: [facility] CATH LAB/EP;  Service: Cardiology;  Laterality: Left;    SPINE SURGERY  2044       Medications:  Current Outpatient Medications   Medication Sig    acetaminophen (TYLENOL) 500 MG tablet Take 2 tablets (1,000 mg total) by mouth every 6 (six) hours as needed for Pain.    aspirin 325 MG tablet Take 1 tablet (325 mg total) by mouth once daily.    atorvastatin (LIPITOR) 40 MG tablet Take 1 tablet (40 mg  total) by mouth once daily.    dutasteride (AVODART) 0.5 mg capsule Take 0.5 mg by mouth once daily.    escitalopram oxalate (LEXAPRO) 10 MG tablet Take 10 mg by mouth once daily.     esomeprazole (NEXIUM) 40 MG capsule Take by mouth.    metoprolol succinate (TOPROL-XL) 100 MG 24 hr tablet Take 1 tablet (100 mg total) by mouth once daily.    NIFEdipine (PROCARDIA-XL) 30 MG (OSM) 24 hr tablet Take 1 tablet (30 mg total) by mouth once daily.    omega-3 fatty acids 1,000 mg Cap Take by mouth.    tamsulosin (FLOMAX) 0.4 mg Cp24 0.4 mg once daily.      No current facility-administered medications for this visit.       Vitamins and Supplements:  Fish oil    Labs:  Patient confirms he is taking lipitor 40mg for cholesterol control.    Lab Results   Component Value Date    CHOL 129 11/01/2022     Lab Results   Component Value Date    HDL 61 11/01/2022     Lab Results   Component Value Date    LDLCALC 47.0 (L) 11/01/2022     Lab Results   Component Value Date    TRIG 105 11/01/2022     Lab Results   Component Value Date    CHOLHDL 47.3 11/01/2022         Lab Results   Component Value Date    GLUF 106 11/01/2022     Lab Results   Component Value Date    HGBA1C 5.9 (H) 07/20/2022       Nutrition/Diet History:  Patient eats 2 meals daily.    Seasons food with lemon, salt substitute.  Patient denies use of a salt shaker at the table on prepared foods.   Dines out 1 per week at restaurants .    Chooses fried foods 1-2 time(s) per month.    Chooses fish 1 time(s) per week.   Beverages:  water unsweet tea coffee  Alcohol: none    24 Hour Recall:  Breakfast: skipped  Lunch: american cheese on honey wheat, water  Dinner:hamburger with BBQ sauce, BBQ chips, slice cake  Other: cookies x 4 with milk    Difficulty Chewing or Swallowing: no  Current Exercise: See Exercise Physiologist Note  Food Safety/Food Preparation:  shared with wife  Living Arrangements/Family Support: Lives with spouse  Cultural/Spiritual/Personal Preferences: not  applicable   Barriers to Education: none identified  Stage of Change Related to Diet Habits: Pre-Contemplation    Nutrition Diagnosis:  Food and nutrition related knowledge deficit related to the lack of prior nutrition education as evidenced by diet history and 24 hour recall    Nutrition Plan:   Goals:  LDL-C < 70 (for high risk patients)  Hgb A1c < 7%  BMI < 25 and abdominal girth < 40M/<35 F  2 gram sodium, Mediterranean diet  Rehab weight goal: -10lbs  Fish intake (non-fried varieties) to a goal of 2-3 servings per week.   Increase fruit and vegetable intake    Interventions/Recommendations:  Lab results reviewed and discussed  Nutrition Prescription:  Total Energy Estimated Needs: 1993-6007 Kcal/d for weight loss  Method for Estimating Needs: 20-25kcal/kg ABW  Total Protein Estimated Needs: 65-97 g/d  Method for Estimating Needs: 0.8-1.2 g/Kg ABW  Total Fluid Estimated Needs: 1 mL/Kcal  Dietitian Consult: No  Patient to participate in Cardiac Rehab sessions three times a week  Weekly Dietitian Weight Check  Encouraged patient to complete 3 day food diary  Follow Up Plan for Ongoing Self-Management Support    Education:  Mediterranean Diet; verbalizes understanding; Date: 11/10/22  Person taught: patient  Preferred Learning Method: Verbal and written  Education Needed/Provided: Nutrition counseling and education related to cardiac rehabilitation  Education Method: Weekly nutrition lectures on the Mediterranean diet, cooking, shopping, and dining out  Written Materials Provided: 3 Day Food Record, Introduction to Mediterranean Diet  Strategies Implemented: Motivational interviewing, Goal setting, Self-Monitoring, and Problem Solving    Comments:   Discussed ways to incorporate healthy snacks, eating on a schedule, and monitoring sodium intake for heart health.  Emphasized importance of increased produce intake    Diabetes  Is the patient diabetic? No    Pt has dx DM but on no meds and A1c is 5.9rmp    RD contact  information provided.      Renate Gibbs MS, RDN/LDN

## 2022-11-10 NOTE — PROGRESS NOTES
Session: Orientation   Cardiac Rehab Individual Treatment Plan - Initial Assessment      Patient Name: Christiano Frost MRN: [id number]   : 1950   Age: 72 y.o.   Date of Event: 22   Primary Diagnosis: CABG    EF: 60% (22)    Physical Assessment:   There were no vitals taken for this visit.    ASSESSMENT:  Heart Sounds: regular rate and rhythm, S1, S2 normal, no murmur, click, rub or gallop  Prosthetic Valve: No  Lung Sounds: normal air entry, lungs clear to auscultation  Capillary Refill: normal  Left Radial Pulse: Normal (+2)  Right Radial Pulse: Normal (+2)  Left Pedal Pulse: Normal (+2)  Right Pedal Pulse: Normal (+2)  Right Edema: 1+  Left Edema 2+  Strength: good  Range of Motion: full range of motion  Existing Limitations:  Reports of chronic back pain, s/p multiple back surgeries    Site   [] Arthritis, bursitis    [] Amputation, atrophy    [] Other:    []       Diabetic patient's foot examination comments: Normal -  Bilateral  Incisional site: healing well  Special needs: Patient has an ulcer to left medial calf with scar    Psychosocial Assessment:   Outcome Survey Tools:    TERESA SCORES:   PRE   Anxiety 6   Depression 5   Somatic 2   Hostility 3     SF-36 SCORES:   PRE   Physical Function 20   Social Function 6   Mental Health 22   Pain 6   Change in Health 4   Physical Role Limitation 0   Mental Role Limitation 0   Energy/Fatigue 18   Health Perceptions 16   Total Score 92     PHQ-9:  PHQ-9 Depression Patient Health Questionnaire 11/10/2022   Over the last two weeks how often have you been bothered by little interest or pleasure in doing things 2   Over the last two weeks how often have you been bothered by feeling down, depressed or hopeless 0   Over the last two weeks how often have you been bothered by trouble falling or staying asleep, or sleeping too much 1   Over the last two weeks how often have you been bothered by feeling tired or having little energy 1   Over the last two weeks  how often have you been bothered by a poor appetite or overeating 3   Over the last two weeks how often have you been bothered by feeling bad about yourself - or that you are a failure or have let yourself or your family down 1   Over the last two weeks how often have you been bothered by trouble concentrating on things, such as reading the newspaper or watching television 1   Over the last two weeks how often have you been bothered by moving or speaking so slowly that other people could have noticed. 0   Over the last two weeks how often have you been bothered by thoughts that you would be better off dead, or of hurting yourself 0   If you checked off any problems, how difficult have these problems made it for you to do your work, take care of things at home or get along with other people? Somewhat difficult   Total Score 9              Living Arrangements: Lives with spouse  Family Support: children and spouse  Self Reported: Anxiety, Depression, Stress, and Chronic Pain  Displays: happiness and calmness  Medication:  Patient currently on Lexapro for depression    Psychosocial Plan:   Goals:  Improved psychosocial coping strategies  Verbalizes coping mechanisms  Reduce manifestation of depression  Reduce manifestation of stress  Reduce manifestation of chronic pain  Maintain positive support system  Maintain positive outlook  Improve overall quality of life    Interventions/Recommendations:  Discussed Results of Surveys  Patient to Self Report Emotional Changes at Session Check In  Recommend Physical Activity  Recommend Attending Education Lectures  Notify MD: Declined  Program Referral: Declined  Pharmaceutical Intervention/Therapy: Yes  Other Needs:  Patient is currently on Lexapro but will discuss this with his primary provider next appt which is coming up.  Stage of Readiness to Change: Preparation    Education:  Depression; verbalizes understanding; Date: 11/10/22  Stress; verbalizes understanding; Date:  "11/10/22  Risk Factors; verbalizes understanding; Date: 11/10/22    Comments:  Discussed results of screening tools. Patient will speak with his primary provider regarding screening results and treatment plan. Patient acknowledges depression/stress which could be affecting his health. Patient has been instructed to notify staff in the event that circumstances worsen.  Patient verbalizes understanding.    Other Core Components/Risk Factors Assessment:   RISK FACTORS:  diabetes, hyperlipidemia, hypertension, obesity, positive family history, sedentary lifestyle, stress    Learning Barriers: None    Education Level:  High School (9-12) or GED    Pre-test Score: 60    Medication Compliance: has been compliant with taking medications    Other Core Components/Risk Factors Plan:   Goals:  Decrease cholesterol level: In Progress  Increase exercise tolerance: In Progress  Increase knowledge of CAD: In Progress  Decrease blood pressure: In Progress  Weight loss: In Progress  Identify and manage personal areas of stress: In Progress  Control diabetes by adjusting diet and exercise: In Progress  Learn more about healthy eating: In Progress    Interventions/Recommendations:  Recommend regular attendance for Cardiac Rehab: Exercise and Education Lectures  Encourage medication compliance  Individual Education/ Counseling: Yes  Physician Referral: No    Education:    risk factors, verbalizes understanding; Date: 11/10/22  stress, verbalizes understanding; Date: 11/10/22         Education method adapted to patients education level and preferred method of learning.  Method: explanation    Comments:  Patient currently relies on his wife for all medication administration "she gives me my meds". Discussed importance of patient knowing all his medications and purpose of each medication, carrying a list of his medications in his wallet patient verbalized understanding.     Other Core Components/Hypertension Assessment:   Resting BP: Left "  102/54 Right 108/56   BP Readings from Last 1 Encounters:   11/01/22 (!) 104/54         BP Diagnosis: Hypertensive  Patient reported symptoms: none    Other Core Components/Hypertension Plan:   Goals:  Blood Pressure <130/80    Interventions/Recommendations:  Med Card Reconciled: Yes  Encourage medication compliance  Encourage sodium reduction  Encourage weight loss  Recommend physical activity  Educate on contributory factors  Reduce stress, anxiety, anger, depression, and/or chronic pain  Encourage home blood pressure monitoring    Education:    Risk Factors; verbalizes understanding; Date: 11/10/22  Stress; verbalizes understanding; Date: 11/10/22         Comments:  Discussed effects of stress/anxiety on blood pressure, he had a cuff at home but it is broken. Discussed getting a new blood pressure cuff for the upper arm and taking bp daily and keeping a log. Patient verbalized understanding.      Does the patient have Heart Failure? No    Other Core Components/Tobacco Cessation Assessment:   Smoking Status: lifetime non-smoker  Smoking Cessation Barriers:  NA  Stage of Readiness to Change: Maintenance    Other Core Components/Tobacco Cessation Plan:   Goals:  Maintain non-smoking status    Interventions:  Maintains non-smoking status    Education:    Risk Factors; verbalizes understanding; Date: 11/10/22         Comments:  Patient is motivated to make lifestyle changes to improve his cardiac health. Discussed Cardiac Rehab program in depth with patient.  Medication list updated per patient & marked as reviewed.  Patient has been instructed to notify staff of any problems while attending rehab (ie: chest pain, shortness of breath, lightheadedness, dizziness).  Patient has been instructed to monitor blood pressure readings outside of rehab & to keep a daily log of the readings.  Patient verbalizes understanding.     Tim Russell RN  Cardiac Rehab Nurse

## 2022-11-10 NOTE — PATIENT INSTRUCTIONS
HISTORY: S/P CABG (7-29-22), CAD, HTN , HLP, CVD, HX OF STROKE, DM, EF=60%(7-19-22)    ANTHROPOMETRICS:     PRE   Height (in) 69   Weight (lb) 229   BMI 33.9   Abdominal Girth (in) 49.5   % Body Fat 20.3%       LAB RESULTS:    Lab Results   Component Value Date    HGB 12.9 (L) 11/01/2022     Lab Results   Component Value Date    HCT 41.2 11/01/2022     Lab Results   Component Value Date    MPV 10.1 11/01/2022       Lab Results   Component Value Date    CHOL 129 11/01/2022     Lab Results   Component Value Date    HDL 61 11/01/2022     Lab Results   Component Value Date    LDLCALC 47.0 (L) 11/01/2022     Lab Results   Component Value Date    TRIG 105 11/01/2022     Lab Results   Component Value Date    CHOLHDL 47.3 11/01/2022       Lab Results   Component Value Date    GLUF 106 11/01/2022     Lab Results   Component Value Date    HGBA1C 5.9 (H) 07/20/2022        Lab Results   Component Value Date    HSCRP 1.18 11/01/2022         TERESA SCORES:     PRE   Anxiety 6   Depression 5   Somatic 2   Hostility 3     SF-36 SCORES:     PRE   Physical Function 20   Social Function 6   Mental Health 22   Pain 6   Change in Health 4   Physical Role Limitation 0   Mental Role Limitation 0   Energy/Fatigue 18   Health Perceptions 16   Total Score 92     PHQ-9:  PHQ-9 Depression Patient Health Questionnaire 11/10/2022   Over the last two weeks how often have you been bothered by little interest or pleasure in doing things 2   Over the last two weeks how often have you been bothered by feeling down, depressed or hopeless 0   Over the last two weeks how often have you been bothered by trouble falling or staying asleep, or sleeping too much 1   Over the last two weeks how often have you been bothered by feeling tired or having little energy 1   Over the last two weeks how often have you been bothered by a poor appetite or overeating 3   Over the last two weeks how often have you been bothered by feeling bad about yourself - or that you  are a failure or have let yourself or your family down 1   Over the last two weeks how often have you been bothered by trouble concentrating on things, such as reading the newspaper or watching television 1   Over the last two weeks how often have you been bothered by moving or speaking so slowly that other people could have noticed. 0   Over the last two weeks how often have you been bothered by thoughts that you would be better off dead, or of hurting yourself 0   If you checked off any problems, how difficult have these problems made it for you to do your work, take care of things at home or get along with other people? Somewhat difficult   Total Score 9             EDUCATION SCORES:     PRE   Education Score 60

## 2022-11-16 ENCOUNTER — CLINICAL SUPPORT (OUTPATIENT)
Dept: CARDIAC REHAB | Facility: CLINIC | Age: 72
End: 2022-11-16
Payer: MEDICARE

## 2022-11-16 DIAGNOSIS — I25.10 CORONARY ARTERY DISEASE INVOLVING NATIVE CORONARY ARTERY OF NATIVE HEART WITHOUT ANGINA PECTORIS: ICD-10-CM

## 2022-11-16 DIAGNOSIS — Z95.1 POSTSURGICAL AORTOCORONARY BYPASS STATUS: Primary | ICD-10-CM

## 2022-11-16 PROCEDURE — 93798 PHYS/QHP OP CAR RHAB W/ECG: CPT | Mod: S$GLB,,, | Performed by: INTERNAL MEDICINE

## 2022-11-16 PROCEDURE — 93798 PR CARDIAC REHAB/MONITOR: ICD-10-PCS | Mod: S$GLB,,, | Performed by: INTERNAL MEDICINE

## 2022-11-16 NOTE — PROGRESS NOTES
Patient arrived for his 2nd cardiac rehab session. Pt reports to have eaten prior to exercise session.  The patient was on continuous EKG monitoring throughout the session. Noted normal sinus rhythm.  The patient tolerated exercise session well with no complaints.

## 2022-11-18 ENCOUNTER — CLINICAL SUPPORT (OUTPATIENT)
Dept: CARDIAC REHAB | Facility: CLINIC | Age: 72
End: 2022-11-18
Payer: MEDICARE

## 2022-11-18 DIAGNOSIS — I25.10 CORONARY ARTERY DISEASE, UNSPECIFIED VESSEL OR LESION TYPE, UNSPECIFIED WHETHER ANGINA PRESENT, UNSPECIFIED WHETHER NATIVE OR TRANSPLANTED HEART: ICD-10-CM

## 2022-11-18 DIAGNOSIS — Z95.1 POSTSURGICAL AORTOCORONARY BYPASS STATUS: Primary | ICD-10-CM

## 2022-11-18 PROCEDURE — 93798 PR CARDIAC REHAB/MONITOR: ICD-10-PCS | Mod: S$GLB,,, | Performed by: INTERNAL MEDICINE

## 2022-11-18 PROCEDURE — 93798 PHYS/QHP OP CAR RHAB W/ECG: CPT | Mod: S$GLB,,, | Performed by: INTERNAL MEDICINE

## 2022-11-21 ENCOUNTER — CLINICAL SUPPORT (OUTPATIENT)
Dept: CARDIAC REHAB | Facility: CLINIC | Age: 72
End: 2022-11-21
Payer: MEDICARE

## 2022-11-21 DIAGNOSIS — Z95.1 POSTSURGICAL AORTOCORONARY BYPASS STATUS: Primary | ICD-10-CM

## 2022-11-21 DIAGNOSIS — I25.10 ATHEROSCLEROSIS OF NATIVE CORONARY ARTERY OF NATIVE HEART, UNSPECIFIED WHETHER ANGINA PRESENT: ICD-10-CM

## 2022-11-21 PROCEDURE — 93798 PHYS/QHP OP CAR RHAB W/ECG: CPT | Mod: S$GLB,,,

## 2022-11-21 PROCEDURE — 93798 PR CARDIAC REHAB/MONITOR: ICD-10-PCS | Mod: S$GLB,,,

## 2022-11-23 ENCOUNTER — CLINICAL SUPPORT (OUTPATIENT)
Dept: CARDIAC REHAB | Facility: CLINIC | Age: 72
End: 2022-11-23
Payer: MEDICARE

## 2022-11-23 DIAGNOSIS — I25.10 ATHEROSCLEROSIS OF NATIVE CORONARY ARTERY OF NATIVE HEART, UNSPECIFIED WHETHER ANGINA PRESENT: ICD-10-CM

## 2022-11-23 DIAGNOSIS — Z95.1 POSTSURGICAL AORTOCORONARY BYPASS STATUS: Primary | ICD-10-CM

## 2022-11-23 PROCEDURE — 93798 PR CARDIAC REHAB/MONITOR: ICD-10-PCS | Mod: S$GLB,,,

## 2022-11-23 PROCEDURE — 93798 PHYS/QHP OP CAR RHAB W/ECG: CPT | Mod: S$GLB,,,

## 2022-11-28 ENCOUNTER — CLINICAL SUPPORT (OUTPATIENT)
Dept: CARDIAC REHAB | Facility: CLINIC | Age: 72
End: 2022-11-28
Payer: MEDICARE

## 2022-11-28 DIAGNOSIS — I25.10 CORONARY ARTERY DISEASE INVOLVING NATIVE CORONARY ARTERY OF NATIVE HEART WITHOUT ANGINA PECTORIS: ICD-10-CM

## 2022-11-28 DIAGNOSIS — Z98.61 POSTSURGICAL PERCUTANEOUS TRANSLUMINAL CORONARY ANGIOPLASTY STATUS: Primary | ICD-10-CM

## 2022-11-28 PROCEDURE — 93798 PHYS/QHP OP CAR RHAB W/ECG: CPT | Mod: S$GLB,,, | Performed by: INTERNAL MEDICINE

## 2022-11-28 PROCEDURE — 93798 PR CARDIAC REHAB/MONITOR: ICD-10-PCS | Mod: S$GLB,,, | Performed by: INTERNAL MEDICINE

## 2022-11-30 ENCOUNTER — CLINICAL SUPPORT (OUTPATIENT)
Dept: CARDIAC REHAB | Facility: CLINIC | Age: 72
End: 2022-11-30
Payer: MEDICARE

## 2022-11-30 DIAGNOSIS — Z95.1 POSTSURGICAL AORTOCORONARY BYPASS STATUS: Primary | ICD-10-CM

## 2022-11-30 DIAGNOSIS — I25.10 ATHEROSCLEROSIS OF NATIVE CORONARY ARTERY OF NATIVE HEART, UNSPECIFIED WHETHER ANGINA PRESENT: ICD-10-CM

## 2022-11-30 PROCEDURE — 93798 PHYS/QHP OP CAR RHAB W/ECG: CPT | Mod: S$GLB,,,

## 2022-11-30 PROCEDURE — 93798 PR CARDIAC REHAB/MONITOR: ICD-10-PCS | Mod: S$GLB,,,

## 2022-12-01 ENCOUNTER — DOCUMENTATION ONLY (OUTPATIENT)
Dept: CARDIAC REHAB | Facility: CLINIC | Age: 72
End: 2022-12-01
Payer: MEDICARE

## 2022-12-01 NOTE — PROGRESS NOTES
Mr. Alvarado has completed 7 out of 36 exercise session of Phase II cardiac rehab.  A follow up reassessment will be completed at 12 sessions.     Session: Orientation   Cardiac Rehab Individual Treatment Plan - Initial Assessment      Patient Name: Christiano Frost MRN: [id number]   : 1950   Age: 72 y.o.   Primary Diagnosis: CABG  Date of Event: 22  EF: 60%  Risk Stratification: high  Referring Physician: SUSANA   Exercise Assessment:     CPX/TM Date: 22 Results   RHR 65   Max    Peak VO2 (CPX only) 15.8   Actual METS (CPX only) 4.5   Estimated METS 5.0     Anthropometrics    Height 69 inches   Weight 229 lbs   BMI 33.9   Abdominal Girth 49.5   Body Composition 20.3%     ST Depression noted on Stress Test?:No  Angina with exercise?: No   Fall Risk: Yes   Assistive Devices:  independent   Currently exercising? Yes: Walking; Frequency: 3 days per week; Duration 45 minutes with some stops inbetween  There were some limitations noted by the patient due to chronic back pain.  It limits walking and standing time.      Exercise Plan:   Goals:  CR Exercise Goals: Attend Cardiac Rehab 3 times/week: In Progress  Home Aerobic Exercise: 2 additional days/week for 30-60 minutes: In Progress  Intensity of 12-15 on the Rate of Perceived Exertion (RPE) scale: In Progress  30% increase in entry estimated METS: 6.5 : In Progress  5 days/week for 30-60 minutes: In Progress    Intervention:   Discussed importance of regular attendance to cardiac rehab class    Exercise Prescription:  THR Range    Mode: Treadmill  Recumbent Bike  Upright Bike  Nustep   Frequency:  3 days/week   Duration:  30 - 60 minutes   Intensity:  12 - 15 RPE   Resistance Training:  Yes: 5 lb weights with 10-15 reps based on strength and range of motion assessment     Home Prescription:  Mode Aerobic   Frequency: 2- 3 days/week   Duration: 30-60 minutes   Resistance Training: None        Education:  Orientation to Equipment; verbalizes  understanding; Date: 11-10-22  Exercise Recommendations; verbalizes understanding; Date: 11-10-22  Exercise Safety; verbalizes understanding; Date: 11-10-22  Class Preparation: verbalizes understanding; Date: 11-10-22  Signs and symptoms to report: verbalizes understanding; Date: 11-10-22  Caffeine/Hydration: verbalizes understanding; Date: 11-10-22  Exercise Terminology: verbalizes understanding; Date: 11-10-22  Resistance Training: verbalizes understanding; Date: 11-10-22    Comments:  Mr. Alvarado was encouraged to continue walking at least 2 non-rehab days per week for at least 30 minutes in addition to attending Phase II cardiac rehab classes 3 days per week.  He stated understanding.     All consent forms were signed, proper attire and shoes were discussed.       Mr. Alvarado will begin Cardiac Rehab on Wednesday,  at 10:00am.    The exercise prescription will be adjusted based on tolerance of exercise intensity by patient.    Ele Panda, CEP     Orientation   Cardiac Rehab Individual Treatment Plan - Initial Assessment      Patient Name: Christiano Frost MRN: [id number]   : 1950   Age: 72 y.o.   Primary Diagnosis: s/p CABG, CAD, HLD, CVD, h/o stroke DM    Nutrition Assessment:     Anthropometrics    Height 69 inches   Weight 229 lbs   BMI 33.9   Abdominal Girth 49.5   Body Composition 20.3       Drug Allergies and Intolerances:  Review of patient's allergies indicates:  No Known Allergies    Food Allergies and Intolerances:  NA    Past Medical History:  Past Medical History:   Diagnosis Date    BPH (benign prostatic hyperplasia)     Depression     Diabetes mellitus     GERD (gastroesophageal reflux disease)     Hyperlipidemia     Hypertension     Obesity     TIA (transient ischemic attack)        Past Surgical History:  Past Surgical History:   Procedure Laterality Date    CORONARY ARTERY BYPASS GRAFT (CABG) Left 2022    Procedure: CORONARY ARTERY BYPASS GRAFT (CABG);   Surgeon: Magen Martell MD;  Location: [address];  Service: Cardiovascular;  Laterality: Left;  cabg3 with vein harvest    ENDOSCOPIC HARVEST OF VEIN Left 7/29/2022    Procedure: HARVEST-VEIN-ENDOVASCULAR;  Surgeon: Magen Martell MD;  Location: [facility] OR [address] [place] [address];  Service: Cardiovascular;  Laterality: Left;  Vein Cyrus Start time: 0818am  Vein Cyrus Stop time: 0926am    Vein Prep Start time: 0928am  Vein Prep Stop time: 1002am    LEFT HEART CATHETERIZATION Left 7/19/2022    Procedure: Left heart cath;  Surgeon: Ben Ortega MD;  Location: [facility] CATH LAB/EP;  Service: Cardiology;  Laterality: Left;    SPINE SURGERY  2044 2005 2008       Medications:  Current Outpatient Medications   Medication Sig    acetaminophen (TYLENOL) 500 MG tablet Take 2 tablets (1,000 mg total) by mouth every 6 (six) hours as needed for Pain.    aspirin 325 MG tablet Take 1 tablet (325 mg total) by mouth once daily.    atorvastatin (LIPITOR) 40 MG tablet Take 1 tablet (40 mg total) by mouth once daily.    cholecalciferol, vitamin D3, (VITAMIN D3) 25 mcg (1,000 unit) capsule Take 1,000 Units by mouth once daily.    dutasteride (AVODART) 0.5 mg capsule Take 0.5 mg by mouth once daily.    escitalopram oxalate (LEXAPRO) 10 MG tablet Take 10 mg by mouth once daily.     esomeprazole (NEXIUM) 40 MG capsule Take by mouth.    Lactobacillus rhamnosus GG (CULTURELLE) 10 billion cell capsule Take 1 capsule by mouth once daily.    metoprolol succinate (TOPROL-XL) 100 MG 24 hr tablet Take 1 tablet (100 mg total) by mouth once daily.    NIFEdipine (PROCARDIA-XL) 30 MG (OSM) 24 hr tablet Take 1 tablet (30 mg total) by mouth once daily.    omega-3 fatty acids 1,000 mg Cap Take by mouth.    tamsulosin (FLOMAX) 0.4 mg Cp24 0.4 mg once daily.      No current facility-administered medications for this visit.       Vitamins and Supplements:  Fish oil    Labs:  Patient confirms he is taking lipitor 40mg for cholesterol control.    Lab Results    Component Value Date    CHOL 129 11/01/2022     Lab Results   Component Value Date    HDL 61 11/01/2022     Lab Results   Component Value Date    LDLCALC 47.0 (L) 11/01/2022     Lab Results   Component Value Date    TRIG 105 11/01/2022     Lab Results   Component Value Date    CHOLHDL 47.3 11/01/2022         Lab Results   Component Value Date    GLUF 106 11/01/2022     Lab Results   Component Value Date    HGBA1C 5.9 (H) 07/20/2022       Nutrition/Diet History:  Patient eats 2 meals daily.    Seasons food with lemon, salt substitute.  Patient denies use of a salt shaker at the table on prepared foods.   Dines out 1 per week at restaurants .    Chooses fried foods 1-2 time(s) per month.    Chooses fish 1 time(s) per week.   Beverages:  water unsweet tea coffee  Alcohol: none    24 Hour Recall:  Breakfast: skipped  Lunch: american cheese on honey wheat, water  Dinner:hamburger with BBQ sauce, BBQ chips, slice cake  Other: cookies x 4 with milk    Difficulty Chewing or Swallowing: no  Current Exercise: See Exercise Physiologist Note  Food Safety/Food Preparation:  shared with wife  Living Arrangements/Family Support: Lives with spouse  Cultural/Spiritual/Personal Preferences: not applicable   Barriers to Education: none identified  Stage of Change Related to Diet Habits: Pre-Contemplation    Nutrition Diagnosis:  Food and nutrition related knowledge deficit related to the lack of prior nutrition education as evidenced by diet history and 24 hour recall    Nutrition Plan:   Goals:  LDL-C < 70 (for high risk patients)  Hgb A1c < 7%  BMI < 25 and abdominal girth < 40M/<35 F  2 gram sodium, Mediterranean diet  Rehab weight goal: -10lbs  Fish intake (non-fried varieties) to a goal of 2-3 servings per week.   Increase fruit and vegetable intake    Interventions/Recommendations:  Lab results reviewed and discussed  Nutrition Prescription:  Total Energy Estimated Needs: 3901-0329 Kcal/d for weight loss  Method for Estimating  Needs: 20-25kcal/kg ABW  Total Protein Estimated Needs: 65-97 g/d  Method for Estimating Needs: 0.8-1.2 g/Kg ABW  Total Fluid Estimated Needs: 1 mL/Kcal  Dietitian Consult: No  Patient to participate in Cardiac Rehab sessions three times a week  Weekly Dietitian Weight Check  Encouraged patient to complete 3 day food diary  Follow Up Plan for Ongoing Self-Management Support    Education:  Mediterranean Diet; verbalizes understanding; Date: 11/10/22  Person taught: patient  Preferred Learning Method: Verbal and written  Education Needed/Provided: Nutrition counseling and education related to cardiac rehabilitation  Education Method: Weekly nutrition lectures on the Mediterranean diet, cooking, shopping, and dining out  Written Materials Provided: 3 Day Food Record, Introduction to Mediterranean Diet  Strategies Implemented: Motivational interviewing, Goal setting, Self-Monitoring, and Problem Solving    Comments:   Discussed ways to incorporate healthy snacks, eating on a schedule, and monitoring sodium intake for heart health.  Emphasized importance of increased produce intake    Diabetes  Is the patient diabetic? No    Pt has dx DM but on no meds and A1c is 5.9rmp    RD contact information provided.      Renate Gibbs MS, RDN/LDN     Session: Orientation   Cardiac Rehab Individual Treatment Plan - Initial Assessment      Patient Name: Christiano Frost MRN: [id number]   : 1950   Age: 72 y.o.   Date of Event: 22   Primary Diagnosis: CABG    EF: 60% (22)    Physical Assessment:   There were no vitals taken for this visit.    ASSESSMENT:  Heart Sounds: regular rate and rhythm, S1, S2 normal, no murmur, click, rub or gallop  Prosthetic Valve: No  Lung Sounds: normal air entry, lungs clear to auscultation  Capillary Refill: normal  Left Radial Pulse: Normal (+2)  Right Radial Pulse: Normal (+2)  Left Pedal Pulse: Normal (+2)  Right Pedal Pulse: Normal (+2)  Right Edema: 1+  Left Edema 2+  Strength:  good  Range of Motion: full range of motion  Existing Limitations:  Reports of chronic back pain, s/p multiple back surgeries    Site   [] Arthritis, bursitis    [] Amputation, atrophy    [] Other:    []       Diabetic patient's foot examination comments: Normal -  Bilateral  Incisional site: healing well  Special needs: Patient has an ulcer to left medial calf with scar    Psychosocial Assessment:   Outcome Survey Tools:    TERESA SCORES:   PRE   Anxiety 6   Depression 5   Somatic 2   Hostility 3     SF-36 SCORES:   PRE   Physical Function 20   Social Function 6   Mental Health 22   Pain 6   Change in Health 4   Physical Role Limitation 0   Mental Role Limitation 0   Energy/Fatigue 18   Health Perceptions 16   Total Score 92     PHQ-9:  PHQ-9 Depression Patient Health Questionnaire 11/10/2022   Over the last two weeks how often have you been bothered by little interest or pleasure in doing things 2   Over the last two weeks how often have you been bothered by feeling down, depressed or hopeless 0   Over the last two weeks how often have you been bothered by trouble falling or staying asleep, or sleeping too much 1   Over the last two weeks how often have you been bothered by feeling tired or having little energy 1   Over the last two weeks how often have you been bothered by a poor appetite or overeating 3   Over the last two weeks how often have you been bothered by feeling bad about yourself - or that you are a failure or have let yourself or your family down 1   Over the last two weeks how often have you been bothered by trouble concentrating on things, such as reading the newspaper or watching television 1   Over the last two weeks how often have you been bothered by moving or speaking so slowly that other people could have noticed. 0   Over the last two weeks how often have you been bothered by thoughts that you would be better off dead, or of hurting yourself 0   If you checked off any problems, how  difficult have these problems made it for you to do your work, take care of things at home or get along with other people? Somewhat difficult   Total Score 9              Living Arrangements: Lives with spouse  Family Support: children and spouse  Self Reported: Anxiety, Depression, Stress, and Chronic Pain  Displays: happiness and calmness  Medication:  Patient currently on Lexapro for depression    Psychosocial Plan:   Goals:  Improved psychosocial coping strategies  Verbalizes coping mechanisms  Reduce manifestation of depression  Reduce manifestation of stress  Reduce manifestation of chronic pain  Maintain positive support system  Maintain positive outlook  Improve overall quality of life    Interventions/Recommendations:  Discussed Results of Surveys  Patient to Self Report Emotional Changes at Session Check In  Recommend Physical Activity  Recommend Attending Education Lectures  Notify MD: Declined  Program Referral: Declined  Pharmaceutical Intervention/Therapy: Yes  Other Needs:  Patient is currently on Lexapro but will discuss this with his primary provider next appt which is coming up.  Stage of Readiness to Change: Preparation    Education:  Depression; verbalizes understanding; Date: 11/10/22  Stress; verbalizes understanding; Date: 11/10/22  Risk Factors; verbalizes understanding; Date: 11/10/22    Comments:  Discussed results of screening tools. Patient will speak with his primary provider regarding screening results and treatment plan. Patient acknowledges depression/stress which could be affecting his health. Patient has been instructed to notify staff in the event that circumstances worsen.  Patient verbalizes understanding.    Other Core Components/Risk Factors Assessment:   RISK FACTORS:  diabetes, hyperlipidemia, hypertension, obesity, positive family history, sedentary lifestyle, stress    Learning Barriers: None    Education Level:  High School (9-12) or GED    Pre-test Score: 60    Medication  "Compliance: has been compliant with taking medications    Other Core Components/Risk Factors Plan:   Goals:  Decrease cholesterol level: In Progress  Increase exercise tolerance: In Progress  Increase knowledge of CAD: In Progress  Decrease blood pressure: In Progress  Weight loss: In Progress  Identify and manage personal areas of stress: In Progress  Control diabetes by adjusting diet and exercise: In Progress  Learn more about healthy eating: In Progress    Interventions/Recommendations:  Recommend regular attendance for Cardiac Rehab: Exercise and Education Lectures  Encourage medication compliance  Individual Education/ Counseling: Yes  Physician Referral: No    Education:    risk factors, verbalizes understanding; Date: 11/10/22  stress, verbalizes understanding; Date: 11/10/22         Education method adapted to patients education level and preferred method of learning.  Method: explanation    Comments:  Patient currently relies on his wife for all medication administration "she gives me my meds". Discussed importance of patient knowing all his medications and purpose of each medication, carrying a list of his medications in his wallet patient verbalized understanding.     Other Core Components/Hypertension Assessment:   Resting BP: Left  102/54 Right 108/56   BP Readings from Last 1 Encounters:   11/01/22 (!) 104/54         BP Diagnosis: Hypertensive  Patient reported symptoms: none    Other Core Components/Hypertension Plan:   Goals:  Blood Pressure <130/80    Interventions/Recommendations:  Med Card Reconciled: Yes  Encourage medication compliance  Encourage sodium reduction  Encourage weight loss  Recommend physical activity  Educate on contributory factors  Reduce stress, anxiety, anger, depression, and/or chronic pain  Encourage home blood pressure monitoring    Education:    Risk Factors; verbalizes understanding; Date: 11/10/22  Stress; verbalizes understanding; Date: 11/10/22      "    Comments:  Discussed effects of stress/anxiety on blood pressure, he had a cuff at home but it is broken. Discussed getting a new blood pressure cuff for the upper arm and taking bp daily and keeping a log. Patient verbalized understanding.      Does the patient have Heart Failure? No    Other Core Components/Tobacco Cessation Assessment:   Smoking Status: lifetime non-smoker  Smoking Cessation Barriers:  NA  Stage of Readiness to Change: Maintenance    Other Core Components/Tobacco Cessation Plan:   Goals:  Maintain non-smoking status    Interventions:  Maintains non-smoking status    Education:    Risk Factors; verbalizes understanding; Date: 11/10/22         Comments:  Patient is motivated to make lifestyle changes to improve his cardiac health. Discussed Cardiac Rehab program in depth with patient.  Medication list updated per patient & marked as reviewed.  Patient has been instructed to notify staff of any problems while attending rehab (ie: chest pain, shortness of breath, lightheadedness, dizziness).  Patient has been instructed to monitor blood pressure readings outside of rehab & to keep a daily log of the readings.  Patient verbalizes understanding.     Tim Russell RN  Cardiac Rehab Nurse

## 2022-12-02 ENCOUNTER — CLINICAL SUPPORT (OUTPATIENT)
Dept: CARDIAC REHAB | Facility: CLINIC | Age: 72
End: 2022-12-02
Payer: MEDICARE

## 2022-12-02 DIAGNOSIS — I25.10 ATHEROSCLEROSIS OF NATIVE CORONARY ARTERY OF NATIVE HEART WITHOUT ANGINA PECTORIS: ICD-10-CM

## 2022-12-02 DIAGNOSIS — Z95.1 POSTSURGICAL AORTOCORONARY BYPASS STATUS: Primary | ICD-10-CM

## 2022-12-02 PROCEDURE — 93798 PR CARDIAC REHAB/MONITOR: ICD-10-PCS | Mod: S$GLB,,, | Performed by: INTERNAL MEDICINE

## 2022-12-02 PROCEDURE — 93798 PHYS/QHP OP CAR RHAB W/ECG: CPT | Mod: S$GLB,,, | Performed by: INTERNAL MEDICINE

## 2022-12-05 ENCOUNTER — CLINICAL SUPPORT (OUTPATIENT)
Dept: CARDIAC REHAB | Facility: CLINIC | Age: 72
End: 2022-12-05
Payer: MEDICARE

## 2022-12-05 DIAGNOSIS — I25.10 ATHEROSCLEROSIS OF NATIVE CORONARY ARTERY OF NATIVE HEART, UNSPECIFIED WHETHER ANGINA PRESENT: ICD-10-CM

## 2022-12-05 DIAGNOSIS — Z95.1 POSTSURGICAL AORTOCORONARY BYPASS STATUS: Primary | ICD-10-CM

## 2022-12-05 PROCEDURE — 93798 PHYS/QHP OP CAR RHAB W/ECG: CPT | Mod: S$GLB,,,

## 2022-12-05 PROCEDURE — 93798 PR CARDIAC REHAB/MONITOR: ICD-10-PCS | Mod: S$GLB,,,

## 2022-12-07 ENCOUNTER — CLINICAL SUPPORT (OUTPATIENT)
Dept: CARDIAC REHAB | Facility: CLINIC | Age: 72
End: 2022-12-07
Payer: MEDICARE

## 2022-12-07 DIAGNOSIS — Z95.1 POSTSURGICAL AORTOCORONARY BYPASS STATUS: Primary | ICD-10-CM

## 2022-12-07 DIAGNOSIS — I25.10 ATHEROSCLEROSIS OF NATIVE CORONARY ARTERY OF NATIVE HEART, UNSPECIFIED WHETHER ANGINA PRESENT: ICD-10-CM

## 2022-12-07 PROCEDURE — 93798 PR CARDIAC REHAB/MONITOR: ICD-10-PCS | Mod: S$GLB,,,

## 2022-12-07 PROCEDURE — 93798 PHYS/QHP OP CAR RHAB W/ECG: CPT | Mod: S$GLB,,,

## 2022-12-09 ENCOUNTER — CLINICAL SUPPORT (OUTPATIENT)
Dept: CARDIAC REHAB | Facility: CLINIC | Age: 72
End: 2022-12-09
Payer: MEDICARE

## 2022-12-09 DIAGNOSIS — I25.10 ATHEROSCLEROSIS OF NATIVE CORONARY ARTERY OF NATIVE HEART, UNSPECIFIED WHETHER ANGINA PRESENT: ICD-10-CM

## 2022-12-09 DIAGNOSIS — Z95.1 POSTSURGICAL AORTOCORONARY BYPASS STATUS: Primary | ICD-10-CM

## 2022-12-09 PROCEDURE — 93798 PR CARDIAC REHAB/MONITOR: ICD-10-PCS | Mod: S$GLB,,, | Performed by: INTERNAL MEDICINE

## 2022-12-09 PROCEDURE — 93798 PHYS/QHP OP CAR RHAB W/ECG: CPT | Mod: S$GLB,,, | Performed by: INTERNAL MEDICINE

## 2022-12-12 ENCOUNTER — CLINICAL SUPPORT (OUTPATIENT)
Dept: CARDIAC REHAB | Facility: CLINIC | Age: 72
End: 2022-12-12
Payer: MEDICARE

## 2022-12-12 ENCOUNTER — DOCUMENTATION ONLY (OUTPATIENT)
Dept: CARDIAC REHAB | Facility: CLINIC | Age: 72
End: 2022-12-12

## 2022-12-12 DIAGNOSIS — Z95.1 POSTSURGICAL AORTOCORONARY BYPASS STATUS: Primary | ICD-10-CM

## 2022-12-12 DIAGNOSIS — I25.10 ATHEROSCLEROSIS OF NATIVE CORONARY ARTERY OF NATIVE HEART WITHOUT ANGINA PECTORIS: ICD-10-CM

## 2022-12-12 PROCEDURE — 93798 PHYS/QHP OP CAR RHAB W/ECG: CPT | Mod: S$GLB,,, | Performed by: INTERNAL MEDICINE

## 2022-12-12 PROCEDURE — 93798 PR CARDIAC REHAB/MONITOR: ICD-10-PCS | Mod: S$GLB,,, | Performed by: INTERNAL MEDICINE

## 2022-12-12 NOTE — PROGRESS NOTES
12 Session Follow Up   Cardiac Rehab Individual Treatment Plan - Reassessment    Patient Name: Christiano Frost MRN: [id number]   : 1950   Age: 72 y.o.   Primary Diagnosis: s/p CABG, CAD, HLD, CVD, h/o stroke, DM    Nutrition Assessment:     Anthropometrics    Height 69 inches   Weight 231 lbs   BMI 34.1     Patient confirms he is taking lipitor 40mg for cholesterol control.  Difficulty Chewing or Swallowing: no  Current Exercise: See Exercise Physiologist Note  Food Safety/Food Preparation: spouse  Living Arrangements/Family Support: Lives with spouse  Cultural/Spiritual/Personal Preferences: not applicable   Barriers to Education: none identified  Stage of Change Related to Diet Habits: Action  Recent Changes to Diet: Yes - less processed foods  Food Diary: Given      Nutrition Plan:   Goals:  LDL-C < 70 (for high risk patients)  Hgb A1c < 7%  BMI < 25 and abdominal girth < 40M/<35 F  2 gram sodium, Mediterranean diet: In Progress  Rehab weight loss goal of 10 lbs, 1 lb per week: In Progress  Fish intake (non-fried varieties) to a goal of 2-3 servings per week: In Progress  Increase fruit and vegetable intake: In Progress    Comments on Goal Progression:  Discussed food choices for improved glucose control    Interventions:  Dietitian Consult: No  Patient to participate in Cardiac Rehab sessions three times a week  Weekly Dietitian Weight Check  Nutrition Recommendations Provided: Verbal, Reviewed  Follow Up Plan for Ongoing Self-Management Support    Education:  Food Labels; verbalizes understanding; Date: 22  Mediterranean Diet; verbalizes understanding; Date: 22  Recipe Modication; verbalizes understanding; Date: 22  Vegetarianism; verbalizes understanding; Date: 22    Comments:   Discussed ways to incorporate healthy snacks, eating on a schedule, and monitoring sodium intake for heart health.    Diabetes  Is the patient diabetic? Yes   Other Core Components/Diabetes Assessment:    Labs:  Lab Results   Component Value Date    GLUF 106 11/01/2022     Lab Results   Component Value Date    HGBA1C 5.9 (H) 07/20/2022    HGBA1C 6.5 (H) 07/19/2022      Lab Results   Component Value Date    ESTIMATEDAVG 123 07/20/2022    ESTIMATEDAVG 140 (H) 07/19/2022       History of diabetes since unknown  Diabetes medications: none  Blood Glucose Checks at Home: Yes: Other: daily  Typical morning range:  mg/dl  Endocrinologist or PCP following DM: PCP    Other Core Components/Diabetes Plan:   Goals:  Hgb A1c < 7%  Exercise Blood Glucose: 100-300mg/dl    Interventions:  Reviewed and Discussed Labs  Medication Compliance  Med Card Reconciled  Low Sodium, Mediterranean, ADA Diet  Weight Management  Physical Activity  Increase Knowledge of Contributory Factors  Home Monitoring    Education:  Diabetes; verbalizes understanding; Date: 11/28/22  Mediterranean Diet; verbalizes understanding; Date: 11/23/22  Medications I; verbalizes understanding; Date: 11/28/22  Medications II; verbalizes understanding; Date: 12/2/22  Food Labels; verbalizes understanding; Date: 11/30/22    Comments:   Patient verbalizes understanding to bring home glucometer and check glucose pre and post each exercise session.  Per cardiac rehab protocols, patient's glucose must be between 90 and 270 mg/dL to exercise.  Patient denies any recent glucose levels less than 60 mg/dL or greater than 300 mg/dL. Patient verbalizes importance of notifying rehab staff if symptoms of hypoglycemia occur while at cardiac rehab.  Abnormal labs will be reported to patient's PCP/Endocrinologist by rehab staff.    Noted updated glucose range of 100-300    Renate Gibbs MS, RDN/LDN

## 2022-12-12 NOTE — PROGRESS NOTES
Mr. Alvarado has completed 12 out of 36 exercise session of Phase II cardiac rehab.  A follow up reassessment will be completed at 24 sessions.     12 Session Follow Up   Cardiac Rehab Individual Treatment Plan - Reassessment      Patient Name: Christiano Frost MRN: [id number]   : 1950   Age: 72 y.o.   Primary Diagnosis: CABG Date of Event: 22   EF: 60%  Risk Stratification: high  Referring Physician: SUSANA   Exercise Assessment:     Angina with exercise?: No   ST Depression with Exercise?: No  Fall Risk: Yes   Assistive Devices:  independent  There were some limitations with walking due to back issues noted by the patient.  Comments on Progression: Mr. Alvarado is progressing well and his workloads will continue to be increased as he tolerates exercise intensity.     Exercise Plan:   Goals:  CR Exercise Goals: Attend Cardiac Rehab 3 times/week: In Progress  Home Aerobic Exercise: 2 additional days/week for 30-60 minutes: In Progress  Intensity of 12-15 on the Rate of Perceived Exertion (RPE) scale: In Progress  30% increase in entry estimated METS: 6.5 : In Progress  5 days/week for 30-60 minutes: In Progress    Comments on Goal Progression:  Patient Consistency: consistent with attendance  Home exercise? Yes: Walking; Frequency: 2 non-rehab days per week; Duration 30 minutes  Patient reports intensity rate 11-13 on RPE scale    Intervention/Recommendations:   Discussed importance of regular attendance to cardiac rehab class    Exercise Prescription:  THR Range    Mode: Recumbent Bike  Nustep   Frequency:  3 days/week   Duration:  30-60 minutes   Intensity:  12-15 RPE   Resistance Training:  Yes: 0 to 5 lb weights with 10-15 reps based on strength and range of motion and adjusted accordingly     Home Prescription:  Mode Aerobic exercise   Frequency: 2- 3 days/week   Duration: 30-60 minutes   Resistance Training: None     Education:  Arthritis/Osteporosis; verbalizes understanding; Date:  12-7-22  Diabetes; verbalizes understanding; Date: 11-28-22  Exercise and Rehydration; verbalizes understanding; Date: 11-21-22  Exercise Terminology; verbalizes understanding; Date: 12-12-22    Comments:  I reviewed exercise recommendations with Mr. Alvarado.  I encouraged him to continue exercising.  He stated understanding.      The exercise prescription will continue to be adjusted based on tolerance of exercise intensity by patient.    Moises Panda., CEP

## 2022-12-14 ENCOUNTER — CLINICAL SUPPORT (OUTPATIENT)
Dept: CARDIAC REHAB | Facility: CLINIC | Age: 72
End: 2022-12-14
Payer: MEDICARE

## 2022-12-14 DIAGNOSIS — I25.10 ATHEROSCLEROSIS OF NATIVE CORONARY ARTERY OF NATIVE HEART, UNSPECIFIED WHETHER ANGINA PRESENT: ICD-10-CM

## 2022-12-14 DIAGNOSIS — Z98.61 POSTSURGICAL PERCUTANEOUS TRANSLUMINAL CORONARY ANGIOPLASTY STATUS: Primary | ICD-10-CM

## 2022-12-14 PROCEDURE — 93798 PR CARDIAC REHAB/MONITOR: ICD-10-PCS | Mod: S$GLB,,, | Performed by: INTERNAL MEDICINE

## 2022-12-14 PROCEDURE — 93798 PHYS/QHP OP CAR RHAB W/ECG: CPT | Mod: S$GLB,,, | Performed by: INTERNAL MEDICINE

## 2022-12-16 ENCOUNTER — CLINICAL SUPPORT (OUTPATIENT)
Dept: CARDIAC REHAB | Facility: CLINIC | Age: 72
End: 2022-12-16
Payer: MEDICARE

## 2022-12-16 DIAGNOSIS — Z95.1 POSTSURGICAL AORTOCORONARY BYPASS STATUS: Primary | ICD-10-CM

## 2022-12-16 DIAGNOSIS — I25.10 ATHEROSCLEROSIS OF NATIVE CORONARY ARTERY OF NATIVE HEART WITHOUT ANGINA PECTORIS: ICD-10-CM

## 2022-12-16 PROCEDURE — 93798 PHYS/QHP OP CAR RHAB W/ECG: CPT | Mod: S$GLB,,, | Performed by: INTERNAL MEDICINE

## 2022-12-16 PROCEDURE — 93798 PR CARDIAC REHAB/MONITOR: ICD-10-PCS | Mod: S$GLB,,, | Performed by: INTERNAL MEDICINE

## 2022-12-19 ENCOUNTER — CLINICAL SUPPORT (OUTPATIENT)
Dept: CARDIAC REHAB | Facility: CLINIC | Age: 72
End: 2022-12-19
Payer: MEDICARE

## 2022-12-19 DIAGNOSIS — I25.10 CORONARY ARTERY DISEASE, UNSPECIFIED VESSEL OR LESION TYPE, UNSPECIFIED WHETHER ANGINA PRESENT, UNSPECIFIED WHETHER NATIVE OR TRANSPLANTED HEART: ICD-10-CM

## 2022-12-19 DIAGNOSIS — Z95.1 POSTSURGICAL AORTOCORONARY BYPASS STATUS: Primary | ICD-10-CM

## 2022-12-19 PROCEDURE — 93798 PR CARDIAC REHAB/MONITOR: ICD-10-PCS | Mod: S$GLB,,, | Performed by: INTERNAL MEDICINE

## 2022-12-19 PROCEDURE — 93798 PHYS/QHP OP CAR RHAB W/ECG: CPT | Mod: S$GLB,,, | Performed by: INTERNAL MEDICINE

## 2022-12-19 NOTE — PROGRESS NOTES
Session: 12 Session Follow Up   Cardiac Rehab Individual Treatment Plan - Reassessment      Patient Name: Christiano Frost MRN: [id number]   : 1950   Age: 72 y.o.   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)      Psychosocial Assessment:   Living Arrangements: Lives with spouse  Family Support: children and spouse  Self Reported: increase Stress  Displays: happiness  Medication:  Patient currently on Lexapro for depression    Psychosocial Plan:   Goals:  Improved psychosocial coping strategies: In Progress  Verbalizes coping mechanisms: In Progress  Reduce manifestation of anxiety: In Progress  Reduce manifestation of depression: In Progress  Reduce manifestation of chronic pain: In Progress  Maintain positive support system: In Progress  Maintain positive outlook: In Progress  Improve overall quality of life: In Progress    Comments on Goal Progression:  Patient feels he is making progress toward goals.    Interventions:  Patient to Self Report Emotional Changes at Session Check In  Recommend Physical Activity  Recommend Attending Education Lectures  Notify MD: No  Program Referral: No  Pharmaceutical Intervention/Therapy: No  Other Needs: not applicable  Stage of Readiness to Change: Action    Education:  Depression; verbalizes understanding; Date: 11/10/22  Stress; verbalizes understanding; Date: 11/10/22  Risk Factors ; verbalizes understanding; Date: 11/10/22    Comments:  Patient reports continued high stress at home. He has not used and stress management techniques differently. Discussed need for speaking to PCP for possible counseling patient verbalized understanding.   Patient has been instructed to notify staff in the event that circumstances worsen.  Patient verbalizes understanding.    Other Core Components/Risk Factors Assessment:   RISK FACTORS:  diabetes, hyperlipidemia, hypertension, obesity, positive family history, sedentary lifestyle, stress    Learning Barriers: None    Medication Compliance: has  been compliant with the medicaiton regimen    Other Core Components/Risk Factors Plan:   Goals:  Decrease cholesterol level: In Progress  Increase exercise tolerance: In Progress  Increase knowledge of CAD: In Progress  Decrease blood pressure: In Progress  Weight loss: In Progress  Identify and manage personal areas of stress: In Progress  Control diabetes by adjusting diet and exercise: In Progress  Learn more about healthy eating: In Progress    Comments on Goal Progression:  Patient feels he is making progress toward his goals.    Interventions:  Individual Education/ Counseling: Yes  Physician Referral: No    Education:    blood pressure meds, verbalizes understanding; Date: 11/18/22  cholesterol meds, verbalizes understanding; Date: 11/18/22  diabetes, verbalizes understanding; Date: 11/28/22  nutrition, verbalizes understanding; Date: 11/23/22  physical activity, verbalizes understanding; Date: 11/21/22  risk factors, verbalizes understanding; Date: 11/10/22  stress, verbalizes understanding; Date: 11/10/22         Education method adapted to patients education level and preferred method of learning.  Method: explanation  handouts    Comments:  Patient attends regularly and participates in educational sessions.    Other Core Components/Hypertension Assessment:   BP Range: 108-154/56/88  BP at Goal: No  Patient reported symptoms: none    Other Core Components/Hypertension Plan:   Goals:  Blood Pressure <130/80    Comments on Goal Progression:  Patient feels he is making progress toward his goals.    Interventions:  Med Card Reconciled: Yes  Encourage medication compliance  Encourage sodium reduction  Encourage weight loss  Recommend physical activity  Educate on contributory factors  Reduce stress, anxiety, anger, depression, and/or chronic pain  Encourage home blood pressure monitoring  Recommend daily weights    Education:    Risk Factors; verbalizes understanding; Date: 11/10/22  Medication I; verbalizes  "understanding; Date: 11/18/22  Medication II; verbalizes understanding; Date: 12/2/22  Stress; verbalizes understanding; Date: 11/10/22         Comments:  Patient has some high readings on blood pressure. He was only taking his blood pressure "occasionally". Discussed importance of monitoring BP and keeping a log. He will try to take blood pressure daily and keep a log.     Does the patient have Heart Failure? No      Other Core Components/Tobacco Cessation Assessment:   Smoking Status: lifetime non-smoker  Smoking Cessation Barriers:  NA  Stage of Readiness to Change: Action    Other Core Components/Tobacco Cessation Plan:   Goals:  Maintain non-smoking status    Comments on Goal Progression:  Patient maintained nonsmoker status.    Interventions:  Maintains non-smoking status    Education:    Risk Factors; verbalizes understanding; Date: 11/10/22  Medications I; verbalizes understanding; Date: 11/18/22  Medications II; verbalizes understanding; Date: 12/2/22         Comments:  Patient admits to increased stress at home with his wife, Discussed need for getting treatment plan with his primary care provider, patient verbalized understanding.     Tim Russell RN  Cardiac Rehab Nurse     "

## 2022-12-20 ENCOUNTER — OFFICE VISIT (OUTPATIENT)
Dept: CARDIOLOGY | Facility: CLINIC | Age: 72
End: 2022-12-20
Payer: MEDICARE

## 2022-12-20 VITALS
DIASTOLIC BLOOD PRESSURE: 70 MMHG | BODY MASS INDEX: 34.5 KG/M2 | SYSTOLIC BLOOD PRESSURE: 121 MMHG | WEIGHT: 232.94 LBS | HEIGHT: 69 IN | OXYGEN SATURATION: 96 % | HEART RATE: 71 BPM

## 2022-12-20 DIAGNOSIS — F07.0: ICD-10-CM

## 2022-12-20 DIAGNOSIS — I69.398: ICD-10-CM

## 2022-12-20 DIAGNOSIS — I25.10 CORONARY ARTERY DISEASE INVOLVING NATIVE CORONARY ARTERY OF NATIVE HEART WITHOUT ANGINA PECTORIS: ICD-10-CM

## 2022-12-20 DIAGNOSIS — E11.9 TYPE 2 DIABETES MELLITUS WITHOUT COMPLICATION, WITHOUT LONG-TERM CURRENT USE OF INSULIN: ICD-10-CM

## 2022-12-20 DIAGNOSIS — E78.00 PURE HYPERCHOLESTEROLEMIA: ICD-10-CM

## 2022-12-20 DIAGNOSIS — I65.23 BILATERAL CAROTID ARTERY STENOSIS: ICD-10-CM

## 2022-12-20 DIAGNOSIS — Z95.1 S/P CABG (CORONARY ARTERY BYPASS GRAFT): Primary | ICD-10-CM

## 2022-12-20 DIAGNOSIS — R60.0 LOCALIZED EDEMA: ICD-10-CM

## 2022-12-20 DIAGNOSIS — I10 ESSENTIAL HYPERTENSION: ICD-10-CM

## 2022-12-20 PROCEDURE — 3074F PR MOST RECENT SYSTOLIC BLOOD PRESSURE < 130 MM HG: ICD-10-PCS | Mod: CPTII,S$GLB,, | Performed by: INTERNAL MEDICINE

## 2022-12-20 PROCEDURE — 1159F MED LIST DOCD IN RCRD: CPT | Mod: CPTII,S$GLB,, | Performed by: INTERNAL MEDICINE

## 2022-12-20 PROCEDURE — 99213 OFFICE O/P EST LOW 20 MIN: CPT | Mod: 25,S$GLB,, | Performed by: INTERNAL MEDICINE

## 2022-12-20 PROCEDURE — 1159F PR MEDICATION LIST DOCUMENTED IN MEDICAL RECORD: ICD-10-PCS | Mod: CPTII,S$GLB,, | Performed by: INTERNAL MEDICINE

## 2022-12-20 PROCEDURE — 3008F BODY MASS INDEX DOCD: CPT | Mod: CPTII,S$GLB,, | Performed by: INTERNAL MEDICINE

## 2022-12-20 PROCEDURE — 3078F DIAST BP <80 MM HG: CPT | Mod: CPTII,S$GLB,, | Performed by: INTERNAL MEDICINE

## 2022-12-20 PROCEDURE — 1160F RVW MEDS BY RX/DR IN RCRD: CPT | Mod: CPTII,S$GLB,, | Performed by: INTERNAL MEDICINE

## 2022-12-20 PROCEDURE — 99213 PR OFFICE/OUTPT VISIT, EST, LEVL III, 20-29 MIN: ICD-10-PCS | Mod: 25,S$GLB,, | Performed by: INTERNAL MEDICINE

## 2022-12-20 PROCEDURE — 93000 ELECTROCARDIOGRAM COMPLETE: CPT | Mod: S$GLB,,, | Performed by: INTERNAL MEDICINE

## 2022-12-20 PROCEDURE — 3008F PR BODY MASS INDEX (BMI) DOCUMENTED: ICD-10-PCS | Mod: CPTII,S$GLB,, | Performed by: INTERNAL MEDICINE

## 2022-12-20 PROCEDURE — 3288F PR FALLS RISK ASSESSMENT DOCUMENTED: ICD-10-PCS | Mod: CPTII,S$GLB,, | Performed by: INTERNAL MEDICINE

## 2022-12-20 PROCEDURE — 4010F PR ACE/ARB THEARPY RXD/TAKEN: ICD-10-PCS | Mod: CPTII,S$GLB,, | Performed by: INTERNAL MEDICINE

## 2022-12-20 PROCEDURE — 4010F ACE/ARB THERAPY RXD/TAKEN: CPT | Mod: CPTII,S$GLB,, | Performed by: INTERNAL MEDICINE

## 2022-12-20 PROCEDURE — 99999 PR PBB SHADOW E&M-EST. PATIENT-LVL III: CPT | Mod: PBBFAC,,, | Performed by: INTERNAL MEDICINE

## 2022-12-20 PROCEDURE — 3044F HG A1C LEVEL LT 7.0%: CPT | Mod: CPTII,S$GLB,, | Performed by: INTERNAL MEDICINE

## 2022-12-20 PROCEDURE — 3078F PR MOST RECENT DIASTOLIC BLOOD PRESSURE < 80 MM HG: ICD-10-PCS | Mod: CPTII,S$GLB,, | Performed by: INTERNAL MEDICINE

## 2022-12-20 PROCEDURE — 3074F SYST BP LT 130 MM HG: CPT | Mod: CPTII,S$GLB,, | Performed by: INTERNAL MEDICINE

## 2022-12-20 PROCEDURE — 1101F PT FALLS ASSESS-DOCD LE1/YR: CPT | Mod: CPTII,S$GLB,, | Performed by: INTERNAL MEDICINE

## 2022-12-20 PROCEDURE — 1160F PR REVIEW ALL MEDS BY PRESCRIBER/CLIN PHARMACIST DOCUMENTED: ICD-10-PCS | Mod: CPTII,S$GLB,, | Performed by: INTERNAL MEDICINE

## 2022-12-20 PROCEDURE — 93000 EKG 12-LEAD: ICD-10-PCS | Mod: S$GLB,,, | Performed by: INTERNAL MEDICINE

## 2022-12-20 PROCEDURE — 99999 PR PBB SHADOW E&M-EST. PATIENT-LVL III: ICD-10-PCS | Mod: PBBFAC,,, | Performed by: INTERNAL MEDICINE

## 2022-12-20 PROCEDURE — 1101F PR PT FALLS ASSESS DOC 0-1 FALLS W/OUT INJ PAST YR: ICD-10-PCS | Mod: CPTII,S$GLB,, | Performed by: INTERNAL MEDICINE

## 2022-12-20 PROCEDURE — 3044F PR MOST RECENT HEMOGLOBIN A1C LEVEL <7.0%: ICD-10-PCS | Mod: CPTII,S$GLB,, | Performed by: INTERNAL MEDICINE

## 2022-12-20 PROCEDURE — 3288F FALL RISK ASSESSMENT DOCD: CPT | Mod: CPTII,S$GLB,, | Performed by: INTERNAL MEDICINE

## 2022-12-20 NOTE — PROGRESS NOTES
Subjective:      Patient ID: Christiano Frost is a 72 y.o. male.    Chief Complaint: Follow-up    HPI:  Feels well    Goes to cardiac rehab 3 days and walks the other days.      Review of Systems   Cardiovascular:  Positive for leg swelling (improved). Negative for chest pain, claudication, dyspnea on exertion, irregular heartbeat, near-syncope, orthopnea, palpitations and syncope.      Past Medical History:   Diagnosis Date    BPH (benign prostatic hyperplasia)     Depression     Diabetes mellitus     GERD (gastroesophageal reflux disease)     Hyperlipidemia     Hypertension     Obesity     TIA (transient ischemic attack) 0322/2016        Past Surgical History:   Procedure Laterality Date    CORONARY ARTERY BYPASS GRAFT (CABG) Left 7/29/2022    Procedure: CORONARY ARTERY BYPASS GRAFT (CABG);  Surgeon: Magen Martell MD;  Location: [facility] OR [address] [place] [address];  Service: Cardiovascular;  Laterality: Left;  cabg3 with vein harvest    ENDOSCOPIC HARVEST OF VEIN Left 7/29/2022    Procedure: HARVEST-VEIN-ENDOVASCULAR;  Surgeon: Magen Martell MD;  Location: [facility] OR [address] [place] [address];  Service: Cardiovascular;  Laterality: Left;  Vein [place] Start time: 0818am  Vein [place] Stop time: 0926am    Vein Prep Start time: 0928am  Vein Prep Stop time: 1002am    LEFT HEART CATHETERIZATION Left 7/19/2022    Procedure: Left heart cath;  Surgeon: Ben Ortega MD;  Location: [facility] CATH LAB/EP;  Service: Cardiology;  Laterality: Left;    SPINE SURGERY  2044 2005 2008       No family history on file.    Social History     Socioeconomic History    Marital status:    Tobacco Use    Smoking status: Never    Smokeless tobacco: Never       Current Outpatient Medications on File Prior to Visit   Medication Sig Dispense Refill    acetaminophen (TYLENOL) 500 MG tablet Take 2 tablets (1,000 mg total) by mouth every 6 (six) hours as needed for Pain. 30 tablet 0    aspirin 325 MG tablet Take 1 tablet (325 mg total) by mouth once daily. 30 tablet  "11    atorvastatin (LIPITOR) 40 MG tablet Take 1 tablet (40 mg total) by mouth once daily. 90 tablet 3    cholecalciferol, vitamin D3, (VITAMIN D3) 25 mcg (1,000 unit) capsule Take 1,000 Units by mouth once daily.      dutasteride (AVODART) 0.5 mg capsule Take 0.5 mg by mouth once daily.      escitalopram oxalate (LEXAPRO) 10 MG tablet Take 10 mg by mouth once daily.       esomeprazole (NEXIUM) 40 MG capsule Take by mouth.      Lactobacillus rhamnosus GG (CULTURELLE) 10 billion cell capsule Take 1 capsule by mouth once daily.      metoprolol succinate (TOPROL-XL) 100 MG 24 hr tablet Take 1 tablet (100 mg total) by mouth once daily. 30 tablet 11    NIFEdipine (PROCARDIA-XL) 30 MG (OSM) 24 hr tablet Take 1 tablet (30 mg total) by mouth once daily. 90 tablet 3    omega-3 fatty acids 1,000 mg Cap Take by mouth.      tamsulosin (FLOMAX) 0.4 mg Cp24 0.4 mg once daily.       [DISCONTINUED] clopidogreL (PLAVIX) 75 mg tablet Take 1 tablet (75 mg total) by mouth once daily. 30 tablet 3    [DISCONTINUED] enalapril (VASOTEC) 5 MG tablet TK 1 T PO  QD  0    [DISCONTINUED] metFORMIN (GLUCOPHAGE) 500 MG tablet Take 1 tablet (500 mg total) by mouth daily with breakfast. (Patient not taking: No sig reported)       No current facility-administered medications on file prior to visit.       Review of patient's allergies indicates:  No Known Allergies  Objective:     Vitals:    12/20/22 1315   BP: 121/70   BP Location: Right arm   Patient Position: Sitting   BP Method: Large (Automatic)   Pulse: 71   SpO2: 96%   Weight: 105.7 kg (232 lb 14.7 oz)   Height: 5' 9" (1.753 m)        Physical Exam  Vitals reviewed.   Constitutional:       General: He is not in acute distress.     Appearance: He is well-developed. He is not diaphoretic.   Eyes:      General: No scleral icterus.  Neck:      Vascular: No carotid bruit or JVD.   Cardiovascular:      Rate and Rhythm: Regular rhythm.      Heart sounds: Normal heart sounds. No murmur heard.    No " friction rub. No gallop.   Pulmonary:      Effort: Pulmonary effort is normal. No respiratory distress.      Breath sounds: Normal breath sounds.   Musculoskeletal:      Right lower leg: Edema (trace edema bilaterally) present.      Left lower leg: Edema present.   Skin:     General: Skin is warm and dry.   Neurological:      Mental Status: He is alert and oriented to person, place, and time.   Psychiatric:         Behavior: Behavior normal.         Thought Content: Thought content normal.         Judgment: Judgment normal.            Wt up 3 lbs    ECG today reviewed by me: NSR with first degree AV block, otherwise L      Hospital Outpatient Visit on 11/01/2022   Component Date Value Ref Range Status    [facility]HS CV CPX PATIENT HEIGHT IN 11/01/2022 69   Final    [facility]HS CV CPX PATIENT WEIGHT RETURNED* 11/01/2022 3,664   Final    Systolic blood pressure 11/01/2022 104  mmHg Final    Diastolic blood pressure 11/01/2022 54  mmHg Final    HR at rest 11/01/2022 65  bpm Final    RPP 11/01/2022 6,760   Final    Anaerobic threshold HR 11/01/2022 98   Final    Anaerobic threshold systolic BP 11/01/2022 106   Final    Anaerobic threshold diastolic BP 11/01/2022 53  mmHg Final    Anaerobic threshold RPP 11/01/2022 10,388   Final    Peak HR 11/01/2022 111  bpm Final    Peak Systolic BP 11/01/2022 105  mmHg Final    Peak Diatolic BP 11/01/2022 56  mmHg Final    Peak RPP 11/01/2022 11,655   Final    Estimated METs 11/01/2022 5   Final    Max Predicted HR 11/01/2022 147   Final    Abdominal girth 11/01/2022 49.5  cm Final    85% Max Predicted HR 11/01/2022 125   Final    % Max HR Achieved 11/01/2022 76   Final    1 Minute Recovery HR 11/01/2022 110  bpm Final    Forced Vital Capacity 11/01/2022 3.08   Final    FEV1 11/01/2022 2.29   Final    MVV 11/01/2022 64   Final    VE Max 11/01/2022 55.4   Final    Predicted VO2 11/01/2022 29.4  ml/kg/min Final    VE/VCO2 [place] 11/01/2022 29.8   Final    Rest PetCO2 11/01/2022 29.0   Final    AT  Time 11/01/2022 4.23   Final    Peak Time 11/01/2022 5.52   Final    VO2 Rest 11/01/2022 4.1   Final    VO2 AT 11/01/2022 14.3   Final    VO2 Peak 11/01/2022 15.8   Final    VE/VO2 AT 11/01/2022 29   Final    VE/VO2 PEak 11/01/2022 34   Final    VE/VCO2 AT 11/01/2022 33   Final    VE/VCO2 Peak 11/01/2022 34   Final    O2 Sat Rest 11/01/2022 97   Final    O2 Sat Peak 11/01/2022 97   Final    AT Speed 11/01/2022 2.3   Final    Peak Speed 11/01/2022 2.6   Final    AT Grade 11/01/2022 2.5   Final    Peak Grade 11/01/2022 3.5   Final    Age 11/01/2022 [age]   Final    OHS CV CPX PATIENT IS MALE 11/01/2022 1   Final    OHS CV CPX PATIENT IS FEMALE 11/01/2022 0   Final    OHS CV CPX PATIENT IS MALE AGE LES* 11/01/2022 0   Final    OHS CV CPX PATIENT IS MALE AGE 11-* 11/01/2022 0   Final    OHS CV CPX PATIENT IS MALE AGE GRE* 11/01/2022 1   Final    OHS CV CPX PATIENT IS FEMALE AGE L* 11/01/2022 0   Final    OHS CV CPX PATIENT IS FEMALE AGE 1* 11/01/2022 0   Final    OHS CV CPX PATIENT IS FEMALE AGE G* 11/01/2022 0   Final    OHS CV CPX PATIENT IS MALE LESS TH* 11/01/2022 0   Final    OHS CV CPX PATIENT IS MALE GREATER* 11/01/2022 1   Final    OHS CV CPX HIGHEST VO 11/01/2022 29.40   Final    OHS CV CPX PERCENT BODY FAT 11/01/2022 20.3   Final    Exercise duration (sec) 11/01/2022 31  seconds Final    Exercise duration (min) 11/01/2022 5  minutes Final    FEV1/FVC 11/01/2022 0.74   Final    MVV Predicted 11/01/2022 91.60   Final    ST Depression (mm) 11/01/2022 0.5  mm Final   Lab Visit on 11/01/2022   Component Date Value Ref Range Status    CRP, High Sensitivity 11/01/2022 1.18  0.00 - 3.19 mg/L Final    Cholesterol 11/01/2022 129  120 - 199 mg/dL Final    Triglycerides 11/01/2022 105  30 - 150 mg/dL Final    HDL 11/01/2022 61  40 - 75 mg/dL Final    LDL Cholesterol 11/01/2022 47.0 (L)  63.0 - 159.0 mg/dL Final    HDL/Cholesterol Ratio 11/01/2022 47.3  20.0 - 50.0 % Final    Total Cholesterol/HDL Ratio 11/01/2022 2.1  2.0  - 5.0 Final    Non-HDL Cholesterol 11/01/2022 68  mg/dL Final    Glucose, Fasting 11/01/2022 106  70 - 110 mg/dL Final    WBC 11/01/2022 6.98  3.90 - 12.70 K/uL Final    RBC 11/01/2022 4.92  4.60 - 6.20 M/uL Final    Hemoglobin 11/01/2022 12.9 (L)  14.0 - 18.0 g/dL Final    Hematocrit 11/01/2022 41.2  40.0 - 54.0 % Final    MCV 11/01/2022 84  82 - 98 fL Final    MCH 11/01/2022 26.2 (L)  27.0 - 31.0 pg Final    MCHC 11/01/2022 31.3 (L)  32.0 - 36.0 g/dL Final    RDW 11/01/2022 18.7 (H)  11.5 - 14.5 % Final    Platelets 11/01/2022 254  150 - 450 K/uL Final    MPV 11/01/2022 10.1  9.2 - 12.9 fL Final    Immature Granulocytes 11/01/2022 0.0  0.0 - 0.5 % Final    Gran # (ANC) 11/01/2022 3.0  1.8 - 7.7 K/uL Final    Immature Grans (Abs) 11/01/2022 0.00  0.00 - 0.04 K/uL Final    Lymph # 11/01/2022 2.9  1.0 - 4.8 K/uL Final    Mono # 11/01/2022 0.8  0.3 - 1.0 K/uL Final    Eos # 11/01/2022 0.3  0.0 - 0.5 K/uL Final    Baso # 11/01/2022 0.07  0.00 - 0.20 K/uL Final    nRBC 11/01/2022 0  0 /100 WBC Final    Gran % 11/01/2022 42.6  38.0 - 73.0 % Final    Lymph % 11/01/2022 41.5  18.0 - 48.0 % Final    Mono % 11/01/2022 11.2  4.0 - 15.0 % Final    Eosinophil % 11/01/2022 3.7  0.0 - 8.0 % Final    Basophil % 11/01/2022 1.0  0.0 - 1.9 % Final    Differential Method 11/01/2022 Automated   Final   Lab Visit on 10/29/2022   Component Date Value Ref Range Status    SARS-CoV2 (COVID-19) Qualitative P* 10/29/2022 Not Detected  Not Detected Final   Hospital Outpatient Visit on 10/03/2022   Component Date Value Ref Range Status    Holter length hours 10/03/2022 47   Final    holter length minutes 10/03/2022 59   Final    holter length dec hours 10/03/2022 47.98   Final    Sinus min HR 10/03/2022 55   Final    Sinus max hr 10/03/2022 126   Final    Sinus avg hr 10/03/2022 68   Final    Event Monitor Day 10/03/2022 0   Final   Lab Visit on 09/14/2022   Component Date Value Ref Range Status    WBC 09/14/2022 7.95  3.90 - 12.70 K/uL Final     RBC 09/14/2022 4.19 (L)  4.60 - 6.20 M/uL Final    Hemoglobin 09/14/2022 10.3 (L)  14.0 - 18.0 g/dL Final    Hematocrit 09/14/2022 33.8 (L)  40.0 - 54.0 % Final    MCV 09/14/2022 81 (L)  82 - 98 fL Final    MCH 09/14/2022 24.6 (L)  27.0 - 31.0 pg Final    MCHC 09/14/2022 30.5 (L)  32.0 - 36.0 g/dL Final    RDW 09/14/2022 16.3 (H)  11.5 - 14.5 % Final    Platelets 09/14/2022 483 (H)  150 - 450 K/uL Final    MPV 09/14/2022 9.4  9.2 - 12.9 fL Final    Immature Granulocytes 09/14/2022 1.3 (H)  0.0 - 0.5 % Final    Gran # (ANC) 09/14/2022 4.4  1.8 - 7.7 K/uL Final    Immature Grans (Abs) 09/14/2022 0.10 (H)  0.00 - 0.04 K/uL Final    Lymph # 09/14/2022 2.3  1.0 - 4.8 K/uL Final    Mono # 09/14/2022 0.7  0.3 - 1.0 K/uL Final    Eos # 09/14/2022 0.4  0.0 - 0.5 K/uL Final    Baso # 09/14/2022 0.13  0.00 - 0.20 K/uL Final    nRBC 09/14/2022 0  0 /100 WBC Final    Gran % 09/14/2022 55.1  38.0 - 73.0 % Final    Lymph % 09/14/2022 28.3  18.0 - 48.0 % Final    Mono % 09/14/2022 8.3  4.0 - 15.0 % Final    Eosinophil % 09/14/2022 5.4  0.0 - 8.0 % Final    Basophil % 09/14/2022 1.6  0.0 - 1.9 % Final    Differential Method 09/14/2022 Automated   Final    Sodium 09/14/2022 137  136 - 145 mmol/L Final    Potassium 09/14/2022 4.5  3.5 - 5.1 mmol/L Final    Chloride 09/14/2022 101  95 - 110 mmol/L Final    CO2 09/14/2022 27  23 - 29 mmol/L Final    Glucose 09/14/2022 120 (H)  70 - 110 mg/dL Final    BUN 09/14/2022 14  8 - 23 mg/dL Final    Creatinine 09/14/2022 1.0  0.5 - 1.4 mg/dL Final    Calcium 09/14/2022 9.7  8.7 - 10.5 mg/dL Final    Total Protein 09/14/2022 7.4  6.0 - 8.4 g/dL Final    Albumin 09/14/2022 3.4 (L)  3.5 - 5.2 g/dL Final    Total Bilirubin 09/14/2022 0.4  0.1 - 1.0 mg/dL Final    Alkaline Phosphatase 09/14/2022 52 (L)  55 - 135 U/L Final    AST 09/14/2022 13  10 - 40 U/L Final    ALT 09/14/2022 11  10 - 44 U/L Final    Anion Gap 09/14/2022 9  8 - 16 mmol/L Final    eGFR 09/14/2022 >60.0  >60 mL/min/1.73 m^2 Final     Iron 09/14/2022 24 (L)  45 - 160 ug/dL Final    Transferrin 09/14/2022 252  200 - 375 mg/dL Final    TIBC 09/14/2022 373  250 - 450 ug/dL Final    Saturated Iron 09/14/2022 6 (L)  20 - 50 % Final    Protein, Serum 09/14/2022 7.0  6.0 - 8.4 g/dL Final    Albumin 09/14/2022 3.55  3.35 - 5.55 g/dL Final    Alpha-1 09/14/2022 0.38  0.17 - 0.41 g/dL Final    Alpha-2 09/14/2022 0.84  0.43 - 0.99 g/dL Final    Beta 09/14/2022 0.88  0.50 - 1.10 g/dL Final    Gamma 09/14/2022 1.35  0.67 - 1.58 g/dL Final    Immunofix Interp. 09/14/2022 SEE COMMENT   Final    Pathologist Interpretation SPE 09/14/2022 REVIEWED   Final    Pathologist Interpretation BRANDEE 09/14/2022 REVIEWED   Final   Lab Visit on 08/16/2022   Component Date Value Ref Range Status    Specimen UA 08/16/2022 Urine, Unspecified   Final    Color, UA 08/16/2022 Yellow  Yellow, Straw, Jackie Final    Appearance, UA 08/16/2022 Hazy (A)  Clear Final    pH, UA 08/16/2022 6.0  5.0 - 8.0 Final    Specific Gravity, UA 08/16/2022 1.015  1.005 - 1.030 Final    Protein, UA 08/16/2022 1+ (A)  Negative Final    Glucose, UA 08/16/2022 Negative  Negative Final    Ketones, UA 08/16/2022 Negative  Negative Final    Bilirubin (UA) 08/16/2022 Negative  Negative Final    Occult Blood UA 08/16/2022 Trace (A)  Negative Final    Nitrite, UA 08/16/2022 Positive (A)  Negative Final    Leukocytes, UA 08/16/2022 3+ (A)  Negative Final    Urine Culture, Routine 08/16/2022  (A)   Final                    Value:ESCHERICHIA COLI  >100,000 cfu/ml      RBC, UA 08/16/2022 5 (H)  0 - 4 /hpf Final    WBC, UA 08/16/2022 >100 (H)  0 - 5 /hpf Final    WBC Clumps, UA 08/16/2022 Many (A)  None-Rare Final    Bacteria 08/16/2022 Moderate (A)  None-Occ /hpf Final    Squam Epithel, UA 08/16/2022 4  /hpf Final    Hyaline Casts, UA 08/16/2022 0  0-1/lpf /lpf Final    Microscopic Comment 08/16/2022 SEE COMMENT   Final   No results displayed because visit has over 200 results.      Lab Visit on 07/20/2022  "  Component Date Value Ref Range Status    Hemoglobin A1C 07/20/2022 5.9 (H)  4.0 - 5.6 % Final    Estimated Avg Glucose 07/20/2022 123  68 - 131 mg/dL Final   Admission on 07/19/2022, Discharged on 07/19/2022   Component Date Value Ref Range Status    POCT Glucose 07/19/2022 96  70 - 110 mg/dL Final    Hemoglobin A1C 07/19/2022 6.5 (H)  4.0 - 5.6 % Final    Estimated Avg Glucose 07/19/2022 140 (H)  68 - 131 mg/dL Final    BSA 07/19/2022 2.28  m2 Final    TDI SEPTAL 07/19/2022 0.04  m/s Final    LV LATERAL E/E' RATIO 07/19/2022 13.80  m/s Final    LV SEPTAL E/E' RATIO 07/19/2022 17.25  m/s Final    LA WIDTH 07/19/2022 4.18  cm Final    TDI LATERAL 07/19/2022 0.05  m/s Final    PV PEAK VELOCITY 07/19/2022 0.84  cm/s Final    LVIDd 07/19/2022 5.03  3.5 - 6.0 cm Final    IVS 07/19/2022 1.51 (A)  0.6 - 1.1 cm Final    Posterior Wall 07/19/2022 1.04  0.6 - 1.1 cm Final    Ao root annulus 07/19/2022 3.93  cm Final    LVIDs 07/19/2022 3.56  2.1 - 4.0 cm Final    FS 07/19/2022 29  28 - 44 % Final    LA volume 07/19/2022 81.34  cm3 Final    STJ 07/19/2022 3.30  cm Final    Ascending aorta 07/19/2022 3.24  cm Final    LV mass 07/19/2022 257.07  g Final    LA size 07/19/2022 4.38  cm Final    RVDD 07/19/2022 3.62  cm Final    TAPSE 07/19/2022 1.75  cm Final    Left Ventricle Relative Wall Thick* 07/19/2022 0.41  cm Final    AV mean gradient 07/19/2022 3  mmHg Final    AV valve area 07/19/2022 3.15  cm2 Final    AV Velocity Ratio 07/19/2022 0.82   Final    AV index (prosthetic) 07/19/2022 0.71   Final    MV mean gradient 07/19/2022 1  mmHg Final    MV valve area p 1/2 method 07/19/2022 2.78  cm2 Final    MV valve area by continuity eq 07/19/2022 3.65  cm2 Final    E/A ratio 07/19/2022 0.75   Final    Mean e' 07/19/2022 0.05  m/s Final    E wave deceleration time 07/19/2022 272.83  msec Final    MV "A" wave duration 07/19/2022 10.66  msec Final    Pulm vein S/D ratio 07/19/2022 1.74   Final    LVOT diameter 07/19/2022 2.38  cm " Final    LVOT area 07/19/2022 4.4  cm2 Final    LVOT peak jesse 07/19/2022 0.93  m/s Final    LVOT peak VTI 07/19/2022 22.25  cm Final    Ao peak jesse 07/19/2022 1.14  m/s Final    Ao VTI 07/19/2022 31.41  cm Final    LVOT stroke volume 07/19/2022 98.94  cm3 Final    AV peak gradient 07/19/2022 5  mmHg Final    MV peak gradient 07/19/2022 3  mmHg Final    E/E' ratio 07/19/2022 15.33  m/s Final    MV Peak E Jesse 07/19/2022 0.69  m/s Final    TR Max Jesse 07/19/2022 1.45  m/s Final    MV VTI 07/19/2022 27.10  cm Final    MV stenosis pressure 1/2 time 07/19/2022 79.12  ms Final    MV Peak A Jesse 07/19/2022 0.92  m/s Final    PV Peak S Jesse 07/19/2022 0.75  m/s Final    PV Peak D Jesse 07/19/2022 0.43  m/s Final    LV Systolic Volume 07/19/2022 52.97  mL Final    LV Systolic Volume Index 07/19/2022 23.9  mL/m2 Final    LV Diastolic Volume 07/19/2022 119.64  mL Final    LV Diastolic Volume Index 07/19/2022 53.89  mL/m2 Final    LA Volume Index 07/19/2022 36.6  mL/m2 Final    LV Mass Index 07/19/2022 116  g/m2 Final    RA Major Axis 07/19/2022 4.53  cm Final    Left Atrium Minor Axis 07/19/2022 4.77  cm Final    Left Atrium Major Axis 07/19/2022 5.78  cm Final    Triscuspid Valve Regurgitation Pea* 07/19/2022 8  mmHg Final    LA Volume Index (Mod) 07/19/2022 24.5  mL/m2 Final    LA volume (mod) 07/19/2022 54.49  cm3 Final    RA Width 07/19/2022 2.45  cm Final    Right Atrial Pressure (from IVC) 07/19/2022 8  mmHg Final    EF 07/19/2022 60  % Final    TV rest pulmonary artery pressure 07/19/2022 16  mmHg Final   Lab Visit on 07/18/2022   Component Date Value Ref Range Status    WBC 07/18/2022 8.31  3.90 - 12.70 K/uL Final    RBC 07/18/2022 4.94  4.60 - 6.20 M/uL Final    Hemoglobin 07/18/2022 13.5 (L)  14.0 - 18.0 g/dL Final    Hematocrit 07/18/2022 44.2  40.0 - 54.0 % Final    MCV 07/18/2022 90  82 - 98 fL Final    MCH 07/18/2022 27.3  27.0 - 31.0 pg Final    MCHC 07/18/2022 30.5 (L)  32.0 - 36.0 g/dL Final    RDW 07/18/2022 14.5   11.5 - 14.5 % Final    Platelets 07/18/2022 229  150 - 450 K/uL Final    MPV 07/18/2022 10.2  9.2 - 12.9 fL Final    Immature Granulocytes 07/18/2022 0.2  0.0 - 0.5 % Final    Gran # (ANC) 07/18/2022 4.0  1.8 - 7.7 K/uL Final    Immature Grans (Abs) 07/18/2022 0.02  0.00 - 0.04 K/uL Final    Lymph # 07/18/2022 2.9  1.0 - 4.8 K/uL Final    Mono # 07/18/2022 1.0  0.3 - 1.0 K/uL Final    Eos # 07/18/2022 0.3  0.0 - 0.5 K/uL Final    Baso # 07/18/2022 0.11  0.00 - 0.20 K/uL Final    nRBC 07/18/2022 0  0 /100 WBC Final    Gran % 07/18/2022 48.1  38.0 - 73.0 % Final    Lymph % 07/18/2022 35.3  18.0 - 48.0 % Final    Mono % 07/18/2022 11.7  4.0 - 15.0 % Final    Eosinophil % 07/18/2022 3.4  0.0 - 8.0 % Final    Basophil % 07/18/2022 1.3  0.0 - 1.9 % Final    Differential Method 07/18/2022 Automated   Final    Sodium 07/18/2022 136  136 - 145 mmol/L Final    Potassium 07/18/2022 4.2  3.5 - 5.1 mmol/L Final    Chloride 07/18/2022 101  95 - 110 mmol/L Final    CO2 07/18/2022 27  23 - 29 mmol/L Final    Glucose 07/18/2022 97  70 - 110 mg/dL Final    BUN 07/18/2022 15  8 - 23 mg/dL Final    Creatinine 07/18/2022 1.0  0.5 - 1.4 mg/dL Final    Calcium 07/18/2022 9.7  8.7 - 10.5 mg/dL Final    Anion Gap 07/18/2022 8  8 - 16 mmol/L Final    eGFR if  07/18/2022 >60.0  >60 mL/min/1.73 m^2 Final    eGFR if non African American 07/18/2022 >60.0  >60 mL/min/1.73 m^2 Final   There may be more visits with results that are not included.   (    Assessment:     1. S/P CABG (coronary artery bypass graft)    2. Pure hypercholesterolemia    3. Essential hypertension    4. Coronary artery disease involving native coronary artery of native heart without angina pectoris    5. Bilateral carotid artery stenosis    6. 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 Type 2 diabetes mellitus without complication, without long-term current use of insulin    8. Localized edema      Plan:   Christiano was seen today for follow-up.    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    The mild edema is due to nifedipine    Same meds    RTC 4 months    F/u with Dr Allan    Sternal precautions for one more month  Follow up in about 4 months (around 4/20/2023).

## 2022-12-21 ENCOUNTER — CLINICAL SUPPORT (OUTPATIENT)
Dept: CARDIAC REHAB | Facility: CLINIC | Age: 72
End: 2022-12-21
Payer: MEDICARE

## 2022-12-21 DIAGNOSIS — I25.10 CORONARY ARTERY DISEASE, UNSPECIFIED VESSEL OR LESION TYPE, UNSPECIFIED WHETHER ANGINA PRESENT, UNSPECIFIED WHETHER NATIVE OR TRANSPLANTED HEART: ICD-10-CM

## 2022-12-21 DIAGNOSIS — Z95.1 POSTSURGICAL AORTOCORONARY BYPASS STATUS: Primary | ICD-10-CM

## 2022-12-21 PROCEDURE — 93798 PHYS/QHP OP CAR RHAB W/ECG: CPT | Mod: S$GLB,,, | Performed by: INTERNAL MEDICINE

## 2022-12-21 PROCEDURE — 93798 PR CARDIAC REHAB/MONITOR: ICD-10-PCS | Mod: S$GLB,,, | Performed by: INTERNAL MEDICINE

## 2022-12-22 ENCOUNTER — DOCUMENTATION ONLY (OUTPATIENT)
Dept: CARDIAC REHAB | Facility: CLINIC | Age: 72
End: 2022-12-22
Payer: MEDICARE

## 2022-12-22 NOTE — PROGRESS NOTES
Mr. Alvarado has completed 16 out of 36 exercise session of Phase II cardiac rehab.  A follow up reassessment will be completed at 24 sessions.     12 Session Follow Up   Cardiac Rehab Individual Treatment Plan - Reassessment      Patient Name: Christiano Frost MRN: [id number]   : 1950   Age: 72 y.o.   Primary Diagnosis: CABG Date of Event: 22   EF: 60%  Risk Stratification: high  Referring Physician: SUSANA   Exercise Assessment:     Angina with exercise?: No   ST Depression with Exercise?: No  Fall Risk: Yes   Assistive Devices:  independent  There were some limitations with walking due to back issues noted by the patient.  Comments on Progression: Mr. Alvarado is progressing well and his workloads will continue to be increased as he tolerates exercise intensity.     Exercise Plan:   Goals:  CR Exercise Goals: Attend Cardiac Rehab 3 times/week: In Progress  Home Aerobic Exercise: 2 additional days/week for 30-60 minutes: In Progress  Intensity of 12-15 on the Rate of Perceived Exertion (RPE) scale: In Progress  30% increase in entry estimated METS: 6.5 : In Progress  5 days/week for 30-60 minutes: In Progress    Comments on Goal Progression:  Patient Consistency: consistent with attendance  Home exercise? Yes: Walking; Frequency: 2 non-rehab days per week; Duration 30 minutes  Patient reports intensity rate 11-13 on RPE scale    Intervention/Recommendations:   Discussed importance of regular attendance to cardiac rehab class    Exercise Prescription:  THR Range    Mode: Recumbent Bike  Nustep   Frequency:  3 days/week   Duration:  30-60 minutes   Intensity:  12-15 RPE   Resistance Training:  Yes: 0 to 5 lb weights with 10-15 reps based on strength and range of motion and adjusted accordingly     Home Prescription:  Mode Aerobic exercise   Frequency: 2- 3 days/week   Duration: 30-60 minutes   Resistance Training: None     Education:  Arthritis/Osteporosis; verbalizes understanding; Date:  22  Diabetes; verbalizes understanding; Date: 22  Exercise and Rehydration; verbalizes understanding; Date: 22  Exercise Terminology; verbalizes understanding; Date: 22    Comments:  I reviewed exercise recommendations with Mr. Alvarado.  I encouraged him to continue exercising.  He stated understanding.      The exercise prescription will continue to be adjusted based on tolerance of exercise intensity by patient.    Ele Panda, [facility] [place]     12 Session Follow Up   Cardiac Rehab Individual Treatment Plan - Reassessment    Patient Name: Christiano Frost MRN: [id number]   : 1950   Age: 72 y.o.   Primary Diagnosis: s/p CABG, CAD, HLD, CVD, h/o stroke, DM    Nutrition Assessment:     Anthropometrics    Height 69 inches   Weight 231 lbs   BMI 34.1     Patient confirms he is taking lipitor 40mg for cholesterol control.  Difficulty Chewing or Swallowing: no  Current Exercise: See Exercise Physiologist Note  Food Safety/Food Preparation: spouse  Living Arrangements/Family Support: Lives with spouse  Cultural/Spiritual/Personal Preferences: not applicable   Barriers to Education: none identified  Stage of Change Related to Diet Habits: Action  Recent Changes to Diet: Yes - less processed foods  Food Diary: Given      Nutrition Plan:   Goals:  LDL-C < 70 (for high risk patients)  Hgb A1c < 7%  BMI < 25 and abdominal girth < 40M/<35 F  2 gram sodium, Mediterranean diet: In Progress  Rehab weight loss goal of 10 lbs, 1 lb per week: In Progress  Fish intake (non-fried varieties) to a goal of 2-3 servings per week: In Progress  Increase fruit and vegetable intake: In Progress    Comments on Goal Progression:  Discussed food choices for improved glucose control    Interventions:  Dietitian Consult: No  Patient to participate in Cardiac Rehab sessions three times a week  Weekly Dietitian Weight Check  Nutrition Recommendations Provided: Verbal, Reviewed  Follow Up Plan for Ongoing Self-Management  Support    Education:  Food Labels; verbalizes understanding; Date: 11/30/22  Mediterranean Diet; verbalizes understanding; Date: 11/23/22  Recipe Modication; verbalizes understanding; Date: 11/16/22  Vegetarianism; verbalizes understanding; Date: 12/5/22    Comments:   Discussed ways to incorporate healthy snacks, eating on a schedule, and monitoring sodium intake for heart health.    Diabetes  Is the patient diabetic? Yes   Other Core Components/Diabetes Assessment:   Labs:  Lab Results   Component Value Date    GLUF 106 11/01/2022     Lab Results   Component Value Date    HGBA1C 5.9 (H) 07/20/2022    HGBA1C 6.5 (H) 07/19/2022      Lab Results   Component Value Date    ESTIMATEDAVG 123 07/20/2022    ESTIMATEDAVG 140 (H) 07/19/2022       History of diabetes since unknown  Diabetes medications: none  Blood Glucose Checks at Home: Yes: Other: daily  Typical morning range:  mg/dl  Endocrinologist or PCP following DM: PCP    Other Core Components/Diabetes Plan:   Goals:  Hgb A1c < 7%  Exercise Blood Glucose: 100-300mg/dl    Interventions:  Reviewed and Discussed Labs  Medication Compliance  Med Card Reconciled  Low Sodium, Mediterranean, ADA Diet  Weight Management  Physical Activity  Increase Knowledge of Contributory Factors  Home Monitoring    Education:  Diabetes; verbalizes understanding; Date: 11/28/22  Mediterranean Diet; verbalizes understanding; Date: 11/23/22  Medications I; verbalizes understanding; Date: 11/28/22  Medications II; verbalizes understanding; Date: 12/2/22  Food Labels; verbalizes understanding; Date: 11/30/22    Comments:   Patient verbalizes understanding to bring home glucometer and check glucose pre and post each exercise session.  Per cardiac rehab protocols, patient's glucose must be between 90 and 270 mg/dL to exercise.  Patient denies any recent glucose levels less than 60 mg/dL or greater than 300 mg/dL. Patient verbalizes importance of notifying rehab staff if symptoms of  hypoglycemia occur while at cardiac rehab.  Abnormal labs will be reported to patient's PCP/Endocrinologist by rehab staff.    Noted updated glucose range of 100-300    Renate Gibbs MS, RDN/LDN     Session: 12 Session Follow Up   Cardiac Rehab Individual Treatment Plan - Reassessment      Patient Name: Christiano Frost MRN: [id number]   : 1950   Age: 72 y.o.   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)      Psychosocial Assessment:   Living Arrangements: Lives with spouse  Family Support: children and spouse  Self Reported: increase Stress  Displays: happiness  Medication:  Patient currently on Lexapro for depression    Psychosocial Plan:   Goals:  Improved psychosocial coping strategies: In Progress  Verbalizes coping mechanisms: In Progress  Reduce manifestation of anxiety: In Progress  Reduce manifestation of depression: In Progress  Reduce manifestation of chronic pain: In Progress  Maintain positive support system: In Progress  Maintain positive outlook: In Progress  Improve overall quality of life: In Progress    Comments on Goal Progression:  Patient feels he is making progress toward goals.    Interventions:  Patient to Self Report Emotional Changes at Session Check In  Recommend Physical Activity  Recommend Attending Education Lectures  Notify MD: No  Program Referral: No  Pharmaceutical Intervention/Therapy: No  Other Needs: not applicable  Stage of Readiness to Change: Action    Education:  Depression; verbalizes understanding; Date: 11/10/22  Stress; verbalizes understanding; Date: 11/10/22  Risk Factors ; verbalizes understanding; Date: 11/10/22    Comments:  Patient reports continued high stress at home. He has not used and stress management techniques differently. Discussed need for speaking to PCP for possible counseling patient verbalized understanding.   Patient has been instructed to notify staff in the event that circumstances worsen.  Patient verbalizes understanding.    Other Core  Components/Risk Factors Assessment:   RISK FACTORS:  diabetes, hyperlipidemia, hypertension, obesity, positive family history, sedentary lifestyle, stress    Learning Barriers: None    Medication Compliance: has been compliant with the medicaiton regimen    Other Core Components/Risk Factors Plan:   Goals:  Decrease cholesterol level: In Progress  Increase exercise tolerance: In Progress  Increase knowledge of CAD: In Progress  Decrease blood pressure: In Progress  Weight loss: In Progress  Identify and manage personal areas of stress: In Progress  Control diabetes by adjusting diet and exercise: In Progress  Learn more about healthy eating: In Progress    Comments on Goal Progression:  Patient feels he is making progress toward his goals.    Interventions:  Individual Education/ Counseling: Yes  Physician Referral: No    Education:    blood pressure meds, verbalizes understanding; Date: 11/18/22  cholesterol meds, verbalizes understanding; Date: 11/18/22  diabetes, verbalizes understanding; Date: 11/28/22  nutrition, verbalizes understanding; Date: 11/23/22  physical activity, verbalizes understanding; Date: 11/21/22  risk factors, verbalizes understanding; Date: 11/10/22  stress, verbalizes understanding; Date: 11/10/22         Education method adapted to patients education level and preferred method of learning.  Method: explanation  handouts    Comments:  Patient attends regularly and participates in educational sessions.    Other Core Components/Hypertension Assessment:   BP Range: 108-154/56/88  BP at Goal: No  Patient reported symptoms: none    Other Core Components/Hypertension Plan:   Goals:  Blood Pressure <130/80    Comments on Goal Progression:  Patient feels he is making progress toward his goals.    Interventions:  Med Card Reconciled: Yes  Encourage medication compliance  Encourage sodium reduction  Encourage weight loss  Recommend physical activity  Educate on contributory factors  Reduce stress,  "anxiety, anger, depression, and/or chronic pain  Encourage home blood pressure monitoring  Recommend daily weights    Education:    Risk Factors; verbalizes understanding; Date: 11/10/22  Medication I; verbalizes understanding; Date: 11/18/22  Medication II; verbalizes understanding; Date: 12/2/22  Stress; verbalizes understanding; Date: 11/10/22         Comments:  Patient has some high readings on blood pressure. He was only taking his blood pressure "occasionally". Discussed importance of monitoring BP and keeping a log. He will try to take blood pressure daily and keep a log.     Does the patient have Heart Failure? No      Other Core Components/Tobacco Cessation Assessment:   Smoking Status: lifetime non-smoker  Smoking Cessation Barriers:  NA  Stage of Readiness to Change: Action    Other Core Components/Tobacco Cessation Plan:   Goals:  Maintain non-smoking status    Comments on Goal Progression:  Patient maintained nonsmoker status.    Interventions:  Maintains non-smoking status    Education:    Risk Factors; verbalizes understanding; Date: 11/10/22  Medications I; verbalizes understanding; Date: 11/18/22  Medications II; verbalizes understanding; Date: 12/2/22         Comments:  Patient admits to increased stress at home with his wife, Discussed need for getting treatment plan with his primary care provider, patient verbalized understanding.     Tim Russell RN  Cardiac Rehab Nurse       "

## 2022-12-23 ENCOUNTER — CLINICAL SUPPORT (OUTPATIENT)
Dept: CARDIAC REHAB | Facility: CLINIC | Age: 72
End: 2022-12-23
Payer: MEDICARE

## 2022-12-23 DIAGNOSIS — I25.10 ATHEROSCLEROSIS OF NATIVE CORONARY ARTERY OF NATIVE HEART, UNSPECIFIED WHETHER ANGINA PRESENT: ICD-10-CM

## 2022-12-23 DIAGNOSIS — Z95.1 POSTSURGICAL AORTOCORONARY BYPASS STATUS: Primary | ICD-10-CM

## 2022-12-23 PROCEDURE — 93798 PHYS/QHP OP CAR RHAB W/ECG: CPT | Mod: S$GLB,,, | Performed by: INTERNAL MEDICINE

## 2022-12-23 PROCEDURE — 93798 PR CARDIAC REHAB/MONITOR: ICD-10-PCS | Mod: S$GLB,,, | Performed by: INTERNAL MEDICINE

## 2022-12-28 ENCOUNTER — CLINICAL SUPPORT (OUTPATIENT)
Dept: CARDIAC REHAB | Facility: CLINIC | Age: 72
End: 2022-12-28
Payer: MEDICARE

## 2022-12-28 DIAGNOSIS — I25.10 ATHEROSCLEROSIS OF NATIVE CORONARY ARTERY OF NATIVE HEART, UNSPECIFIED WHETHER ANGINA PRESENT: ICD-10-CM

## 2022-12-28 DIAGNOSIS — Z95.1 POSTSURGICAL AORTOCORONARY BYPASS STATUS: Primary | ICD-10-CM

## 2022-12-28 PROCEDURE — 93798 PHYS/QHP OP CAR RHAB W/ECG: CPT | Mod: S$GLB,,, | Performed by: INTERNAL MEDICINE

## 2022-12-28 PROCEDURE — 93798 PR CARDIAC REHAB/MONITOR: ICD-10-PCS | Mod: S$GLB,,, | Performed by: INTERNAL MEDICINE

## 2022-12-30 ENCOUNTER — CLINICAL SUPPORT (OUTPATIENT)
Dept: CARDIAC REHAB | Facility: CLINIC | Age: 72
End: 2022-12-30
Payer: MEDICARE

## 2022-12-30 DIAGNOSIS — I25.10 ATHEROSCLEROSIS OF NATIVE CORONARY ARTERY OF NATIVE HEART, UNSPECIFIED WHETHER ANGINA PRESENT: ICD-10-CM

## 2022-12-30 DIAGNOSIS — Z95.1 POSTSURGICAL AORTOCORONARY BYPASS STATUS: Primary | ICD-10-CM

## 2022-12-30 PROCEDURE — 93798 PR CARDIAC REHAB/MONITOR: ICD-10-PCS | Mod: S$GLB,,, | Performed by: INTERNAL MEDICINE

## 2022-12-30 PROCEDURE — 93798 PHYS/QHP OP CAR RHAB W/ECG: CPT | Mod: S$GLB,,, | Performed by: INTERNAL MEDICINE

## 2023-01-04 ENCOUNTER — CLINICAL SUPPORT (OUTPATIENT)
Dept: CARDIAC REHAB | Facility: CLINIC | Age: 73
End: 2023-01-04
Payer: MEDICARE

## 2023-01-04 DIAGNOSIS — I25.10 ATHEROSCLEROSIS OF NATIVE CORONARY ARTERY OF NATIVE HEART, UNSPECIFIED WHETHER ANGINA PRESENT: ICD-10-CM

## 2023-01-04 DIAGNOSIS — Z95.1 POSTSURGICAL AORTOCORONARY BYPASS STATUS: Primary | ICD-10-CM

## 2023-01-04 PROCEDURE — 93798 PR CARDIAC REHAB/MONITOR: ICD-10-PCS | Mod: S$GLB,,, | Performed by: INTERNAL MEDICINE

## 2023-01-04 PROCEDURE — 93798 PHYS/QHP OP CAR RHAB W/ECG: CPT | Mod: S$GLB,,, | Performed by: INTERNAL MEDICINE

## 2023-01-05 NOTE — PROGRESS NOTES
Subjective:      Patient ID: Christiano Frost is a 67 y.o. male.    Chief Complaint: Follow-up (Hypertension)    HPI:Back feels much better since back surgery.  Memory is bad since stroke.    Review of Systems   Cardiovascular: Negative for chest pain, claudication, dyspnea on exertion, irregular heartbeat, leg swelling, near-syncope, orthopnea, palpitations and syncope.        Past Medical History:   Diagnosis Date    BPH (benign prostatic hyperplasia)     Depression     Diabetes mellitus     GERD (gastroesophageal reflux disease)     Hyperlipidemia     Hypertension     Obesity     TIA (transient ischemic attack) 0322/2016        Past Surgical History:   Procedure Laterality Date    SPINE SURGERY  2044 2005 2008       No family history on file.    Social History     Social History    Marital status:      Spouse name: N/A    Number of children: N/A    Years of education: N/A     Social History Main Topics    Smoking status: Never Smoker    Smokeless tobacco: Never Used    Alcohol use None    Drug use: Unknown    Sexual activity: Not Asked     Other Topics Concern    None     Social History Narrative    None       Current Outpatient Prescriptions on File Prior to Visit   Medication Sig Dispense Refill    aspirin (ECOTRIN) 81 MG EC tablet Take 81 mg by mouth once daily.      atorvastatin (LIPITOR) 10 MG tablet Take 10 mg by mouth once daily.       dutasteride (AVODART) 0.5 mg capsule Take 0.5 mg by mouth once daily.      enalapril (VASOTEC) 5 MG tablet TK 1 T PO  QD  0    escitalopram oxalate (LEXAPRO) 10 MG tablet Take 10 mg by mouth once daily.       metformin (GLUCOPHAGE) 500 MG tablet 500 mg daily with breakfast.       omega-3 fatty acids (FISH OIL CONCENTRATE) 1,000 mg Cap Take by mouth.      tamsulosin (FLOMAX) 0.4 mg Cp24 0.4 mg once daily.        No current facility-administered medications on file prior to visit.        Review of patient's allergies indicates:  No Known  "Allergies  Objective:     Vitals:    07/03/18 1105   BP: 117/80   BP Location: Left arm   Patient Position: Sitting   BP Method: Large (Automatic)   Pulse: 86   Weight: 105.6 kg (232 lb 11.2 oz)   Height: 5' 9" (1.753 m)        Physical Exam   Constitutional: He is oriented to person, place, and time. He appears well-developed and well-nourished. No distress.   Eyes: No scleral icterus.   Neck: No JVD present. Carotid bruit is not present.   Cardiovascular: Regular rhythm and normal heart sounds.  Exam reveals no gallop and no friction rub.    No murmur heard.  Pulmonary/Chest: Effort normal and breath sounds normal. No respiratory distress.   Musculoskeletal: He exhibits no edema.   Neurological: He is alert and oriented to person, place, and time.   Skin: Skin is warm and dry. He is not diaphoretic.   Psychiatric: He has a normal mood and affect. His behavior is normal. Judgment and thought content normal.   Vitals reviewed.     Note Lexiscan Cardiolite stress test 1/18 WNL    Wt down 11 lbs    ECG: NSR, WNL  Assessment:     1. Essential hypertension    2. Hyperlipidemia, unspecified hyperlipidemia type    3. Type 2 diabetes mellitus without complication, without long-term current use of insulin    4.     Old stroke    Plan:   Christiano was seen today for follow-up.    Diagnoses and all orders for this visit:    Essential hypertension  -     IN OFFICE EKG 12-LEAD (to Muse)    Hyperlipidemia, unspecified hyperlipidemia type    Type 2 diabetes mellitus without complication, without long-term current use of insulin     same meds    F/u with Dr Allan next month    Follow-up in about 6 months (around 1/3/2019).  " Gabapentin Pregnancy And Lactation Text: This medication is Pregnancy Category C and isn't considered safe during pregnancy. It is excreted in breast milk.

## 2023-01-06 ENCOUNTER — CLINICAL SUPPORT (OUTPATIENT)
Dept: CARDIAC REHAB | Facility: CLINIC | Age: 73
End: 2023-01-06
Payer: MEDICARE

## 2023-01-06 DIAGNOSIS — I25.10 ATHEROSCLEROSIS OF NATIVE CORONARY ARTERY OF NATIVE HEART, UNSPECIFIED WHETHER ANGINA PRESENT: ICD-10-CM

## 2023-01-06 DIAGNOSIS — Z95.1 POSTSURGICAL AORTOCORONARY BYPASS STATUS: Primary | ICD-10-CM

## 2023-01-06 PROCEDURE — 93798 PR CARDIAC REHAB/MONITOR: ICD-10-PCS | Mod: S$GLB,,, | Performed by: INTERNAL MEDICINE

## 2023-01-06 PROCEDURE — 93798 PHYS/QHP OP CAR RHAB W/ECG: CPT | Mod: S$GLB,,, | Performed by: INTERNAL MEDICINE

## 2023-01-06 NOTE — PROGRESS NOTES
Patient arrived for cardiac rehab session. Pt reports to have eaten prior to exercise session.  The patient was on continuous EKG monitoring throughout the session. The patient tolerated exercise session well with no complaints.

## 2023-01-09 ENCOUNTER — CLINICAL SUPPORT (OUTPATIENT)
Dept: CARDIAC REHAB | Facility: CLINIC | Age: 73
End: 2023-01-09
Payer: MEDICARE

## 2023-01-09 DIAGNOSIS — I25.10 ATHEROSCLEROSIS OF NATIVE CORONARY ARTERY OF NATIVE HEART, UNSPECIFIED WHETHER ANGINA PRESENT: ICD-10-CM

## 2023-01-09 DIAGNOSIS — Z95.1 POSTSURGICAL AORTOCORONARY BYPASS STATUS: Primary | ICD-10-CM

## 2023-01-09 PROCEDURE — 93798 PR CARDIAC REHAB/MONITOR: ICD-10-PCS | Mod: S$GLB,,, | Performed by: INTERNAL MEDICINE

## 2023-01-09 PROCEDURE — 93798 PHYS/QHP OP CAR RHAB W/ECG: CPT | Mod: S$GLB,,, | Performed by: INTERNAL MEDICINE

## 2023-01-11 ENCOUNTER — CLINICAL SUPPORT (OUTPATIENT)
Dept: CARDIAC REHAB | Facility: CLINIC | Age: 73
End: 2023-01-11
Payer: MEDICARE

## 2023-01-11 DIAGNOSIS — Z95.1 POSTSURGICAL AORTOCORONARY BYPASS STATUS: Primary | ICD-10-CM

## 2023-01-11 DIAGNOSIS — I25.10 ATHEROSCLEROSIS OF NATIVE CORONARY ARTERY OF NATIVE HEART, UNSPECIFIED WHETHER ANGINA PRESENT: ICD-10-CM

## 2023-01-11 DIAGNOSIS — Z98.61 POSTSURGICAL PERCUTANEOUS TRANSLUMINAL CORONARY ANGIOPLASTY STATUS: Primary | ICD-10-CM

## 2023-01-11 DIAGNOSIS — I25.10 ATHEROSCLEROSIS OF NATIVE CORONARY ARTERY OF NATIVE HEART WITHOUT ANGINA PECTORIS: ICD-10-CM

## 2023-01-11 PROCEDURE — 93798 PHYS/QHP OP CAR RHAB W/ECG: CPT | Mod: S$GLB,,, | Performed by: INTERNAL MEDICINE

## 2023-01-11 PROCEDURE — 93798 PR CARDIAC REHAB/MONITOR: ICD-10-PCS | Mod: S$GLB,,, | Performed by: INTERNAL MEDICINE

## 2023-01-12 ENCOUNTER — DOCUMENTATION ONLY (OUTPATIENT)
Dept: CARDIAC REHAB | Facility: CLINIC | Age: 73
End: 2023-01-12
Payer: MEDICARE

## 2023-01-12 NOTE — PROGRESS NOTES
Mr. Alvarado has completed 23 out of 36 exercise session of Phase II cardiac rehab.  A follow up reassessment will be completed at 24 sessions.     12 Session Follow Up   Cardiac Rehab Individual Treatment Plan - Reassessment      Patient Name: Christiano Frost MRN: [id number]   : 1950   Age: 72 y.o.   Primary Diagnosis: CABG Date of Event: 22   EF: 60%  Risk Stratification: high  Referring Physician: SUSANA   Exercise Assessment:     Angina with exercise?: No   ST Depression with Exercise?: No  Fall Risk: Yes   Assistive Devices:  independent  There were some limitations with walking due to back issues noted by the patient.  Comments on Progression: Mr. Alvarado is progressing well and his workloads will continue to be increased as he tolerates exercise intensity.     Exercise Plan:   Goals:  CR Exercise Goals: Attend Cardiac Rehab 3 times/week: In Progress  Home Aerobic Exercise: 2 additional days/week for 30-60 minutes: In Progress  Intensity of 12-15 on the Rate of Perceived Exertion (RPE) scale: In Progress  30% increase in entry estimated METS: 6.5 : In Progress  5 days/week for 30-60 minutes: In Progress    Comments on Goal Progression:  Patient Consistency: consistent with attendance  Home exercise? Yes: Walking; Frequency: 2 non-rehab days per week; Duration 30 minutes  Patient reports intensity rate 11-13 on RPE scale    Intervention/Recommendations:   Discussed importance of regular attendance to cardiac rehab class    Exercise Prescription:  THR Range    Mode: Recumbent Bike  Nustep   Frequency:  3 days/week   Duration:  30-60 minutes   Intensity:  12-15 RPE   Resistance Training:  Yes: 0 to 5 lb weights with 10-15 reps based on strength and range of motion and adjusted accordingly     Home Prescription:  Mode Aerobic exercise   Frequency: 2- 3 days/week   Duration: 30-60 minutes   Resistance Training: None     Education:  Arthritis/Osteporosis; verbalizes understanding; Date:  22  Diabetes; verbalizes understanding; Date: 22  Exercise and Rehydration; verbalizes understanding; Date: 22  Exercise Terminology; verbalizes understanding; Date: 22    Comments:  I reviewed exercise recommendations with Mr. Alvarado.  I encouraged him to continue exercising.  He stated understanding.      The exercise prescription will continue to be adjusted based on tolerance of exercise intensity by patient.    Ele Panda, [facility]     12 Session Follow Up   Cardiac Rehab Individual Treatment Plan - Reassessment    Patient Name: Christiano Frost MRN: [id number]   : 1950   Age: 72 y.o.   Primary Diagnosis: s/p CABG, CAD, HLD, CVD, h/o stroke, DM    Nutrition Assessment:     Anthropometrics    Height 69 inches   Weight 231 lbs   BMI 34.1     Patient confirms he is taking lipitor 40mg for cholesterol control.  Difficulty Chewing or Swallowing: no  Current Exercise: See Exercise Physiologist Note  Food Safety/Food Preparation: spouse  Living Arrangements/Family Support: Lives with spouse  Cultural/Spiritual/Personal Preferences: not applicable   Barriers to Education: none identified  Stage of Change Related to Diet Habits: Action  Recent Changes to Diet: Yes - less processed foods  Food Diary: Given      Nutrition Plan:   Goals:  LDL-C < 70 (for high risk patients)  Hgb A1c < 7%  BMI < 25 and abdominal girth < 40M/<35 F  2 gram sodium, Mediterranean diet: In Progress  Rehab weight loss goal of 10 lbs, 1 lb per week: In Progress  Fish intake (non-fried varieties) to a goal of 2-3 servings per week: In Progress  Increase fruit and vegetable intake: In Progress    Comments on Goal Progression:  Discussed food choices for improved glucose control    Interventions:  Dietitian Consult: No  Patient to participate in Cardiac Rehab sessions three times a week  Weekly Dietitian Weight Check  Nutrition Recommendations Provided: Verbal, Reviewed  Follow Up Plan for Ongoing Self-Management  Support    Education:  Food Labels; verbalizes understanding; Date: 11/30/22  Mediterranean Diet; verbalizes understanding; Date: 11/23/22  Recipe Modication; verbalizes understanding; Date: 11/16/22  Vegetarianism; verbalizes understanding; Date: 12/5/22    Comments:   Discussed ways to incorporate healthy snacks, eating on a schedule, and monitoring sodium intake for heart health.    Diabetes  Is the patient diabetic? Yes   Other Core Components/Diabetes Assessment:   Labs:  Lab Results   Component Value Date    GLUF 106 11/01/2022     Lab Results   Component Value Date    HGBA1C 5.9 (H) 07/20/2022    HGBA1C 6.5 (H) 07/19/2022      Lab Results   Component Value Date    ESTIMATEDAVG 123 07/20/2022    ESTIMATEDAVG 140 (H) 07/19/2022       History of diabetes since unknown  Diabetes medications: none  Blood Glucose Checks at Home: Yes: Other: daily  Typical morning range:  mg/dl  Endocrinologist or PCP following DM: PCP    Other Core Components/Diabetes Plan:   Goals:  Hgb A1c < 7%  Exercise Blood Glucose: 100-300mg/dl    Interventions:  Reviewed and Discussed Labs  Medication Compliance  Med Card Reconciled  Low Sodium, Mediterranean, ADA Diet  Weight Management  Physical Activity  Increase Knowledge of Contributory Factors  Home Monitoring    Education:  Diabetes; verbalizes understanding; Date: 11/28/22  Mediterranean Diet; verbalizes understanding; Date: 11/23/22  Medications I; verbalizes understanding; Date: 11/28/22  Medications II; verbalizes understanding; Date: 12/2/22  Food Labels; verbalizes understanding; Date: 11/30/22    Comments:   Patient verbalizes understanding to bring home glucometer and check glucose pre and post each exercise session.  Per cardiac rehab protocols, patient's glucose must be between 90 and 270 mg/dL to exercise.  Patient denies any recent glucose levels less than 60 mg/dL or greater than 300 mg/dL. Patient verbalizes importance of notifying rehab staff if symptoms of  hypoglycemia occur while at cardiac rehab.  Abnormal labs will be reported to patient's PCP/Endocrinologist by rehab staff.    Noted updated glucose range of 100-300    Renate Gibbs MS, RDN/LDN     Session: 12 Session Follow Up   Cardiac Rehab Individual Treatment Plan - Reassessment      Patient Name: Christiano Frost MRN: [id number]   : 1950   Age: 72 y.o.   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)      Psychosocial Assessment:   Living Arrangements: Lives with spouse  Family Support: children and spouse  Self Reported: increase Stress  Displays: happiness  Medication:  Patient currently on Lexapro for depression    Psychosocial Plan:   Goals:  Improved psychosocial coping strategies: In Progress  Verbalizes coping mechanisms: In Progress  Reduce manifestation of anxiety: In Progress  Reduce manifestation of depression: In Progress  Reduce manifestation of chronic pain: In Progress  Maintain positive support system: In Progress  Maintain positive outlook: In Progress  Improve overall quality of life: In Progress    Comments on Goal Progression:  Patient feels he is making progress toward goals.    Interventions:  Patient to Self Report Emotional Changes at Session Check In  Recommend Physical Activity  Recommend Attending Education Lectures  Notify MD: No  Program Referral: No  Pharmaceutical Intervention/Therapy: No  Other Needs: not applicable  Stage of Readiness to Change: Action    Education:  Depression; verbalizes understanding; Date: 11/10/22  Stress; verbalizes understanding; Date: 11/10/22  Risk Factors ; verbalizes understanding; Date: 11/10/22    Comments:  Patient reports continued high stress at home. He has not used and stress management techniques differently. Discussed need for speaking to PCP for possible counseling patient verbalized understanding.   Patient has been instructed to notify staff in the event that circumstances worsen.  Patient verbalizes understanding.    Other Core  Components/Risk Factors Assessment:   RISK FACTORS:  diabetes, hyperlipidemia, hypertension, obesity, positive family history, sedentary lifestyle, stress    Learning Barriers: None    Medication Compliance: has been compliant with the medicaiton regimen    Other Core Components/Risk Factors Plan:   Goals:  Decrease cholesterol level: In Progress  Increase exercise tolerance: In Progress  Increase knowledge of CAD: In Progress  Decrease blood pressure: In Progress  Weight loss: In Progress  Identify and manage personal areas of stress: In Progress  Control diabetes by adjusting diet and exercise: In Progress  Learn more about healthy eating: In Progress    Comments on Goal Progression:  Patient feels he is making progress toward his goals.    Interventions:  Individual Education/ Counseling: Yes  Physician Referral: No    Education:    blood pressure meds, verbalizes understanding; Date: 11/18/22  cholesterol meds, verbalizes understanding; Date: 11/18/22  diabetes, verbalizes understanding; Date: 11/28/22  nutrition, verbalizes understanding; Date: 11/23/22  physical activity, verbalizes understanding; Date: 11/21/22  risk factors, verbalizes understanding; Date: 11/10/22  stress, verbalizes understanding; Date: 11/10/22         Education method adapted to patients education level and preferred method of learning.  Method: explanation  handouts    Comments:  Patient attends regularly and participates in educational sessions.    Other Core Components/Hypertension Assessment:   BP Range: 108-154/56/88  BP at Goal: No  Patient reported symptoms: none    Other Core Components/Hypertension Plan:   Goals:  Blood Pressure <130/80    Comments on Goal Progression:  Patient feels he is making progress toward his goals.    Interventions:  Med Card Reconciled: Yes  Encourage medication compliance  Encourage sodium reduction  Encourage weight loss  Recommend physical activity  Educate on contributory factors  Reduce stress,  "anxiety, anger, depression, and/or chronic pain  Encourage home blood pressure monitoring  Recommend daily weights    Education:    Risk Factors; verbalizes understanding; Date: 11/10/22  Medication I; verbalizes understanding; Date: 11/18/22  Medication II; verbalizes understanding; Date: 12/2/22  Stress; verbalizes understanding; Date: 11/10/22         Comments:  Patient has some high readings on blood pressure. He was only taking his blood pressure "occasionally". Discussed importance of monitoring BP and keeping a log. He will try to take blood pressure daily and keep a log.     Does the patient have Heart Failure? No      Other Core Components/Tobacco Cessation Assessment:   Smoking Status: lifetime non-smoker  Smoking Cessation Barriers:  NA  Stage of Readiness to Change: Action    Other Core Components/Tobacco Cessation Plan:   Goals:  Maintain non-smoking status    Comments on Goal Progression:  Patient maintained nonsmoker status.    Interventions:  Maintains non-smoking status    Education:    Risk Factors; verbalizes understanding; Date: 11/10/22  Medications I; verbalizes understanding; Date: 11/18/22  Medications II; verbalizes understanding; Date: 12/2/22         Comments:  Patient admits to increased stress at home with his wife, Discussed need for getting treatment plan with his primary care provider, patient verbalized understanding.     Tim Russell RN  Cardiac Rehab Nurse         "

## 2023-01-13 ENCOUNTER — DOCUMENTATION ONLY (OUTPATIENT)
Dept: CARDIAC REHAB | Facility: CLINIC | Age: 73
End: 2023-01-13

## 2023-01-13 ENCOUNTER — CLINICAL SUPPORT (OUTPATIENT)
Dept: CARDIAC REHAB | Facility: CLINIC | Age: 73
End: 2023-01-13
Payer: MEDICARE

## 2023-01-13 DIAGNOSIS — Z95.1 POSTSURGICAL AORTOCORONARY BYPASS STATUS: Primary | ICD-10-CM

## 2023-01-13 DIAGNOSIS — I25.10 ATHEROSCLEROSIS OF NATIVE CORONARY ARTERY OF NATIVE HEART, UNSPECIFIED WHETHER ANGINA PRESENT: ICD-10-CM

## 2023-01-13 PROCEDURE — 93798 PHYS/QHP OP CAR RHAB W/ECG: CPT | Mod: S$GLB,,, | Performed by: INTERNAL MEDICINE

## 2023-01-13 PROCEDURE — 93798 PR CARDIAC REHAB/MONITOR: ICD-10-PCS | Mod: S$GLB,,, | Performed by: INTERNAL MEDICINE

## 2023-01-13 NOTE — PROGRESS NOTES
Mr. Alvarado has completed 24 out of 36 exercise session of Phase II cardiac rehab.  A follow up reassessment will be completed at exit interview.     24 Session Follow Up   Cardiac Rehab Individual Treatment Plan - Reassessment      Patient Name: Christiano Frost MRN: [id number]   : 1950   Age: 72 y.o.   Primary Diagnosis: CABG Date of Event: 22   EF: 60%  Risk Stratification: high  Referring Physician: SUSANA   Exercise Assessment:     Angina with exercise?: No   ST Depression with Exercise?: No  Fall Risk: Yes   Assistive Devices:  independent  There were some limitations due to chronic back pain noted by the patient.  Comments on Progression: Mr. Alvarado is progressing fair and his workloads will continue to be increased as he tolerates exercise intensity.     Exercise Plan:   Goals:  CR Exercise Goals: Attend Cardiac Rehab 3 times/week: In Progress  Home Aerobic Exercise: 2 additional days/week for 30-60 minutes: In Progress  Intensity of 12-15 on the Rate of Perceived Exertion (RPE) scale: In Progress  30% increase in entry estimated METS: 6.5 : In Progress  5 days/week for 30-60 minutes: In Progress    Comments on Goal Progression:  Patient Consistency: consistent with attendance  Home exercise? Yes: Walking; Frequency: 2 non-rehab days per week; Duration 30 minutes with stops due to back pain  Patient reports intensity rate 11-13 on RPE scale    Intervention/Recommendations:   Discussed importance of regular attendance to cardiac rehab class    Exercise Prescription:  THR Range    Mode: Recumbent Bike  Nustep   Frequency:  3 days/week   Duration:  30-60 minutes   Intensity:  12-15 RPE   Resistance Training:  Yes: 5 to 7 lb weights with 10-15 reps based on strength and range of motion and adjusted accordingly     Home Prescription:  Mode Aerobic exercise   Frequency: 2- 3 days/week   Duration: 30-60 minutes   Resistance Training: None     Education:  Muscular Anatomy; verbalizes understanding;  Date: 12-19-22  Resistance Training; verbalizes understanding; Date: 1-9-23  The Exercise Program; verbalizes understanding; Date: 1-4-23    Comments:  I reviewed exercise recommendations with Mr. Alvarado.  I encouraged him to continue exercising but to try riding a bike instead of walking since walking is difficult due to back pain.  He stated understanding.      The exercise prescription will continue to be adjusted based on tolerance of exercise intensity by patient.    Moises Panda., CEP

## 2023-01-13 NOTE — PROGRESS NOTES
24 Session Follow Up   Cardiac Rehab Individual Treatment Plan - Reassessment    Patient Name: Christiano Frost MRN: [id number]   : 1950   Age: 72 y.o.   Primary Diagnosis: s/p CABG, CAD, HLD, CVD, h/o stroke, DM    Nutrition Assessment:     Anthropometrics    Height 69 inches   Weight 234 lbs   BMI 34.6     Patient confirms he is taking lipitor 40mg for cholesterol control.  Difficulty Chewing or Swallowing: no  Current Exercise: See Exercise Physiologist Note  Food Safety/Food Preparation: spouse  Living Arrangements/Family Support: Lives with spouse  Cultural/Spiritual/Personal Preferences: not applicable   Barriers to Education: none identified  Stage of Change Related to Diet Habits: Action  Recent Changes to Diet: Yes - pt states he is eating more produce and less junk food  Food Diary: Completed      Nutrition Plan:   Goals:  LDL-C < 70 (for high risk patients)  Hgb A1c < 7%  BMI < 25 and abdominal girth < 40M/<35 F  2 gram sodium, Mediterranean diet: In Progress  Rehab weight loss goal of 10 lbs, 1 lb per week: In Progress  Fish intake (non-fried varieties) to a goal of 2-3 servings per week: In Progress  Increase fruit and vegetable intake: In Progress    Comments on Goal Progression:  Pt has gained 5lbs since starting rehab but states he is working on diet changes    Interventions:  Dietitian Consult: No  Patient to participate in Cardiac Rehab sessions three times a week  Weekly Dietitian Weight Check  Nutrition Recommendations Provided: verbal and written, Reviewed  Follow Up Plan for Ongoing Self-Management Support    Education:  Cholesterol and Fat; verbalizes understanding; Date: 23  Seafood; verbalizes understanding; Date: 22  Weight Management; verbalizes understanding; Date: 22  Pre/Probiotics; verbalizes understanding; Date: 22    Comments:   Discussed ways to incorporate healthy snacks, eating on a schedule, and monitoring sodium intake for heart health.    Diabetes  Is  the patient diabetic? No    Pt dx with DM but on no meds and A1c is below 6    Renatecooper Gibbs MS, RDN/LDN

## 2023-01-18 ENCOUNTER — CLINICAL SUPPORT (OUTPATIENT)
Dept: CARDIAC REHAB | Facility: CLINIC | Age: 73
End: 2023-01-18
Payer: MEDICARE

## 2023-01-18 DIAGNOSIS — Z95.1 POSTSURGICAL AORTOCORONARY BYPASS STATUS: Primary | ICD-10-CM

## 2023-01-18 DIAGNOSIS — I25.10 ATHEROSCLEROSIS OF NATIVE CORONARY ARTERY OF NATIVE HEART, UNSPECIFIED WHETHER ANGINA PRESENT: ICD-10-CM

## 2023-01-18 PROCEDURE — 93798 PR CARDIAC REHAB/MONITOR: ICD-10-PCS | Mod: S$GLB,,, | Performed by: INTERNAL MEDICINE

## 2023-01-18 PROCEDURE — 93798 PHYS/QHP OP CAR RHAB W/ECG: CPT | Mod: S$GLB,,, | Performed by: INTERNAL MEDICINE

## 2023-01-18 NOTE — PROGRESS NOTES
Session: 24 Session Follow Up   Cardiac Rehab Individual Treatment Plan - Reassessment      Patient Name: Christiano Frost MRN: [id number]   : 1950   Age: 72 y.o.   Primary Diagnosis: S/P CABG      Psychosocial Assessment:   Living Arrangements: Lives with spouse  Family Support: children and spouse  Self Reported: no change Anxiety, Depression, Stress, and Chronic Pain  Displays: happiness and calmness  Medication:  Patient currently on Lexapro for depression    Psychosocial Plan:   Goals:  Improved psychosocial coping strategies: In Progress  Verbalizes coping mechanisms: In Progress  Reduce manifestation of depression: In Progress  Reduce manifestation of stress: In Progress  Reduce manifestation of chronic pain: In Progress  Maintain positive support system: In Progress  Maintain positive outlook: In Progress  Improve overall quality of life: In Progress    Comments on Goal Progression:  Patient feels he is making progress toward his goals.     Interventions:  Patient to Self Report Emotional Changes at Session Check In  Recommend Physical Activity  Recommend Attending Education Lectures  Notify MD: No  Program Referral: No  Pharmaceutical Intervention/Therapy: Yes  Other Needs: not applicable  Stage of Readiness to Change: Action    Education:  Depression; verbalizes understanding; Date: 11/10/22  Stress; verbalizes understanding; Date: 11/10/22  Risk Factors; verbalizes understanding; Date: 11/10/22    Comments:  Discussed effects of stress/anxiety/depression. Patient verbalized understanding. Patient has been instructed to notify staff in the event that circumstances worsen.  Patient verbalizes understanding.    Other Core Components/Risk Factors Assessment:   RISK FACTORS:  diabetes, hyperlipidemia, hypertension, obesity, positive family history, sedentary lifestyle, stress    Learning Barriers: None    Medication Compliance: has been compliant with the medicaiton regimen    Other Core Components/Risk  Factors Plan:   Goals:  Decrease cholesterol level: In Progress  Increase exercise tolerance: In Progress  Increase knowledge of CAD: In Progress  Decrease blood pressure: In Progress  Weight loss: In Progress  Identify and manage personal areas of stress: In Progress  Control diabetes by adjusting diet and exercise: In Progress  Learn more about healthy eating: In Progress    Comments on Goal Progression:  Patient feels he is making progress toward his goals.    Interventions:  Individual Education/ Counseling: Yes  Physician Referral: No    Education:    blood pressure meds, verbalizes understanding; Date: 11/18/22  cholesterol, verbalizes understanding; Date: 1/11/23  cholesterol meds, verbalizes understanding; Date: 11/18/22  diabetes, verbalizes understanding; Date: 11/28/22  nutrition, verbalizes understanding; Date: 11/23/22  physical activity, verbalizes understanding; Date: 1/4/23  risk factors, verbalizes understanding; Date: 11/10/22  stress, verbalizes understanding; Date: 11/10/22         Education method adapted to patients education level and preferred method of learning.  Method: explanation  handouts    Comments:  Patient attends class and participates in education lecture time.    Other Core Components/Hypertension Assessment:   BP Range: 126-150/62-80  BP at Goal: Yes  Patient reported symptoms: none    Other Core Components/Hypertension Plan:   Goals:  Blood Pressure <130/80    Comments on Goal Progression:  Patient feels his blood pressure is at goal at home.    Interventions:  Med Card Reconciled: Yes  Encourage medication compliance  Encourage sodium reduction  Encourage weight loss  Recommend physical activity  Educate on contributory factors  Reduce stress, anxiety, anger, depression, and/or chronic pain  Encourage home blood pressure monitoring    Education:    Risk Factors; verbalizes understanding; Date: 11/10/23  Medication I; verbalizes understanding; Date: 11/18/22  Medication II;  verbalizes understanding; Date: 12/2/22  Stroke; verbalizes understanding; Date: 12/16/22         Comments:  Patient reports a 7 pound with gain, discussed portion control patient verbalized understanding    Does the patient have Heart Failure? No      Other Core Components/Tobacco Cessation Assessment:   Smoking Status: lifetime non-smoker  Smoking Cessation Barriers:  NA  Stage of Readiness to Change: Maintenance    Other Core Components/Tobacco Cessation Plan:   Goals:  Maintain non-smoking status    Comments on Goal Progression:  Maintain nonsmoker status    Interventions:  Maintains non-smoking status    Education:    Heart and Lung Anatomy; verbalizes understanding; Date: 1/6/22  Risk Factors; verbalizes understanding; Date: 11/10/22  Medications I; verbalizes understanding; Date: 11/18/22  Medications II; verbalizes understanding; Date: 12/2/22  Stroke; verbalizes understanding; Date: 12/16/22  Benefits of Cardiac Rehab; verbalizes understanding; Date: 12/23/22         Comments:  Discussed portion control,  moderation, increase fruits and vegetables. Patient verbalized understanding.     Tim Russell RN  Cardiac Rehab Nurse

## 2023-01-20 ENCOUNTER — CLINICAL SUPPORT (OUTPATIENT)
Dept: CARDIAC REHAB | Facility: CLINIC | Age: 73
End: 2023-01-20
Payer: MEDICARE

## 2023-01-20 DIAGNOSIS — I25.10 ATHEROSCLEROSIS OF NATIVE CORONARY ARTERY OF NATIVE HEART, UNSPECIFIED WHETHER ANGINA PRESENT: ICD-10-CM

## 2023-01-20 DIAGNOSIS — Z95.1 POSTSURGICAL AORTOCORONARY BYPASS STATUS: Primary | ICD-10-CM

## 2023-01-20 PROCEDURE — 93798 PR CARDIAC REHAB/MONITOR: ICD-10-PCS | Mod: S$GLB,,, | Performed by: INTERNAL MEDICINE

## 2023-01-20 PROCEDURE — 93798 PHYS/QHP OP CAR RHAB W/ECG: CPT | Mod: S$GLB,,, | Performed by: INTERNAL MEDICINE

## 2023-01-23 ENCOUNTER — CLINICAL SUPPORT (OUTPATIENT)
Dept: CARDIAC REHAB | Facility: CLINIC | Age: 73
End: 2023-01-23
Payer: MEDICARE

## 2023-01-23 DIAGNOSIS — I25.10 ATHEROSCLEROSIS OF NATIVE CORONARY ARTERY OF NATIVE HEART, UNSPECIFIED WHETHER ANGINA PRESENT: ICD-10-CM

## 2023-01-23 DIAGNOSIS — Z95.1 POSTSURGICAL AORTOCORONARY BYPASS STATUS: Primary | ICD-10-CM

## 2023-01-23 PROCEDURE — 93798 PHYS/QHP OP CAR RHAB W/ECG: CPT | Mod: S$GLB,,, | Performed by: INTERNAL MEDICINE

## 2023-01-23 PROCEDURE — 93798 PR CARDIAC REHAB/MONITOR: ICD-10-PCS | Mod: S$GLB,,, | Performed by: INTERNAL MEDICINE

## 2023-01-25 ENCOUNTER — CLINICAL SUPPORT (OUTPATIENT)
Dept: CARDIAC REHAB | Facility: CLINIC | Age: 73
End: 2023-01-25
Payer: MEDICARE

## 2023-01-25 DIAGNOSIS — I25.10 ATHEROSCLEROSIS OF NATIVE CORONARY ARTERY OF NATIVE HEART, UNSPECIFIED WHETHER ANGINA PRESENT: ICD-10-CM

## 2023-01-25 DIAGNOSIS — Z95.1 POSTSURGICAL AORTOCORONARY BYPASS STATUS: Primary | ICD-10-CM

## 2023-01-25 PROCEDURE — 93798 PR CARDIAC REHAB/MONITOR: ICD-10-PCS | Mod: S$GLB,,, | Performed by: INTERNAL MEDICINE

## 2023-01-25 PROCEDURE — 93798 PHYS/QHP OP CAR RHAB W/ECG: CPT | Mod: S$GLB,,, | Performed by: INTERNAL MEDICINE

## 2023-01-27 ENCOUNTER — CLINICAL SUPPORT (OUTPATIENT)
Dept: CARDIAC REHAB | Facility: CLINIC | Age: 73
End: 2023-01-27
Payer: MEDICARE

## 2023-01-27 DIAGNOSIS — Z95.1 POSTSURGICAL AORTOCORONARY BYPASS STATUS: Primary | ICD-10-CM

## 2023-01-27 DIAGNOSIS — I25.10 ATHEROSCLEROSIS OF NATIVE CORONARY ARTERY OF NATIVE HEART, UNSPECIFIED WHETHER ANGINA PRESENT: ICD-10-CM

## 2023-01-27 PROCEDURE — 93798 PR CARDIAC REHAB/MONITOR: ICD-10-PCS | Mod: S$GLB,,, | Performed by: INTERNAL MEDICINE

## 2023-01-27 PROCEDURE — 93798 PHYS/QHP OP CAR RHAB W/ECG: CPT | Mod: S$GLB,,, | Performed by: INTERNAL MEDICINE

## 2023-01-30 ENCOUNTER — CLINICAL SUPPORT (OUTPATIENT)
Dept: CARDIAC REHAB | Facility: CLINIC | Age: 73
End: 2023-01-30
Payer: MEDICARE

## 2023-01-30 DIAGNOSIS — Z95.1 POSTSURGICAL AORTOCORONARY BYPASS STATUS: Primary | ICD-10-CM

## 2023-01-30 DIAGNOSIS — I25.10 CORONARY ATHEROSCLEROSIS OF NATIVE CORONARY ARTERY: ICD-10-CM

## 2023-01-30 PROCEDURE — 93798 PR CARDIAC REHAB/MONITOR: ICD-10-PCS | Mod: S$GLB,,, | Performed by: INTERNAL MEDICINE

## 2023-01-30 PROCEDURE — 93798 PHYS/QHP OP CAR RHAB W/ECG: CPT | Mod: S$GLB,,, | Performed by: INTERNAL MEDICINE

## 2023-02-01 ENCOUNTER — CLINICAL SUPPORT (OUTPATIENT)
Dept: CARDIAC REHAB | Facility: CLINIC | Age: 73
End: 2023-02-01
Payer: MEDICARE

## 2023-02-01 DIAGNOSIS — I25.10 CORONARY ATHEROSCLEROSIS OF NATIVE CORONARY ARTERY: ICD-10-CM

## 2023-02-01 DIAGNOSIS — Z95.1 POSTSURGICAL AORTOCORONARY BYPASS STATUS: Primary | ICD-10-CM

## 2023-02-01 PROCEDURE — 93798 PR CARDIAC REHAB/MONITOR: ICD-10-PCS | Mod: S$GLB,,, | Performed by: INTERNAL MEDICINE

## 2023-02-01 PROCEDURE — 93798 PHYS/QHP OP CAR RHAB W/ECG: CPT | Mod: S$GLB,,, | Performed by: INTERNAL MEDICINE

## 2023-02-02 ENCOUNTER — DOCUMENTATION ONLY (OUTPATIENT)
Dept: CARDIAC REHAB | Facility: CLINIC | Age: 73
End: 2023-02-02
Payer: MEDICARE

## 2023-02-02 NOTE — PROGRESS NOTES
Mr. Alvarado has completed 31 out of 36 exercise session of Phase II cardiac rehab.  A follow up reassessment will be completed at exit interview.     24 Session Follow Up   Cardiac Rehab Individual Treatment Plan - Reassessment      Patient Name: Christiano Frost MRN: [id number]   : 1950   Age: 72 y.o.   Primary Diagnosis: CABG Date of Event: 22   EF: 60%  Risk Stratification: high  Referring Physician: SUSANA   Exercise Assessment:     Angina with exercise?: No   ST Depression with Exercise?: No  Fall Risk: Yes   Assistive Devices:  independent  There were some limitations due to chronic back pain noted by the patient.  Comments on Progression: Mr. Alvarado is progressing fair and his workloads will continue to be increased as he tolerates exercise intensity.     Exercise Plan:   Goals:  CR Exercise Goals: Attend Cardiac Rehab 3 times/week: In Progress  Home Aerobic Exercise: 2 additional days/week for 30-60 minutes: In Progress  Intensity of 12-15 on the Rate of Perceived Exertion (RPE) scale: In Progress  30% increase in entry estimated METS: 6.5 : In Progress  5 days/week for 30-60 minutes: In Progress    Comments on Goal Progression:  Patient Consistency: consistent with attendance  Home exercise? Yes: Walking; Frequency: 2 non-rehab days per week; Duration 30 minutes with stops due to back pain  Patient reports intensity rate 11-13 on RPE scale    Intervention/Recommendations:   Discussed importance of regular attendance to cardiac rehab class    Exercise Prescription:  THR Range    Mode: Recumbent Bike  Nustep   Frequency:  3 days/week   Duration:  30-60 minutes   Intensity:  12-15 RPE   Resistance Training:  Yes: 5 to 7 lb weights with 10-15 reps based on strength and range of motion and adjusted accordingly     Home Prescription:  Mode Aerobic exercise   Frequency: 2- 3 days/week   Duration: 30-60 minutes   Resistance Training: None     Education:  Muscular Anatomy; verbalizes understanding;  Date: 22  Resistance Training; verbalizes understanding; Date: 23  The Exercise Program; verbalizes understanding; Date: 23    Comments:  I reviewed exercise recommendations with Mr. Alvarado.  I encouraged him to continue exercising but to try riding a bike instead of walking since walking is difficult due to back pain.  He stated understanding.      The exercise prescription will continue to be adjusted based on tolerance of exercise intensity by patient.    Ele Panda, [facility] [place]     24 Session Follow Up   Cardiac Rehab Individual Treatment Plan - Reassessment    Patient Name: Christiano Frost MRN: [id number]   : 1950   Age: 72 y.o.   Primary Diagnosis: s/p CABG, CAD, HLD, CVD, h/o stroke, DM    Nutrition Assessment:     Anthropometrics    Height 69 inches   Weight 234 lbs   BMI 34.6     Patient confirms he is taking lipitor 40mg for cholesterol control.  Difficulty Chewing or Swallowing: no  Current Exercise: See Exercise Physiologist Note  Food Safety/Food Preparation: spouse  Living Arrangements/Family Support: Lives with spouse  Cultural/Spiritual/Personal Preferences: not applicable   Barriers to Education: none identified  Stage of Change Related to Diet Habits: Action  Recent Changes to Diet: Yes - pt states he is eating more produce and less junk food  Food Diary: Completed      Nutrition Plan:   Goals:  LDL-C < 70 (for high risk patients)  Hgb A1c < 7%  BMI < 25 and abdominal girth < 40M/<35 F  2 gram sodium, Mediterranean diet: In Progress  Rehab weight loss goal of 10 lbs, 1 lb per week: In Progress  Fish intake (non-fried varieties) to a goal of 2-3 servings per week: In Progress  Increase fruit and vegetable intake: In Progress    Comments on Goal Progression:  Pt has gained 5lbs since starting rehab but states he is working on diet changes    Interventions:  Dietitian Consult: No  Patient to participate in Cardiac Rehab sessions three times a week  Weekly Dietitian Weight  Check  Nutrition Recommendations Provided: verbal and written, Reviewed  Follow Up Plan for Ongoing Self-Management Support    Education:  Cholesterol and Fat; verbalizes understanding; Date: 23  Seafood; verbalizes understanding; Date: 22  Weight Management; verbalizes understanding; Date: 22  Pre/Probiotics; verbalizes understanding; Date: 22    Comments:   Discussed ways to incorporate healthy snacks, eating on a schedule, and monitoring sodium intake for heart health.    Diabetes  Is the patient diabetic? No    Pt dx with DM but on no meds and A1c is below 6    Renatecooper Gibbs MS, RDN/LDN     Session: 24 Session Follow Up   Cardiac Rehab Individual Treatment Plan - Reassessment      Patient Name: Christiano Frost MRN: [id number]   : 1950   Age: 72 y.o.   Primary Diagnosis: S/P CABG      Psychosocial Assessment:   Living Arrangements: Lives with spouse  Family Support: children and spouse  Self Reported: no change Anxiety, Depression, Stress, and Chronic Pain  Displays: happiness and calmness  Medication:  Patient currently on Lexapro for depression    Psychosocial Plan:   Goals:  Improved psychosocial coping strategies: In Progress  Verbalizes coping mechanisms: In Progress  Reduce manifestation of depression: In Progress  Reduce manifestation of stress: In Progress  Reduce manifestation of chronic pain: In Progress  Maintain positive support system: In Progress  Maintain positive outlook: In Progress  Improve overall quality of life: In Progress    Comments on Goal Progression:  Patient feels he is making progress toward his goals.     Interventions:  Patient to Self Report Emotional Changes at Session Check In  Recommend Physical Activity  Recommend Attending Education Lectures  Notify MD: No  Program Referral: No  Pharmaceutical Intervention/Therapy: Yes  Other Needs: not applicable  Stage of Readiness to Change: Action    Education:  Depression; verbalizes understanding; Date:  11/10/22  Stress; verbalizes understanding; Date: 11/10/22  Risk Factors; verbalizes understanding; Date: 11/10/22    Comments:  Discussed effects of stress/anxiety/depression. Patient verbalized understanding. Patient has been instructed to notify staff in the event that circumstances worsen.  Patient verbalizes understanding.    Other Core Components/Risk Factors Assessment:   RISK FACTORS:  diabetes, hyperlipidemia, hypertension, obesity, positive family history, sedentary lifestyle, stress    Learning Barriers: None    Medication Compliance: has been compliant with the medicaiton regimen    Other Core Components/Risk Factors Plan:   Goals:  Decrease cholesterol level: In Progress  Increase exercise tolerance: In Progress  Increase knowledge of CAD: In Progress  Decrease blood pressure: In Progress  Weight loss: In Progress  Identify and manage personal areas of stress: In Progress  Control diabetes by adjusting diet and exercise: In Progress  Learn more about healthy eating: In Progress    Comments on Goal Progression:  Patient feels he is making progress toward his goals.    Interventions:  Individual Education/ Counseling: Yes  Physician Referral: No    Education:    blood pressure meds, verbalizes understanding; Date: 11/18/22  cholesterol, verbalizes understanding; Date: 1/11/23  cholesterol meds, verbalizes understanding; Date: 11/18/22  diabetes, verbalizes understanding; Date: 11/28/22  nutrition, verbalizes understanding; Date: 11/23/22  physical activity, verbalizes understanding; Date: 1/4/23  risk factors, verbalizes understanding; Date: 11/10/22  stress, verbalizes understanding; Date: 11/10/22         Education method adapted to patients education level and preferred method of learning.  Method: explanation  handouts    Comments:  Patient attends class and participates in education lecture time.    Other Core Components/Hypertension Assessment:   BP Range: 126-150/62-80  BP at Goal: Yes  Patient  reported symptoms: none    Other Core Components/Hypertension Plan:   Goals:  Blood Pressure <130/80    Comments on Goal Progression:  Patient feels his blood pressure is at goal at home.    Interventions:  Med Card Reconciled: Yes  Encourage medication compliance  Encourage sodium reduction  Encourage weight loss  Recommend physical activity  Educate on contributory factors  Reduce stress, anxiety, anger, depression, and/or chronic pain  Encourage home blood pressure monitoring    Education:    Risk Factors; verbalizes understanding; Date: 11/10/23  Medication I; verbalizes understanding; Date: 11/18/22  Medication II; verbalizes understanding; Date: 12/2/22  Stroke; verbalizes understanding; Date: 12/16/22         Comments:  Patient reports a 7 pound with gain, discussed portion control patient verbalized understanding    Does the patient have Heart Failure? No      Other Core Components/Tobacco Cessation Assessment:   Smoking Status: lifetime non-smoker  Smoking Cessation Barriers:  NA  Stage of Readiness to Change: Maintenance    Other Core Components/Tobacco Cessation Plan:   Goals:  Maintain non-smoking status    Comments on Goal Progression:  Maintain nonsmoker status    Interventions:  Maintains non-smoking status    Education:    Heart and Lung Anatomy; verbalizes understanding; Date: 1/6/22  Risk Factors; verbalizes understanding; Date: 11/10/22  Medications I; verbalizes understanding; Date: 11/18/22  Medications II; verbalizes understanding; Date: 12/2/22  Stroke; verbalizes understanding; Date: 12/16/22  Benefits of Cardiac Rehab; verbalizes understanding; Date: 12/23/22         Comments:  Discussed portion control,  moderation, increase fruits and vegetables. Patient verbalized understanding.     Tim Russell RN  Cardiac Rehab Nurse

## 2023-02-03 ENCOUNTER — CLINICAL SUPPORT (OUTPATIENT)
Dept: CARDIAC REHAB | Facility: CLINIC | Age: 73
End: 2023-02-03
Payer: MEDICARE

## 2023-02-03 DIAGNOSIS — Z95.1 POSTSURGICAL AORTOCORONARY BYPASS STATUS: Primary | ICD-10-CM

## 2023-02-03 DIAGNOSIS — I25.10 ATHEROSCLEROSIS OF NATIVE CORONARY ARTERY OF NATIVE HEART, UNSPECIFIED WHETHER ANGINA PRESENT: ICD-10-CM

## 2023-02-03 PROCEDURE — 93798 PR CARDIAC REHAB/MONITOR: ICD-10-PCS | Mod: S$GLB,,, | Performed by: INTERNAL MEDICINE

## 2023-02-03 PROCEDURE — 93798 PHYS/QHP OP CAR RHAB W/ECG: CPT | Mod: S$GLB,,, | Performed by: INTERNAL MEDICINE

## 2023-02-06 ENCOUNTER — CLINICAL SUPPORT (OUTPATIENT)
Dept: CARDIAC REHAB | Facility: CLINIC | Age: 73
End: 2023-02-06
Payer: MEDICARE

## 2023-02-06 DIAGNOSIS — I25.10 CORONARY ATHEROSCLEROSIS OF NATIVE CORONARY ARTERY: ICD-10-CM

## 2023-02-06 DIAGNOSIS — Z95.1 POSTSURGICAL AORTOCORONARY BYPASS STATUS: Primary | ICD-10-CM

## 2023-02-06 PROCEDURE — 93798 PR CARDIAC REHAB/MONITOR: ICD-10-PCS | Mod: S$GLB,,, | Performed by: INTERNAL MEDICINE

## 2023-02-06 PROCEDURE — 93798 PHYS/QHP OP CAR RHAB W/ECG: CPT | Mod: S$GLB,,, | Performed by: INTERNAL MEDICINE

## 2023-02-08 ENCOUNTER — CLINICAL SUPPORT (OUTPATIENT)
Dept: CARDIAC REHAB | Facility: CLINIC | Age: 73
End: 2023-02-08
Payer: MEDICARE

## 2023-02-08 DIAGNOSIS — Z95.1 POSTSURGICAL AORTOCORONARY BYPASS STATUS: Primary | ICD-10-CM

## 2023-02-08 DIAGNOSIS — I25.10 ATHEROSCLEROSIS OF NATIVE CORONARY ARTERY OF NATIVE HEART, UNSPECIFIED WHETHER ANGINA PRESENT: ICD-10-CM

## 2023-02-08 PROCEDURE — 93798 PHYS/QHP OP CAR RHAB W/ECG: CPT | Mod: S$GLB,,, | Performed by: INTERNAL MEDICINE

## 2023-02-08 PROCEDURE — 93798 PR CARDIAC REHAB/MONITOR: ICD-10-PCS | Mod: S$GLB,,, | Performed by: INTERNAL MEDICINE

## 2023-02-13 ENCOUNTER — HOSPITAL ENCOUNTER (OUTPATIENT)
Dept: CARDIOLOGY | Facility: HOSPITAL | Age: 73
Discharge: HOME | End: 2023-02-13
Attending: INTERNAL MEDICINE
Payer: MEDICARE

## 2023-02-13 ENCOUNTER — TELEPHONE (OUTPATIENT)
Dept: CARDIOLOGY | Facility: CLINIC | Age: 73
End: 2023-02-13
Payer: MEDICARE

## 2023-02-13 VITALS
SYSTOLIC BLOOD PRESSURE: 114 MMHG | HEART RATE: 63 BPM | HEIGHT: 69 IN | DIASTOLIC BLOOD PRESSURE: 52 MMHG | WEIGHT: 240 LBS | BODY MASS INDEX: 35.55 KG/M2

## 2023-02-13 DIAGNOSIS — I25.10 ATHEROSCLEROSIS OF NATIVE CORONARY ARTERY OF NATIVE HEART, UNSPECIFIED WHETHER ANGINA PRESENT: ICD-10-CM

## 2023-02-13 DIAGNOSIS — Z98.61 POSTSURGICAL PERCUTANEOUS TRANSLUMINAL CORONARY ANGIOPLASTY STATUS: ICD-10-CM

## 2023-02-13 LAB
CV STRESS BASE HR: 63 BPM
DIASTOLIC BLOOD PRESSURE: 52 MMHG
OHS CV CPX 1 MINUTE RECOVERY HEART RATE: 110 BPM
OHS CV CPX 85 PERCENT MAX PREDICTED HEART RATE MALE: 124
OHS CV CPX ABDOMINAL GIRTH: 51 CM
OHS CV CPX ANAEROBIC THRESHOLD DIASTOLIC BLOOD PRESSURE: 56 MMHG
OHS CV CPX ANAEROBIC THRESHOLD HEART RATE: 94
OHS CV CPX ANAEROBIC THRESHOLD RATE PRESSURE PRODUCT: NORMAL
OHS CV CPX ANAEROBIC THRESHOLD SYSTOLIC BLOOD PRESSURE: 107
OHS CV CPX DATA GRADE - AT: 2.5
OHS CV CPX DATA GRADE - PEAK: 4
OHS CV CPX DATA O2 SAT - PEAK: 95
OHS CV CPX DATA O2 SAT - REST: 94
OHS CV CPX DATA SPEED - AT: 2.3
OHS CV CPX DATA SPEED - PEAK: 2.7
OHS CV CPX DATA TIME - AT: 4.07
OHS CV CPX DATA TIME - PEAK: 6.33
OHS CV CPX DATA VE/VCO2 - AT: 30
OHS CV CPX DATA VE/VCO2 - PEAK: 33
OHS CV CPX DATA VE/VO2 - AT: 26
OHS CV CPX DATA VE/VO2 - PEAK: 36
OHS CV CPX DATA VO2 - AT: 14.3
OHS CV CPX DATA VO2 - PEAK: 19.2
OHS CV CPX DATA VO2 - REST: 4
OHS CV CPX ESTIMATED METS: 5
OHS CV CPX FEV1/FVC: 0.68
OHS CV CPX FORCED EXPIRATORY VOLUME: 2.21
OHS CV CPX FORCED VITAL CAPACITY (FVC): 3.23
OHS CV CPX HIGHEST VO: 29.4
OHS CV CPX MAX PREDICTED HEART RATE: 146
OHS CV CPX MAXIMAL VOLUNTARY VENTILATION (MVV) PREDICTED: 88.4
OHS CV CPX MAXIMAL VOLUNTARY VENTILATION (MVV): 41
OHS CV CPX MAXIUMUM EXERCISE VENTILATION (VE MAX): 63.3
OHS CV CPX PATIENT AGE: 74
OHS CV CPX PATIENT HEIGHT IN: 69
OHS CV CPX PATIENT IS FEMALE AGE 11-19: 0
OHS CV CPX PATIENT IS FEMALE AGE GREATER THAN 19: 0
OHS CV CPX PATIENT IS FEMALE AGE LESS THAN 11: 0
OHS CV CPX PATIENT IS FEMALE: 0
OHS CV CPX PATIENT IS MALE AGE 11-25: 0
OHS CV CPX PATIENT IS MALE AGE GREATER THAN 25: 1
OHS CV CPX PATIENT IS MALE AGE LESS THAN 11: 0
OHS CV CPX PATIENT IS MALE GREATER THAN 18: 1
OHS CV CPX PATIENT IS MALE LESS THAN OR EQUAL TO 18: 0
OHS CV CPX PATIENT IS MALE: 1
OHS CV CPX PATIENT WEIGHT RETURNED IN OZ: 3840
OHS CV CPX PEAK DIASTOLIC BLOOD PRESSURE: 65 MMHG
OHS CV CPX PEAK HEAR RATE: 113 BPM
OHS CV CPX PEAK RATE PRESSURE PRODUCT: NORMAL
OHS CV CPX PEAK SYSTOLIC BLOOD PRESSURE: 105 MMHG
OHS CV CPX PERCENT BODY FAT: 22.7
OHS CV CPX PERCENT MAX PREDICTED HEART RATE ACHIEVED: 77
OHS CV CPX PREDICTED VO2: 29.4 ML/KG/MIN
OHS CV CPX RATE PRESSURE PRODUCT PRESENTING: 7182
OHS CV CPX REST PET CO2: 33
OHS CV CPX VE/VCO2 SLOPE: 26.5
STRESS ECHO POST EXERCISE DUR MIN: 6 MINUTES
STRESS ECHO POST EXERCISE DUR SEC: 20 SECONDS
SYSTOLIC BLOOD PRESSURE: 114 MMHG

## 2023-02-13 PROCEDURE — 94621 CARDIOPULMONARY EXERCISE TESTING (CUPID ONLY): ICD-10-PCS | Mod: 26,,, | Performed by: INTERNAL MEDICINE

## 2023-02-13 PROCEDURE — 94621 CARDIOPULM EXERCISE TESTING: CPT | Mod: 26,,, | Performed by: INTERNAL MEDICINE

## 2023-02-13 PROCEDURE — 94621 CARDIOPULM EXERCISE TESTING: CPT

## 2023-02-13 NOTE — TELEPHONE ENCOUNTER
----- Message from Nathalie Gutierrez sent at 2/13/2023  4:38 PM CST -----  Contact: low  Type:  Needs Medical Advice    Who Called: Dr Ellis winn office  Symptoms (please be specific):they need to get a approval to give the pt Lasix for 1 plus Edema    Would the patient rather a call back or a response via [facility]? call  Best Call Back Number: [phone]  Additional Information:

## 2023-02-13 NOTE — TELEPHONE ENCOUNTER
LOV 12/20/2022    Spoke with Kelly at PCP office.  PCP saw patient today, has +1 edema, he would like to prescribe furosemide 20 mg daily.  Is it okay with you?

## 2023-02-14 NOTE — TELEPHONE ENCOUNTER
Spoke with Kelly at Dr. Allan's office, informed of provider message and recommendation.  She verbalized understanding.

## 2023-02-15 ENCOUNTER — CLINICAL SUPPORT (OUTPATIENT)
Dept: CARDIAC REHAB | Facility: CLINIC | Age: 73
End: 2023-02-15
Payer: MEDICARE

## 2023-02-15 DIAGNOSIS — I25.10 CORONARY ATHEROSCLEROSIS OF NATIVE CORONARY ARTERY: ICD-10-CM

## 2023-02-15 DIAGNOSIS — Z95.1 POSTSURGICAL AORTOCORONARY BYPASS STATUS: Primary | ICD-10-CM

## 2023-02-15 PROCEDURE — 93798 PHYS/QHP OP CAR RHAB W/ECG: CPT | Mod: S$GLB,,, | Performed by: INTERNAL MEDICINE

## 2023-02-15 PROCEDURE — 93798 PR CARDIAC REHAB/MONITOR: ICD-10-PCS | Mod: S$GLB,,, | Performed by: INTERNAL MEDICINE

## 2023-02-17 NOTE — PROGRESS NOTES
Session: Exit   Cardiac Rehab Individual Treatment Plan - Discharge Assessment      Patient Name: Christiano Frost MRN: [id number]   : 1950   Age: 72 y.o.   Primary Diagnosis: CABG  Date of Event: 22  EF: 60%  Risk Stratification: moderate  Referring Physician: SUSANA   Exercise Assessment:     CPX/TM: Entry Results Exit Results    Date: 22 Date: 23   RHR 65 63   Max  113   Peak VO2 (CPX only) 15.8 19.2   Actual METS (CPX only) 4.5 5.5   Estimated METS 5.0 5.0     Anthropometrics Entry Results Exit Results   Height 69 inches 69 inches   Weight 229 lbs 240 lbs   BMI 33.9  35.5   Abdominal Girth 49.5 51.0   Body Composition 20.3% 22.7%     Angina with exercise?: No   ST Depression with Exercise?: No  Currently exercising at home? yes Recumbent Bike and Walking; Frequency: 4 days of walking and 1 day of biking; Duration 30 to 45 minutes    Education:  Flexibility/Stretching; verbalizes understanding; Date: 23  Resistance Training II; verbalizes understanding; Date: 23  What Comes After Phase II?; verbalizes understanding; Date: 23    Rehab Exercise Goals:  Attend Cardiac Rehab 3 times/week: Met  Home Aerobic Exercise: 2 additional days/week for 30-60 minutes: Met  Intensity of 12-15 on the Rate of Perceived Exertion (RPE) scale: Met  30% increase in entry estimated METS: 6.5 : Not Met: 5.0    Comments: Mr. Alvarado attended class regularly and had significant improvement in cardiovascular fitness.      Exercise Discharge Plan:     Intervention/Recommendations:       Recommended Home Prescription:  THR Range:    Mode: Aerobic   Frequency: 5-6 times per week   Duration: 30-60 minutes each day   Other Recs: 3-5 minute warm up and cool down/stretches   Resistance Trainin-3 days per week on non-consecutive days       Comments:    I met with Mr. Alvarado for an exit interview from the Phase II cardiac rehab program.  The entry and exit stress testing results were discussed as  well as current and future exercise programs.     Patient's plan: Mr. Alvarado is planning to continue exercising with his current routine.     I reviewed exercise recommendations with Mr. Alvarado and encouraged him to continue but with a few suggestions.  We discussed spending more days using his bike due to his chronic back pain that is exacerbated with walking.  I also encouraged him to slow down.  He is working too intensely and is unable to continuously exercise.  We also discussed going to a gym to use other non-weightbearing equipment as well as a pool.  I asked him to contact me if he has any questions in the future.  He stated understanding.    Moises Panda., CEP

## 2023-02-17 NOTE — PROGRESS NOTES
HISTORY: ***    ANTHROPOMETRICS:     PRE   Height (in) ***   Weight (lb) ***   BMI ***   Abdominal Girth (in) ***   % Body Fat ***%       LAB RESULTS:    Lab Results   Component Value Date    HGB 14.3 02/13/2023     Lab Results   Component Value Date    HCT 45.8 02/13/2023     Lab Results   Component Value Date    MPV 9.7 02/13/2023       Lab Results   Component Value Date    CHOL 126 02/13/2023     Lab Results   Component Value Date    HDL 52 02/13/2023     Lab Results   Component Value Date    LDLCALC 59.4 (L) 02/13/2023     Lab Results   Component Value Date    TRIG 73 02/13/2023     Lab Results   Component Value Date    CHOLHDL 41.3 02/13/2023       Lab Results   Component Value Date    GLUF 106 02/13/2023     Lab Results   Component Value Date    HGBA1C 5.9 (H) 07/20/2022        Lab Results   Component Value Date    HSCRP 1.58 02/13/2023         TERESA SCORES:     PRE   Anxiety ***   Depression ***   Somatic ***   Hostility ***     SF-36 SCORES:     PRE   Physical Function ***   Social Function ***   Mental Health ***   Pain ***   Change in Health ***   Physical Role Limitation ***   Mental Role Limitation ***   Energy/Fatigue ***   Health Perceptions ***   Total Score ***     PHQ-9:  PHQ-9 Depression Patient Health Questionnaire 11/10/2022   Over the last two weeks how often have you been bothered by little interest or pleasure in doing things 2   Over the last two weeks how often have you been bothered by feeling down, depressed or hopeless 0   Over the last two weeks how often have you been bothered by trouble falling or staying asleep, or sleeping too much 1   Over the last two weeks how often have you been bothered by feeling tired or having little energy 1   Over the last two weeks how often have you been bothered by a poor appetite or overeating 3   Over the last two weeks how often have you been bothered by feeling bad about yourself - or that you are a failure or have let yourself or your family down  1   Over the last two weeks how often have you been bothered by trouble concentrating on things, such as reading the newspaper or watching television 1   Over the last two weeks how often have you been bothered by moving or speaking so slowly that other people could have noticed. 0   Over the last two weeks how often have you been bothered by thoughts that you would be better off dead, or of hurting yourself 0   If you checked off any problems, how difficult have these problems made it for you to do your work, take care of things at home or get along with other people? Somewhat difficult   Total Score 9             EDUCATION SCORES:     PRE   Education Score ***

## 2023-02-22 NOTE — PROGRESS NOTES
HISTORY: S/P CABG (7-29-22), CAD, HTN, HLP, CVD, HX OF STROKE, DM, EF=60%(7-19-22)    ANTHROPOMETRICS:     PRE POST   Abdominal girth (in) 49.5 51.0   Height (in) 69 69   Weight (lbs) 229 240   BMI 33.9 35.5   % Body Fat 20.3% 22.7%     EXERCISE RESULTS:     PRE POST   Peak VO2 (CPX only) 15.8 19.2   Actual METS (CPX only) 4.5 5.5   Estimated METS 5.0 5.0       HOME PRESCRIPTION: Christiano Hui.  You completed Cardiac Rehab.  Aerobic exercise frequency is recommended 5 to 6 times per week with a duration of 30 to 60 minutes.  Intensity should keep you in your target heart range of 89 to 106 beats per minute.  Include a 3 to 5 minute warm up and cool down stretches.  Resistance training is recommended 2 to 3 days per week on non-consecutive days.  Good luck to you.  Keep up the hard work, and it has been a pleasure working with you.      LAB RESULTS:    Lab Results   Component Value Date    HGB 14.3 02/13/2023    HGB 12.9 (L) 11/01/2022    HGB 10.3 (L) 09/14/2022     Lab Results   Component Value Date    HCT 45.8 02/13/2023    HCT 41.2 11/01/2022    HCT 33.8 (L) 09/14/2022     Lab Results   Component Value Date    MPV 9.7 02/13/2023    MPV 10.1 11/01/2022    MPV 9.4 09/14/2022       Lab Results   Component Value Date    CHOL 126 02/13/2023    CHOL 129 11/01/2022     Lab Results   Component Value Date    HDL 52 02/13/2023    HDL 61 11/01/2022     Lab Results   Component Value Date    LDLCALC 59.4 (L) 02/13/2023    LDLCALC 47.0 (L) 11/01/2022     Lab Results   Component Value Date    TRIG 73 02/13/2023    TRIG 105 11/01/2022     Lab Results   Component Value Date    CHOLHDL 41.3 02/13/2023    CHOLHDL 47.3 11/01/2022       Lab Results   Component Value Date    GLUF 106 02/13/2023    GLUF 106 11/01/2022     Lab Results   Component Value Date    HGBA1C 5.9 (H) 07/20/2022    HGBA1C 6.5 (H) 07/19/2022        Lab Results   Component Value Date    HSCRP 1.58 02/13/2023    HSCRP 1.18 11/01/2022         TERESA  SCORES:     PRE POST   Anxiety 6 11   Depression 5 10   Somatic 2 2   Hostility 3 4     SF-36 SCORES:     PRE POST   Physical Function 20 26   Social Function 6 7   Mental Health 22 14   Pain 6 8   Change in Health 4 4   Physical Role Limitation 0 4   Mental Role Limitation 0 2   Energy/Fatigue 18 14   Health Perceptions 16 12   Total Score 92 91     PHQ-9:    PHQ-9 Depression Patient Health Questionnaire 11/10/2022 2/28/2023   Over the last two weeks how often have you been bothered by little interest or pleasure in doing things 2 0   Over the last two weeks how often have you been bothered by feeling down, depressed or hopeless 0 2   Over the last two weeks how often have you been bothered by trouble falling or staying asleep, or sleeping too much 1 2   Over the last two weeks how often have you been bothered by feeling tired or having little energy 1 2   Over the last two weeks how often have you been bothered by a poor appetite or overeating 3 3   Over the last two weeks how often have you been bothered by feeling bad about yourself - or that you are a failure or have let yourself or your family down 1 2   Over the last two weeks how often have you been bothered by trouble concentrating on things, such as reading the newspaper or watching television 1 0   Over the last two weeks how often have you been bothered by moving or speaking so slowly that other people could have noticed. 0 0   Over the last two weeks how often have you been bothered by thoughts that you would be better off dead, or of hurting yourself 0 0   If you checked off any problems, how difficult have these problems made it for you to do your work, take care of things at home or get along with other people? Somewhat difficult Not difficult at all   Total Score 9 11                  EDUCATION SCORES:     PRE POST   Education Score 60 100

## 2023-02-23 ENCOUNTER — DOCUMENTATION ONLY (OUTPATIENT)
Dept: CARDIAC REHAB | Facility: CLINIC | Age: 73
End: 2023-02-23
Payer: MEDICARE

## 2023-02-23 NOTE — PROGRESS NOTES
Mr. Alvarado has completed 35 out of 36 exercise session of Phase II cardiac rehab.  A follow up reassessment will be completed at exit interview.     24 Session Follow Up   Cardiac Rehab Individual Treatment Plan - Reassessment      Patient Name: Christiano Frost MRN: [id number]   : 1950   Age: 72 y.o.   Primary Diagnosis: CABG Date of Event: 22   EF: 60%  Risk Stratification: high  Referring Physician: SUSANA   Exercise Assessment:     Angina with exercise?: No   ST Depression with Exercise?: No  Fall Risk: Yes   Assistive Devices:  independent  There were some limitations due to chronic back pain noted by the patient.  Comments on Progression: Mr. Alvarado is progressing fair and his workloads will continue to be increased as he tolerates exercise intensity.     Exercise Plan:   Goals:  CR Exercise Goals: Attend Cardiac Rehab 3 times/week: In Progress  Home Aerobic Exercise: 2 additional days/week for 30-60 minutes: In Progress  Intensity of 12-15 on the Rate of Perceived Exertion (RPE) scale: In Progress  30% increase in entry estimated METS: 6.5 : In Progress  5 days/week for 30-60 minutes: In Progress    Comments on Goal Progression:  Patient Consistency: consistent with attendance  Home exercise? Yes: Walking; Frequency: 2 non-rehab days per week; Duration 30 minutes with stops due to back pain  Patient reports intensity rate 11-13 on RPE scale    Intervention/Recommendations:   Discussed importance of regular attendance to cardiac rehab class    Exercise Prescription:  THR Range    Mode: Recumbent Bike  Nustep   Frequency:  3 days/week   Duration:  30-60 minutes   Intensity:  12-15 RPE   Resistance Training:  Yes: 5 to 7 lb weights with 10-15 reps based on strength and range of motion and adjusted accordingly     Home Prescription:  Mode Aerobic exercise   Frequency: 2- 3 days/week   Duration: 30-60 minutes   Resistance Training: None     Education:  Muscular Anatomy; verbalizes understanding;  Date: 22  Resistance Training; verbalizes understanding; Date: 23  The Exercise Program; verbalizes understanding; Date: 23    Comments:  I reviewed exercise recommendations with Mr. Alvarado.  I encouraged him to continue exercising but to try riding a bike instead of walking since walking is difficult due to back pain.  He stated understanding.      The exercise prescription will continue to be adjusted based on tolerance of exercise intensity by patient.    Ele Panda, [facility] [place]     24 Session Follow Up   Cardiac Rehab Individual Treatment Plan - Reassessment    Patient Name: Christiano Frost MRN: [id number]   : 1950   Age: 72 y.o.   Primary Diagnosis: s/p CABG, CAD, HLD, CVD, h/o stroke, DM    Nutrition Assessment:     Anthropometrics    Height 69 inches   Weight 234 lbs   BMI 34.6     Patient confirms he is taking lipitor 40mg for cholesterol control.  Difficulty Chewing or Swallowing: no  Current Exercise: See Exercise Physiologist Note  Food Safety/Food Preparation: spouse  Living Arrangements/Family Support: Lives with spouse  Cultural/Spiritual/Personal Preferences: not applicable   Barriers to Education: none identified  Stage of Change Related to Diet Habits: Action  Recent Changes to Diet: Yes - pt states he is eating more produce and less junk food  Food Diary: Completed      Nutrition Plan:   Goals:  LDL-C < 70 (for high risk patients)  Hgb A1c < 7%  BMI < 25 and abdominal girth < 40M/<35 F  2 gram sodium, Mediterranean diet: In Progress  Rehab weight loss goal of 10 lbs, 1 lb per week: In Progress  Fish intake (non-fried varieties) to a goal of 2-3 servings per week: In Progress  Increase fruit and vegetable intake: In Progress    Comments on Goal Progression:  Pt has gained 5lbs since starting rehab but states he is working on diet changes    Interventions:  Dietitian Consult: No  Patient to participate in Cardiac Rehab sessions three times a week  Weekly Dietitian Weight  Check  Nutrition Recommendations Provided: verbal and written, Reviewed  Follow Up Plan for Ongoing Self-Management Support    Education:  Cholesterol and Fat; verbalizes understanding; Date: 23  Seafood; verbalizes understanding; Date: 22  Weight Management; verbalizes understanding; Date: 22  Pre/Probiotics; verbalizes understanding; Date: 22    Comments:   Discussed ways to incorporate healthy snacks, eating on a schedule, and monitoring sodium intake for heart health.    Diabetes  Is the patient diabetic? No    Pt dx with DM but on no meds and A1c is below 6    Renatecooper Gibbs MS, RDN/LDN     Session: 24 Session Follow Up   Cardiac Rehab Individual Treatment Plan - Reassessment      Patient Name: Christiano Frost MRN: [id number]   : 1950   Age: 72 y.o.   Primary Diagnosis: S/P CABG      Psychosocial Assessment:   Living Arrangements: Lives with spouse  Family Support: children and spouse  Self Reported: no change Anxiety, Depression, Stress, and Chronic Pain  Displays: happiness and calmness  Medication:  Patient currently on Lexapro for depression    Psychosocial Plan:   Goals:  Improved psychosocial coping strategies: In Progress  Verbalizes coping mechanisms: In Progress  Reduce manifestation of depression: In Progress  Reduce manifestation of stress: In Progress  Reduce manifestation of chronic pain: In Progress  Maintain positive support system: In Progress  Maintain positive outlook: In Progress  Improve overall quality of life: In Progress    Comments on Goal Progression:  Patient feels he is making progress toward his goals.     Interventions:  Patient to Self Report Emotional Changes at Session Check In  Recommend Physical Activity  Recommend Attending Education Lectures  Notify MD: No  Program Referral: No  Pharmaceutical Intervention/Therapy: Yes  Other Needs: not applicable  Stage of Readiness to Change: Action    Education:  Depression; verbalizes understanding; Date:  11/10/22  Stress; verbalizes understanding; Date: 11/10/22  Risk Factors; verbalizes understanding; Date: 11/10/22    Comments:  Discussed effects of stress/anxiety/depression. Patient verbalized understanding. Patient has been instructed to notify staff in the event that circumstances worsen.  Patient verbalizes understanding.    Other Core Components/Risk Factors Assessment:   RISK FACTORS:  diabetes, hyperlipidemia, hypertension, obesity, positive family history, sedentary lifestyle, stress    Learning Barriers: None    Medication Compliance: has been compliant with the medicaiton regimen    Other Core Components/Risk Factors Plan:   Goals:  Decrease cholesterol level: In Progress  Increase exercise tolerance: In Progress  Increase knowledge of CAD: In Progress  Decrease blood pressure: In Progress  Weight loss: In Progress  Identify and manage personal areas of stress: In Progress  Control diabetes by adjusting diet and exercise: In Progress  Learn more about healthy eating: In Progress    Comments on Goal Progression:  Patient feels he is making progress toward his goals.    Interventions:  Individual Education/ Counseling: Yes  Physician Referral: No    Education:    blood pressure meds, verbalizes understanding; Date: 11/18/22  cholesterol, verbalizes understanding; Date: 1/11/23  cholesterol meds, verbalizes understanding; Date: 11/18/22  diabetes, verbalizes understanding; Date: 11/28/22  nutrition, verbalizes understanding; Date: 11/23/22  physical activity, verbalizes understanding; Date: 1/4/23  risk factors, verbalizes understanding; Date: 11/10/22  stress, verbalizes understanding; Date: 11/10/22         Education method adapted to patients education level and preferred method of learning.  Method: explanation  handouts    Comments:  Patient attends class and participates in education lecture time.    Other Core Components/Hypertension Assessment:   BP Range: 126-150/62-80  BP at Goal: Yes  Patient  reported symptoms: none    Other Core Components/Hypertension Plan:   Goals:  Blood Pressure <130/80    Comments on Goal Progression:  Patient feels his blood pressure is at goal at home.    Interventions:  Med Card Reconciled: Yes  Encourage medication compliance  Encourage sodium reduction  Encourage weight loss  Recommend physical activity  Educate on contributory factors  Reduce stress, anxiety, anger, depression, and/or chronic pain  Encourage home blood pressure monitoring    Education:    Risk Factors; verbalizes understanding; Date: 11/10/23  Medication I; verbalizes understanding; Date: 11/18/22  Medication II; verbalizes understanding; Date: 12/2/22  Stroke; verbalizes understanding; Date: 12/16/22         Comments:  Patient reports a 7 pound with gain, discussed portion control patient verbalized understanding    Does the patient have Heart Failure? No      Other Core Components/Tobacco Cessation Assessment:   Smoking Status: lifetime non-smoker  Smoking Cessation Barriers:  NA  Stage of Readiness to Change: Maintenance    Other Core Components/Tobacco Cessation Plan:   Goals:  Maintain non-smoking status    Comments on Goal Progression:  Maintain nonsmoker status    Interventions:  Maintains non-smoking status    Education:    Heart and Lung Anatomy; verbalizes understanding; Date: 1/6/22  Risk Factors; verbalizes understanding; Date: 11/10/22  Medications I; verbalizes understanding; Date: 11/18/22  Medications II; verbalizes understanding; Date: 12/2/22  Stroke; verbalizes understanding; Date: 12/16/22  Benefits of Cardiac Rehab; verbalizes understanding; Date: 12/23/22         Comments:  Discussed portion control,  moderation, increase fruits and vegetables. Patient verbalized understanding.     Tim Russell RN  Cardiac Rehab Nurse

## 2023-02-27 ENCOUNTER — TELEPHONE (OUTPATIENT)
Dept: CARDIAC REHAB | Facility: CLINIC | Age: 73
End: 2023-02-27
Payer: MEDICARE

## 2023-02-28 ENCOUNTER — CLINICAL SUPPORT (OUTPATIENT)
Dept: CARDIAC REHAB | Facility: CLINIC | Age: 73
End: 2023-02-28
Payer: MEDICARE

## 2023-02-28 DIAGNOSIS — I25.10 CORONARY ARTERY DISEASE, UNSPECIFIED VESSEL OR LESION TYPE, UNSPECIFIED WHETHER ANGINA PRESENT, UNSPECIFIED WHETHER NATIVE OR TRANSPLANTED HEART: ICD-10-CM

## 2023-02-28 DIAGNOSIS — Z95.1 POSTSURGICAL AORTOCORONARY BYPASS STATUS: Primary | ICD-10-CM

## 2023-02-28 PROCEDURE — 93798 PR CARDIAC REHAB/MONITOR: ICD-10-PCS | Mod: S$GLB,,, | Performed by: INTERNAL MEDICINE

## 2023-02-28 PROCEDURE — 99999 PR PBB SHADOW E&M-EST. PATIENT-LVL III: ICD-10-PCS | Mod: PBBFAC,,,

## 2023-02-28 PROCEDURE — 93798 PHYS/QHP OP CAR RHAB W/ECG: CPT | Mod: S$GLB,,, | Performed by: INTERNAL MEDICINE

## 2023-02-28 PROCEDURE — 99999 PR PBB SHADOW E&M-EST. PATIENT-LVL III: CPT | Mod: PBBFAC,,,

## 2023-02-28 NOTE — PROGRESS NOTES
Exit   Cardiac Rehab Individual Treatment Plan - Discharge Assessment      Patient Name: Christiano Frost MRN: [id number]   : 1950   Age: 72 y.o.   Primary Diagnosis: s/p CABG, CAD, HLD, CVD, h/o stroke, DM    Nutrition Assessment:     Anthropometrics Pre Post   Height 69 inches 69 inches   Weight 229 lbs 240 lbs   BMI 33.9 35.5   Abdominal Girth 49.5 51   Body Composition 20.3 22.7     Labs:  Patient confirms he is taking lipitor 40mg for cholesterol control.    Lab Results   Component Value Date    CHOL 126 2023    CHOL 129 2022     Lab Results   Component Value Date    HDL 52 2023    HDL 61 2022     Lab Results   Component Value Date    LDLCALC 59.4 (L) 2023    LDLCALC 47.0 (L) 2022     Lab Results   Component Value Date    TRIG 73 2023    TRIG 105 2022     Lab Results   Component Value Date    CHOLHDL 41.3 2023    CHOLHDL 47.3 2022       Lab Results   Component Value Date    GLUF 106 2023     Lab Results   Component Value Date    HGBA1C 5.9 (H) 2022       Nutrition/Diet History:  Patient eats 3 meals daily.    Seasons food with pepper/herbs/ salt occasionally.  Patient denies use of a salt shaker at the table on prepared foods.   Dines out 2 per week.  Chooses fried foods rarely  Chooses fish 2 time(s) per week.   Beverages:  diet tea water  Alcohol: none  Current Exercise: See Exercise Physiologist Note  Food Safety/Food Preparation: spouse  Living Arrangements/Family Support: Lives with spouse  Stage of Change Related to Diet Habits: Contemplation  Recent Changes to Diet: No  Food Diary: Completed      Nutrition Plan:   Goals:  2 gram sodium, Mediterranean diet: Met  Rehab weight loss goal of 10 lbs, 1 lb per week: Not Met: pt gained 11lbs  Increase fish intake (non-fried varieties) to a goal of 2-3 servings per week: Met  Increase fruit and vegetable intake: Met    Comments of Goal Progression:  Pt often dismissive of diet  recommendations and education on importance of portion control    Interventions/Recommendations:  Reviewed Anthropometric Progress  Reviewed Pre and Post Labs  Dietitian Consult: No  Nutrition Recommendations Provided: verbal and written, Reviewed  Discussed follow up plan for ongoing self-management support    Education:  Dining Out; verbalizes understanding; Date: 2/15/23  Protein; verbalizes understanding; Date: 2/1/23  Sodium; verbalizes understanding; Date: 1/25/23  Vitamins; verbalizes understanding; Date: 2/8/23  Fiber; verbalizes understanding; Date: 1/18/23    Comments:   Discussed ways to continue to incorporate healthy snacks, eating on a schedule, and monitoring sodium intake for heart health.  Continued to emphasize importance of portion control    Diabetes  Is the patient diabetic? No    Dx DM with no meds last A1c 5.9    Renate Gibbs MS, RDN/LDN

## 2023-02-28 NOTE — PROGRESS NOTES
Session: Exit   Cardiac Rehab Individual Treatment Plan - Discharge Assessment      Patient Name: Christiano Frost MRN: [id number]   : 1950   Age: 72 y.o.   Diagnosis: S/P CABG (22)    Psychosocial Assessment:   Outcome Survey Tools:    TERESA SCORES:   PRE POST   Anxiety 6 11   Depression 5 10   Somatic 2 2   Hostility 3 4     SF-36 SCORES:   PRE POST   Physical Function 20 26   Social Function 6 7   Mental Health 00 01   Pain 3 5   Change in Health 1 1   Physical Role Limitation 0 4   Mental Role Limitation 0 2   Energy/Fatigue 18 14   Health Perceptions 16 12   Total Score 92 91     PHQ 9:  PHQ-9 Depression Patient Health Questionnaire 11/10/2022 2023   Over the last two weeks how often have you been bothered by little interest or pleasure in doing things 2 0   Over the last two weeks how often have you been bothered by feeling down, depressed or hopeless 0 2   Over the last two weeks how often have you been bothered by trouble falling or staying asleep, or sleeping too much 1 2   Over the last two weeks how often have you been bothered by feeling tired or having little energy 1 2   Over the last two weeks how often have you been bothered by a poor appetite or overeating 3 3   Over the last two weeks how often have you been bothered by feeling bad about yourself - or that you are a failure or have let yourself or your family down 1 2   Over the last two weeks how often have you been bothered by trouble concentrating on things, such as reading the newspaper or watching television 1 0   Over the last two weeks how often have you been bothered by moving or speaking so slowly that other people could have noticed. 0 0   Over the last two weeks how often have you been bothered by thoughts that you would be better off dead, or of hurting yourself 0 0   If you checked off any problems, how difficult have these problems made it for you to do your work, take care of things at home or get along with other  people? Somewhat difficult Not difficult at all   Total Score 9 11                  Family Support: children and spouse  Self Reported: Anxiety, Depression, Stress, Anger/Hostility, and Chronic Pain  Displays: happiness and smiles often    Psychosocial Plan:   Goals:  Improved psychosocial coping strategies: In Progress  Verbalizes coping mechanisms: In Progress  Reduce manifestation of anxiety: In Progress  Reduce manifestation of depression; In Progress  Reduce manifestation of stress: In Progress  Reduce manifestation of chronic pain: In Progress  Maintain positive support system: Met  Maintain positive outlook: Met  Improve overall quality of life: In Progress    Comments on Goal Progression:  Patient reports progress since starting cardiac rehab but still working on psychosocial issues of anxiety/depression/stress with home environment.    Interventions/Recommendations:  Discussed Results of Surveys: Yes  Notify MD: Declined  Program Referral: Declined  Pharmaceutical Intervention/Therapy: No  Physical Activity: Yes  Continue Patient Education: Yes  Other Needs:  patient will follow up with primary care provider for the anxiety/depression/stress/hostility scores from screening tools  Stage of Readiness to Change: Action    Education:  Depression; verbalizes understanding; Date: 12/9/22  Stress; verbalizes understanding; Date: 11/10/22  Risk Factors; verbalizes understanding; Date: 11/10/22    Comments:  Discussed results of screening tools Bautista score, SF 36, PHQ-9. Patient admits to having a lot of anxiety/depression/stress/hostility. Discussed importance of seeking treatment for this and pt verbalized understanding. Patient will see his primary provider and discuss the screening tools and get a plan for addressing these issues. Patient given pamphlet for behavioral health if he wishes to see a provider within [facility]. Patient has been instructed to seek attention via PCP/Psychiatry in the event that  circumstances change. Patient verbalizes understanding.     Other Core Components/Risk Factors Assessment:   RISK FACTORS:  diabetes, hyperlipidemia, hypertension, obesity, positive family history, sedentary lifestyle, stress    Learning Barriers: None    Education Level:  High School (9-12) or GED    Pre-Test Score Post-Test Score   60 100     Medication Compliance: has been compliant with taking medications    Other Core Components/Risk Factors Plan:   Goals:  Decrease cholesterol level: Met  Increase exercise tolerance: Met  Increase knowledge of CAD: Met  Decrease blood pressure: Met  Weight loss: Not Met: patient had 11 pound weight gain  Identify and manage personal areas of stress: In Progress  Control diabetes by adjusting diet and exercise: Met  Learn more about healthy eating: Met    Interventions/Recommendations:  Individual Education/Counseling: Yes  Physician Referral: No    Education:    blood pressure meds, verbalizes understanding; Date: 11/18/22  cholesterol, verbalizes understanding; Date: 1/11/23  cholesterol meds, verbalizes understanding; Date: 11/18/22  diabetes, verbalizes understanding; Date: 11/28/22  fluid overload/CHF, verbalizes understanding; Date: 1/20/23  hypertension, verbalizes understanding; Date: 2/3/23  nutrition, verbalizes understanding; Date: 11/23/22  physical activity, verbalizes understanding; Date: 1/4/23  risk factors, verbalizes understanding; Date: 1/27/23  sodium, verbalizes understanding; Date: 1/25/23  stress, verbalizes understanding; Date: 11/10/22         Comments:  Patient attended regularly and participated in educational sessions. Patient reports he is a stress eater and feels this is why he gained weight. Discussed portion control with patient as well and making healthy food choices patient verbalized understanding.     Other Core Components/Hypertension Assessment:   BP Range: 122-154/60-80  Patient reported symptoms: none    Other Core Components/Hypertension  Plan:   Goals:  Blood Pressure <130/80: Met    Interventions/Recommendations:  Med Card Reconciled: Yes  Encourage medication compliance  Encourage sodium reduction  Encourage weight loss  Recommend physical activity  Educate on contributory factors  Reduce stress, anxiety, anger, depression, and/or chronic pain  Encourage home blood pressure monitoring    Education:    Hypertension; verbalizes understanding; Date: 2/3/23  Coronary Artery Disease; verbalizes understanding; Date: 1/13/23  Congestive Heart Failure; verbalizes understanding; Date: 1/20/23  Risk Factors; verbalizes understanding; Date: 1/27/23  Medication I; verbalizes understanding; Date: 11/18/22  Medication II; verbalizes understanding; Date: 12/2/22  Stroke; verbalizes understanding; Date: 12/16/22  Stress; verbalizes understanding; Date: 11/10/22         Comments:  See monthly report in Epic for blood pressure trends  Information given to patient for [facility]: Declined  Patient reports he is walking for exercise at home. Encouraged patient to join a gym to give him time out of the house and camaraderie for his mental health. Patient verbalized understanding.     Does the patient have Heart Failure? No    Other Core Components/Tobacco Cessation Assessment:   Smoking Status: lifetime non-smoker  Smoking Cessation Barriers:  NA  Stage of Readiness to Change: Maintenance    Other Core Components/Tobacco Cessation Plan:   Goals:  Maintain non-smoking status: Met    Interventions/Recommendations:  Maintains non-smoking status    Education:    Heart and Lung Anatomy; verbalizes understanding; Date: 1/6/23  Coronary Artery Disease; verbalizes understanding; Date: 1/13/23  Risk Factors; verbalizes understanding; Date: 1/27/23  Hypertension; verbalizes understanding; Date: 2/3/23  Medications I; verbalizes understanding; Date: 11/18/22  Medications II; verbalizes understanding; Date: 12/2/22  Stroke; verbalizes understanding; Date:  12/16/22  Benefits of Cardiac Rehab; verbalizes understanding; Date: 12/23/22         Comments:  Patient admits to high stress/anxiety/depression/stress with home situations and will follow up with his primary provider to assist with this. Encouraged patient to get a therapist and to take care of his mental health patient verbalized understanding. Patient has been instructed to follow up with Cardiologist for any further cardiac issues. Information on [facility] at [facility] given to patient.      Tim Russell RN  Cardiac Rehab Nurse

## 2023-02-28 NOTE — PATIENT INSTRUCTIONS
HISTORY: S/P CABG (7-29-22), CAD, HTN, HLP, CVD, HX OF STROKE, DM, EF=60%(7-19-22)    ANTHROPOMETRICS:     PRE POST   Abdominal girth (in) 49.5 51.0   Height (in) 69 69   Weight (lbs) 229 240   BMI 33.9 35.5   % Body Fat 20.3% 22.7%     EXERCISE RESULTS:     PRE POST   Peak VO2 (CPX only) 15.8 19.2   Actual METS (CPX only) 4.5 5.5   Estimated METS 5.0 5.0       HOME PRESCRIPTION: Christiano Hui.  You completed Cardiac Rehab.  Aerobic exercise frequency is recommended 5 to 6 times per week with a duration of 30 to 60 minutes.  Intensity should keep you in your target heart range of 89 to 106 beats per minute.  Include a 3 to 5 minute warm up and cool down stretches.  Resistance training is recommended 2 to 3 days per week on non-consecutive days.  Good luck to you.  Keep up the hard work, and it has been a pleasure working with you.      LAB RESULTS:    Lab Results   Component Value Date    HGB 14.3 02/13/2023    HGB 12.9 (L) 11/01/2022    HGB 10.3 (L) 09/14/2022     Lab Results   Component Value Date    HCT 45.8 02/13/2023    HCT 41.2 11/01/2022    HCT 33.8 (L) 09/14/2022     Lab Results   Component Value Date    MPV 9.7 02/13/2023    MPV 10.1 11/01/2022    MPV 9.4 09/14/2022       Lab Results   Component Value Date    CHOL 126 02/13/2023    CHOL 129 11/01/2022     Lab Results   Component Value Date    HDL 52 02/13/2023    HDL 61 11/01/2022     Lab Results   Component Value Date    LDLCALC 59.4 (L) 02/13/2023    LDLCALC 47.0 (L) 11/01/2022     Lab Results   Component Value Date    TRIG 73 02/13/2023    TRIG 105 11/01/2022     Lab Results   Component Value Date    CHOLHDL 41.3 02/13/2023    CHOLHDL 47.3 11/01/2022       Lab Results   Component Value Date    GLUF 106 02/13/2023    GLUF 106 11/01/2022     Lab Results   Component Value Date    HGBA1C 5.9 (H) 07/20/2022    HGBA1C 6.5 (H) 07/19/2022        Lab Results   Component Value Date    HSCRP 1.58 02/13/2023    HSCRP 1.18 11/01/2022         TERESA  SCORES:     PRE POST   Anxiety 6 11   Depression 5 10   Somatic 2 2   Hostility 3 4     SF-36 SCORES:     PRE POST   Physical Function 20 26   Social Function 6 7   Mental Health 22 14   Pain 6 8   Change in Health 4 4   Physical Role Limitation 0 4   Mental Role Limitation 0 2   Energy/Fatigue 18 14   Health Perceptions 16 12   Total Score 92 91     PHQ-9:    PHQ-9 Depression Patient Health Questionnaire 11/10/2022 2/28/2023   Over the last two weeks how often have you been bothered by little interest or pleasure in doing things 2 0   Over the last two weeks how often have you been bothered by feeling down, depressed or hopeless 0 2   Over the last two weeks how often have you been bothered by trouble falling or staying asleep, or sleeping too much 1 2   Over the last two weeks how often have you been bothered by feeling tired or having little energy 1 2   Over the last two weeks how often have you been bothered by a poor appetite or overeating 3 3   Over the last two weeks how often have you been bothered by feeling bad about yourself - or that you are a failure or have let yourself or your family down 1 2   Over the last two weeks how often have you been bothered by trouble concentrating on things, such as reading the newspaper or watching television 1 0   Over the last two weeks how often have you been bothered by moving or speaking so slowly that other people could have noticed. 0 0   Over the last two weeks how often have you been bothered by thoughts that you would be better off dead, or of hurting yourself 0 0   If you checked off any problems, how difficult have these problems made it for you to do your work, take care of things at home or get along with other people? Somewhat difficult Not difficult at all   Total Score 9 11                  EDUCATION SCORES:     PRE POST   Education Score 60 100      no

## 2023-05-02 ENCOUNTER — OFFICE VISIT (OUTPATIENT)
Dept: CARDIOLOGY | Facility: CLINIC | Age: 73
End: 2023-05-02
Payer: MEDICARE

## 2023-05-02 VITALS
HEIGHT: 69 IN | OXYGEN SATURATION: 93 % | SYSTOLIC BLOOD PRESSURE: 117 MMHG | DIASTOLIC BLOOD PRESSURE: 74 MMHG | WEIGHT: 246.5 LBS | BODY MASS INDEX: 36.51 KG/M2 | HEART RATE: 85 BPM

## 2023-05-02 DIAGNOSIS — I10 ESSENTIAL HYPERTENSION: ICD-10-CM

## 2023-05-02 DIAGNOSIS — E11.9 TYPE 2 DIABETES MELLITUS WITHOUT COMPLICATION, WITHOUT LONG-TERM CURRENT USE OF INSULIN: ICD-10-CM

## 2023-05-02 DIAGNOSIS — I25.10 CORONARY ARTERY DISEASE INVOLVING NATIVE CORONARY ARTERY OF NATIVE HEART WITHOUT ANGINA PECTORIS: Primary | ICD-10-CM

## 2023-05-02 DIAGNOSIS — R60.0 LOCALIZED EDEMA: ICD-10-CM

## 2023-05-02 DIAGNOSIS — E78.5 HYPERLIPIDEMIA, UNSPECIFIED HYPERLIPIDEMIA TYPE: ICD-10-CM

## 2023-05-02 DIAGNOSIS — Z95.1 S/P CABG (CORONARY ARTERY BYPASS GRAFT): ICD-10-CM

## 2023-05-02 DIAGNOSIS — I67.9 CVD (CEREBROVASCULAR DISEASE): ICD-10-CM

## 2023-05-02 PROCEDURE — 3288F FALL RISK ASSESSMENT DOCD: CPT | Mod: CPTII,S$GLB,, | Performed by: INTERNAL MEDICINE

## 2023-05-02 PROCEDURE — 3288F PR FALLS RISK ASSESSMENT DOCUMENTED: ICD-10-PCS | Mod: CPTII,S$GLB,, | Performed by: INTERNAL MEDICINE

## 2023-05-02 PROCEDURE — 1126F PR PAIN SEVERITY QUANTIFIED, NO PAIN PRESENT: ICD-10-PCS | Mod: CPTII,S$GLB,, | Performed by: INTERNAL MEDICINE

## 2023-05-02 PROCEDURE — 99214 OFFICE O/P EST MOD 30 MIN: CPT | Mod: 25,S$GLB,, | Performed by: INTERNAL MEDICINE

## 2023-05-02 PROCEDURE — 93000 EKG 12-LEAD: ICD-10-PCS | Mod: S$GLB,,, | Performed by: INTERNAL MEDICINE

## 2023-05-02 PROCEDURE — 3078F DIAST BP <80 MM HG: CPT | Mod: CPTII,S$GLB,, | Performed by: INTERNAL MEDICINE

## 2023-05-02 PROCEDURE — 3008F PR BODY MASS INDEX (BMI) DOCUMENTED: ICD-10-PCS | Mod: CPTII,S$GLB,, | Performed by: INTERNAL MEDICINE

## 2023-05-02 PROCEDURE — 93000 ELECTROCARDIOGRAM COMPLETE: CPT | Mod: S$GLB,,, | Performed by: INTERNAL MEDICINE

## 2023-05-02 PROCEDURE — 1159F PR MEDICATION LIST DOCUMENTED IN MEDICAL RECORD: ICD-10-PCS | Mod: CPTII,S$GLB,, | Performed by: INTERNAL MEDICINE

## 2023-05-02 PROCEDURE — 99999 PR PBB SHADOW E&M-EST. PATIENT-LVL III: ICD-10-PCS | Mod: PBBFAC,,, | Performed by: INTERNAL MEDICINE

## 2023-05-02 PROCEDURE — 1159F MED LIST DOCD IN RCRD: CPT | Mod: CPTII,S$GLB,, | Performed by: INTERNAL MEDICINE

## 2023-05-02 PROCEDURE — 1160F RVW MEDS BY RX/DR IN RCRD: CPT | Mod: CPTII,S$GLB,, | Performed by: INTERNAL MEDICINE

## 2023-05-02 PROCEDURE — 1126F AMNT PAIN NOTED NONE PRSNT: CPT | Mod: CPTII,S$GLB,, | Performed by: INTERNAL MEDICINE

## 2023-05-02 PROCEDURE — 3078F PR MOST RECENT DIASTOLIC BLOOD PRESSURE < 80 MM HG: ICD-10-PCS | Mod: CPTII,S$GLB,, | Performed by: INTERNAL MEDICINE

## 2023-05-02 PROCEDURE — 1100F PR PT FALLS ASSESS DOC 2+ FALLS/FALL W/INJURY/YR: ICD-10-PCS | Mod: CPTII,S$GLB,, | Performed by: INTERNAL MEDICINE

## 2023-05-02 PROCEDURE — 99214 PR OFFICE/OUTPT VISIT, EST, LEVL IV, 30-39 MIN: ICD-10-PCS | Mod: 25,S$GLB,, | Performed by: INTERNAL MEDICINE

## 2023-05-02 PROCEDURE — 1100F PTFALLS ASSESS-DOCD GE2>/YR: CPT | Mod: CPTII,S$GLB,, | Performed by: INTERNAL MEDICINE

## 2023-05-02 PROCEDURE — 3074F SYST BP LT 130 MM HG: CPT | Mod: CPTII,S$GLB,, | Performed by: INTERNAL MEDICINE

## 2023-05-02 PROCEDURE — 3074F PR MOST RECENT SYSTOLIC BLOOD PRESSURE < 130 MM HG: ICD-10-PCS | Mod: CPTII,S$GLB,, | Performed by: INTERNAL MEDICINE

## 2023-05-02 PROCEDURE — 1160F PR REVIEW ALL MEDS BY PRESCRIBER/CLIN PHARMACIST DOCUMENTED: ICD-10-PCS | Mod: CPTII,S$GLB,, | Performed by: INTERNAL MEDICINE

## 2023-05-02 PROCEDURE — 3008F BODY MASS INDEX DOCD: CPT | Mod: CPTII,S$GLB,, | Performed by: INTERNAL MEDICINE

## 2023-05-02 PROCEDURE — 99999 PR PBB SHADOW E&M-EST. PATIENT-LVL III: CPT | Mod: PBBFAC,,, | Performed by: INTERNAL MEDICINE

## 2023-05-02 RX ORDER — ATORVASTATIN CALCIUM 40 MG/1
40 TABLET, FILM COATED ORAL DAILY
Qty: 90 TABLET | Refills: 3 | Status: SHIPPED | OUTPATIENT
Start: 2023-05-02 | End: 2024-05-01

## 2023-05-02 RX ORDER — METOPROLOL SUCCINATE 100 MG/1
100 TABLET, EXTENDED RELEASE ORAL DAILY
Qty: 90 TABLET | Refills: 3 | Status: SHIPPED | OUTPATIENT
Start: 2023-05-02 | End: 2023-06-20 | Stop reason: SDUPTHER

## 2023-05-02 RX ORDER — HYDROCHLOROTHIAZIDE 12.5 MG/1
12.5 CAPSULE ORAL DAILY
Qty: 30 CAPSULE | Refills: 11 | Status: SHIPPED | OUTPATIENT
Start: 2023-05-02 | End: 2023-05-02

## 2023-05-02 RX ORDER — HYDROCHLOROTHIAZIDE 12.5 MG/1
12.5 CAPSULE ORAL DAILY
Qty: 90 CAPSULE | Refills: 3 | Status: SHIPPED | OUTPATIENT
Start: 2023-05-02 | End: 2023-06-20

## 2023-05-02 NOTE — PROGRESS NOTES
Subjective:      Patient ID: Christiano Frost is a 72 y.o. male.    Chief Complaint: Follow-up    HPI:  Finished cardiac rehab.    Can't play golf due to chronic back pain.    Walks a mile a day.    Had swelling of knee attributed to staph infection.  Pt was given antibiotics for 3 weeks--cipro and minocycline.    Review of Systems   Cardiovascular:  Positive for dyspnea on exertion (chronic, after walking a quarter of a mile), leg swelling (chronic bilateral) and palpitations (heart races at times). Negative for chest pain, claudication, irregular heartbeat, near-syncope, orthopnea and syncope.      Past Medical History:   Diagnosis Date    BPH (benign prostatic hyperplasia)     Depression     Diabetes mellitus     GERD (gastroesophageal reflux disease)     Hyperlipidemia     Hypertension     Obesity     TIA (transient ischemic attack) 0322/2016        Past Surgical History:   Procedure Laterality Date    CORONARY ARTERY BYPASS GRAFT (CABG) Left 7/29/2022    Procedure: CORONARY ARTERY BYPASS GRAFT (CABG);  Surgeon: Magen Martell MD;  Location: [address];  Service: Cardiovascular;  Laterality: Left;  cabg3 with vein harvest    ENDOSCOPIC HARVEST OF VEIN Left 7/29/2022    Procedure: HARVEST-VEIN-ENDOVASCULAR;  Surgeon: Magen Martell MD;  Location: [address];  Service: Cardiovascular;  Laterality: Left;  Vein Myton Start time: 0818am  Vein Myton Stop time: 0926am    Vein Prep Start time: 0928am  Vein Prep Stop time: 1002am    LEFT HEART CATHETERIZATION Left 7/19/2022    Procedure: Left heart cath;  Surgeon: Ben Ortega MD;  Location: [facility] CATH LAB/EP;  Service: Cardiology;  Laterality: Left;    SPINE SURGERY  2044 2005 2008       No family history on file.    Social History     Socioeconomic History    Marital status:    Tobacco Use    Smoking status: Never    Smokeless tobacco: Never       Current Outpatient Medications on File Prior to Visit   Medication Sig Dispense Refill  "   acetaminophen (TYLENOL) 500 MG tablet Take 2 tablets (1,000 mg total) by mouth every 6 (six) hours as needed for Pain. 30 tablet 0    aspirin 325 MG tablet Take 1 tablet (325 mg total) by mouth once daily. 30 tablet 11    cholecalciferol, vitamin D3, (VITAMIN D3) 25 mcg (1,000 unit) capsule Take 1,000 Units by mouth once daily.      dutasteride (AVODART) 0.5 mg capsule Take 0.5 mg by mouth once daily.      escitalopram oxalate (LEXAPRO) 10 MG tablet Take 10 mg by mouth once daily.       esomeprazole (NEXIUM) 40 MG capsule Take by mouth.      Lactobacillus rhamnosus GG (CULTURELLE) 10 billion cell capsule Take 1 capsule by mouth once daily.      omega-3 fatty acids 1,000 mg Cap Take by mouth.      tamsulosin (FLOMAX) 0.4 mg Cp24 0.4 mg once daily.       [DISCONTINUED] atorvastatin (LIPITOR) 40 MG tablet Take 1 tablet (40 mg total) by mouth once daily. 90 tablet 3    [DISCONTINUED] metoprolol succinate (TOPROL-XL) 100 MG 24 hr tablet Take 1 tablet (100 mg total) by mouth once daily. 30 tablet 11    [DISCONTINUED] NIFEdipine (PROCARDIA-XL) 30 MG (OSM) 24 hr tablet Take 1 tablet (30 mg total) by mouth once daily. 90 tablet 3    [DISCONTINUED] clopidogreL (PLAVIX) 75 mg tablet Take 1 tablet (75 mg total) by mouth once daily. 30 tablet 3    [DISCONTINUED] enalapril (VASOTEC) 5 MG tablet TK 1 T PO  QD  0    [DISCONTINUED] metFORMIN (GLUCOPHAGE) 500 MG tablet Take 1 tablet (500 mg total) by mouth daily with breakfast. (Patient not taking: No sig reported)       No current facility-administered medications on file prior to visit.       Review of patient's allergies indicates:  No Known Allergies  Objective:     Vitals:    05/02/23 1335   BP: 117/74   BP Location: Right arm   Patient Position: Sitting   BP Method: Large (Automatic)   Pulse: 85   SpO2: (!) 93%   Weight: 111.8 kg (246 lb 7.6 oz)   Height: 5' 9" (1.753 m)        Physical Exam  Vitals reviewed.   Constitutional:       General: He is not in acute distress.     " Appearance: He is well-developed. He is not diaphoretic.   Eyes:      General: No scleral icterus.  Neck:      Vascular: No carotid bruit or JVD.   Cardiovascular:      Rate and Rhythm: Regular rhythm.      Heart sounds: Normal heart sounds. No murmur heard.    No friction rub. No gallop.   Pulmonary:      Effort: Pulmonary effort is normal. No respiratory distress.      Breath sounds: Normal breath sounds.   Musculoskeletal:      Right lower leg: Edema (trace pitting pedal edema bilaterally) present.      Left lower leg: Edema present.   Skin:     General: Skin is warm and dry.   Neurological:      Mental Status: He is alert and oriented to person, place, and time.   Psychiatric:         Behavior: Behavior normal.         Thought Content: Thought content normal.         Judgment: Judgment normal.      Wt up 20 lbs since last year      ECG today: NSR with first degree AV block, nonspecific ST sagging      Hospital Outpatient Visit on 02/13/2023   Component Date Value Ref Range Status    [facility] CV CPX PATIENT HEIGHT IN 02/13/2023 69   Final    [facility] CV CPX PATIENT WEIGHT RETURNED* 02/13/2023 3,840   Final    Systolic blood pressure 02/13/2023 114  mmHg Final    Diastolic blood pressure 02/13/2023 52  mmHg Final    HR at rest 02/13/2023 63  bpm Final    RPP 02/13/2023 7,182   Final    Anaerobic threshold HR 02/13/2023 94   Final    Anaerobic threshold systolic BP 02/13/2023 107   Final    Anaerobic threshold diastolic BP 02/13/2023 56  mmHg Final    Anaerobic threshold RPP 02/13/2023 10,058   Final    Peak HR 02/13/2023 113  bpm Final    Peak Systolic BP 02/13/2023 105  mmHg Final    Peak Diatolic BP 02/13/2023 65  mmHg Final    Peak RPP 02/13/2023 11,865   Final    Estimated METs 02/13/2023 5   Final    Max Predicted HR 02/13/2023 146   Final    Abdominal girth 02/13/2023 51.0  cm Final    85% Max Predicted HR 02/13/2023 124   Final    % Max HR Achieved 02/13/2023 77   Final    1 Minute Recovery HR 02/13/2023 110  bpm  Final    Forced Vital Capacity 02/13/2023 3.23   Final    FEV1 02/13/2023 2.21   Final    MVV 02/13/2023 41   Final    VE Max 02/13/2023 63.3   Final    Predicted VO2 02/13/2023 29.4  ml/kg/min Final    VE/VCO2 [place] 02/13/2023 26.5   Final    Rest PetCO2 02/13/2023 33.0   Final    AT Time 02/13/2023 4.07   Final    Peak Time 02/13/2023 6.33   Final    VO2 Rest 02/13/2023 4.0   Final    VO2 AT 02/13/2023 14.3   Final    VO2 Peak 02/13/2023 19.2   Final    VE/VO2 AT 02/13/2023 26   Final    VE/VO2 [place] 02/13/2023 36   Final    VE/VCO2 AT 02/13/2023 30   Final    VE/VCO2 Peak 02/13/2023 33   Final    O2 Sat Rest 02/13/2023 94   Final    O2 Sat Peak 02/13/2023 95   Final    AT Speed 02/13/2023 2.3   Final    Peak Speed 02/13/2023 2.7   Final    AT Grade 02/13/2023 2.5   Final    Peak Grade 02/13/2023 4.0   Final    [age] 02/13/2023 [age]   Final    [facility] CV CPX PATIENT IS MALE 02/13/2023 1   Final    [facility] CV CPX PATIENT IS FEMALE 02/13/2023 0   Final    [facility] CV CPX PATIENT IS MALE AGE LES* 02/13/2023 0   Final    [facility] CV CPX PATIENT IS MALE AGE 11-* 02/13/2023 0   Final    [facility] CV CPX PATIENT IS MALE AGE GRE* 02/13/2023 1   Final    [facility] CV CPX PATIENT IS FEMALE AGE L* 02/13/2023 0   Final    [facility] CV CPX PATIENT IS FEMALE AGE 1* 02/13/2023 0   Final    [facility] CV CPX PATIENT IS FEMALE AGE G* 02/13/2023 0   Final    [facility] CV CPX PATIENT IS MALE LESS TH* 02/13/2023 0   Final    [facility] CV CPX PATIENT IS MALE GREATER* 02/13/2023 1   Final    [facility] CV CPX HIGHEST VO 02/13/2023 29.40   Final    [facility] CV CPX PERCENT BODY FAT 02/13/2023 22.7   Final    Exercise duration (sec) 02/13/2023 20  seconds Final    Exercise duration (min) 02/13/2023 6  minutes Final    FEV1/FVC 02/13/2023 0.68   Final    MVV Predicted 02/13/2023 88.40   Final   Lab Visit on 02/13/2023   Component Date Value Ref Range Status    CRP, High Sensitivity 02/13/2023 1.58  0.00 - 3.19 mg/L Final    WBC 02/13/2023 7.06  3.90 - 12.70 K/uL Final    RBC 02/13/2023 5.07  4.60 - 6.20  M/uL Final    Hemoglobin 02/13/2023 14.3  14.0 - 18.0 g/dL Final    Hematocrit 02/13/2023 45.8  40.0 - 54.0 % Final    MCV 02/13/2023 90  82 - 98 fL Final    MCH 02/13/2023 28.2  27.0 - 31.0 pg Final    MCHC 02/13/2023 31.2 (L)  32.0 - 36.0 g/dL Final    RDW 02/13/2023 14.8 (H)  11.5 - 14.5 % Final    Platelets 02/13/2023 198  150 - 450 K/uL Final    MPV 02/13/2023 9.7  9.2 - 12.9 fL Final    Immature Granulocytes 02/13/2023 0.3  0.0 - 0.5 % Final    Gran # (ANC) 02/13/2023 3.4  1.8 - 7.7 K/uL Final    Immature Grans (Abs) 02/13/2023 0.02  0.00 - 0.04 K/uL Final    Lymph # 02/13/2023 2.5  1.0 - 4.8 K/uL Final    Mono # 02/13/2023 0.8  0.3 - 1.0 K/uL Final    Eos # 02/13/2023 0.3  0.0 - 0.5 K/uL Final    Baso # 02/13/2023 0.10  0.00 - 0.20 K/uL Final    nRBC 02/13/2023 0  0 /100 WBC Final    Gran % 02/13/2023 48.1  38.0 - 73.0 % Final    Lymph % 02/13/2023 35.3  18.0 - 48.0 % Final    Mono % 02/13/2023 11.2  4.0 - 15.0 % Final    Eosinophil % 02/13/2023 3.7  0.0 - 8.0 % Final    Basophil % 02/13/2023 1.4  0.0 - 1.9 % Final    Differential Method 02/13/2023 Automated   Final    Cholesterol 02/13/2023 126  120 - 199 mg/dL Final    Triglycerides 02/13/2023 73  30 - 150 mg/dL Final    HDL 02/13/2023 52  40 - 75 mg/dL Final    LDL Cholesterol 02/13/2023 59.4 (L)  63.0 - 159.0 mg/dL Final    HDL/Cholesterol Ratio 02/13/2023 41.3  20.0 - 50.0 % Final    Total Cholesterol/HDL Ratio 02/13/2023 2.4  2.0 - 5.0 Final    Non-HDL Cholesterol 02/13/2023 74  mg/dL Final    Glucose, Fasting 02/13/2023 106  70 - 110 mg/dL Final   (    Assessment:     1. Coronary artery disease involving native coronary artery of native heart without angina pectoris    2. Essential hypertension    3. Hyperlipidemia, unspecified hyperlipidemia type    4. S/P CABG (coronary artery bypass graft)    5. Type 2 diabetes mellitus without complication, without long-term current use of insulin    6. CVD (cerebrovascular disease)    7. Localized edema      Plan:    Christiano was seen today for follow-up.    Diagnoses and all orders for this visit:    Coronary artery disease involving native coronary artery of native heart without angina pectoris  -     IN OFFICE EKG 12-LEAD (to Muse)  -     Discontinue: hydroCHLOROthiazide (MICROZIDE) 12.5 mg capsule; Take 1 capsule (12.5 mg total) by mouth once daily.  -     hydroCHLOROthiazide (MICROZIDE) 12.5 mg capsule; Take 1 capsule (12.5 mg total) by mouth once daily.    Essential hypertension  -     IN OFFICE EKG 12-LEAD (to Muse)  -     Discontinue: hydroCHLOROthiazide (MICROZIDE) 12.5 mg capsule; Take 1 capsule (12.5 mg total) by mouth once daily.  -     hydroCHLOROthiazide (MICROZIDE) 12.5 mg capsule; Take 1 capsule (12.5 mg total) by mouth once daily.    Hyperlipidemia, unspecified hyperlipidemia type  -     IN OFFICE EKG 12-LEAD (to Muse)  -     Discontinue: hydroCHLOROthiazide (MICROZIDE) 12.5 mg capsule; Take 1 capsule (12.5 mg total) by mouth once daily.  -     hydroCHLOROthiazide (MICROZIDE) 12.5 mg capsule; Take 1 capsule (12.5 mg total) by mouth once daily.    S/P CABG (coronary artery bypass graft)  -     IN OFFICE EKG 12-LEAD (to Muse)  -     Discontinue: hydroCHLOROthiazide (MICROZIDE) 12.5 mg capsule; Take 1 capsule (12.5 mg total) by mouth once daily.  -     hydroCHLOROthiazide (MICROZIDE) 12.5 mg capsule; Take 1 capsule (12.5 mg total) by mouth once daily.    Type 2 diabetes mellitus without complication, without long-term current use of insulin  -     IN OFFICE EKG 12-LEAD (to Muse)  -     Discontinue: hydroCHLOROthiazide (MICROZIDE) 12.5 mg capsule; Take 1 capsule (12.5 mg total) by mouth once daily.  -     hydroCHLOROthiazide (MICROZIDE) 12.5 mg capsule; Take 1 capsule (12.5 mg total) by mouth once daily.    CVD (cerebrovascular disease)  -     IN OFFICE EKG 12-LEAD (to Muse)  -     Discontinue: hydroCHLOROthiazide (MICROZIDE) 12.5 mg capsule; Take 1 capsule (12.5 mg total) by mouth once daily.  -      hydroCHLOROthiazide (MICROZIDE) 12.5 mg capsule; Take 1 capsule (12.5 mg total) by mouth once daily.    Localized edema    Other orders  -     metoprolol succinate (TOPROL-XL) 100 MG 24 hr tablet; Take 1 tablet (100 mg total) by mouth once daily.  -     atorvastatin (LIPITOR) 40 MG tablet; Take 1 tablet (40 mg total) by mouth once daily.     Due to bilateral pitting pedal edema and recent RLE cellulitis will discontinue nifedipine and substitute HCTZ 12.5 mg daily    Rest of meds the same    RTC one month    Follow up in about 4 weeks (around 5/30/2023).

## 2023-06-20 ENCOUNTER — OFFICE VISIT (OUTPATIENT)
Dept: CARDIOLOGY | Facility: CLINIC | Age: 73
End: 2023-06-20
Payer: MEDICARE

## 2023-06-20 VITALS
HEART RATE: 67 BPM | BODY MASS INDEX: 35.94 KG/M2 | SYSTOLIC BLOOD PRESSURE: 113 MMHG | HEIGHT: 69 IN | OXYGEN SATURATION: 94 % | WEIGHT: 242.63 LBS | DIASTOLIC BLOOD PRESSURE: 73 MMHG

## 2023-06-20 DIAGNOSIS — I25.10 CORONARY ARTERY DISEASE INVOLVING NATIVE CORONARY ARTERY OF NATIVE HEART WITHOUT ANGINA PECTORIS: Primary | ICD-10-CM

## 2023-06-20 DIAGNOSIS — I70.0 THORACIC AORTIC ATHEROSCLEROSIS: ICD-10-CM

## 2023-06-20 DIAGNOSIS — R60.0 LOCALIZED EDEMA: ICD-10-CM

## 2023-06-20 DIAGNOSIS — R29.6 FREQUENT FALLS: ICD-10-CM

## 2023-06-20 DIAGNOSIS — E78.00 PURE HYPERCHOLESTEROLEMIA: ICD-10-CM

## 2023-06-20 DIAGNOSIS — Z95.1 S/P CABG (CORONARY ARTERY BYPASS GRAFT): ICD-10-CM

## 2023-06-20 DIAGNOSIS — I10 ESSENTIAL HYPERTENSION: ICD-10-CM

## 2023-06-20 PROCEDURE — 1159F MED LIST DOCD IN RCRD: CPT | Mod: CPTII,S$GLB,, | Performed by: INTERNAL MEDICINE

## 2023-06-20 PROCEDURE — 1159F PR MEDICATION LIST DOCUMENTED IN MEDICAL RECORD: ICD-10-PCS | Mod: CPTII,S$GLB,, | Performed by: INTERNAL MEDICINE

## 2023-06-20 PROCEDURE — 99999 PR PBB SHADOW E&M-EST. PATIENT-LVL III: ICD-10-PCS | Mod: PBBFAC,,, | Performed by: INTERNAL MEDICINE

## 2023-06-20 PROCEDURE — 3288F FALL RISK ASSESSMENT DOCD: CPT | Mod: CPTII,S$GLB,, | Performed by: INTERNAL MEDICINE

## 2023-06-20 PROCEDURE — 3074F SYST BP LT 130 MM HG: CPT | Mod: CPTII,S$GLB,, | Performed by: INTERNAL MEDICINE

## 2023-06-20 PROCEDURE — 1125F AMNT PAIN NOTED PAIN PRSNT: CPT | Mod: CPTII,S$GLB,, | Performed by: INTERNAL MEDICINE

## 2023-06-20 PROCEDURE — 3008F PR BODY MASS INDEX (BMI) DOCUMENTED: ICD-10-PCS | Mod: CPTII,S$GLB,, | Performed by: INTERNAL MEDICINE

## 2023-06-20 PROCEDURE — 3074F PR MOST RECENT SYSTOLIC BLOOD PRESSURE < 130 MM HG: ICD-10-PCS | Mod: CPTII,S$GLB,, | Performed by: INTERNAL MEDICINE

## 2023-06-20 PROCEDURE — 3078F PR MOST RECENT DIASTOLIC BLOOD PRESSURE < 80 MM HG: ICD-10-PCS | Mod: CPTII,S$GLB,, | Performed by: INTERNAL MEDICINE

## 2023-06-20 PROCEDURE — 3078F DIAST BP <80 MM HG: CPT | Mod: CPTII,S$GLB,, | Performed by: INTERNAL MEDICINE

## 2023-06-20 PROCEDURE — 99214 PR OFFICE/OUTPT VISIT, EST, LEVL IV, 30-39 MIN: ICD-10-PCS | Mod: 25,S$GLB,, | Performed by: INTERNAL MEDICINE

## 2023-06-20 PROCEDURE — 1125F PR PAIN SEVERITY QUANTIFIED, PAIN PRESENT: ICD-10-PCS | Mod: CPTII,S$GLB,, | Performed by: INTERNAL MEDICINE

## 2023-06-20 PROCEDURE — 1101F PT FALLS ASSESS-DOCD LE1/YR: CPT | Mod: CPTII,S$GLB,, | Performed by: INTERNAL MEDICINE

## 2023-06-20 PROCEDURE — 1101F PR PT FALLS ASSESS DOC 0-1 FALLS W/OUT INJ PAST YR: ICD-10-PCS | Mod: CPTII,S$GLB,, | Performed by: INTERNAL MEDICINE

## 2023-06-20 PROCEDURE — 93000 EKG 12-LEAD: ICD-10-PCS | Mod: S$GLB,,, | Performed by: INTERNAL MEDICINE

## 2023-06-20 PROCEDURE — 93000 ELECTROCARDIOGRAM COMPLETE: CPT | Mod: S$GLB,,, | Performed by: INTERNAL MEDICINE

## 2023-06-20 PROCEDURE — 3008F BODY MASS INDEX DOCD: CPT | Mod: CPTII,S$GLB,, | Performed by: INTERNAL MEDICINE

## 2023-06-20 PROCEDURE — 99999 PR PBB SHADOW E&M-EST. PATIENT-LVL III: CPT | Mod: PBBFAC,,, | Performed by: INTERNAL MEDICINE

## 2023-06-20 PROCEDURE — 99214 OFFICE O/P EST MOD 30 MIN: CPT | Mod: 25,S$GLB,, | Performed by: INTERNAL MEDICINE

## 2023-06-20 PROCEDURE — 3288F PR FALLS RISK ASSESSMENT DOCUMENTED: ICD-10-PCS | Mod: CPTII,S$GLB,, | Performed by: INTERNAL MEDICINE

## 2023-06-20 RX ORDER — METOPROLOL SUCCINATE 100 MG/1
100 TABLET, EXTENDED RELEASE ORAL DAILY
Qty: 90 TABLET | Refills: 3 | Status: SHIPPED | OUTPATIENT
Start: 2023-06-20 | End: 2024-06-19

## 2023-06-20 NOTE — PROGRESS NOTES
Patient, Christiano Frost (MRN #[id number]), presented with a recorded BMI of 35.83 kg/m^2 and a documented comorbidity(s):  - Hypertension  - Hyperlipidemia  to which the severe obesity is a contributing factor. This is consistent with the definition of severe obesity (BMI 35.0-39.9) with comorbidity (ICD-10 E66.01, Z68.35). The patient's severe obesity was monitored, evaluated, addressed and/or treated. This addendum to the medical record is made on 06/20/2023.

## 2023-06-20 NOTE — PROGRESS NOTES
"  Subjective:      Patient ID: Christiano Frost is a 72 y.o. male.    Chief Complaint: Follow-up    HPI:  Pt c/o problems with balance.    Pt is seeing Dr Harper: "Too much fluid in brain"    Pt slipped and fell and injured left knee.    Sometimes I lose my balance    No loss of consciousness.    Pt has had 2 or 3 falls "when in a hurry"      Review of Systems   Cardiovascular:  Negative for chest pain, claudication, dyspnea on exertion, irregular heartbeat, leg swelling, near-syncope, orthopnea, palpitations and syncope.      Past Medical History:   Diagnosis Date    BPH (benign prostatic hyperplasia)     Depression     Diabetes mellitus     GERD (gastroesophageal reflux disease)     Hyperlipidemia     Hypertension     Obesity     TIA (transient ischemic attack) 0322/2016        Past Surgical History:   Procedure Laterality Date    CORONARY ARTERY BYPASS GRAFT (CABG) Left 7/29/2022    Procedure: CORONARY ARTERY BYPASS GRAFT (CABG);  Surgeon: Magen Martell MD;  Location: [address];  Service: Cardiovascular;  Laterality: Left;  cabg3 with vein harvest    ENDOSCOPIC HARVEST OF VEIN Left 7/29/2022    Procedure: HARVEST-VEIN-ENDOVASCULAR;  Surgeon: Magen Martell MD;  Location: [address];  Service: Cardiovascular;  Laterality: Left;  Vein [place] Start time: 0818am  Vein [place] Stop time: 0926am    Vein Prep Start time: 0928am  Vein Prep Stop time: 1002am    LEFT HEART CATHETERIZATION Left 7/19/2022    Procedure: Left heart cath;  Surgeon: Ben rOtega MD;  Location: [facility] CATH LAB/EP;  Service: Cardiology;  Laterality: Left;    SPINE SURGERY  2044 2005 2008       No family history on file.    Social History     Socioeconomic History    Marital status:    Tobacco Use    Smoking status: Never    Smokeless tobacco: Never       Current Outpatient Medications on File Prior to Visit   Medication Sig Dispense Refill    acetaminophen (TYLENOL) 500 MG tablet Take 2 tablets (1,000 mg total) by " "mouth every 6 (six) hours as needed for Pain. 30 tablet 0    aspirin 325 MG tablet Take 1 tablet (325 mg total) by mouth once daily. 30 tablet 11    atorvastatin (LIPITOR) 40 MG tablet Take 1 tablet (40 mg total) by mouth once daily. 90 tablet 3    cholecalciferol, vitamin D3, (VITAMIN D3) 25 mcg (1,000 unit) capsule Take 1,000 Units by mouth once daily.      dutasteride (AVODART) 0.5 mg capsule Take 0.5 mg by mouth once daily.      escitalopram oxalate (LEXAPRO) 10 MG tablet Take 10 mg by mouth once daily.       esomeprazole (NEXIUM) 40 MG capsule Take by mouth.      Lactobacillus rhamnosus GG (CULTURELLE) 10 billion cell capsule Take 1 capsule by mouth once daily.      omega-3 fatty acids 1,000 mg Cap Take by mouth.      tamsulosin (FLOMAX) 0.4 mg Cp24 0.4 mg once daily.       [DISCONTINUED] hydroCHLOROthiazide (MICROZIDE) 12.5 mg capsule Take 1 capsule (12.5 mg total) by mouth once daily. 90 capsule 3    [DISCONTINUED] metoprolol succinate (TOPROL-XL) 100 MG 24 hr tablet Take 1 tablet (100 mg total) by mouth once daily. 90 tablet 3    [DISCONTINUED] clopidogreL (PLAVIX) 75 mg tablet Take 1 tablet (75 mg total) by mouth once daily. 30 tablet 3    [DISCONTINUED] enalapril (VASOTEC) 5 MG tablet TK 1 T PO  QD  0    [DISCONTINUED] metFORMIN (GLUCOPHAGE) 500 MG tablet Take 1 tablet (500 mg total) by mouth daily with breakfast. (Patient not taking: No sig reported)       No current facility-administered medications on file prior to visit.       Review of patient's allergies indicates:  No Known Allergies  Objective:     Vitals:    06/20/23 1015   BP: 113/73   BP Location: Right arm   Patient Position: Sitting   BP Method: Large (Automatic)   Pulse: 67   SpO2: (!) 94%   Weight: 110.1 kg (242 lb 9.9 oz)   Height: 5' 9" (1.753 m)        Physical Exam  Vitals reviewed.   Constitutional:       General: He is not in acute distress.     Appearance: He is well-developed. He is not diaphoretic.   Eyes:      General: No scleral " icterus.  Neck:      Vascular: No carotid bruit or JVD.   Cardiovascular:      Rate and Rhythm: Regular rhythm.      Heart sounds: Normal heart sounds. No murmur heard.    No friction rub. No gallop.   Pulmonary:      Effort: Pulmonary effort is normal. No respiratory distress.      Breath sounds: Normal breath sounds.   Musculoskeletal:      Right lower leg: Edema (trivial edema bilaterally) present.      Left lower leg: Edema present.   Skin:     General: Skin is warm and dry.   Neurological:      Mental Status: He is alert and oriented to person, place, and time.   Psychiatric:         Behavior: Behavior normal.         Thought Content: Thought content normal.         Judgment: Judgment normal.      ECG today:  NSR with first degree AV block, otherwise WNL       Chol HDL LDL CHOLc) TG   02/13/23 0742 126 52 59.4 Important  73   11/01/22 0722 129 61 47.0 Important  105       Hospital Outpatient Visit on 02/13/2023   Component Date Value Ref Range Status    [facility] CV CPX PATIENT HEIGHT IN 02/13/2023 69   Final    [facility] CV CPX PATIENT WEIGHT RETURNED* 02/13/2023 3,840   Final    Systolic blood pressure 02/13/2023 114  mmHg Final    Diastolic blood pressure 02/13/2023 52  mmHg Final    HR at rest 02/13/2023 63  bpm Final    RPP 02/13/2023 7,182   Final    Anaerobic threshold HR 02/13/2023 94   Final    Anaerobic threshold systolic BP 02/13/2023 107   Final    Anaerobic threshold diastolic BP 02/13/2023 56  mmHg Final    Anaerobic threshold RPP 02/13/2023 10,058   Final    Peak HR 02/13/2023 113  bpm Final    Peak Systolic BP 02/13/2023 105  mmHg Final    Peak Diatolic BP 02/13/2023 65  mmHg Final    Peak RPP 02/13/2023 11,865   Final    Estimated METs 02/13/2023 5   Final    Max Predicted HR 02/13/2023 146   Final    Abdominal girth 02/13/2023 51.0  cm Final    85% Max Predicted HR 02/13/2023 124   Final    % Max HR Achieved 02/13/2023 77   Final    1 Minute Recovery HR 02/13/2023 110  bpm Final    Forced Vital Capacity  02/13/2023 3.23   Final    FEV1 02/13/2023 2.21   Final    MVV 02/13/2023 41   Final    VE Max 02/13/2023 63.3   Final    Predicted VO2 02/13/2023 29.4  ml/kg/min Final    VE/VCO2 [place] 02/13/2023 26.5   Final    Rest PetCO2 02/13/2023 33.0   Final    AT Time 02/13/2023 4.07   Final    Peak Time 02/13/2023 6.33   Final    VO2 Rest 02/13/2023 4.0   Final    VO2 AT 02/13/2023 14.3   Final    VO2 Peak 02/13/2023 19.2   Final    VE/VO2 AT 02/13/2023 26   Final    VE/VO2 [place] 02/13/2023 36   Final    VE/VCO2 AT 02/13/2023 30   Final    VE/VCO2 Peak 02/13/2023 33   Final    O2 Sat Rest 02/13/2023 94   Final    O2 Sat Peak 02/13/2023 95   Final    AT Speed 02/13/2023 2.3   Final    Peak Speed 02/13/2023 2.7   Final    AT Grade 02/13/2023 2.5   Final    Peak Grade 02/13/2023 4.0   Final    Age 02/13/2023 [age]   Final    [facility] CV CPX PATIENT IS MALE 02/13/2023 1   Final    [facility] CV CPX PATIENT IS FEMALE 02/13/2023 0   Final    [facility] CV CPX PATIENT IS MALE AGE LES* 02/13/2023 0   Final    [facility] CV CPX PATIENT IS MALE AGE 11-* 02/13/2023 0   Final    [facility] CV CPX PATIENT IS MALE AGE GRE* 02/13/2023 1   Final    [facility]HS CV CPX PATIENT IS FEMALE AGE L* 02/13/2023 0   Final    [facility]HS CV CPX PATIENT IS FEMALE AGE 1* 02/13/2023 0   Final    [facility]HS CV CPX PATIENT IS FEMALE AGE G* 02/13/2023 0   Final    [facility] CV CPX PATIENT IS MALE LESS TH* 02/13/2023 0   Final    [facility] CV CPX PATIENT IS MALE GREATER* 02/13/2023 1   Final    [facility] CV CPX HIGHEST VO 02/13/2023 29.40   Final    [facility] CV CPX PERCENT BODY FAT 02/13/2023 22.7   Final    Exercise duration (sec) 02/13/2023 20  seconds Final    Exercise duration (min) 02/13/2023 6  minutes Final    FEV1/FVC 02/13/2023 0.68   Final    MVV Predicted 02/13/2023 88.40   Final   Lab Visit on 02/13/2023   Component Date Value Ref Range Status    CRP, High Sensitivity 02/13/2023 1.58  0.00 - 3.19 mg/L Final    WBC 02/13/2023 7.06  3.90 - 12.70 K/uL Final    RBC 02/13/2023 5.07  4.60 - 6.20 M/uL Final    Hemoglobin  02/13/2023 14.3  14.0 - 18.0 g/dL Final    Hematocrit 02/13/2023 45.8  40.0 - 54.0 % Final    MCV 02/13/2023 90  82 - 98 fL Final    MCH 02/13/2023 28.2  27.0 - 31.0 pg Final    MCHC 02/13/2023 31.2 (L)  32.0 - 36.0 g/dL Final    RDW 02/13/2023 14.8 (H)  11.5 - 14.5 % Final    Platelets 02/13/2023 198  150 - 450 K/uL Final    MPV 02/13/2023 9.7  9.2 - 12.9 fL Final    Immature Granulocytes 02/13/2023 0.3  0.0 - 0.5 % Final    Gran # (ANC) 02/13/2023 3.4  1.8 - 7.7 K/uL Final    Immature Grans (Abs) 02/13/2023 0.02  0.00 - 0.04 K/uL Final    Lymph # 02/13/2023 2.5  1.0 - 4.8 K/uL Final    Mono # 02/13/2023 0.8  0.3 - 1.0 K/uL Final    Eos # 02/13/2023 0.3  0.0 - 0.5 K/uL Final    Baso # 02/13/2023 0.10  0.00 - 0.20 K/uL Final    nRBC 02/13/2023 0  0 /100 WBC Final    Gran % 02/13/2023 48.1  38.0 - 73.0 % Final    Lymph % 02/13/2023 35.3  18.0 - 48.0 % Final    Mono % 02/13/2023 11.2  4.0 - 15.0 % Final    Eosinophil % 02/13/2023 3.7  0.0 - 8.0 % Final    Basophil % 02/13/2023 1.4  0.0 - 1.9 % Final    Differential Method 02/13/2023 Automated   Final    Cholesterol 02/13/2023 126  120 - 199 mg/dL Final    Triglycerides 02/13/2023 73  30 - 150 mg/dL Final    HDL 02/13/2023 52  40 - 75 mg/dL Final    LDL Cholesterol 02/13/2023 59.4 (L)  63.0 - 159.0 mg/dL Final    HDL/Cholesterol Ratio 02/13/2023 41.3  20.0 - 50.0 % Final    Total Cholesterol/HDL Ratio 02/13/2023 2.4  2.0 - 5.0 Final    Non-HDL Cholesterol 02/13/2023 74  mg/dL Final    Glucose, Fasting 02/13/2023 106  70 - 110 mg/dL Final   (  Encounter Form Printed    Encounter form printed on 07/20/22 10:38 AM         CT Chest Without Contrast  Status: Final result     [facility] Results Release    [facility] Status: Pending  Results Release     PACS Images for [employer] Viewer     Show images for CT Chest Without Contrast  All Reviewers List    Magen Martell MD on 7/21/2022 03:48   Analisa Hui RN on 7/20/2022 16:14     CT Chest Without Contrast  Order:  [id number]  Status: Final result      Visible to patient: No (inaccessible in Patient Portal)       Next appt: None      Dx: Coronary artery disease involving valerie...       0 Result Notes      Details    Reading Physician Reading Date Result Priority   Rebeka Caballero MD  [phone] 7/20/2022 Routine   Avery Chan MD  [phone]  [phone] 7/20/2022      Narrative & Impression  EXAMINATION:  CT CHEST WITHOUT CONTRAST     CLINICAL HISTORY:  cad; Atherosclerotic heart disease of native coronary artery without angina pectoris     TECHNIQUE:  Low dose axial images, sagittal and coronal reformations were obtained from the thoracic inlet to the lung bases. Contrast was not administered.     COMPARISON:  None.     FINDINGS:  SOFT TISSUES:  Unremarkable.     HEART & MEDIASTINUM: Severe coronary artery calcific disease..  Mild-to-moderate calcific plaque of the visualized aorta and arch vessels.  Mildly dilated left atrium.     PLEURA: No significant abnormalities.     LUNGS AND AIRWAYS:   Subsegmental atelectasis in the right lobe.  No radiographic evidence of pneumonia or pulmonary edema is identified.     ESOPHAGUS:  Small hiatal hernia.     UPPER ABDOMEN (limited):   2 subcentimeter low-density lesions within the liver.  These are not fully characterize, but are typically related to cysts versus hemangiomas.     BONES: Degenerative changes of the spine.  No fractures or focal osseous lesions.     Impression:     1.  Mildly dilated left atrium.  2.  Severe bilateral coronary artery calcific disease.  3.  Other findings as above.     Electronically signed by resident: Avery Chan  Date:                                            07/20/2022  Time:                                           14:44     Electronically signed by: Rebeka Caballero  Date:                                            07/20/2022  Time:                                           16:04               Exam Ended: 07/20/22 11:07 Last  Resulted: 07/20/22 16:04            VAS US Carotid Bilateral  Status: Final result     MyChart Results Release    MyChart Status: Pending  Results Release     Viewpoint Result Report     Please click here to view VAS LAB report for Vascular procedure (Viewpoint0  All Reviewers List    Magen Martell MD on 7/20/2022 14:22   Analisa Hui RN on 7/20/2022 11:48     VAS US Carotid Bilateral  Order: [id number]  Status: Final result      Visible to patient: No (inaccessible in Patient Portal)       Next appt: None      Dx: Coronary artery disease involving valerie...       0 Result Notes      Details    Narrative    Indication   ========     Bilateral carotid artery stenosis; Pre-Op exam; CAD     Results   ======     Right CCA prox  cm/s   Right CCA prox     ED          15 cm/s   Right CCA distal PSV   92 cm/s   Right CCA distal ED    17 cm/s   Right bulb PSV 70 cm/s   Right bulb ED  13 cm/s   Right ICA prox PSV 61 cm/s   Right ICA prox EDV 12 cm/s   Right ICA distal PSV   111 cm/s   Right ICA distal EDV   33 cm/s   Right ICA PSV / right CCA PSV  1.0   Right ICA EDV / right CCA EDV  1.9   Right ECA mid PSV  83 cm/s   Right VERT PSV 45 cm/s   Right VERT findings:   Antegrade flow   Left CCA prox PSV  96 cm/s   Left CCA prox ED   8 cm/s   Left CCA distal PSV    109 cm/s   Left CCA distal ED 21 cm/s   Left bulb PSV  98 cm/s   Left bulb ED   19 cm/s   Left ICA prox PSV  69 cm/s   Left ICA prox EDV  20 cm/s   Left ICA mid PSV   58 cm/s   Left ICA mid EDV   13 cm/s   Left ICA distal PSV    60 cm/s   Left ICA distal EDV    21 cm/s   Left ICA PSV / Left CCA PSV    0.9   Left ICA EDV / Left CCA EDV    1.0   Left ECA mid PSV   61 cm/s   Left VERT PSV  57 cm/s   Left VERT findings:    Antegrade flow   Plaque presence:   ICA plaque identified, CCA plaque identified   Right ICA plaque:  Heterogeneous   Right CCA plaque:  Heterogeneous   Left ICA plaque:   Heterogeneous   Left CCA plaque:   Heterogeneous     Impression    =========     RIGHT SIDE:   1-39% Right ICA stenosis.   Heterogeneous plaque noted in the right internal carotid artery.   Heterogeneous plaque noted in the right common carotid artery.   Antegrade flow noted in the right vertebral artery.     LEFT SIDE:   1-39% Left ICA stenosis. The ICA is highly tortuous in nature and a high bifurcation is identified.   Heterogeneous plaque noted in the left internal carotid artery.   Heterogeneous plaque noted in the left common carotid artery.   Antegrade flow noted in the left vertebral artery.       DATE OF SERVICE: 07/20/2022                                                       Sonographer: ANA PHILLIPS RVS   Electronically Signed by: David Andres M.D. at 07/20/2022-11:31             Specimen Collected: 07/20/22 10:09 Last Resulted: 07/20/22 11:32                [phone] 1/2/2018      Narrative & Impression  MYOCARDIAL PERFUSION SCAN     Indication:  Shortness of breath. Chest pain.     Comparison: None.     Technique: SPECT images were acquired after the injection of 10 mCi of Tc-99m Myoview at rest and 27 mCi during a cardiac stress. The clinical stress and ECG portion of the study is to be read separately.     Findings:    The quality of the study is good..      Stress SPECT images demonstrate homogenous distribution of the tracer throughout the left ventricle. On the resting images, there is matched homogenous distribution of the tracer throughout the left ventricle.     The gated post-stress images reveal normal wall motion and normal systolic wall thickening with an estimated LVEF of 52 %. The LV cavity is not dilated with an end-diastolic volume of 100 ml and an end-systolic volume of 48 ml.  IMPRESSION:         1.  Scintigraphically negative for ischemia or infarct.  2.  The global left ventricular systolic function is normal with an LV ejection fraction of 52 % and no evidence of LV dilatation. Wall motion is normal.        Electronically signed by: JANIS  "JANIS ANDERSON MD  Date:                                            01/02/18  Time:                                           11:30            Exam Ended: 01/02/18 10:23         Accession #: [id number]  Christiano Frost  Holter monitor - 48 hour  Order# [id number]  Reading physician: John Meyers MD Ordering physician: John Meyers MD Study date: 10/3/22     Reason for Exam  Priority: Routine  Dx: Hx of CABG [Z95.1 (ICD-10-CM)]; Coronary artery disease involving native coronary artery of native heart without angina pectoris [I25.10 (ICD-10-CM)]; S/P CABG (coronary artery bypass graft) [Z95.1 (ICD-10-CM)]; Hyperlipidemia, unspecified hyperlipidemia type [E78.5 (ICD-10-CM)]; Essential hypertension [I10 (ICD-10-CM)]; Iron deficiency anemia due to chronic blood loss [D50.0 (ICD-10-CM)]; Type 2 diabetes mellitus without complication, without long-term current use of insulin [E11.9 (ICD-10-CM)]     Conclusion    Normal sinus rhythm with a heart rate variable between 55 and 126 bpm, avertaging 68 bpm  A first degree AV block is a variable finding  There are rare pauses of up to 1.9 seconds due to non-conducted premature atrial contractions  There are extremely rare, isolated PVC's  There are occasional PAC's inculding 2 PAC couplets and brief atrial bigeminy  There are no abnormalities on the ECG at the times the pat reported symptoms of "heavy breathing", "chest pain", and "fast heartbeat". A blocked PAC correlated with symptom of "chest pain"     Performing Clinician    Barbara Puente, RT             Neurology consult:       [facility]  Outside Information  Office Visit  5/18/2023  Dr. Leroy Mayorga  Encounter Summary      Christiano Frost - 72 y.o. Male; born [birth year]Encounter Summary, generated on Jun. 20, 2023June 20, 2023   Encounter Details    Encounter Details  Date Type Department Care Team Description   05/18/2023 Office Visit Dr. Leroy Mayorga    [address] [facility] [place] [address] [id number]    [phone]   Kelsi Carty PA-C    [address] [place] [address]    [phone] (Work)    [phone] (Fax)   Gait disorder;   Balance problems     Social History  - documented as of this encounter  Social History  Tobacco Use Types Packs/Day Years Used Date   Smoking Tobacco: Never           Smokeless Tobacco: Never             Social History  Tobacco Cessation: Counseling Given: Not Answered     Social History  Alcohol Use Standard Drinks/Week Comments   Never 0 (1 standard drink = 0.6 oz pure alcohol)       Social History  Sex Assigned at Birth Date Recorded   Not on file       Last Filed Vital Signs  - documented in this encounter  Last Filed Vital Signs  Vital Sign Reading Time Taken Comments   Blood Pressure 146/76 05/18/2023 9:25 AM CDT     Pulse 74 05/18/2023 9:25 AM CDT     Temperature - -     Respiratory Rate 18 05/18/2023 9:25 AM CDT     Oxygen Saturation 93% 05/18/2023 9:25 AM CDT     Inhaled Oxygen Concentration - -     Weight 109.6 kg (241 lb 9.6 oz) 05/18/2023 9:25 AM CDT     Height - -     Body Mass Index 35.68 04/18/2022 8:29 AM CDT       Patient Instructions  - documented in this encounter  Patient Instructions  Kelsi Carty PA-C - 05/18/2023 9:30 AM CDT    Formatting of this note might be different from the original.  Please get the images of the MRI from [facility]. We will be in the office on Tuesday and Thursday mornings.     American Heart Association's Life's Essential 8 for good cardiovascular and brain health  - healthy eating - try the Mediterranean diet  - regular exercise - The patient was advised to increase activity to a minimum of 150 minutes of exercise each week to improve overall physical and cognitive health and to reduce the need for medication management to control stroke risk factors. Try to walk 7000 steps per day.  - maintain a healthy weight  - don't smoke/use of tobacco products  - maintain blood pressure <130/90mmHg  -  "maintain normal glucose level  - maintain healthy cholesterol levels  - get adequate sleep (7-9 hours for most adults)    Electronically signed by Kelsi Carty PA-C at 05/18/2023 10:42 AM CDT     Progress Notes  - documented in this encounter  Kelsi Carty PA-C - 05/18/2023 9:30 AM CDT  Formatting of this note is different from the original.  Outpatient Neurology Consult    Reason for consult:   1. Gait disorder   2. Balance problems     Informant: patient    Other sources of information: chart review, Patient's wife    CC: " I have been having problems for the last 2-3 years with balance and coordination." No chief complaint on file.      HPI: Christiano Frost is a 72 y.o. M with a PMHx of CAD s/p CABG DM, HTN, JEEVAN and stroke who presents today for evaluation of balance and coordination problems. He reports that he has had increasing difficulty with balance and coordination for the last 2-3 years. He used to be able to catch himself if he tripped but now he will fall. He reports a stroke 7-8 years ago. He had tPA at the time. He was seen at [facility]. He also notes some "senior moments" at times. His PCP ordered an MRI which did reveal "fluid within the scalp on the L." He does note that he had a fall a few years ago that he hit the back of his head. He does not recall any other head injuries in recent years, though he did play sports actively when he was younger and does report prior head injuries associated with that. He does note that he lost use of his LLE in 2008 and had back surgery at that time. The patient denies sudden changes in vision, speech/communication/swallow, focal weakness/numbness/incoordination, or nausea/vertigo. The patient denies explosive headache. The patient denies CP, palpitations, SOB, cough or fever. His wife notes that his gait is wide. He also notes that he has a tremor. He also reports recent urinary incontinence, which he attributed to having started " hydrochlorothiazide. He reports that he knows he has to go and will try to get to the bathroom but is not always able to make it in time.     ROS: 14 systems reviewed and negative except as per HPI.    Histories:     Allergies: No Known Allergies    Current Medications:   Current Outpatient Medications:    aspirin 81 MG chewable tablet, Take 81 mg by mouth daily, Disp: , Rfl:    atorvastatin (LIPITOR) 40 MG tablet, Take 1 tablet by mouth daily, Disp: , Rfl:    esomeprazole (NEXIUM) 40 MG capsule, Take 40 mg by mouth daily, Disp: , Rfl:    hydroCHLOROthiazide (MICROZIDE) 12.5 mg capsule, Take 12.5 mg by mouth, Disp: , Rfl:    metoprolol succinate (TOPROL XL) 100 MG 24 hr tablet, Take 1 tablet by mouth daily, Disp: , Rfl:    tamsulosin HCl (FLOMAX ORAL), Take 0.4 mg by mouth, Disp: , Rfl:    dutasteride (AVODART) 0.5 mg capsule, Take 0.5 mg by mouth daily, Disp: , Rfl:    escitalopram oxalate (LEXAPRO) 10 MG tablet, Take 10 mg by mouth daily, Disp: , Rfl:    OZEMPIC 0.25 mg or 0.5 mg (2 mg/3 mL) PnIj, Inject 0.25 mg into the skin once a week , Disp: , Rfl:     Past Medical/Surgical/Family History:  Medical History:   Past Medical History:   Diagnosis Date    CVA (cerebral vascular accident) (CMS/[facility]ly [facility])    Diabetes mellitus (CMS/[facility]ly [facility])    Hypertension    JEEVAN (obstructive sleep apnea)     Surgical History: No past surgical history on file.  Family History: No family history on file.  Social History:  Social History     Socioeconomic History    Marital status:    Spouse name: Not on file    Number of children: Not on file    Years of education: Not on file    Highest education level: Not on file   Occupational History    Not on file   Tobacco Use    Smoking status: Never    Smokeless tobacco: Never   Vaping Use    Vaping Use: Never used   Substance and Sexual Activity    Alcohol use: Never    Drug use: Never    Sexual activity: Not on file   Other Topics Concern    Not on file   Social History Narrative    Not on file      Social Determinants of Health     Financial Resource Strain: Not on file   Food Insecurity: Not on file   Transportation Needs: Not on file   Physical Activity: Not on file   Stress: Not on file   Social Connections: Not on file   Intimate Partner Violence: Not on file   Housing Stability: Not on file     Current Evaluation:     Vital Signs:   Vitals:   05/18/23 0925   BP: 146/76   BP Location: Left arm   BP Patient Position: Sitting   Pulse: 74   Resp: 18   SpO2: 93%   Weight: 109.6 kg (241 lb 9.6 oz)       Medical exam:  alert, attentive, no distress  pink conjunctiva, mmm  no JVD  regular s1/s2, no murmur, 2+ppx4  lungs CTAB and respirations unlabored  abdomen is soft, NT/ND, +BS, no organomegaly  no c/c/e, no unusual rash, bruising, or joint effusion    GAIT Assessment: normal stance, stride and arm swing; normal number of steps to turn 180 degrees; able to walk on toes, heels and perform tandem.    Neurological exam:  Alert  Attentive to examiner  Attentive to formal tasks  Oriented to person, place, time  Oriented to current events - Biden as president  Registration - intact  Recall at 5 minutes - intact  No neglect  No apraxia  Language - fluent with intact naming, repetition, reading, comprehension  PERRL, EOMI, VFF  normal facial movements/symmetry; no dysarthria  normal bulk and tone to extremities  no drift in upper extremities or lower extremities; full anticipated strength in all muscle groups  equal sensation to light touch in face, arms, and legs  Vibration sense intact  coordination is intact in BUE and BLE, LLE limited by hip ROM  normoactive and symmetric muscle stretch reflexes in BUE, silent in LLE    LABORATORY STUDIES:  No results found for: NA, K, CL, CO2, GLU, BUN, LABCREA, CALCIUM, GFRNONAA, GFRAA  No results found for: HGBA1C, EAG  No results found for: CHOL, HDL, TRIG, LDLDIRECT, LDLHDL, VLDLCALC    RADIOLOGY STUDIES:  I have personally reviewed the images performed.     Assessment:  Christiano Frost is a 72 y.o. M with a PMHx of CAD s/p CABG DM, HTN, JEEVAN and stroke who presents today for evaluation of balance and coordination problems. His symptoms of balance/coordination problems, cognitive impairment and urinary incontinence are concerning for NPH. He has a MRI report that is also suggestive of possible NPH. He does not have a disc of the images at this time but will obtain one and bring it to us next week for review. If it is indeed consistent with NPH will refer for therapy evaluations and a large volume LP. If his symptoms improve with the removal of CSF will refer to [facility] for possible VPS.     Treatment Plan:  No orders of the defined types were placed in this encounter.    Please get the images of the MRI from [facility]. We will be in the office on Tuesday and Thursday mornings.      American Heart Association's Life's Essential 8 for good cardiovascular and brain health  - healthy eating - try the Mediterranean diet  - regular exercise - The patient was advised to increase activity to a minimum of 150 minutes of exercise each week to improve overall physical and cognitive health and to reduce the need for medication management to control stroke risk factors. Try to walk 7000 steps per day.  - maintain a healthy weight  - don't smoke/use of tobacco products  - maintain blood pressure <130/90mmHg  - maintain normal glucose level  - maintain healthy cholesterol levels  - get adequate sleep (7-9 hours for most adults)  Electronically signed by Deidre Shook MD at 05/22/2023 11:03 AM CDT   Plan of Treatment  - documented as of this encounter  Plan of Treatment - Upcoming Encounters  Upcoming Encounters  Date Type Specialty Care Team Description   06/22/2023 Appointment Neurology Kelsi Carty PA-C    [address] [place] [address]    [phone] (Work)    [phone] (Fax)         Visit Diagnoses  - documented in this encounter  Visit Diagnoses  Diagnosis    Gait disorder    Abnormality of gait     Balance problems    Other symptoms involving nervous and musculoskeletal systems       Historical Medications  - added in this encounter  Reconcile with Patient's Chart  This list may reflect changes made after this encounter.    Historical Medications  Medication Sig Dispensed Refills Start Date End Date   OZEMPIC 0.25 mg or 0.5 mg (2 mg/3 mL) PnIj   Inject 0.25 mg into the skin once a week    0 04/05/2023     esomeprazole (NEXIUM) 40 MG capsule   Take 40 mg by mouth daily   0 03/08/2019     aspirin 81 MG chewable tablet   Take 81 mg by mouth daily   0 05/18/2021     hydroCHLOROthiazide (MICROZIDE) 12.5 mg capsule   Take 12.5 mg by mouth   0 05/02/2023 05/01/2024   atorvastatin (LIPITOR) 40 MG tablet   Take 1 tablet by mouth daily   0 05/02/2023 05/01/2024   metoprolol succinate (TOPROL XL) 100 MG 24 hr tablet   Take 1 tablet by mouth daily   0 05/02/2023 05/01/2024   tamsulosin HCl (FLOMAX ORAL)   Take 0.4 mg by mouth   0 04/29/2008     dutasteride (AVODART) 0.5 mg capsule   Take 0.5 mg by mouth daily   0       escitalopram oxalate (LEXAPRO) 10 MG tablet   Take 10 mg by mouth daily   0 05/09/2023       Care Teams  - documented as of this encounter  Care Teams  Team Member Relationship Specialty Start Date End Date   Ellis Allan MD    [address] [place] [id number]    [phone] (Work)    [phone] (Fax)   PCP - General Internal Medicine 9/13/22       Additional Source Comments  - [facility] is releasing the current information available at the time of this request.  Images  Patient Demographics    Patient Demographics  Patient Address Communication Language Race / Ethnicity Marital Status   [address] [place] [address] [phone] (Home)  [phone] (Mobile) English (Preferred) White / Not  or       Patient Contacts    Patient Contacts  Contact Name Contact Address Communication Relationship to Patient    Given None Unknown [phone] (Mobile) Emergency Contact     Document Information    Primary Care Provider Other Service Providers Document Coverage Dates   Ellis Allan MD (Sep. 13, 2022September 13, 2022 - Present)  [phone] (Work)  [phone] (Fax)  [address] [facility]  [address]  [place] [address]  Internal Medicine  [address] [address]  May 18, 2023May 18, 2023      Organization   [address] [address]     Encounter Providers Encounter Date   Kelsi Carty PA-C (Attending)  [phone] (Work)  [phone] (Fax)  [address] [place] [address]  Physician Assistant May 18, 2023May 18, 2023     Legal Authenticator    [facility] Him , MD  [phone] (Work)  [place] [address]      Show All Sections   Assessment:     1. Coronary artery disease involving native coronary artery of native heart without angina pectoris    2. Essential hypertension    3. Pure hypercholesterolemia    4. S/P CABG (coronary artery bypass graft)    5. Localized edema    6. Thoracic aortic atherosclerosis    7. Frequent falls      Plan:   Christiano was seen today for follow-up.    Diagnoses and all orders for this visit:    Coronary artery disease involving native coronary artery of native heart without angina pectoris  -     IN OFFICE EKG 12-LEAD (to Muse)  -     Holter monitor - 48 hour; Future    Essential hypertension  -     IN OFFICE EKG 12-LEAD (to Muse)  -     Holter monitor - 48 hour; Future    Pure hypercholesterolemia  -     IN OFFICE EKG 12-LEAD (to Muse)  -     Holter monitor - 48 hour; Future    S/P CABG (coronary artery bypass graft)  -     IN OFFICE EKG 12-LEAD (to Muse)  -     Holter monitor - 48 hour; Future    Localized edema  -     IN OFFICE EKG 12-LEAD (to Muse)  -     Holter monitor - 48 hour; Future    Thoracic aortic atherosclerosis  -     IN OFFICE EKG 12-LEAD (to  Muse)  -     Holter monitor - 48 hour; Future    Frequent falls  -     Holter monitor - 48 hour; Future    Other orders  -     metoprolol succinate (TOPROL-XL) 100 MG 24 hr tablet; Take 1 tablet (100 mg total) by mouth once daily.     CAD is stable    The trace edema is venous stasis edema exacerbated by BMI 35.8    Low salt low carb diet discussed    Due to frequent falls which have been attributed to loss of balance and possible NPH will proceed with repeat 48 hour holter to look for roger or tachydysrhythmia which could contribute to falls.  Also will empirically discontinue the HCTZ in case pt is experiencing symptomatic orthostatic hypotension as etiology of falls    Tylenol for knee pain     F/u with  neurologist, Dr Carty    F/u with PCP, Dr Allan    Follow up in about 4 weeks (around 7/18/2023).

## 2023-06-28 ENCOUNTER — HOSPITAL ENCOUNTER (OUTPATIENT)
Dept: CARDIOLOGY | Facility: HOSPITAL | Age: 73
Discharge: HOME | End: 2023-06-28
Attending: INTERNAL MEDICINE
Payer: MEDICARE

## 2023-06-28 DIAGNOSIS — E78.00 PURE HYPERCHOLESTEROLEMIA: ICD-10-CM

## 2023-06-28 DIAGNOSIS — Z95.1 S/P CABG (CORONARY ARTERY BYPASS GRAFT): ICD-10-CM

## 2023-06-28 DIAGNOSIS — I25.10 CORONARY ARTERY DISEASE INVOLVING NATIVE CORONARY ARTERY OF NATIVE HEART WITHOUT ANGINA PECTORIS: ICD-10-CM

## 2023-06-28 DIAGNOSIS — R29.6 FREQUENT FALLS: ICD-10-CM

## 2023-06-28 DIAGNOSIS — I70.0 THORACIC AORTIC ATHEROSCLEROSIS: ICD-10-CM

## 2023-06-28 DIAGNOSIS — I10 ESSENTIAL HYPERTENSION: ICD-10-CM

## 2023-06-28 DIAGNOSIS — R60.0 LOCALIZED EDEMA: ICD-10-CM

## 2023-06-28 PROCEDURE — 93225 XTRNL ECG REC<48 HRS REC: CPT

## 2023-06-28 PROCEDURE — 93227 XTRNL ECG REC<48 HR R&I: CPT | Mod: ,,, | Performed by: INTERNAL MEDICINE

## 2023-06-28 PROCEDURE — 93227 HOLTER MONITOR - 48 HOUR (CUPID ONLY): ICD-10-PCS | Mod: ,,, | Performed by: INTERNAL MEDICINE

## 2023-07-06 ENCOUNTER — TELEPHONE (OUTPATIENT)
Dept: CARDIOLOGY | Facility: CLINIC | Age: 73
End: 2023-07-06
Payer: MEDICARE

## 2023-07-06 LAB
OHS CV EVENT MONITOR DAY: 0
OHS CV HOLTER LENGTH DECIMAL HOURS: 47.98
OHS CV HOLTER LENGTH HOURS: 47
OHS CV HOLTER LENGTH MINUTES: 59
OHS CV HOLTER SINUS AVERAGE HR: 66
OHS CV HOLTER SINUS MAX HR: 103
OHS CV HOLTER SINUS MIN HR: 50

## 2023-08-08 ENCOUNTER — OFFICE VISIT (OUTPATIENT)
Dept: CARDIOLOGY | Facility: CLINIC | Age: 73
End: 2023-08-08
Payer: MEDICARE

## 2023-08-08 VITALS
HEART RATE: 72 BPM | BODY MASS INDEX: 36.15 KG/M2 | HEIGHT: 69 IN | DIASTOLIC BLOOD PRESSURE: 68 MMHG | WEIGHT: 244.06 LBS | SYSTOLIC BLOOD PRESSURE: 127 MMHG

## 2023-08-08 DIAGNOSIS — I10 ESSENTIAL HYPERTENSION: ICD-10-CM

## 2023-08-08 DIAGNOSIS — I70.0 THORACIC AORTIC ATHEROSCLEROSIS: ICD-10-CM

## 2023-08-08 DIAGNOSIS — I25.10 CORONARY ARTERY DISEASE INVOLVING NATIVE CORONARY ARTERY OF NATIVE HEART WITHOUT ANGINA PECTORIS: ICD-10-CM

## 2023-08-08 DIAGNOSIS — Z95.1 S/P CABG (CORONARY ARTERY BYPASS GRAFT): Primary | ICD-10-CM

## 2023-08-08 DIAGNOSIS — E11.9 TYPE 2 DIABETES MELLITUS WITHOUT COMPLICATION, WITHOUT LONG-TERM CURRENT USE OF INSULIN: ICD-10-CM

## 2023-08-08 DIAGNOSIS — E78.5 HYPERLIPIDEMIA, UNSPECIFIED HYPERLIPIDEMIA TYPE: ICD-10-CM

## 2023-08-08 DIAGNOSIS — I67.9 CVD (CEREBROVASCULAR DISEASE): ICD-10-CM

## 2023-08-08 PROCEDURE — 1160F PR REVIEW ALL MEDS BY PRESCRIBER/CLIN PHARMACIST DOCUMENTED: ICD-10-PCS | Mod: CPTII,S$GLB,, | Performed by: INTERNAL MEDICINE

## 2023-08-08 PROCEDURE — 99999 PR PBB SHADOW E&M-EST. PATIENT-LVL III: CPT | Mod: PBBFAC,,, | Performed by: INTERNAL MEDICINE

## 2023-08-08 PROCEDURE — 3288F FALL RISK ASSESSMENT DOCD: CPT | Mod: CPTII,S$GLB,, | Performed by: INTERNAL MEDICINE

## 2023-08-08 PROCEDURE — 3288F PR FALLS RISK ASSESSMENT DOCUMENTED: ICD-10-PCS | Mod: CPTII,S$GLB,, | Performed by: INTERNAL MEDICINE

## 2023-08-08 PROCEDURE — 1126F PR PAIN SEVERITY QUANTIFIED, NO PAIN PRESENT: ICD-10-PCS | Mod: CPTII,S$GLB,, | Performed by: INTERNAL MEDICINE

## 2023-08-08 PROCEDURE — 1159F PR MEDICATION LIST DOCUMENTED IN MEDICAL RECORD: ICD-10-PCS | Mod: CPTII,S$GLB,, | Performed by: INTERNAL MEDICINE

## 2023-08-08 PROCEDURE — 3074F SYST BP LT 130 MM HG: CPT | Mod: CPTII,S$GLB,, | Performed by: INTERNAL MEDICINE

## 2023-08-08 PROCEDURE — 3008F PR BODY MASS INDEX (BMI) DOCUMENTED: ICD-10-PCS | Mod: CPTII,S$GLB,, | Performed by: INTERNAL MEDICINE

## 2023-08-08 PROCEDURE — 3074F PR MOST RECENT SYSTOLIC BLOOD PRESSURE < 130 MM HG: ICD-10-PCS | Mod: CPTII,S$GLB,, | Performed by: INTERNAL MEDICINE

## 2023-08-08 PROCEDURE — 1159F MED LIST DOCD IN RCRD: CPT | Mod: CPTII,S$GLB,, | Performed by: INTERNAL MEDICINE

## 2023-08-08 PROCEDURE — 99999 PR PBB SHADOW E&M-EST. PATIENT-LVL III: ICD-10-PCS | Mod: PBBFAC,,, | Performed by: INTERNAL MEDICINE

## 2023-08-08 PROCEDURE — 1101F PR PT FALLS ASSESS DOC 0-1 FALLS W/OUT INJ PAST YR: ICD-10-PCS | Mod: CPTII,S$GLB,, | Performed by: INTERNAL MEDICINE

## 2023-08-08 PROCEDURE — 3078F PR MOST RECENT DIASTOLIC BLOOD PRESSURE < 80 MM HG: ICD-10-PCS | Mod: CPTII,S$GLB,, | Performed by: INTERNAL MEDICINE

## 2023-08-08 PROCEDURE — 1126F AMNT PAIN NOTED NONE PRSNT: CPT | Mod: CPTII,S$GLB,, | Performed by: INTERNAL MEDICINE

## 2023-08-08 PROCEDURE — 3078F DIAST BP <80 MM HG: CPT | Mod: CPTII,S$GLB,, | Performed by: INTERNAL MEDICINE

## 2023-08-08 PROCEDURE — 99213 OFFICE O/P EST LOW 20 MIN: CPT | Mod: S$GLB,,, | Performed by: INTERNAL MEDICINE

## 2023-08-08 PROCEDURE — 1160F RVW MEDS BY RX/DR IN RCRD: CPT | Mod: CPTII,S$GLB,, | Performed by: INTERNAL MEDICINE

## 2023-08-08 PROCEDURE — 3008F BODY MASS INDEX DOCD: CPT | Mod: CPTII,S$GLB,, | Performed by: INTERNAL MEDICINE

## 2023-08-08 PROCEDURE — 99213 PR OFFICE/OUTPT VISIT, EST, LEVL III, 20-29 MIN: ICD-10-PCS | Mod: S$GLB,,, | Performed by: INTERNAL MEDICINE

## 2023-08-08 PROCEDURE — 1101F PT FALLS ASSESS-DOCD LE1/YR: CPT | Mod: CPTII,S$GLB,, | Performed by: INTERNAL MEDICINE

## 2023-08-08 RX ORDER — SEMAGLUTIDE 0.68 MG/ML
INJECTION, SOLUTION SUBCUTANEOUS
COMMUNITY
Start: 2023-08-02 | End: 2024-02-27

## 2023-08-08 NOTE — PROGRESS NOTES
Subjective:      Patient ID: Christiano Frost is a 72 y.o. male.    Chief Complaint: Follow-up    HPI: Walks 0.75 miles.    Feels OK    Review of Systems   Cardiovascular:  Negative for chest pain, claudication, dyspnea on exertion, irregular heartbeat, leg swelling, near-syncope, orthopnea, palpitations and syncope.        Past Medical History:   Diagnosis Date    BPH (benign prostatic hyperplasia)     Depression     Diabetes mellitus     GERD (gastroesophageal reflux disease)     Hyperlipidemia     Hypertension     Obesity     TIA (transient ischemic attack) 0322/2016        Past Surgical History:   Procedure Laterality Date    CORONARY ARTERY BYPASS GRAFT (CABG) Left 7/29/2022    Procedure: CORONARY ARTERY BYPASS GRAFT (CABG);  Surgeon: Magen Martell MD;  Location: [facility] OR [address] [place] [address];  Service: Cardiovascular;  Laterality: Left;  cabg3 with vein harvest    ENDOSCOPIC HARVEST OF VEIN Left 7/29/2022    Procedure: HARVEST-VEIN-ENDOVASCULAR;  Surgeon: Magen Martell MD;  Location: [facility] OR [address] [place] [address];  Service: Cardiovascular;  Laterality: Left;  Vein Rydal Start time: 0818am  Vein Rydal Stop time: 0926am    Vein Prep Start time: 0928am  Vein Prep Stop time: 1002am    LEFT HEART CATHETERIZATION Left 7/19/2022    Procedure: Left heart cath;  Surgeon: Ben Ortega MD;  Location: [facility] CATH LAB/EP;  Service: Cardiology;  Laterality: Left;    SPINE SURGERY  2044 2005 2008       No family history on file.    Social History     Socioeconomic History    Marital status:    Tobacco Use    Smoking status: Never    Smokeless tobacco: Never       Current Outpatient Medications on File Prior to Visit   Medication Sig Dispense Refill    acetaminophen (TYLENOL) 500 MG tablet Take 2 tablets (1,000 mg total) by mouth every 6 (six) hours as needed for Pain. 30 tablet 0    aspirin 325 MG tablet Take 1 tablet (325 mg total) by mouth once daily. 30 tablet 11    atorvastatin (LIPITOR) 40 MG tablet Take 1 tablet (40 mg  "total) by mouth once daily. 90 tablet 3    cholecalciferol, vitamin D3, (VITAMIN D3) 25 mcg (1,000 unit) capsule Take 1,000 Units by mouth once daily.      dutasteride (AVODART) 0.5 mg capsule Take 0.5 mg by mouth once daily.      escitalopram oxalate (LEXAPRO) 10 MG tablet Take 10 mg by mouth once daily.       esomeprazole (NEXIUM) 40 MG capsule Take by mouth.      Lactobacillus rhamnosus GG (CULTURELLE) 10 billion cell capsule Take 1 capsule by mouth once daily.      metoprolol succinate (TOPROL-XL) 100 MG 24 hr tablet Take 1 tablet (100 mg total) by mouth once daily. 90 tablet 3    omega-3 fatty acids 1,000 mg Cap Take by mouth.      OZEMPIC 0.25 mg or 0.5 mg (2 mg/3 mL) pen injector Inject into the skin.      tamsulosin (FLOMAX) 0.4 mg Cp24 0.4 mg once daily.       [DISCONTINUED] clopidogreL (PLAVIX) 75 mg tablet Take 1 tablet (75 mg total) by mouth once daily. 30 tablet 3    [DISCONTINUED] enalapril (VASOTEC) 5 MG tablet TK 1 T PO  QD  0    [DISCONTINUED] metFORMIN (GLUCOPHAGE) 500 MG tablet Take 1 tablet (500 mg total) by mouth daily with breakfast. (Patient not taking: No sig reported)       No current facility-administered medications on file prior to visit.       Review of patient's allergies indicates:  No Known Allergies  Objective:     Vitals:    08/08/23 0901   BP: 127/68   BP Location: Left arm   Patient Position: Sitting   BP Method: Large (Automatic)   Pulse: 72   Weight: 110.7 kg (244 lb 0.8 oz)   Height: 5' 9" (1.753 m)        Physical Exam  Constitutional:       General: He is not in acute distress.     Appearance: He is well-developed. He is not diaphoretic.   Eyes:      General: No scleral icterus.  Neck:      Vascular: No carotid bruit or JVD.   Cardiovascular:      Rate and Rhythm: Regular rhythm.      Heart sounds: Normal heart sounds. No murmur heard.     No friction rub. No gallop.   Pulmonary:      Effort: Pulmonary effort is normal. No respiratory distress.      Breath sounds: Normal " breath sounds.   Musculoskeletal:      Right lower leg: Edema present.      Left lower leg: Edema (trivial bilateral) present.   Skin:     General: Skin is warm and dry.   Neurological:      Mental Status: He is alert and oriented to person, place, and time.   Psychiatric:         Behavior: Behavior normal.         Thought Content: Thought content normal.         Judgment: Judgment normal.        Wt up 18 lbs over the past year    Hospital Outpatient Visit on 06/28/2023   Component Date Value Ref Range Status    Holter length hours 06/28/2023 47   Final    holter length minutes 06/28/2023 59   Final    holter length dec hours 06/28/2023 47.98   Final    Sinus min HR 06/28/2023 50   Final    Sinus max hr 06/28/2023 103   Final    Sinus avg hr 06/28/2023 66   Final    Event Monitor Day 06/28/2023 0   Final   Hospital Outpatient Visit on 02/13/2023   Component Date Value Ref Range Status    [facility] CV CPX PATIENT HEIGHT IN 02/13/2023 69   Final    [facility] CV CPX PATIENT WEIGHT RETURNED* 02/13/2023 3,840   Final    Systolic blood pressure 02/13/2023 114  mmHg Final    Diastolic blood pressure 02/13/2023 52  mmHg Final    HR at rest 02/13/2023 63  bpm Final    RPP 02/13/2023 7,182   Final    Anaerobic threshold HR 02/13/2023 94   Final    Anaerobic threshold systolic BP 02/13/2023 107   Final    Anaerobic threshold diastolic BP 02/13/2023 56  mmHg Final    Anaerobic threshold RPP 02/13/2023 10,058   Final    Peak HR 02/13/2023 113  bpm Final    Peak Systolic BP 02/13/2023 105  mmHg Final    Peak Diatolic BP 02/13/2023 65  mmHg Final    Peak RPP 02/13/2023 11,865   Final    Estimated METs 02/13/2023 5   Final    Max Predicted HR 02/13/2023 146   Final    Abdominal girth 02/13/2023 51.0  cm Final    85% Max Predicted HR 02/13/2023 124   Final    % Max HR Achieved 02/13/2023 77   Final    1 Minute Recovery HR 02/13/2023 110  bpm Final    Forced Vital Capacity 02/13/2023 3.23   Final    FEV1 02/13/2023 2.21   Final    MVV 02/13/2023  41   Final    VE Max 02/13/2023 63.3   Final    Predicted VO2 02/13/2023 29.4  ml/kg/min Final    VE/VCO2 [place] 02/13/2023 26.5   Final    Rest PetCO2 02/13/2023 33.0   Final    AT Time 02/13/2023 4.07   Final    Peak Time 02/13/2023 6.33   Final    VO2 Rest 02/13/2023 4.0   Final    VO2 AT 02/13/2023 14.3   Final    VO2 Peak 02/13/2023 19.2   Final    VE/VO2 AT 02/13/2023 26   Final    VE/VO2 [place] 02/13/2023 36   Final    VE/VCO2 AT 02/13/2023 30   Final    VE/VCO2 Peak 02/13/2023 33   Final    O2 Sat Rest 02/13/2023 94   Final    O2 Sat Peak 02/13/2023 95   Final    AT Speed 02/13/2023 2.3   Final    Peak Speed 02/13/2023 2.7   Final    AT Grade 02/13/2023 2.5   Final    Peak Grade 02/13/2023 4.0   Final    Age 02/13/2023 [age]   Final    [facility] CV CPX PATIENT IS MALE 02/13/2023 1   Final    [facility] CV CPX PATIENT IS FEMALE 02/13/2023 0   Final    [facility] CV CPX PATIENT IS MALE AGE LES* 02/13/2023 0   Final    [facility] CV CPX PATIENT IS MALE AGE 11-* 02/13/2023 0   Final    [facility] CV CPX PATIENT IS MALE AGE GRE* 02/13/2023 1   Final    [facility] CV CPX PATIENT IS FEMALE AGE L* 02/13/2023 0   Final    [facility] CV CPX PATIENT IS FEMALE AGE 1* 02/13/2023 0   Final    [facility] CV CPX PATIENT IS FEMALE AGE G* 02/13/2023 0   Final    [facility] CV CPX PATIENT IS MALE LESS TH* 02/13/2023 0   Final    [facility] CV CPX PATIENT IS MALE GREATER* 02/13/2023 1   Final    [facility] CV CPX HIGHEST VO 02/13/2023 29.40   Final    [facility] CV CPX PERCENT BODY FAT 02/13/2023 22.7   Final    Exercise duration (sec) 02/13/2023 20  seconds Final    Exercise duration (min) 02/13/2023 6  minutes Final    FEV1/FVC 02/13/2023 0.68   Final    MVV Predicted 02/13/2023 88.40   Final   Lab Visit on 02/13/2023   Component Date Value Ref Range Status    CRP, High Sensitivity 02/13/2023 1.58  0.00 - 3.19 mg/L Final    WBC 02/13/2023 7.06  3.90 - 12.70 K/uL Final    RBC 02/13/2023 5.07  4.60 - 6.20 M/uL Final    Hemoglobin 02/13/2023 14.3  14.0 - 18.0 g/dL Final    Hematocrit 02/13/2023 45.8  40.0 - 54.0  % Final    MCV 02/13/2023 90  82 - 98 fL Final    MCH 02/13/2023 28.2  27.0 - 31.0 pg Final    MCHC 02/13/2023 31.2 (L)  32.0 - 36.0 g/dL Final    RDW 02/13/2023 14.8 (H)  11.5 - 14.5 % Final    Platelets 02/13/2023 198  150 - 450 K/uL Final    MPV 02/13/2023 9.7  9.2 - 12.9 fL Final    Immature Granulocytes 02/13/2023 0.3  0.0 - 0.5 % Final    Gran # (ANC) 02/13/2023 3.4  1.8 - 7.7 K/uL Final    Immature Grans (Abs) 02/13/2023 0.02  0.00 - 0.04 K/uL Final    Lymph # 02/13/2023 2.5  1.0 - 4.8 K/uL Final    Mono # 02/13/2023 0.8  0.3 - 1.0 K/uL Final    Eos # 02/13/2023 0.3  0.0 - 0.5 K/uL Final    Baso # 02/13/2023 0.10  0.00 - 0.20 K/uL Final    nRBC 02/13/2023 0  0 /100 WBC Final    Gran % 02/13/2023 48.1  38.0 - 73.0 % Final    Lymph % 02/13/2023 35.3  18.0 - 48.0 % Final    Mono % 02/13/2023 11.2  4.0 - 15.0 % Final    Eosinophil % 02/13/2023 3.7  0.0 - 8.0 % Final    Basophil % 02/13/2023 1.4  0.0 - 1.9 % Final    Differential Method 02/13/2023 Automated   Final    Cholesterol 02/13/2023 126  120 - 199 mg/dL Final    Triglycerides 02/13/2023 73  30 - 150 mg/dL Final    HDL 02/13/2023 52  40 - 75 mg/dL Final    LDL Cholesterol 02/13/2023 59.4 (L)  63.0 - 159.0 mg/dL Final    HDL/Cholesterol Ratio 02/13/2023 41.3  20.0 - 50.0 % Final    Total Cholesterol/HDL Ratio 02/13/2023 2.4  2.0 - 5.0 Final    Non-HDL Cholesterol 02/13/2023 74  mg/dL Final    Glucose, Fasting 02/13/2023 106  70 - 110 mg/dL Final   (    Assessment:     1. S/P CABG (coronary artery bypass graft)    2. Thoracic aortic atherosclerosis    3. Hyperlipidemia, unspecified hyperlipidemia type    4. Essential hypertension    5. Coronary artery disease involving native coronary artery of native heart without angina pectoris    6. CVD (cerebrovascular disease)    7. Type 2 diabetes mellitus without complication, without long-term current use of insulin      Plan:   Christiano was seen today for follow-up.    Diagnoses and all orders for this visit:    S/P  CABG (coronary artery bypass graft)    Thoracic aortic atherosclerosis    Hyperlipidemia, unspecified hyperlipidemia type    Essential hypertension    Coronary artery disease involving native coronary artery of native heart without angina pectoris    CVD (cerebrovascular disease)    Type 2 diabetes mellitus without complication, without long-term current use of insulin     Pt is doing well    Same meds    F/u with Dr Allan    Follow up in about 6 months (around 2/8/2024).

## 2024-02-27 ENCOUNTER — OFFICE VISIT (OUTPATIENT)
Dept: CARDIOLOGY | Facility: CLINIC | Age: 74
End: 2024-02-27
Payer: MEDICARE

## 2024-02-27 VITALS
DIASTOLIC BLOOD PRESSURE: 76 MMHG | OXYGEN SATURATION: 93 % | HEIGHT: 69 IN | WEIGHT: 244.06 LBS | HEART RATE: 62 BPM | BODY MASS INDEX: 36.15 KG/M2 | SYSTOLIC BLOOD PRESSURE: 130 MMHG

## 2024-02-27 DIAGNOSIS — E11.9 TYPE 2 DIABETES MELLITUS WITHOUT COMPLICATION, WITHOUT LONG-TERM CURRENT USE OF INSULIN: ICD-10-CM

## 2024-02-27 DIAGNOSIS — F07.0: ICD-10-CM

## 2024-02-27 DIAGNOSIS — I69.398: ICD-10-CM

## 2024-02-27 DIAGNOSIS — I70.0 THORACIC AORTIC ATHEROSCLEROSIS: ICD-10-CM

## 2024-02-27 DIAGNOSIS — Z95.1 S/P CABG (CORONARY ARTERY BYPASS GRAFT): ICD-10-CM

## 2024-02-27 DIAGNOSIS — I25.10 CORONARY ARTERY DISEASE INVOLVING NATIVE CORONARY ARTERY OF NATIVE HEART WITHOUT ANGINA PECTORIS: Primary | ICD-10-CM

## 2024-02-27 DIAGNOSIS — I65.23 BILATERAL CAROTID ARTERY STENOSIS: ICD-10-CM

## 2024-02-27 DIAGNOSIS — R06.02 SHORTNESS OF BREATH: ICD-10-CM

## 2024-02-27 DIAGNOSIS — E78.5 HYPERLIPIDEMIA, UNSPECIFIED HYPERLIPIDEMIA TYPE: ICD-10-CM

## 2024-02-27 DIAGNOSIS — E66.01 MORBID (SEVERE) OBESITY DUE TO EXCESS CALORIES: ICD-10-CM

## 2024-02-27 DIAGNOSIS — I10 ESSENTIAL HYPERTENSION: ICD-10-CM

## 2024-02-27 PROCEDURE — 99213 OFFICE O/P EST LOW 20 MIN: CPT | Mod: 25,S$GLB,, | Performed by: INTERNAL MEDICINE

## 2024-02-27 PROCEDURE — 1160F RVW MEDS BY RX/DR IN RCRD: CPT | Mod: CPTII,S$GLB,, | Performed by: INTERNAL MEDICINE

## 2024-02-27 PROCEDURE — 93000 ELECTROCARDIOGRAM COMPLETE: CPT | Mod: S$GLB,,, | Performed by: INTERNAL MEDICINE

## 2024-02-27 PROCEDURE — 1101F PT FALLS ASSESS-DOCD LE1/YR: CPT | Mod: CPTII,S$GLB,, | Performed by: INTERNAL MEDICINE

## 2024-02-27 PROCEDURE — 3078F DIAST BP <80 MM HG: CPT | Mod: CPTII,S$GLB,, | Performed by: INTERNAL MEDICINE

## 2024-02-27 PROCEDURE — 1126F AMNT PAIN NOTED NONE PRSNT: CPT | Mod: CPTII,S$GLB,, | Performed by: INTERNAL MEDICINE

## 2024-02-27 PROCEDURE — 99999 PR PBB SHADOW E&M-EST. PATIENT-LVL III: CPT | Mod: PBBFAC,,, | Performed by: INTERNAL MEDICINE

## 2024-02-27 PROCEDURE — 3075F SYST BP GE 130 - 139MM HG: CPT | Mod: CPTII,S$GLB,, | Performed by: INTERNAL MEDICINE

## 2024-02-27 PROCEDURE — 1159F MED LIST DOCD IN RCRD: CPT | Mod: CPTII,S$GLB,, | Performed by: INTERNAL MEDICINE

## 2024-02-27 PROCEDURE — 3288F FALL RISK ASSESSMENT DOCD: CPT | Mod: CPTII,S$GLB,, | Performed by: INTERNAL MEDICINE

## 2024-02-27 RX ORDER — UBIDECARENONE 30 MG
1 CAPSULE ORAL 3 TIMES DAILY
COMMUNITY

## 2024-02-27 RX ORDER — ESCITALOPRAM OXALATE 5 MG/1
5 TABLET ORAL DAILY
COMMUNITY
Start: 2024-01-10

## 2024-02-27 RX ORDER — ASPIRIN 81 MG/1
81 TABLET ORAL DAILY
COMMUNITY

## 2024-02-27 RX ORDER — CLOPIDOGREL BISULFATE 75 MG/1
75 TABLET ORAL DAILY
COMMUNITY

## 2024-02-27 NOTE — PROGRESS NOTES
"  Subjective:      Patient ID: Christiano Frost is a 73 y.o. male.    Chief Complaint: Follow-up    HPI: Walks daily a mile and a half up until catching "the walking crud"  with clear sputum and no fever..    Has had some shortness of breath with exertionover the past couple of weeks.    Review of Systems   Cardiovascular:  Positive for dyspnea on exertion. Negative for chest pain, claudication, irregular heartbeat, leg swelling, near-syncope, orthopnea, palpitations and syncope.      C/o "incomplete evacuation".  Pt last had a colonoscopy about 2 years ago.      Past Medical History:   Diagnosis Date    BPH (benign prostatic hyperplasia)     Depression     Diabetes mellitus     GERD (gastroesophageal reflux disease)     Hyperlipidemia     Hypertension     Obesity     TIA (transient ischemic attack) 0322/2016        Past Surgical History:   Procedure Laterality Date    CORONARY ARTERY BYPASS GRAFT (CABG) Left 7/29/2022    Procedure: CORONARY ARTERY BYPASS GRAFT (CABG);  Surgeon: Magen Martell MD;  Location: [facility] OR [address] [place] [address];  Service: Cardiovascular;  Laterality: Left;  cabg3 with vein harvest    ENDOSCOPIC HARVEST OF VEIN Left 7/29/2022    Procedure: HARVEST-VEIN-ENDOVASCULAR;  Surgeon: Magen Martell MD;  Location: [facility] OR [address] [place] [address];  Service: Cardiovascular;  Laterality: Left;  Vein [place] Start time: 0818am  Vein [place] Stop time: 0926am    Vein Prep Start time: 0928am  Vein Prep Stop time: 1002am    LEFT HEART CATHETERIZATION Left 7/19/2022    Procedure: Left heart cath;  Surgeon: Ben Ortega MD;  Location: [facility] CATH LAB/EP;  Service: Cardiology;  Laterality: Left;    SPINE SURGERY  2044 2005 2008       No family history on file.    Social History     Socioeconomic History    Marital status:    Tobacco Use    Smoking status: Never    Smokeless tobacco: Never       Current Outpatient Medications on File Prior to Visit   Medication Sig Dispense Refill    acetaminophen (TYLENOL) 500 MG tablet " Take 2 tablets (1,000 mg total) by mouth every 6 (six) hours as needed for Pain. 30 tablet 0    aspirin (ECOTRIN) 81 MG EC tablet Take 81 mg by mouth once daily.      atorvastatin (LIPITOR) 40 MG tablet Take 1 tablet (40 mg total) by mouth once daily. 90 tablet 3    cholecalciferol, vitamin D3, (VITAMIN D3) 25 mcg (1,000 unit) capsule Take 1,000 Units by mouth once daily.      clopidogreL (PLAVIX) 75 mg tablet Take 75 mg by mouth once daily.      co-enzyme Q-10 30 mg capsule Take 1 capsule by mouth 3 (three) times daily.      dutasteride (AVODART) 0.5 mg capsule Take 0.5 mg by mouth once daily.      EScitalopram oxalate (LEXAPRO) 5 MG Tab Take 5 mg by mouth once daily.      esomeprazole (NEXIUM) 40 MG capsule Take by mouth.      Lactobacillus rhamnosus GG (CULTURELLE) 10 billion cell capsule Take 1 capsule by mouth once daily.      metoprolol succinate (TOPROL-XL) 100 MG 24 hr tablet Take 1 tablet (100 mg total) by mouth once daily. 90 tablet 3    omega-3 fatty acids 1,000 mg Cap Take by mouth.      tamsulosin (FLOMAX) 0.4 mg Cp24 0.4 mg once daily.       [DISCONTINUED] escitalopram oxalate (LEXAPRO) 10 MG tablet Take 10 mg by mouth once daily.       OZEMPIC 0.25 mg or 0.5 mg (2 mg/3 mL) pen injector Inject into the skin.      [DISCONTINUED] aspirin 325 MG tablet Take 1 tablet (325 mg total) by mouth once daily. (Patient not taking: Reported on 2/27/2024) 30 tablet 11    [DISCONTINUED] enalapril (VASOTEC) 5 MG tablet TK 1 T PO  QD  0    [DISCONTINUED] metFORMIN (GLUCOPHAGE) 500 MG tablet Take 1 tablet (500 mg total) by mouth daily with breakfast. (Patient not taking: No sig reported)       No current facility-administered medications on file prior to visit.       Review of patient's allergies indicates:  No Known Allergies  Objective:     Vitals:    02/27/24 1109   BP: 130/76   BP Location: Right arm   Patient Position: Sitting   BP Method: Large (Automatic)   Pulse: 62   SpO2: (!) 93%   Weight: 110.7 kg (244 lb 0.8  "oz)   Height: 5' 9" (1.753 m)        Physical Exam  Constitutional:       General: He is not in acute distress.     Appearance: He is well-developed. He is not diaphoretic.   Eyes:      General: No scleral icterus.  Neck:      Vascular: No carotid bruit or JVD.   Cardiovascular:      Rate and Rhythm: Regular rhythm.      Heart sounds: Normal heart sounds. No murmur heard.     No friction rub. No gallop.   Pulmonary:      Effort: Pulmonary effort is normal. No respiratory distress.      Breath sounds: Normal breath sounds.   Musculoskeletal:      Right lower leg: Edema present.      Left lower leg: No edema.   Skin:     General: Skin is warm and dry.   Neurological:      Mental Status: He is alert and oriented to person, place, and time.   Psychiatric:         Behavior: Behavior normal.         Thought Content: Thought content normal.         Judgment: Judgment normal.          Wt up 17 lbs over the past 2 years.      ECG today reviewed by me; NSR with first degree AV block, otherwise WNL    No visits with results within 6 Month(s) from this visit.   Latest known visit with results is:   Hospital Outpatient Visit on 06/28/2023   Component Date Value Ref Range Status    Holter length hours 06/28/2023 47   Final    holter length minutes 06/28/2023 59   Final    holter length dec hours 06/28/2023 47.98   Final    Sinus min HR 06/28/2023 50   Final    Sinus max hr 06/28/2023 103   Final    Sinus avg hr 06/28/2023 66   Final    Event Monitor Day 06/28/2023 0   Final   (    [facility]  Outside Information  Results  MRA Neck with and without contrast (Order [id number])     MRA Neck with and without contrast  Order: [id number]  Impression    Normal MRA neck and brain.   Chronic microvascular ischemic angiopathy, increased compared to prior MRI brain from 2009.     Electronically Signed By: Cary Barrera MD 2/21/2024 4:36 PM CST  Narrative    CLINICAL HISTORY: G45.9   TIA (transient ischemic attack). Dizziness, weakness "     TECHNIQUE: MRA of the brain and neck was performed using time-of-flight technique without intravenous contrast. Postcontrast MRA images of the neck were also obtained.     COMPARISON: Prior CT head from 3/23/2016 and MRI brain from 6/22/2009     FINDINGS:   MRA NECK:     Aortic arch: Limited visualization of the aortic arch is unremarkable.     Common carotid arteries: Patent without evidence of stenosis.     Internal carotid arteries: Patent without evidence of stenosis.     Vertebral arteries: Patent without evidence of stenosis.     MRA BRAIN:     Internal carotid arteries: Normal distal internal carotid arteries.     Cerebral arteries: The anterior, middle, and posterior cerebral arteries are patent.     Posterior Circulation: Normal vertebrobasilar system.     FLAIR sequences images demonstrate confluent periventricular and patchy subcortical white matter FLAIR hyperintensities, consistent with chronic microvascular ischemic angiopathy, which appears slightly increased compared to the most recent prior MRI brain from 2009.  Exam End: 02/21/24 14:09    Specimen Collected: 02/21/24 16:22 Last Resulted: 02/21/24 16:36   Received From: [facility]  Result Received: 02/27/24 11:00   Accession #: [id number]  Christiano Frost  Holter monitor - 48 hour  Order# [id number]  Reading physician: John Meyers MD Ordering physician: John Meyers MD Study date: 10/3/22     Reason for Exam  Priority: Routine  Dx: Hx of CABG [Z95.1 (ICD-10-CM)]; Coronary artery disease involving native coronary artery of native heart without angina pectoris [I25.10 (ICD-10-CM)]; S/P CABG (coronary artery bypass graft) [Z95.1 (ICD-10-CM)]; Hyperlipidemia, unspecified hyperlipidemia type [E78.5 (ICD-10-CM)]; Essential hypertension [I10 (ICD-10-CM)]; Iron deficiency anemia due to chronic blood loss [D50.0 (ICD-10-CM)]; Type 2 diabetes mellitus without complication, without long-term current use of insulin [E11.9 (ICD-10-CM)]  "    Conclusion    Normal sinus rhythm with a heart rate variable between 55 and 126 bpm, avertaging 68 bpm  A first degree AV block is a variable finding  There are rare pauses of up to 1.9 seconds due to non-conducted premature atrial contractions  There are extremely rare, isolated PVC's  There are occasional PAC's inculding 2 PAC couplets and brief atrial bigeminy  There are no abnormalities on the ECG at the times the pat reported symptoms of "heavy breathing", "chest pain", and "fast heartbeat". A blocked PAC correlated with symptom of "chest pain"     Performing Clinician    Barbara Puetne, RT      Chol HDL LDL CHOLc) TG   02/13/23 0742 126 52 59.4 Important  73   11/01/22 0722 129 61 47.0 Important  105   Encounter Form Printed    Encounter form printed on 07/20/22 10:38 AM         CT Chest Without Contrast  Status: Final result     MyChart Results Release    [employer] Status: Pending  Results Release     PACS Images for ViTAL Craig Viewer     Show images for CT Chest Without Contrast  All Reviewers List    Magen Martell MD on 7/21/2022 03:48   Analisa Hui RN on 7/20/2022 16:14     CT Chest Without Contrast  Order: [id number]  Status: Final result      Visible to patient: No (inaccessible in Patient Portal)       Next appt: None      Dx: Coronary artery disease involving valerie...       0 Result Notes      Details    Reading Physician Reading Date Result Priority   Rebeka Caballero MD  [phone] 7/20/2022 Routine   Avery Chan MD  [phone]  [phone] 7/20/2022      Narrative & Impression  EXAMINATION:  CT CHEST WITHOUT CONTRAST     CLINICAL HISTORY:  cad; Atherosclerotic heart disease of native coronary artery without angina pectoris     TECHNIQUE:  Low dose axial images, sagittal and coronal reformations were obtained from the thoracic inlet to the lung bases. Contrast was not administered.     COMPARISON:  None.     FINDINGS:  SOFT TISSUES:  Unremarkable.     HEART & " MEDIASTINUM: Severe coronary artery calcific disease..  Mild-to-moderate calcific plaque of the visualized aorta and arch vessels.  Mildly dilated left atrium.     PLEURA: No significant abnormalities.     LUNGS AND AIRWAYS:   Subsegmental atelectasis in the right lobe.  No radiographic evidence of pneumonia or pulmonary edema is identified.     ESOPHAGUS:  Small hiatal hernia.     UPPER ABDOMEN (limited):   2 subcentimeter low-density lesions within the liver.  These are not fully characterize, but are typically related to cysts versus hemangiomas.     BONES: Degenerative changes of the spine.  No fractures or focal osseous lesions.     Impression:     1.  Mildly dilated left atrium.  2.  Severe bilateral coronary artery calcific disease.  3.  Other findings as above.     Electronically signed by resident: Avery Chan  Date:                                            07/20/2022  Time:                                           14:44     Electronically signed by: Rebeka Caballero  Date:                                            07/20/2022  Time:                                           16:04               Exam Ended: 07/20/22 11:07 Last Resulted: 07/20/22 16:04            VAS US Carotid Bilateral  Status: Final result     MyChart Results Release    [employer] Status: Pending  Results Release     Viewpoint Result Report     Please click here to view VAS LAB report for Vascular procedure (Viewpoint0  All Reviewers List    Magen Martell MD on 7/20/2022 14:22   Analisa Hui RN on 7/20/2022 11:48     VAS US Carotid Bilateral  Order: [id number]  Status: Final result      Visible to patient: No (inaccessible in Patient Portal)       Next appt: None      Dx: Coronary artery disease involving valerie...       0 Result Notes      Details    Narrative    Indication   ========     Bilateral carotid artery stenosis; Pre-Op exam; CAD     Results   ======     Right CCA prox  cm/s   Right CCA prox     ED          15  cm/s   Right CCA distal PSV   92 cm/s   Right CCA distal ED    17 cm/s   Right bulb PSV 70 cm/s   Right bulb ED  13 cm/s   Right ICA prox PSV 61 cm/s   Right ICA prox EDV 12 cm/s   Right ICA distal PSV   111 cm/s   Right ICA distal EDV   33 cm/s   Right ICA PSV / right CCA PSV  1.0   Right ICA EDV / right CCA EDV  1.9   Right ECA mid PSV  83 cm/s   Right VERT PSV 45 cm/s   Right VERT findings:   Antegrade flow   Left CCA prox PSV  96 cm/s   Left CCA prox ED   8 cm/s   Left CCA distal PSV    109 cm/s   Left CCA distal ED 21 cm/s   Left bulb PSV  98 cm/s   Left bulb ED   19 cm/s   Left ICA prox PSV  69 cm/s   Left ICA prox EDV  20 cm/s   Left ICA mid PSV   58 cm/s   Left ICA mid EDV   13 cm/s   Left ICA distal PSV    60 cm/s   Left ICA distal EDV    21 cm/s   Left ICA PSV / Left CCA PSV    0.9   Left ICA EDV / Left CCA EDV    1.0   Left ECA mid PSV   61 cm/s   Left VERT PSV  57 cm/s   Left VERT findings:    Antegrade flow   Plaque presence:   ICA plaque identified, CCA plaque identified   Right ICA plaque:  Heterogeneous   Right CCA plaque:  Heterogeneous   Left ICA plaque:   Heterogeneous   Left CCA plaque:   Heterogeneous     Impression   =========     RIGHT SIDE:   1-39% Right ICA stenosis.   Heterogeneous plaque noted in the right internal carotid artery.   Heterogeneous plaque noted in the right common carotid artery.   Antegrade flow noted in the right vertebral artery.     LEFT SIDE:   1-39% Left ICA stenosis. The ICA is highly tortuous in nature and a high bifurcation is identified.   Heterogeneous plaque noted in the left internal carotid artery.   Heterogeneous plaque noted in the left common carotid artery.   Antegrade flow noted in the left vertebral artery.       DATE OF SERVICE: 07/20/2022                                                       Sonographer: ANA PHILLIPS RVS   Electronically Signed by: David Andres M.D. at 07/20/2022-11:31             Specimen Collected: 07/20/22 10:09 Last Resulted:  07/20/22 11:32              Assessment:     1. Coronary artery disease involving native coronary artery of native heart without angina pectoris    2. S/P CABG (coronary artery bypass graft)    3. Thoracic aortic atherosclerosis    4. Essential hypertension    5. Hyperlipidemia, unspecified hyperlipidemia type    6. Bilateral carotid artery stenosis    7. Type 2 diabetes mellitus without complication, without long-term current use of insulin    8. 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 Shortness of breath    10. Morbid (severe) obesity due to excess calories      Plan:   Christiano was seen today for follow-up.    Diagnoses and all orders for this visit:    Coronary artery disease involving native coronary artery of native heart without angina pectoris  -     IN OFFICE EKG 12-LEAD (to Williams)    S/P CABG (coronary artery bypass graft)  -     IN OFFICE EKG 12-LEAD (to Muse)    Thoracic aortic atherosclerosis  -     IN OFFICE EKG 12-LEAD (to Muse)    Essential hypertension  -     IN OFFICE EKG 12-LEAD (to Muse)    Hyperlipidemia, unspecified hyperlipidemia type  -     IN OFFICE EKG 12-LEAD (to Muse)    Bilateral carotid artery stenosis  -     IN OFFICE EKG 12-LEAD (to Muse)    Type 2 diabetes mellitus without complication, without long-term current use of insulin  -     IN OFFICE EKG 12-LEAD (to Muse)    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

## 2024-02-28 LAB
OHS QRS DURATION: 94 MS
OHS QTC CALCULATION: 406 MS

## 2024-05-16 DIAGNOSIS — I65.23 BILATERAL CAROTID ARTERY STENOSIS: ICD-10-CM

## 2024-05-16 DIAGNOSIS — E78.5 HYPERLIPIDEMIA, UNSPECIFIED HYPERLIPIDEMIA TYPE: Primary | ICD-10-CM

## 2024-05-16 DIAGNOSIS — I10 ESSENTIAL HYPERTENSION: ICD-10-CM

## 2024-05-16 DIAGNOSIS — I25.10 CORONARY ARTERY DISEASE INVOLVING NATIVE CORONARY ARTERY OF NATIVE HEART WITHOUT ANGINA PECTORIS: ICD-10-CM

## 2024-05-21 ENCOUNTER — TELEPHONE (OUTPATIENT)
Dept: CARDIOLOGY | Facility: CLINIC | Age: 74
End: 2024-05-21
Payer: MEDICARE

## 2024-05-21 RX ORDER — ATORVASTATIN CALCIUM 40 MG/1
40 TABLET, FILM COATED ORAL DAILY
Qty: 90 TABLET | Refills: 3 | Status: SHIPPED | OUTPATIENT
Start: 2024-05-21 | End: 2025-05-21

## 2024-05-21 RX ORDER — METOPROLOL SUCCINATE 100 MG/1
100 TABLET, EXTENDED RELEASE ORAL DAILY
Qty: 90 TABLET | Refills: 3 | Status: SHIPPED | OUTPATIENT
Start: 2024-05-21 | End: 2025-05-21

## 2024-05-21 NOTE — TELEPHONE ENCOUNTER
Spoke with patient's wife.  Refill approval sent to provider and have been approved.    ----- Message from Padilla Hernandez sent at 5/16/2024 10:12 AM CDT -----  .Type:  Needs Medical Advice    Who Called: pt wife Aurora    Would the patient rather a call back or a response via [facility]? Call back  Best Call Back Number: [phone]  Additional Information:     Pt wife stated she would like a call back from Eveline regarding pt medication

## 2024-06-26 ENCOUNTER — HOSPITAL ENCOUNTER (OUTPATIENT)
Dept: PREADMISSION TESTING | Facility: OTHER | Age: 74
Discharge: HOME | End: 2024-06-26
Attending: ORTHOPAEDIC SURGERY
Payer: MEDICARE

## 2024-06-26 ENCOUNTER — ANESTHESIA EVENT (OUTPATIENT)
Dept: SURGERY | Facility: OTHER | Age: 74
End: 2024-06-26
Payer: MEDICARE

## 2024-06-26 VITALS
RESPIRATION RATE: 18 BRPM | HEART RATE: 74 BPM | WEIGHT: 244 LBS | OXYGEN SATURATION: 93 % | HEIGHT: 69 IN | TEMPERATURE: 97 F | BODY MASS INDEX: 36.14 KG/M2 | SYSTOLIC BLOOD PRESSURE: 135 MMHG | DIASTOLIC BLOOD PRESSURE: 72 MMHG

## 2024-06-26 DIAGNOSIS — E66.01 MORBID (SEVERE) OBESITY DUE TO EXCESS CALORIES: ICD-10-CM

## 2024-06-26 DIAGNOSIS — Z01.818 PREOP TESTING: Primary | ICD-10-CM

## 2024-06-26 LAB
BASOPHILS # BLD AUTO: 0.09 K/UL (ref 0–0.2)
BASOPHILS NFR BLD: 1.4 % (ref 0–1.9)
DIFFERENTIAL METHOD BLD: NORMAL
EOSINOPHIL # BLD AUTO: 0.3 K/UL (ref 0–0.5)
EOSINOPHIL NFR BLD: 4.5 % (ref 0–8)
ERYTHROCYTE [DISTWIDTH] IN BLOOD BY AUTOMATED COUNT: 12.9 % (ref 11.5–14.5)
HCT VFR BLD AUTO: 46.8 % (ref 40–54)
HGB BLD-MCNC: 15.4 G/DL (ref 14–18)
IMM GRANULOCYTES # BLD AUTO: 0.03 K/UL (ref 0–0.04)
IMM GRANULOCYTES NFR BLD AUTO: 0.5 % (ref 0–0.5)
LYMPHOCYTES # BLD AUTO: 2.1 K/UL (ref 1–4.8)
LYMPHOCYTES NFR BLD: 33.7 % (ref 18–48)
MCH RBC QN AUTO: 29.8 PG (ref 27–31)
MCHC RBC AUTO-ENTMCNC: 32.9 G/DL (ref 32–36)
MCV RBC AUTO: 91 FL (ref 82–98)
MONOCYTES # BLD AUTO: 0.6 K/UL (ref 0.3–1)
MONOCYTES NFR BLD: 10.3 % (ref 4–15)
NEUTROPHILS # BLD AUTO: 3.1 K/UL (ref 1.8–7.7)
NEUTROPHILS NFR BLD: 49.6 % (ref 38–73)
NRBC BLD-RTO: 0 /100 WBC
PLATELET # BLD AUTO: 185 K/UL (ref 150–450)
PMV BLD AUTO: 9.5 FL (ref 9.2–12.9)
RBC # BLD AUTO: 5.16 M/UL (ref 4.6–6.2)
WBC # BLD AUTO: 6.23 K/UL (ref 3.9–12.7)

## 2024-06-26 PROCEDURE — 36415 COLL VENOUS BLD VENIPUNCTURE: CPT | Performed by: ANESTHESIOLOGY

## 2024-06-26 PROCEDURE — 85025 COMPLETE CBC W/AUTO DIFF WBC: CPT | Performed by: ANESTHESIOLOGY

## 2024-06-26 RX ORDER — SODIUM CHLORIDE 0.9 % (FLUSH) 0.9 %
3 SYRINGE (ML) INJECTION
OUTPATIENT
Start: 2024-06-26

## 2024-06-26 RX ORDER — HYDROMORPHONE HYDROCHLORIDE 2 MG/ML
0.4 INJECTION, SOLUTION INTRAMUSCULAR; INTRAVENOUS; SUBCUTANEOUS EVERY 5 MIN PRN
OUTPATIENT
Start: 2024-06-26

## 2024-06-26 RX ORDER — OXYCODONE HYDROCHLORIDE 5 MG/1
5 TABLET ORAL
OUTPATIENT
Start: 2024-06-26

## 2024-06-26 RX ORDER — PROCHLORPERAZINE EDISYLATE 5 MG/ML
5 INJECTION INTRAMUSCULAR; INTRAVENOUS EVERY 30 MIN PRN
OUTPATIENT
Start: 2024-06-26

## 2024-06-26 RX ORDER — MEPERIDINE HYDROCHLORIDE 25 MG/ML
12.5 INJECTION INTRAMUSCULAR; INTRAVENOUS; SUBCUTANEOUS ONCE AS NEEDED
OUTPATIENT
Start: 2024-06-26 | End: 2024-06-27

## 2024-06-26 NOTE — DISCHARGE INSTRUCTIONS
Information to Prepare you for your Surgery    PRE-ADMIT TESTING -  [phone]    [address]  [facility]          Your surgery has been scheduled at [facility]. We are pleased to have the opportunity to serve you. For Further Information please call [phone].    On the day of surgery please report to the Information Desk on the 1st floor.    CONTACT YOUR PHYSICIAN'S OFFICE THE DAY PRIOR TO YOUR SURGERY TO OBTAIN YOUR ARRIVAL TIME.     The evening before surgery do not eat anything after 9 p.m. ( this includes hard candy, chewing gum and mints).  You may only have GATORADE, POWERADE AND WATER  from 9 p.m. until you leave your home.   DO NOT DRINK ANY LIQUIDS ON THE WAY TO THE HOSPITAL.      Why does your anesthesiologist allow you to drink Gatorade/Powerade before surgery?  Gatorade/Powerade helps to increase your comfort before surgery and to decrease your nausea after surgery. The carbohydrates in Gatorade/Powerade help reduce your body's stress response to surgery.  If you are a diabetic-drink only water prior to surgery.    Outpatient Surgery- May allow 2 adult (18 and older) Support Persons (1 being the designated ) for all surgical/procedural patients. A breastfeeding mother will be allowed her infant and 2 adult Support Persons. No one under the age of 18 will be allowed in the building. No swapping out of visitors in the [facility].      SPECIAL MEDICATION INSTRUCTIONS: TAKE medications checked off by the Anesthesiologist on your Medication List.    Angiogram Patients: Take medications as instructed by your physician, including aspirin.     Surgery Patients:    If you take ASPIRIN - Your PHYSICIAN/SURGEON will need to inform you IF/OR when you need to stop taking aspirin prior to your surgery.     The week prior to surgery do not ot take any medications containing IBUPROFEN or NSAIDS ( Advil, Motrin, Goodys, BC, Aleve, Naproxen etc)  If you are not sure if you should take a medicine please call your surgeon's office.  Ok to take Tylenol    Do Not Wear any make-up (especially eye make-up) to surgery. Please remove any false eyelashes or eyelash extensions. If you arrive the day of surgery with makeup/eyelashes on you will be required to remove prior to surgery. (There is a risk of corneal abrasions if eye makeup/eyelash extensions are not removed)      Leave all valuables at home.   Do Not wear any jewelry or watches, including any metal in body piercings. Jewelry must be removed prior to coming to the hospital.  There is a possibility that rings that are unable to be removed may be cut off if they are on the surgical extremity.    Please remove all hair extensions, wigs, clips and any other metal accessories/ ornaments from your hair.  These items may pose a flammable/fire risk in Surgery and must be removed.    Do not shave your surgical area at least 5 days prior to your surgery. The surgical prep will be performed at the hospital according to Infection Control regulations.    Contact Lens must be removed before surgery. Either do not wear the contact lens or bring a case and solution for storage.  Please bring a container for eyeglasses or dentures as required.  Bring any paperwork your physician has provided, such as consent forms,  history and physicals, doctor's orders, etc.   Bring comfortable clothes that are loose fitting to wear upon discharge. Take into consideration the type of surgery being performed.  Maintain your diet as advised per your physician the day prior to surgery.      Adequate rest the night before surgery is advised.   Park in the Parking lot behind the hospital or in the [facility] across the street from the parking lot. Parking is complimentary.  If you will be discharged the same day as your procedure, please arrange for a responsible adult to drive you home or to accompany you if traveling by taxi.    YOU WILL NOT BE PERMITTED TO DRIVE OR TO LEAVE THE HOSPITAL ALONE AFTER SURGERY.   If you are being discharged the same day, it is strongly recommended that you arrange for someone to remain with you for the first 24 hrs following your surgery.    The Surgeon will speak to your family/visitor after your surgery regarding the outcome of your surgery and post op care.  The Surgeon may speak to you after your surgery, but there is a possibility you may not remember the details.  Please check with your family members regarding the conversation with the Surgeon.    We strongly recommend whoever is bringing you home be present for discharge instructions.  This will ensure a thorough understanding for your post op home care.          Thank you for your cooperation.  The Staff of [facility].            Bathing Instructions with Hibiclens    Shower the evening before and morning of your procedure with Chlorhexidine (Hibiclens)  do not use Chlorhexidine on your face or genitals. Do not get in your eyes.  Wash your face with water and your regular face wash/soap  Use your regular shampoo  Apply Chlorhexidine (Hibiclens) directly on your skin or on a wet washcloth and wash gently. When showering: Move away from the shower stream when applying Chlorhexidine (Hibiclens) to avoid rinsing off too soon.  Rinse thoroughly with warm water  Do not dilute Chlorhexidine (Hibiclens)   Dry off as usual, do not use any deodorant, powder, body lotions, perfume, after shave or cologne.

## 2024-06-26 NOTE — ANESTHESIA PREPROCEDURE EVALUATION
06/26/2024  Christiano Frost is a 73 y.o., male.      Pre-op Assessment    I have reviewed the Patient Summary Reports.     I have reviewed the Nursing Notes. I have reviewed the NPO Status.   I have reviewed the Medications.     Review of Systems  Anesthesia Hx:  No previous Anesthesia   History of prior surgery of interest to airway management or planning: heart surgery. Previous anesthesia: General CABG 2022 main campus with general anesthesia.        Denies Family Hx of Anesthesia complications.    Denies Personal Hx of Anesthesia complications.                    Social:  Non-Smoker       Hematology/Oncology:  Hematology Normal   Oncology Normal                                   EENT/Dental:  EENT/Dental Normal           Cardiovascular:     Hypertension, well controlled   CAD   CABG/stent       PVD hyperlipidemia   ECG has been reviewed. Sean carotid stenosis  Cardiology note in epic                         Pulmonary:       Recent URI Sleep Apnea                Renal/:  Renal/ Normal                 Hepatic/GI:     GERD             Musculoskeletal:  Musculoskeletal Normal                Neurological:  TIA, CVA, no residual symptoms                                    Endocrine:  Diabetes         Obesity / BMI > 30  Dermatological:  Skin Normal    Psych:  Psychiatric History                Physical Exam  General: Cooperative, Alert and Oriented    Airway:  Mallampati: III   Mouth Opening: Normal  Neck ROM: Normal ROM    Dental:  Intact    Anesthesia Plan  Type of Anesthesia, risks & benefits discussed:    Anesthesia Type: Regional  Intra-op Monitoring Plan: Standard ASA Monitors  Post Op Pain Control Plan: multimodal analgesia  ASA Score: 3  Anesthesia Plan Notes: Dr arthur said Regional ok  EKG,labs in [facility]. Will hold Plavix 5 days pre op    Ready For Surgery From Anesthesia Perspective.     .

## 2024-07-05 ENCOUNTER — ANESTHESIA (OUTPATIENT)
Dept: SURGERY | Facility: OTHER | Age: 74
End: 2024-07-05
Payer: MEDICARE

## 2024-07-05 ENCOUNTER — HOSPITAL ENCOUNTER (OUTPATIENT)
Facility: OTHER | Age: 74
Discharge: HOME | End: 2024-07-05
Attending: ORTHOPAEDIC SURGERY | Admitting: ORTHOPAEDIC SURGERY
Payer: MEDICARE

## 2024-07-05 DIAGNOSIS — G56.21 ULNAR NERVE ENTRAPMENT AT ELBOW, RIGHT: Primary | ICD-10-CM

## 2024-07-05 PROCEDURE — 37000008 HC ANESTHESIA 1ST 15 MINUTES: Performed by: ORTHOPAEDIC SURGERY

## 2024-07-05 PROCEDURE — 63600175 PHARM REV CODE 636 W HCPCS: Performed by: NURSE ANESTHETIST, CERTIFIED REGISTERED

## 2024-07-05 PROCEDURE — 36000707: Performed by: ORTHOPAEDIC SURGERY

## 2024-07-05 PROCEDURE — 25000003 PHARM REV CODE 250: Performed by: NURSE ANESTHETIST, CERTIFIED REGISTERED

## 2024-07-05 PROCEDURE — 63600175 PHARM REV CODE 636 W HCPCS

## 2024-07-05 PROCEDURE — 36000706: Performed by: ORTHOPAEDIC SURGERY

## 2024-07-05 PROCEDURE — 71000015 HC POSTOP RECOV 1ST HR: Performed by: ORTHOPAEDIC SURGERY

## 2024-07-05 PROCEDURE — 63600175 PHARM REV CODE 636 W HCPCS: Performed by: ORTHOPAEDIC SURGERY

## 2024-07-05 PROCEDURE — 37000009 HC ANESTHESIA EA ADD 15 MINS: Performed by: ORTHOPAEDIC SURGERY

## 2024-07-05 PROCEDURE — 71000016 HC POSTOP RECOV ADDL HR: Performed by: ORTHOPAEDIC SURGERY

## 2024-07-05 PROCEDURE — 63600175 PHARM REV CODE 636 W HCPCS: Performed by: ANESTHESIOLOGY

## 2024-07-05 PROCEDURE — 76942 ECHO GUIDE FOR BIOPSY: CPT | Performed by: ANESTHESIOLOGY

## 2024-07-05 PROCEDURE — 25000003 PHARM REV CODE 250: Performed by: ORTHOPAEDIC SURGERY

## 2024-07-05 RX ORDER — LIDOCAINE HYDROCHLORIDE 10 MG/ML
INJECTION, SOLUTION INTRAVENOUS
Status: DISCONTINUED | OUTPATIENT
Start: 2024-07-05 | End: 2024-07-05

## 2024-07-05 RX ORDER — CEFAZOLIN SODIUM 1 G/3ML
INJECTION, POWDER, FOR SOLUTION INTRAMUSCULAR; INTRAVENOUS
Status: DISCONTINUED | OUTPATIENT
Start: 2024-07-05 | End: 2024-07-05

## 2024-07-05 RX ORDER — SODIUM CHLORIDE 0.9 % (FLUSH) 0.9 %
3 SYRINGE (ML) INJECTION
Status: DISCONTINUED | OUTPATIENT
Start: 2024-07-05 | End: 2024-07-05 | Stop reason: HOSPADM

## 2024-07-05 RX ORDER — HYDROMORPHONE HYDROCHLORIDE 2 MG/ML
0.4 INJECTION, SOLUTION INTRAMUSCULAR; INTRAVENOUS; SUBCUTANEOUS EVERY 5 MIN PRN
Status: DISCONTINUED | OUTPATIENT
Start: 2024-07-05 | End: 2024-07-05 | Stop reason: HOSPADM

## 2024-07-05 RX ORDER — ONDANSETRON HYDROCHLORIDE 2 MG/ML
INJECTION, SOLUTION INTRAVENOUS
Status: DISCONTINUED | OUTPATIENT
Start: 2024-07-05 | End: 2024-07-05

## 2024-07-05 RX ORDER — OXYCODONE HYDROCHLORIDE 5 MG/1
5 TABLET ORAL EVERY 4 HOURS PRN
Status: DISCONTINUED | OUTPATIENT
Start: 2024-07-05 | End: 2024-07-05 | Stop reason: HOSPADM

## 2024-07-05 RX ORDER — PROPOFOL 10 MG/ML
VIAL (ML) INTRAVENOUS CONTINUOUS PRN
Status: DISCONTINUED | OUTPATIENT
Start: 2024-07-05 | End: 2024-07-05

## 2024-07-05 RX ORDER — MEPERIDINE HYDROCHLORIDE 25 MG/ML
12.5 INJECTION INTRAMUSCULAR; INTRAVENOUS; SUBCUTANEOUS ONCE AS NEEDED
Status: DISCONTINUED | OUTPATIENT
Start: 2024-07-05 | End: 2024-07-05 | Stop reason: HOSPADM

## 2024-07-05 RX ORDER — SODIUM CHLORIDE, SODIUM LACTATE, POTASSIUM CHLORIDE, CALCIUM CHLORIDE 600; 310; 30; 20 MG/100ML; MG/100ML; MG/100ML; MG/100ML
INJECTION, SOLUTION INTRAVENOUS CONTINUOUS
Status: DISCONTINUED | OUTPATIENT
Start: 2024-07-05 | End: 2024-07-05 | Stop reason: HOSPADM

## 2024-07-05 RX ORDER — CEFAZOLIN 2 G/1
2 INJECTION, POWDER, FOR SOLUTION INTRAMUSCULAR; INTRAVENOUS
Status: DISCONTINUED | OUTPATIENT
Start: 2024-07-05 | End: 2024-07-05 | Stop reason: HOSPADM

## 2024-07-05 RX ORDER — FENTANYL CITRATE 50 UG/ML
INJECTION, SOLUTION INTRAMUSCULAR; INTRAVENOUS
Status: DISCONTINUED | OUTPATIENT
Start: 2024-07-05 | End: 2024-07-05

## 2024-07-05 RX ORDER — BUPIVACAINE HYDROCHLORIDE AND EPINEPHRINE 2.5; 5 MG/ML; UG/ML
INJECTION, SOLUTION EPIDURAL; INFILTRATION; INTRACAUDAL; PERINEURAL
Status: DISCONTINUED | OUTPATIENT
Start: 2024-07-05 | End: 2024-07-05 | Stop reason: HOSPADM

## 2024-07-05 RX ORDER — DIPHENHYDRAMINE HYDROCHLORIDE 50 MG/ML
12.5 INJECTION INTRAMUSCULAR; INTRAVENOUS EVERY 30 MIN PRN
Status: DISCONTINUED | OUTPATIENT
Start: 2024-07-05 | End: 2024-07-05 | Stop reason: HOSPADM

## 2024-07-05 RX ORDER — PROPOFOL 10 MG/ML
VIAL (ML) INTRAVENOUS
Status: DISCONTINUED | OUTPATIENT
Start: 2024-07-05 | End: 2024-07-05

## 2024-07-05 RX ORDER — LIDOCAINE HYDROCHLORIDE 10 MG/ML
0.5 INJECTION, SOLUTION EPIDURAL; INFILTRATION; INTRACAUDAL; PERINEURAL ONCE
Status: DISCONTINUED | OUTPATIENT
Start: 2024-07-05 | End: 2024-07-05 | Stop reason: HOSPADM

## 2024-07-05 RX ORDER — HYDROCODONE BITARTRATE AND ACETAMINOPHEN 5; 325 MG/1; MG/1
1 TABLET ORAL EVERY 4 HOURS PRN
Qty: 10 TABLET | Refills: 0 | Status: SHIPPED | OUTPATIENT
Start: 2024-07-05

## 2024-07-05 RX ORDER — ROPIVACAINE HYDROCHLORIDE 5 MG/ML
INJECTION, SOLUTION EPIDURAL; INFILTRATION; PERINEURAL
Status: COMPLETED | OUTPATIENT
Start: 2024-07-05 | End: 2024-07-05

## 2024-07-05 RX ORDER — PROCHLORPERAZINE EDISYLATE 5 MG/ML
5 INJECTION INTRAMUSCULAR; INTRAVENOUS EVERY 30 MIN PRN
Status: DISCONTINUED | OUTPATIENT
Start: 2024-07-05 | End: 2024-07-05 | Stop reason: HOSPADM

## 2024-07-05 RX ADMIN — ROPIVACAINE HYDROCHLORIDE 30 ML: 5 INJECTION, SOLUTION EPIDURAL; INFILTRATION; PERINEURAL at 07:07

## 2024-07-05 RX ADMIN — LIDOCAINE HYDROCHLORIDE 25 MG: 10 INJECTION, SOLUTION INTRAVENOUS at 08:07

## 2024-07-05 RX ADMIN — SODIUM CHLORIDE, SODIUM LACTATE, POTASSIUM CHLORIDE, AND CALCIUM CHLORIDE: 600; 310; 30; 20 INJECTION, SOLUTION INTRAVENOUS at 07:07

## 2024-07-05 RX ADMIN — ONDANSETRON HYDROCHLORIDE 4 MG: 2 INJECTION INTRAMUSCULAR; INTRAVENOUS at 08:07

## 2024-07-05 RX ADMIN — FENTANYL CITRATE 50 MCG: 50 INJECTION, SOLUTION INTRAMUSCULAR; INTRAVENOUS at 07:07

## 2024-07-05 RX ADMIN — PROPOFOL 20 MG: 10 INJECTION, EMULSION INTRAVENOUS at 08:07

## 2024-07-05 RX ADMIN — PROPOFOL 50 MCG/KG/MIN: 10 INJECTION, EMULSION INTRAVENOUS at 08:07

## 2024-07-05 RX ADMIN — CEFAZOLIN 2 G: 330 INJECTION, POWDER, FOR SOLUTION INTRAMUSCULAR; INTRAVENOUS at 08:07

## 2024-07-05 NOTE — ANESTHESIA PROCEDURE NOTES
Peripheral Block    Patient location during procedure: pre-op    Reason for block: primary anesthetic    Diagnosis: Right elbow ulnar entrapment    End time: 7/5/2024 7:42 AM    Staffing  Authorizing Provider: Jalil Polk MD  Performing Provider: Jalil Polk MD    Staffing  Performed by: Jalil Polk MD  Authorized by: Jalil Polk MD    Preanesthetic Checklist  Completed: patient identified, IV checked, site marked, risks and benefits discussed, surgical consent, monitors and equipment checked, pre-op evaluation and timeout performed  Peripheral Block  Patient position: supine  Prep: ChloraPrep  Patient monitoring: heart rate, cardiac monitor, continuous pulse ox, continuous capnometry and frequent blood pressure checks  Block type: supraclavicular  Laterality: right  Injection technique: single shot  Needle  Needle type: Stimuplex   Needle gauge: 22 G  Needle length: 2 in  Needle localization: anatomical landmarks and ultrasound guidance   -ultrasound image captured on disc.  Assessment  Injection assessment: negative aspiration, negative parasthesia and local visualized surrounding nerve  Paresthesia pain: none  Heart rate change: no  Slow fractionated injection: yes  Pain Tolerance: comfortable throughout block and no complaints  Medications:    Medications: ropivacaine (NAROPIN) injection 0.5% - Perineural   30 mL - 7/5/2024 7:42:00 AM    Additional Notes  VSS.  [facility] RN monitoring vitals throughout procedure.  Patient tolerated procedure well.

## 2024-07-05 NOTE — ANESTHESIA POSTPROCEDURE EVALUATION
Anesthesia Post Evaluation    Patient: Christiano Frost    Procedure(s) Performed: Procedure(s) (LRB):  TRANSPOSITION, NERVE, ULNAR (Right)    Final Anesthesia Type: regional      Patient location during evaluation: [facility]  Patient participation: Yes- Able to Participate  Level of consciousness: awake and alert  Post-procedure vital signs: reviewed and stable  Pain management: adequate  Airway patency: patent    PONV status at discharge: No PONV  Anesthetic complications: no      Cardiovascular status: blood pressure returned to baseline  Respiratory status: unassisted  Hydration status: euvolemic  Follow-up not needed.              Vitals Value Taken Time   /74 07/05/24 0645   Temp 37.1 °C (98.8 °F) 07/05/24 0645   Pulse 60 07/05/24 0645   Resp 16 07/05/24 0645   SpO2 94 % 07/05/24 0645         No case tracking events are documented in the log.      Pain/Jame Score: No data recorded

## 2024-07-05 NOTE — PLAN OF CARE
Christiano Frost has met all discharge criteria from Phase II. Vital Signs are stable, ambulating  without difficulty. Discharge instructions given, patient verbalized understanding. Discharged from facility via wheelchair in stable condition.

## 2024-07-05 NOTE — BRIEF OP NOTE
[facility]   Operative Note     SUMMARY     Surgery Date: 7/5/2024     Surgeons and Role:     * Williams, Claude S. IV, MD - Primary    Assisting Surgeon: None    Pre-op Diagnosis:  Ulnar nerve entrapment at elbow, right [G56.21]    Post-op Diagnosis:  Post-Op Diagnosis Codes:     * Ulnar nerve entrapment at elbow, right [G56.21]    Procedure:  Right elbow ulnar nerve decompression of the cubital tunnel.  Anesthesia: General    Description of the findings of the procedure:  As above    Findings/Key Components:  As above    Procedure in detail: After proper informed consent was obtained the patient was transported the operative suite placed supine on the operating table.  Regional anesthetic was provided to the right upper extremity per the anesthesiologist without difficulty.  The right upper extremity was sterilely prepped with alcohol ChloraPrep and draped in usual sterile fashion.  Time-out was taken and the operative site was positively identified by the operative team.  The right upper extremity was exsanguinated and a well-padded upper arm tourniquet sterile was elevated to 250 mmHg.  A curvilinear incision was then made over the medial aspect of the right elbow and careful dissection was carried down through the subdermal tissue using bipolar cautery for hemostasis.  The ulnar nerve was identified just proximal to the medial epicondyle and the intermuscular septum was dissected and the nerve was fully decompressed proximally to about 10 cm.  The nerve was then traced distally and Husain's fascia was divided there was some medial anconeus epitrochlearis that was divided and the nerve was fully decompressed distally past the elbow to the flexor carpi ulnaris fascia which was decompressed.  There was some compression noted mostly just distal to the medial epicondyle.  Once the nerve was fully decompressed the elbow was taken through range of motion and it was quite stable without subluxation.   The tourniquet was deflated and meticulous hemostasis was confirmed.  The incision was then closed in layers with Monocryl and subcuticular Quill suture and Dermabond.  A sterile soft dressing was applied and a well-padded upper arm splint was applied in about 60° of flexion.  The patient was awakened in the operative suite and taken to the recovery area in stable condition he tolerated the procedure very well.  Lap instrument and needle counts were correct.  Blood loss minimal.  Complications none    Estimated Blood Loss: * No values recorded between 7/5/2024  8:18 AM and 7/5/2024  9:00 AM *         Specimens:   Specimen (24h ago, onward)      None            Discharge Note    SUMMARY     Admit Date: 7/5/2024    Discharge Date and Time:  07/05/2024 9:03 AM    Hospital Course (synopsis of major diagnoses, care, treatment, and services provided during the course of the hospital stay):  Uneventful     Final Diagnosis: Post-Op Diagnosis Codes:     * Ulnar nerve entrapment at elbow, right [G56.21]    Disposition: Home or Self Care    Follow Up/Patient Instructions:     Medications:  Reconciled Home Medications:      Medication List        START taking these medications      HYDROcodone-acetaminophen 5-325 mg per tablet  Commonly known as: NORCO  Take 1 tablet by mouth every 4 (four) hours as needed for Pain.            CONTINUE taking these medications      acetaminophen 500 MG tablet  Commonly known as: TYLENOL  Take 2 tablets (1,000 mg total) by mouth every 6 (six) hours as needed for Pain.     aspirin 81 MG EC tablet  Commonly known as: ECOTRIN  Take 81 mg by mouth once daily.     atorvastatin 40 MG tablet  Commonly known as: LIPITOR  Take 1 tablet (40 mg total) by mouth once daily.     cholecalciferol (vitamin D3) 25 mcg (1,000 unit) capsule  Commonly known as: VITAMIN D3  Take 1,000 Units by mouth once daily.     clopidogreL 75 mg tablet  Commonly known as: PLAVIX  Take 75 mg by mouth once daily.     co-enzyme Q-10  30 mg capsule  Take 1 capsule by mouth 3 (three) times daily.     dutasteride 0.5 mg capsule  Commonly known as: AVODART  Take 0.5 mg by mouth once daily.     EScitalopram oxalate 10 MG tablet  Commonly known as: LEXAPRO  Take 10 mg by mouth once daily.     Lactobacillus rhamnosus GG 10 billion cell capsule  Commonly known as: CULTURELLE  Take 1 capsule by mouth once daily.     metoprolol succinate 100 MG 24 hr tablet  Commonly known as: TOPROL-XL  Take 1 tablet (100 mg total) by mouth once daily.     omega-3 fatty acids 1,000 mg Cap  Take by mouth.     tamsulosin 0.4 mg Cap  Commonly known as: FLOMAX  0.4 mg once daily.            Discharge Procedure Orders   Diet general     Leave dressing on - Keep it clean, dry, and intact until clinic visit     Call MD for:  temperature >100.4     Call MD for:  persistent nausea and vomiting     Call MD for:  severe uncontrolled pain     Call MD for:  difficulty breathing, headache or visual disturbances     Call MD for:  redness, tenderness, or signs of infection (pain, swelling, redness, odor or green/yellow discharge around incision site)     Call MD for:  hives     Call MD for:  persistent dizziness or light-headedness     Call MD for:  extreme fatigue     Keep surgical extremity elevated     Lifting restrictions     No driving, operating heavy equipment or signing legal documents while taking pain medication      Follow-up Information       Williams, Claude S. IV, MD Follow up in 1 week(s).    Specialty: Orthopedic Surgery  Why: For suture removal, For wound re-check  Contact information:  [address]  [facility] [address]  [phone]

## 2024-07-05 NOTE — TRANSFER OF CARE
Anesthesia Transfer of Care Note    Patient: Christiano Frost    Procedure(s) Performed: Procedure(s) (LRB):  TRANSPOSITION, NERVE, ULNAR (Right)    Patient location: [facility]    Anesthesia Type: regional and MAC    Transport from OR: Transported from OR on room air with adequate spontaneous ventilation    Post pain: adequate analgesia    Post assessment: no apparent anesthetic complications    Post vital signs: stable    Level of consciousness: awake    Nausea/Vomiting: no nausea/vomiting    Complications: none    Transfer of care protocol was followed      Last vitals: Visit Vitals  /74 (BP Location: Right arm, Patient Position: Sitting)   Pulse 60   Temp 37.1 °C (98.8 °F) (Oral)   Resp 16   SpO2 (!) 94%

## 2024-07-08 VITALS
HEART RATE: 62 BPM | SYSTOLIC BLOOD PRESSURE: 131 MMHG | OXYGEN SATURATION: 95 % | DIASTOLIC BLOOD PRESSURE: 72 MMHG | RESPIRATION RATE: 16 BRPM | TEMPERATURE: 98 F

## 2024-08-26 ENCOUNTER — OFFICE VISIT (OUTPATIENT)
Dept: CARDIOLOGY | Facility: CLINIC | Age: 74
End: 2024-08-26
Payer: MEDICARE

## 2024-08-26 VITALS
DIASTOLIC BLOOD PRESSURE: 72 MMHG | WEIGHT: 235.44 LBS | HEART RATE: 69 BPM | HEIGHT: 69 IN | BODY MASS INDEX: 34.87 KG/M2 | SYSTOLIC BLOOD PRESSURE: 128 MMHG

## 2024-08-26 DIAGNOSIS — I25.10 CORONARY ARTERY DISEASE INVOLVING NATIVE CORONARY ARTERY OF NATIVE HEART WITHOUT ANGINA PECTORIS: Primary | ICD-10-CM

## 2024-08-26 DIAGNOSIS — E78.2 MIXED HYPERLIPIDEMIA: ICD-10-CM

## 2024-08-26 DIAGNOSIS — I10 ESSENTIAL HYPERTENSION: ICD-10-CM

## 2024-08-26 DIAGNOSIS — E66.01 MORBID (SEVERE) OBESITY DUE TO EXCESS CALORIES: ICD-10-CM

## 2024-08-26 DIAGNOSIS — I65.23 BILATERAL CAROTID ARTERY STENOSIS: ICD-10-CM

## 2024-08-26 DIAGNOSIS — E11.9 TYPE 2 DIABETES MELLITUS WITHOUT COMPLICATION, WITHOUT LONG-TERM CURRENT USE OF INSULIN: ICD-10-CM

## 2024-08-26 DIAGNOSIS — Z95.1 S/P CABG (CORONARY ARTERY BYPASS GRAFT): ICD-10-CM

## 2024-08-26 PROCEDURE — 3078F DIAST BP <80 MM HG: CPT | Mod: CPTII,S$GLB,, | Performed by: INTERNAL MEDICINE

## 2024-08-26 PROCEDURE — 99214 OFFICE O/P EST MOD 30 MIN: CPT | Mod: S$GLB,,, | Performed by: INTERNAL MEDICINE

## 2024-08-26 PROCEDURE — 3074F SYST BP LT 130 MM HG: CPT | Mod: CPTII,S$GLB,, | Performed by: INTERNAL MEDICINE

## 2024-08-26 PROCEDURE — 1126F AMNT PAIN NOTED NONE PRSNT: CPT | Mod: CPTII,S$GLB,, | Performed by: INTERNAL MEDICINE

## 2024-08-26 PROCEDURE — 99999 PR PBB SHADOW E&M-EST. PATIENT-LVL III: CPT | Mod: PBBFAC,,, | Performed by: INTERNAL MEDICINE

## 2024-08-26 PROCEDURE — 1159F MED LIST DOCD IN RCRD: CPT | Mod: CPTII,S$GLB,, | Performed by: INTERNAL MEDICINE

## 2024-08-26 RX ORDER — EZETIMIBE 10 MG/1
10 TABLET ORAL DAILY
Qty: 90 TABLET | Refills: 3 | Status: SHIPPED | OUTPATIENT
Start: 2024-08-26 | End: 2025-08-26

## 2024-08-26 NOTE — PROGRESS NOTES
Subjective:   08/26/2024     Patient ID:  Christiano Frost is a 73 y.o. male who presents for evaulation of No chief complaint on file.      Patient is status post coronary artery bypass grafting 2 years ago.  Has known bilateral carotid stenoses, hyperlipidemia, hypertension, recently hospitalized for quadriceps rupture right leg, repaired, will still need a knee replacement on that side.      He is not having chest pains or tightness, no PND or orthopnea.    Did have a TIA 6 months ago, was on aspirin at that time, clopidogrel added to medications, continues on dual anti-platelet therapy.    Hypercholesterolemia treated with rosuvastatin, most recent LDL 59, will add ezetimibe, LDL goal less than 55.        Prior note reviewed:  Assessment:     1.  Coronary artery disease involving native coronary artery of native heart without angina pectoris   2.  S/P CABG (coronary artery bypass graft)   3.  Thoracic aortic atherosclerosis   4.  Essential hypertension   5.  Hyperlipidemia, unspecified hyperlipidemia type   6.  Bilateral carotid artery stenosis   7.  Type 2 diabetes mellitus without complication, without long-term current use of insulin   8.  Personality change due to old stroke   9.  Shortness of breath   10.  Morbid (severe) obesity due to excess calories      Plan:  Christiano was seen today for follow-up.     Diagnoses and all orders for this visit:     Coronary artery disease involving native coronary artery of native heart without angina pectoris  -     IN OFFICE EKG 12-LEAD (to [place])     S/P CABG (coronary artery bypass graft)  -     IN OFFICE EKG 12-LEAD (to [place])     Thoracic aortic atherosclerosis  -     IN OFFICE EKG 12-LEAD (to [facility])     Essential hypertension  -     IN OFFICE EKG 12-LEAD (to [facility])     Hyperlipidemia, unspecified hyperlipidemia type  -     IN OFFICE EKG 12-LEAD (to [facility])     Bilateral carotid artery stenosis  -     IN OFFICE EKG 12-LEAD (to [facility])     Type 2 diabetes mellitus without  complication, without long-term current use of insulin  -     IN OFFICE EKG 12-LEAD (to [facility])     Personality change due to old stroke  -     IN OFFICE EKG 12-LEAD (to [facility])     Shortness of breath  -     IN OFFICE EKG 12-LEAD (to [place])     Morbid (severe) obesity due to excess calories  -     IN OFFICE EKG 12-LEAD (to [facility])      Pt is doing well     Same meds     Resume walking     No sugar, low carb diet     GI f/u     Follow up in about 6 months (around 8/27/2024).        Past Medical History:   Diagnosis Date    BPH (benign prostatic hyperplasia)     Depression     Diabetes mellitus     GERD (gastroesophageal reflux disease)     Hyperlipidemia     Hypertension     Obesity     Sleep apnea     wears cpap    TIA (transient ischemic attack) 0322/2016       Review of patient's allergies indicates:  No Known Allergies      Current Outpatient Medications:     acetaminophen (TYLENOL) 500 MG tablet, Take 2 tablets (1,000 mg total) by mouth every 6 (six) hours as needed for Pain., Disp: 30 tablet, Rfl: 0    aspirin (ECOTRIN) 81 MG EC tablet, Take 81 mg by mouth once daily., Disp: , Rfl:     atorvastatin (LIPITOR) 40 MG tablet, Take 1 tablet (40 mg total) by mouth once daily., Disp: 90 tablet, Rfl: 3    cholecalciferol, vitamin D3, (VITAMIN D3) 25 mcg (1,000 unit) capsule, Take 1,000 Units by mouth once daily., Disp: , Rfl:     clopidogreL (PLAVIX) 75 mg tablet, Take 75 mg by mouth once daily., Disp: , Rfl:     co-enzyme Q-10 30 mg capsule, Take 1 capsule by mouth 3 (three) times daily., Disp: , Rfl:     dutasteride (AVODART) 0.5 mg capsule, Take 0.5 mg by mouth once daily., Disp: , Rfl:     EScitalopram oxalate (LEXAPRO) 10 MG tablet, Take 10 mg by mouth once daily., Disp: , Rfl:     Lactobacillus rhamnosus GG (CULTURELLE) 10 billion cell capsule, Take 1 capsule by mouth once daily., Disp: , Rfl:     metoprolol succinate (TOPROL-XL) 100 MG 24 hr tablet, Take 1 tablet (100 mg total) by mouth once daily., Disp: 90 tablet,  Rfl: 3    omega-3 fatty acids 1,000 mg Cap, Take by mouth., Disp: , Rfl:     tamsulosin (FLOMAX) 0.4 mg Cp24, 0.4 mg once daily. , Disp: , Rfl:     ezetimibe (ZETIA) 10 mg tablet, Take 1 tablet (10 mg total) by mouth once daily., Disp: 90 tablet, Rfl: 3    HYDROcodone-acetaminophen (NORCO) 5-325 mg per tablet, Take 1 tablet by mouth every 4 (four) hours as needed for Pain. (Patient not taking: Reported on 8/26/2024), Disp: 10 tablet, Rfl: 0     Objective:   Review of Systems   Cardiovascular:  Negative for chest pain, claudication, cyanosis, dyspnea on exertion, irregular heartbeat, leg swelling, near-syncope, orthopnea, palpitations, paroxysmal nocturnal dyspnea and syncope.         Vitals:    08/26/24 1415   BP: 128/72   Pulse: 69     Wt Readings from Last 3 Encounters:   08/26/24 106.8 kg (235 lb 7.2 oz)   06/26/24 110.7 kg (244 lb)   02/27/24 110.7 kg (244 lb 0.8 oz)     Temp Readings from Last 3 Encounters:   07/05/24 98 °F (36.7 °C) (Oral)   06/26/24 97.2 °F (36.2 °C) (Skin)   08/03/22 98.2 °F (36.8 °C) (Oral)     BP Readings from Last 3 Encounters:   08/26/24 128/72   07/05/24 131/72   06/26/24 135/72     Pulse Readings from Last 3 Encounters:   08/26/24 69   07/05/24 62   06/26/24 74             Physical Exam  Vitals reviewed.   Constitutional:       General: He is not in acute distress.     Appearance: He is well-developed.   HENT:      Head: Normocephalic and atraumatic.      Nose: Nose normal.   Eyes:      Conjunctiva/sclera: Conjunctivae normal.      Pupils: Pupils are equal, round, and reactive to light.   Neck:      Vascular: No carotid bruit or JVD.   Cardiovascular:      Rate and Rhythm: Normal rate and regular rhythm.      Pulses: Intact distal pulses.      Heart sounds: Normal heart sounds. No murmur heard.     No friction rub. No gallop.   Pulmonary:      Effort: Pulmonary effort is normal. No respiratory distress.      Breath sounds: Normal breath sounds. No wheezing or rales.   Chest:      Chest  wall: No tenderness.   Abdominal:      General: Bowel sounds are normal. There is no distension.      Palpations: Abdomen is soft.      Tenderness: There is no abdominal tenderness.   Musculoskeletal:         General: No tenderness or deformity. Normal range of motion.      Cervical back: Normal range of motion and neck supple.      Right lower leg: No edema.      Left lower leg: No edema.   Skin:     General: Skin is warm and dry.      Findings: No erythema or rash.   Neurological:      Mental Status: He is alert and oriented to person, place, and time.      Cranial Nerves: No cranial nerve deficit.      Motor: No abnormal muscle tone.      Coordination: Coordination normal.   Psychiatric:         Behavior: Behavior normal.         Thought Content: Thought content normal.         Judgment: Judgment normal.           Lab Results   Component Value Date    CHOL 126 02/13/2023    CHOL 129 11/01/2022     Lab Results   Component Value Date    HDL 52 02/13/2023    HDL 61 11/01/2022     Lab Results   Component Value Date    LDLCALC 59.4 (L) 02/13/2023    LDLCALC 47.0 (L) 11/01/2022     Lab Results   Component Value Date    ALT 15 04/02/2024    AST 15 04/02/2024    AST 13 09/14/2022    AST 37 08/03/2022     Lab Results   Component Value Date    CREATININE 1.01 04/02/2024    BUN 19 04/02/2024     04/02/2024    K 4.2 07/13/2024    CO2 30 04/02/2024    CO2 27 09/14/2022    CO2 25 08/03/2022     Lab Results   Component Value Date    HGB 15.4 06/26/2024    HCT 46.8 06/26/2024    HCT 45.8 02/13/2023    HCT 41.2 11/01/2022                         Assessment and Plan:     Coronary artery disease involving native coronary artery of native heart without angina pectoris  Comments:  Currently asymptomatic  Orders:  -     Echo; Future; Expected date: 02/26/2025    Mixed hyperlipidemia  Comments:  LDL goal less than 50, add ezetimibe to atorvastatin 40  Orders:  -     ezetimibe (ZETIA) 10 mg tablet; Take 1 tablet (10 mg total) by  mouth once daily.  Dispense: 90 tablet; Refill: 3    Essential hypertension  Comments:  Well controlled  Orders:  -     Echo; Future; Expected date: 02/26/2025    Bilateral carotid artery stenosis  Comments:  Continue dual anti-platelet therapy after TIA per PCP    S/P CABG (coronary artery bypass graft)    Morbid (severe) obesity due to excess calories  Comments:  See after visit summary    Type 2 diabetes mellitus without complication, without long-term current use of insulin         Follow up in about 6 months (around 2/26/2025).          No future appointments.

## 2024-08-26 NOTE — PATIENT INSTRUCTIONS
"I recommend the book, "The Obesity Code" for weight loss; it recommends intermittent fasting and avoidance of sugar, artificial sweeteners and refined carbohydrates.    Also, here is information on a Mediterranean type diet including fish, the pesco-mediterranean diet from the American College of Cardiology:    1.  Humans are evolutionarily adapted to obtain calories and nutrients from both plant and animal food sources. Many people overconsume animal products, often-processed meats high in saturated fats and chemical additives. In contrast, while strict veganism has gained popularity for many reasons and has value in certain groups, it can cause nutritional deficiencies (vitamin B12, high-quality proteins, iron, zinc, omega-3 fatty acid, vitamin D, and calcium), and predispose to osteopenia, loss of muscle mass, and anemia. This is not true of a lacto-ovo vegetarian diet, which allows no animal-based food except for eggs and dairy. A 6-year study of [id number] [place] [employer] reported a decreased incidence of all-cause mortality when comparing vegetarians with nonvegetarians. However, when the vegetarians were stratified into vegans, lacto-ovo vegetarians, pesco-vegetarians, and semi-vegetarians, the pesco-vegetarians had lowest risks for all-cause mortality, cardiovascular disease (CVD) mortality, and mortality from other causes.     2.  The authors propose a plant-rich diet rich in nuts with fish and seafood as the principle source of animal food. Known as the Pesco-Mediterranean diet, it is supplemented with extra-virgin olive oil (EVOO), which is the principle fat source, along with moderate amounts of dairy (particularly yogurt and cheese) and eggs, as well as modest amounts of alcohol consumption (ideally red wine with the evening meal), but few red and processed meats.     3.  Both epidemiological studies and randomized clinical trials indicate that the traditional Mediterranean diet is associated with " lower risks for all-cause and CVD mortality, coronary heart disease, metabolic syndrome, diabetes, cognitive decline, neurodegenerative diseases (including Alzheimers), depression, overall cancer mortality, and breast and colorectal cancers.     4.  The traditional Mediterranean diet has been endorsed in the most recent Dietary Guidelines for Americans and the American College of Cardiology/American Heart Association guidelines. The 2020 [employer] World Report ranked it #1 for overall health based upon it being nutritious, safe, relatively easy to follow, protective against CVD and diabetes, and effective for weight loss.     5.  Fish and seafood are important sources of vitamins protein and omega-3 fatty acids, of which the higher blood and adipose tissue are associated with reduced fatal and nonfatal myocardial infarction. When not fried, fish consumption has been associated with reduced risk of heart failure, and the incidence of the metabolic syndrome, coronary heart disease, ischemic stroke, and sudden cardiac death, particularly when seafood replaces less healthy foods.     6.  Unrestricted use of olive oil in the kitchen, on salads (with vinegar), cooking vegetables, and at the table is the foundation of the traditional Mediterranean diet, although olive oil quality is crucial, which makes it expensive. EVOO retains hydrophilic components of olives including highly bioactive polyphenols, which are believed to underlie many of EVOOs cardiometabolic benefits, such as reduced low-density lipoprotein cholesterol (LDL-C) and increased high-density lipoprotein cholesterol (HDL-C), improved vascular reactivity, enhanced HDL particle functionality, and a lower diabetes risk.     7.  Tree nuts, an integral component of the traditional Mediterranean diet, are nutrient dense rich in unsaturated fats, fiber, protein, polyphenols, phytosterols, and tocopherols. Nut consumption is associated with decreased incidence  and mortality rates from both CVD and coronary artery disease (CAD), as well as atrial fibrillation and diabetes. Randomized controlled trials have shown that diets enriched with nuts produce cardiometabolic benefits including improvements in insulin sensitivity, LDL-C, inflammation, and vascular reactivity. A 1-daily serving of mixed nuts resulted in a 28% reduction in CVD risk. Generous intake of nuts does not promote weight gain because of increased satiety and incomplete digestion.     8.  Legumes are an excellent source of vegetable protein, folate, and magnesium and fiber, and like other seeds including peanuts, are rich in polyphenols. Consumption of legumes has been linked to a reduced risk of incident and fatal CVD and CAD, as well as improvements in blood glucose, cholesterol, blood pressure, and body weight. Legumes, like fish, are a satiating and healthy substitute for red meat and processed meats.     9.  Dairy products and eggs are important sources of protein, nonsodium minerals, probiotics, and vitamin D. Although there is no clear consensus among nutrition experts on the role of dairy products in CVD risk, they are allowed in this Pesco-Mediterranean diet. Fermented low-fat versions, such as yogurt and soft cheeses, are preferred; butter and hard cheese are high in saturated fats and salt.     10.  Eggs are composed of beneficial nutrients including all essential amino acids, in addition to minerals (selenium, phosphorus, iodine, zinc), vitamins (A, D, B2, B12, niacin), and carotenoids (lutein, zeaxanthin). Although each yolk contains about 184 mg of dietary cholesterol, large prospective cohorts suggest that egg consumption is unrelated to serum cholesterol and does not increase CVD risk. Eggs are allowed in the Pesco-Mediterranean diet; egg whites are unlimited and preferably no more than 5 yolks/week.     11.  Whole grains, such as barley, whole oats, rye, corn, buckwheat, brown rice, and quinoa,  are an integral part of the traditional Mediterranean diet. Pasta is an example of a starchy food that has a low glycemic index despite being a refined carbohydrate. In the context of a low glycemic index dietary pattern such as the Mediterranean diet, pasta does not adversely affect adiposity and may even help reduce body weight and there is no evidence that pasta promotes cardiometabolic risk factors. White rice is associated with type 2 diabetes mellitus in Asians but not in Western cohorts, possibly because it is cooked and served plain in [place] and in Western cultures cooked in mixed dishes with vegetables and vegetable oil including EVOO.     12.  The staple beverage of the Pesco-Mediterranean diet is water--which can be flavored but not sweetened. Unsweetened tea and coffee are rich in antioxidants and are associated with improved CVD outcomes. If alcohol is consumed at all, dry red wine is recommended, with the ideal amount being a single glass (6 oz) for women and 1 or 2 glasses/day for men (6-12 oz) consumed with meals.     13.  Time-restricted eating, a type of intermittent fasting, is the practice of limiting the daily intake of calories to a window of time usually between 6-12 hours each day. Intermittent fasting when done on a regular basis has been shown to decrease intra-abdominal adipose tissue and reduce free-radical production. This elicits powerful cellular responses that improve glucose metabolism and reduce systemic inflammation, and may also reduce risks of diabetes, CVD, cancer, and neurodegenerative diseases. After a 12-hour overnight fast, insulin levels are typically low, and glycogen stores have been depleted. In this fasted state, the body starts mobilizing fatty acids from adipose cells to burn as metabolic fuel instead of glucose. This improves insulin sensitivity. Time-restricted eating is not more effective for weight loss than standard calorie-restriction, but appears to enhance CV  health even in nonobese people. Fasting may also lower blood pressure and resting heart rate and improve autonomic balance with augmented heart rate variability.     14.  The evidence regarding time-restricted eating is mostly based on animal models and observational human studies. The most popular form of time-restricted eating involves eating two rather than three meals and compressing the calorie-consumption window. No head-to-head studies have been performed to assess the optimal time window.

## 2025-02-25 ENCOUNTER — OFFICE VISIT (OUTPATIENT)
Dept: CARDIOLOGY | Facility: CLINIC | Age: 75
End: 2025-02-25
Payer: MEDICARE

## 2025-02-25 VITALS
BODY MASS INDEX: 36.63 KG/M2 | SYSTOLIC BLOOD PRESSURE: 151 MMHG | DIASTOLIC BLOOD PRESSURE: 83 MMHG | HEART RATE: 66 BPM | WEIGHT: 248 LBS

## 2025-02-25 DIAGNOSIS — I65.23 BILATERAL CAROTID ARTERY STENOSIS: ICD-10-CM

## 2025-02-25 DIAGNOSIS — E11.9 TYPE 2 DIABETES MELLITUS WITHOUT COMPLICATION, WITHOUT LONG-TERM CURRENT USE OF INSULIN: ICD-10-CM

## 2025-02-25 DIAGNOSIS — E66.01 MORBID (SEVERE) OBESITY DUE TO EXCESS CALORIES: ICD-10-CM

## 2025-02-25 DIAGNOSIS — I67.9 CVD (CEREBROVASCULAR DISEASE): ICD-10-CM

## 2025-02-25 DIAGNOSIS — E78.2 MIXED HYPERLIPIDEMIA: Primary | ICD-10-CM

## 2025-02-25 DIAGNOSIS — I63.00 CEREBROVASCULAR ACCIDENT (CVA) DUE TO THROMBOSIS OF PRECEREBRAL ARTERY: ICD-10-CM

## 2025-02-25 DIAGNOSIS — I25.10 CORONARY ARTERY DISEASE INVOLVING NATIVE CORONARY ARTERY OF NATIVE HEART WITHOUT ANGINA PECTORIS: ICD-10-CM

## 2025-02-25 DIAGNOSIS — I10 ESSENTIAL HYPERTENSION: ICD-10-CM

## 2025-02-25 DIAGNOSIS — Z95.1 S/P CABG (CORONARY ARTERY BYPASS GRAFT): ICD-10-CM

## 2025-02-25 PROCEDURE — 99999 PR PBB SHADOW E&M-EST. PATIENT-LVL III: CPT | Mod: PBBFAC,,, | Performed by: INTERNAL MEDICINE

## 2025-02-25 PROCEDURE — 93005 ELECTROCARDIOGRAM TRACING: CPT

## 2025-02-25 RX ORDER — LOSARTAN POTASSIUM 50 MG/1
50 TABLET ORAL DAILY
Qty: 90 TABLET | Refills: 3 | Status: SHIPPED | OUTPATIENT
Start: 2025-02-25 | End: 2026-02-25

## 2025-02-25 NOTE — PROGRESS NOTES
Subjective:   02/25/2025     Patient ID:  Christiano Frost is a 74 y.o. male who presents for evaulation of Follow-up (6 month follow up)      Patient is status post coronary artery bypass grafting 3 years ago.  Has known bilateral carotid stenoses, hyperlipidemia, hypertension, recently hospitalized for quadriceps rupture right leg, repaired, will still need a knee replacement on that side.      He is not having chest pains or tightness, no PND or orthopnea.    Did have a TIA 1 year ago, was on aspirin at that time, clopidogrel added to medications, continues on dual anti-platelet therapy.    Hypercholesterolemia treated with rosuvastatin 20 and ezetimibe 10.  Will reassess lipids LDL goal less than 55, Apolipoprotein B less than 60.    Blood pressure appears poorly controlled.  Add losartan.    Bypass surgery:   Date of Operation:  07/29/2022   Left internal mammary artery to the distal left anterior descending artery  Saphenous vein graft to the second obtuse marginal artery  Saphenous vein graft to the posterior descending artery  Endoscopic saphenous vein harvest from the left lower extremity       Prior note reviewed:    Coronary artery disease involving native coronary artery of native heart without angina pectoris  Comments:  Currently asymptomatic  Orders:  -     Echo; Future; Expected date: 02/26/2025     Mixed hyperlipidemia  Comments:  LDL goal less than 50, add ezetimibe to atorvastatin 40  Orders:  -     ezetimibe (ZETIA) 10 mg tablet; Take 1 tablet (10 mg total) by mouth once daily.  Dispense: 90 tablet; Refill: 3     Essential hypertension  Comments:  Well controlled  Orders:  -     Echo; Future; Expected date: 02/26/2025     Bilateral carotid artery stenosis  Comments:  Continue dual anti-platelet therapy after TIA per PCP     S/P CABG (coronary artery bypass graft)     Morbid (severe) obesity due to excess calories  Comments:  See after visit summary     Type 2 diabetes mellitus without  complication, without long-term current use of insulin           Follow up in about 6 months (around 2/26/2025).           Past Medical History:   Diagnosis Date    BPH (benign prostatic hyperplasia)     Depression     Diabetes mellitus     GERD (gastroesophageal reflux disease)     Hyperlipidemia     Hypertension     Obesity     Sleep apnea     wears cpap    TIA (transient ischemic attack) 0322/2016       Review of patient's allergies indicates:  No Known Allergies      Current Outpatient Medications:     acetaminophen (TYLENOL) 500 MG tablet, Take 2 tablets (1,000 mg total) by mouth every 6 (six) hours as needed for Pain., Disp: 30 tablet, Rfl: 0    aspirin (ECOTRIN) 81 MG EC tablet, Take 81 mg by mouth once daily., Disp: , Rfl:     atorvastatin (LIPITOR) 40 MG tablet, Take 1 tablet (40 mg total) by mouth once daily., Disp: 90 tablet, Rfl: 3    cholecalciferol, vitamin D3, (VITAMIN D3) 25 mcg (1,000 unit) capsule, Take 1,000 Units by mouth once daily., Disp: , Rfl:     clopidogreL (PLAVIX) 75 mg tablet, Take 75 mg by mouth once daily., Disp: , Rfl:     co-enzyme Q-10 30 mg capsule, Take 1 capsule by mouth 3 (three) times daily., Disp: , Rfl:     dutasteride (AVODART) 0.5 mg capsule, Take 0.5 mg by mouth once daily., Disp: , Rfl:     EScitalopram oxalate (LEXAPRO) 10 MG tablet, Take 10 mg by mouth once daily., Disp: , Rfl:     ezetimibe (ZETIA) 10 mg tablet, Take 1 tablet (10 mg total) by mouth once daily., Disp: 90 tablet, Rfl: 3    Lactobacillus rhamnosus GG (CULTURELLE) 10 billion cell capsule, Take 1 capsule by mouth once daily., Disp: , Rfl:     metoprolol succinate (TOPROL-XL) 100 MG 24 hr tablet, Take 1 tablet (100 mg total) by mouth once daily., Disp: 90 tablet, Rfl: 3    omega-3 fatty acids 1,000 mg Cap, Take by mouth., Disp: , Rfl:     tamsulosin (FLOMAX) 0.4 mg Cp24, 0.4 mg once daily. , Disp: , Rfl:     losartan (COZAAR) 50 MG tablet, Take 1 tablet (50 mg total) by mouth once daily., Disp: 90 tablet,  Rfl: 3     Objective:   Review of Systems   Cardiovascular:  Positive for dyspnea on exertion. Negative for chest pain, claudication, cyanosis, irregular heartbeat, leg swelling, near-syncope, orthopnea, palpitations, paroxysmal nocturnal dyspnea and syncope.         Vitals:    02/25/25 1122   BP: (!) 151/83   Pulse: 66     Wt Readings from Last 3 Encounters:   02/25/25 112.5 kg (248 lb 0.3 oz)   08/26/24 106.8 kg (235 lb 7.2 oz)   06/26/24 110.7 kg (244 lb)     Temp Readings from Last 3 Encounters:   07/05/24 98 °F (36.7 °C) (Oral)   06/26/24 97.2 °F (36.2 °C) (Skin)   08/03/22 98.2 °F (36.8 °C) (Oral)     BP Readings from Last 3 Encounters:   02/25/25 (!) 151/83   08/26/24 128/72   07/05/24 131/72     Pulse Readings from Last 3 Encounters:   02/25/25 66   08/26/24 69   07/05/24 62             Physical Exam  Vitals reviewed.   Constitutional:       General: He is not in acute distress.     Appearance: He is well-developed.   HENT:      Head: Normocephalic and atraumatic.      Nose: Nose normal.   Eyes:      Conjunctiva/sclera: Conjunctivae normal.      Pupils: Pupils are equal, round, and reactive to light.   Neck:      Vascular: No carotid bruit or JVD.   Cardiovascular:      Rate and Rhythm: Normal rate and regular rhythm.      Pulses: Intact distal pulses.      Heart sounds: Normal heart sounds. No murmur heard.     No friction rub. No gallop.   Pulmonary:      Effort: Pulmonary effort is normal. No respiratory distress.      Breath sounds: Normal breath sounds. No wheezing or rales.   Chest:      Chest wall: No tenderness.   Abdominal:      General: Bowel sounds are normal. There is no distension.      Palpations: Abdomen is soft.      Tenderness: There is no abdominal tenderness.   Musculoskeletal:         General: No tenderness or deformity. Normal range of motion.      Cervical back: Normal range of motion and neck supple.      Right lower leg: No edema.      Left lower leg: No edema.   Skin:     General:  Skin is warm and dry.      Findings: No erythema or rash.   Neurological:      Mental Status: He is alert and oriented to person, place, and time.      Cranial Nerves: No cranial nerve deficit.      Motor: No abnormal muscle tone.      Coordination: Coordination normal.   Psychiatric:         Behavior: Behavior normal.         Thought Content: Thought content normal.         Judgment: Judgment normal.           Lab Results   Component Value Date    CHOL 126 02/13/2023    CHOL 129 11/01/2022     Lab Results   Component Value Date    HDL 52 02/13/2023    HDL 61 11/01/2022     Lab Results   Component Value Date    LDLCALC 59.4 (L) 02/13/2023    LDLCALC 47.0 (L) 11/01/2022     Lab Results   Component Value Date    ALT 12 10/14/2024    AST 17 10/14/2024    AST 15 04/02/2024    AST 13 09/14/2022     Lab Results   Component Value Date    CREATININE 0.86 10/14/2024    BUN 18 10/14/2024     10/14/2024    K 4.3 10/14/2024    CO2 26 10/14/2024    CO2 25 07/13/2024    CO2 30 04/02/2024     Lab Results   Component Value Date    HGB 15.4 06/26/2024    HCT 46.8 06/26/2024    HCT 45.8 02/13/2023    HCT 41.2 11/01/2022                 EKG shows sinus rhythm with first-degree AV block and otherwise normal.        Assessment and Plan:     Mixed hyperlipidemia  Comments:  On high-dose statin plus ezetimibe, check lipids  Orders:  -     Comprehensive Metabolic Panel; Future; Expected date: 03/04/2025  -     Lipoprotein A (LPA); Future; Expected date: 03/04/2025  -     Lipid Panel; Future; Expected date: 03/04/2025  -     Apolipoprotein B; Future; Expected date: 02/26/2025    Coronary artery disease involving native coronary artery of native heart without angina pectoris  Comments:  Remains asymptomatic    Bilateral carotid artery stenosis  Comments:  History of stroke   Remains asymptomatic    S/P CABG (coronary artery bypass graft)    Type 2 diabetes mellitus without complication, without long-term current use of  insulin    Cerebrovascular accident (CVA) due to thrombosis of precerebral artery  Comments:  Continue dual anti-platelet therapy    CVD (cerebrovascular disease)    Essential hypertension  Comments:  Add losartan to metoprolol for optimal blood pressure control  Orders:  -     losartan (COZAAR) 50 MG tablet; Take 1 tablet (50 mg total) by mouth once daily.  Dispense: 90 tablet; Refill: 3    Morbid (severe) obesity due to excess calories  Comments:  Certainly would benefit from G LP 1 receptor agonist therapy.  Does not have an indication         Follow up in about 6 months (around 8/25/2025).          No future appointments.

## 2025-02-26 ENCOUNTER — APPOINTMENT (OUTPATIENT)
Dept: LAB | Facility: HOSPITAL | Age: 75
End: 2025-02-26
Attending: INTERNAL MEDICINE
Payer: MEDICARE

## 2025-02-27 LAB
OHS QRS DURATION: 92 MS
OHS QTC CALCULATION: 413 MS

## 2025-03-31 NOTE — RESPIRATORY THERAPY
History noted.     Recent Labs     03/29/25  1322 03/30/25  1257 03/30/25  1301 03/31/25  0552   HGB 12.6* 11.3*  --  10.2*   HCT 43.2 37.0* 39 33.9*     Plan  - Monitor serial CBC: Daily  - Transfuse PRBC if patient becomes hemodynamically unstable, symptomatic or H/H drops below 7/21.   Pt was extubated to 2L NC. Pt was stable and O2 saturation was around 96%

## 2025-05-23 DIAGNOSIS — I25.10 CORONARY ARTERY DISEASE INVOLVING NATIVE CORONARY ARTERY OF NATIVE HEART WITHOUT ANGINA PECTORIS: ICD-10-CM

## 2025-05-23 DIAGNOSIS — E78.5 HYPERLIPIDEMIA, UNSPECIFIED HYPERLIPIDEMIA TYPE: ICD-10-CM

## 2025-05-23 RX ORDER — ATORVASTATIN CALCIUM 40 MG/1
40 TABLET, FILM COATED ORAL NIGHTLY
Qty: 90 TABLET | Refills: 3 | Status: SHIPPED | OUTPATIENT
Start: 2025-05-23

## 2025-06-11 DIAGNOSIS — E78.2 MIXED HYPERLIPIDEMIA: ICD-10-CM

## 2025-06-11 RX ORDER — EZETIMIBE 10 MG/1
10 TABLET ORAL
Qty: 90 TABLET | Refills: 3 | Status: SHIPPED | OUTPATIENT
Start: 2025-06-11

## 2025-06-25 DIAGNOSIS — I10 ESSENTIAL HYPERTENSION: ICD-10-CM

## 2025-06-25 DIAGNOSIS — I65.23 BILATERAL CAROTID ARTERY STENOSIS: ICD-10-CM

## 2025-06-25 DIAGNOSIS — E78.5 HYPERLIPIDEMIA, UNSPECIFIED HYPERLIPIDEMIA TYPE: ICD-10-CM

## 2025-06-25 DIAGNOSIS — I25.10 CORONARY ARTERY DISEASE INVOLVING NATIVE CORONARY ARTERY OF NATIVE HEART WITHOUT ANGINA PECTORIS: ICD-10-CM

## 2025-06-25 RX ORDER — METOPROLOL SUCCINATE 100 MG/1
100 TABLET, EXTENDED RELEASE ORAL DAILY
Qty: 90 TABLET | Refills: 3 | Status: SHIPPED | OUTPATIENT
Start: 2025-06-25

## 2025-08-19 ENCOUNTER — OFFICE VISIT (OUTPATIENT)
Dept: CARDIOLOGY | Facility: CLINIC | Age: 75
End: 2025-08-19
Payer: MEDICARE

## 2025-08-19 VITALS
BODY MASS INDEX: 37 KG/M2 | HEART RATE: 66 BPM | HEIGHT: 69 IN | DIASTOLIC BLOOD PRESSURE: 80 MMHG | WEIGHT: 249.81 LBS | SYSTOLIC BLOOD PRESSURE: 120 MMHG

## 2025-08-19 DIAGNOSIS — I10 ESSENTIAL HYPERTENSION: ICD-10-CM

## 2025-08-19 DIAGNOSIS — E78.2 MIXED HYPERLIPIDEMIA: ICD-10-CM

## 2025-08-19 DIAGNOSIS — Z95.1 S/P CABG (CORONARY ARTERY BYPASS GRAFT): ICD-10-CM

## 2025-08-19 DIAGNOSIS — I70.0 THORACIC AORTIC ATHEROSCLEROSIS: ICD-10-CM

## 2025-08-19 DIAGNOSIS — I65.23 BILATERAL CAROTID ARTERY STENOSIS: ICD-10-CM

## 2025-08-19 DIAGNOSIS — E66.01 MORBID (SEVERE) OBESITY DUE TO EXCESS CALORIES: ICD-10-CM

## 2025-08-19 DIAGNOSIS — I25.10 CORONARY ARTERY DISEASE INVOLVING NATIVE CORONARY ARTERY OF NATIVE HEART WITHOUT ANGINA PECTORIS: Primary | ICD-10-CM

## 2025-08-19 PROCEDURE — 3288F FALL RISK ASSESSMENT DOCD: CPT | Mod: CPTII,S$GLB,, | Performed by: INTERNAL MEDICINE

## 2025-08-19 PROCEDURE — 99999 PR PBB SHADOW E&M-EST. PATIENT-LVL III: CPT | Mod: PBBFAC,,, | Performed by: INTERNAL MEDICINE

## 2025-08-19 PROCEDURE — G2211 COMPLEX E/M VISIT ADD ON: HCPCS | Mod: S$GLB,,, | Performed by: INTERNAL MEDICINE

## 2025-08-19 PROCEDURE — 1101F PT FALLS ASSESS-DOCD LE1/YR: CPT | Mod: CPTII,S$GLB,, | Performed by: INTERNAL MEDICINE

## 2025-08-19 PROCEDURE — 3074F SYST BP LT 130 MM HG: CPT | Mod: CPTII,S$GLB,, | Performed by: INTERNAL MEDICINE

## 2025-08-19 PROCEDURE — 1159F MED LIST DOCD IN RCRD: CPT | Mod: CPTII,S$GLB,, | Performed by: INTERNAL MEDICINE

## 2025-08-19 PROCEDURE — 1126F AMNT PAIN NOTED NONE PRSNT: CPT | Mod: CPTII,S$GLB,, | Performed by: INTERNAL MEDICINE

## 2025-08-19 PROCEDURE — 3079F DIAST BP 80-89 MM HG: CPT | Mod: CPTII,S$GLB,, | Performed by: INTERNAL MEDICINE

## 2025-08-19 PROCEDURE — 99214 OFFICE O/P EST MOD 30 MIN: CPT | Mod: S$GLB,,, | Performed by: INTERNAL MEDICINE

## (undated) DEVICE — STABILIZER STABLESOFT 3 FINGER

## (undated) DEVICE — DRESSING TELFA STRL 4X3 LF

## (undated) DEVICE — BANDAGE ACE ELASTIC 6"

## (undated) DEVICE — SEE MEDLINE ITEM 146292

## (undated) DEVICE — ELECTRODE REM PLYHSV RETURN 9

## (undated) DEVICE — BANDAGE ELAS SOFTWRAP ST 4X5YD

## (undated) DEVICE — PLEDGET SUT SOFT 3/8X3/16X1/16

## (undated) DEVICE — HEMOSTAT VASC BAND REG 24CM

## (undated) DEVICE — CANNULA VESSEL FREE FLOW

## (undated) DEVICE — SUT VICRYL 3-0 27 SH

## (undated) DEVICE — HEMOSTAT SURGICEL NU-KNIT 6X9

## (undated) DEVICE — APPLIER LIGACLIP SM 9.38IN

## (undated) DEVICE — Device

## (undated) DEVICE — SOL IRR SOD CHL .9% POUR

## (undated) DEVICE — NDL HYPO REG 25G X 1 1/2

## (undated) DEVICE — MARKER SKIN STND TIP BLUE BARR

## (undated) DEVICE — SYS VIRTUOSAPH PLUS EVM

## (undated) DEVICE — APPLICATOR CHLORAPREP ORN 26ML

## (undated) DEVICE — TOURNIQUET TOURNIKWIK 2 TB 6IN

## (undated) DEVICE — DRAPE CVMAX SPLIT ANES SCRN

## (undated) DEVICE — TUBING HF INSUFFLATION W/ FLTR

## (undated) DEVICE — KIT URINARY CATH URINE METER

## (undated) DEVICE — PUNCH AORTIC 4.8MM

## (undated) DEVICE — KIT SAHARA DRAPE DRAW/LIFT

## (undated) DEVICE — ALCOHOL ISOPROPYL BLU 70% 16OZ

## (undated) DEVICE — BLADE 4IN EDGE INSULATED

## (undated) DEVICE — BLOWER MISTER

## (undated) DEVICE — GUIDEWIRE WHOLEY STD STR 260CM

## (undated) DEVICE — BANDAGE ELAS SOFTWRAP ST 6X5YD

## (undated) DEVICE — SUT MONOCRYL 4-0 PS-1 UND

## (undated) DEVICE — SYR B-D DISP CONTROL 10CC100/C

## (undated) DEVICE — PAD RADIOLUCENT STAT ADULT

## (undated) DEVICE — SUT VICRYL CTD 2-0 GI 27 SH

## (undated) DEVICE — SUT 6 18IN STEEL MONO CCS

## (undated) DEVICE — DRESSING XEROFORM NONADH 1X8IN

## (undated) DEVICE — SUT SILK 2-0 SH 18IN BLACK

## (undated) DEVICE — NDL BOX COUNTER

## (undated) DEVICE — SUT 2/0 30IN ETHIBOND

## (undated) DEVICE — BLADE SAW STERN .076MM 6.1MM L

## (undated) DEVICE — SUT 2/0 30IN SILK BLK BRAI

## (undated) DEVICE — SLING ARM LARGE FOAM STRAP

## (undated) DEVICE — TAPE SURG MEDIPORE 6X72IN

## (undated) DEVICE — DRESSING TRANS 4X4 TEGADERM

## (undated) DEVICE — PAD K-THERMIA 24IN X 60IN

## (undated) DEVICE — TRAY HEART OMC

## (undated) DEVICE — WRAP COHES MULTCLR NS 3INX5YD

## (undated) DEVICE — SYR 50CC LL

## (undated) DEVICE — DRAPE SLUSH WARMER WITH DISC

## (undated) DEVICE — CATH IMPULSE 5FR PIGTAIL 125CM

## (undated) DEVICE — KIT LEFT HEART MANIFOLD CUSTOM

## (undated) DEVICE — KIT PROBE COVER WITH GEL

## (undated) DEVICE — SUT MCRYL PLUS 4-0 PS2 27IN

## (undated) DEVICE — GLOVE BIOGEL SKINSENSE PI 7.5

## (undated) DEVICE — VALVE CONTROL COPILOT

## (undated) DEVICE — CORD BIPOLAR 12 FOOT

## (undated) DEVICE — COVERS PROBE NR-48 STERILE

## (undated) DEVICE — INSERTS STEALTH FIBRA SIZE 5

## (undated) DEVICE — GUIDEWIRE RUNTHROUGH EF 180CM

## (undated) DEVICE — BUCKET PLASTER DISPOSABLE

## (undated) DEVICE — TUBING HPCIL ROT M/F ADPT 48IN

## (undated) DEVICE — CLIP SPRING 6MM

## (undated) DEVICE — SUT 4-0 12-18IN SILK BLACK

## (undated) DEVICE — SUT 6/0 4-24IN PROLENE

## (undated) DEVICE — CATH RADIAL TIG 6FR 4.0 110CM

## (undated) DEVICE — SEE MEDLINE ITEM 157187

## (undated) DEVICE — SUT PROLENE 4-0 MONO 18IN

## (undated) DEVICE — VISE RADIFOCUS MULTI TORQUE

## (undated) DEVICE — SET DECANTER MEDICHOICE

## (undated) DEVICE — SPLINT PLASTER FAST SET 5X30IN

## (undated) DEVICE — BLADE ELECTRO EDGE INSULATED

## (undated) DEVICE — RETRACTOR OCTOBASE INSERT HOLD

## (undated) DEVICE — KIT GLIDESHEATH SLEND 6FR 10CM

## (undated) DEVICE — GOWN POLY REINF BRTH SLV XL

## (undated) DEVICE — LOOP VESSEL MAXI RED

## (undated) DEVICE — TOURNIQUET SB QC DP 18X4IN

## (undated) DEVICE — BLADE SURG STAINLESS STEEL #15

## (undated) DEVICE — PAD DEFIB CADENCE ADULT R2

## (undated) DEVICE — COVER PROBE US 5.5X58L NON LTX

## (undated) DEVICE — PACK UPPER EXTREMITY BAPTIST

## (undated) DEVICE — DRESSING ADH ISLAND 3.6 X 14

## (undated) DEVICE — ADHESIVE DERMABOND ADVANCED

## (undated) DEVICE — SUT SILK BLK BR. 2 2-60

## (undated) DEVICE — SYR ONLY LUER LOCK 20CC

## (undated) DEVICE — GAUZE SPONGE 4X4 12PLY

## (undated) DEVICE — SUT PROLENE 4-0 RB-1 BL MO

## (undated) DEVICE — CONNECTOR Y 3/8X3/8X3/8

## (undated) DEVICE — TIP YANKAUERS BULB NO VENT

## (undated) DEVICE — NDL 18GA X1 1/2 REG BEVEL

## (undated) DEVICE — TRANSDUCER TRU WAVE

## (undated) DEVICE — GAUZE SPONGE 4X4 8 PLY NS

## (undated) DEVICE — UNDERPAD ULTRASORB 300LB 30X36

## (undated) DEVICE — SUT PROLENE 4-0 SH BLU 36IN

## (undated) DEVICE — TOWEL OR DISP STRL BLUE 4/PK

## (undated) DEVICE — WIRE INTRAMYOCARDIAL TEMP

## (undated) DEVICE — DRAIN CHANNEL ROUND 19FR

## (undated) DEVICE — GLOVE BIOGEL PI MICRO SZ 7.5

## (undated) DEVICE — UNDERGLOVES BIOGEL PI SIZE 8

## (undated) DEVICE — GUIDE LAUNCHER 6FR EBU 3.75

## (undated) DEVICE — APPLIER CLIP LIAGCLIP 9.375IN

## (undated) DEVICE — SUT CTD VICRYL 0 UND CR/CTX

## (undated) DEVICE — KIT INTRODUCER W/GUIDEWIRE

## (undated) DEVICE — DRESSING TELFA N ADH 3X8

## (undated) DEVICE — SPONGE COTTON TRAY 4X4IN

## (undated) DEVICE — SUT PROLENE 7-0 BV-1 30

## (undated) DEVICE — BLADE SAW STERNAL 5/BX

## (undated) DEVICE — DRAIN CHEST DRY SUCTION

## (undated) DEVICE — DRESSING AQUACEL SACRAL 9 X 9